# Patient Record
Sex: MALE | Race: WHITE | NOT HISPANIC OR LATINO | Employment: OTHER | URBAN - METROPOLITAN AREA
[De-identification: names, ages, dates, MRNs, and addresses within clinical notes are randomized per-mention and may not be internally consistent; named-entity substitution may affect disease eponyms.]

---

## 2017-01-12 ENCOUNTER — OFFICE VISIT (OUTPATIENT)
Dept: FAMILY MEDICINE | Facility: CLINIC | Age: 82
End: 2017-01-12
Payer: MEDICARE

## 2017-01-12 VITALS
HEIGHT: 65 IN | SYSTOLIC BLOOD PRESSURE: 138 MMHG | WEIGHT: 194.44 LBS | HEART RATE: 76 BPM | BODY MASS INDEX: 32.4 KG/M2 | DIASTOLIC BLOOD PRESSURE: 82 MMHG | RESPIRATION RATE: 12 BRPM

## 2017-01-12 DIAGNOSIS — Z28.39 IMMUNIZATION DEFICIENCY: ICD-10-CM

## 2017-01-12 DIAGNOSIS — N40.0 BENIGN NODULAR PROSTATIC HYPERPLASIA WITHOUT LOWER URINARY TRACT SYMPTOMS: ICD-10-CM

## 2017-01-12 DIAGNOSIS — G47.33 OSA (OBSTRUCTIVE SLEEP APNEA): ICD-10-CM

## 2017-01-12 DIAGNOSIS — I25.10 ASCVD (ARTERIOSCLEROTIC CARDIOVASCULAR DISEASE): ICD-10-CM

## 2017-01-12 DIAGNOSIS — G30.9 DEMENTIA IN ALZHEIMER'S DISEASE: ICD-10-CM

## 2017-01-12 DIAGNOSIS — Z74.09 IMPAIRED MOBILITY AND ACTIVITIES OF DAILY LIVING: ICD-10-CM

## 2017-01-12 DIAGNOSIS — F51.01 PRIMARY INSOMNIA: ICD-10-CM

## 2017-01-12 DIAGNOSIS — E55.9 VITAMIN D INSUFFICIENCY: ICD-10-CM

## 2017-01-12 DIAGNOSIS — Z78.9 IMPAIRED MOBILITY AND ACTIVITIES OF DAILY LIVING: ICD-10-CM

## 2017-01-12 DIAGNOSIS — M15.9 PRIMARY OSTEOARTHRITIS INVOLVING MULTIPLE JOINTS: Primary | ICD-10-CM

## 2017-01-12 DIAGNOSIS — F02.80 DEMENTIA IN ALZHEIMER'S DISEASE: ICD-10-CM

## 2017-01-12 DIAGNOSIS — Z12.5 SCREENING FOR PROSTATE CANCER: ICD-10-CM

## 2017-01-12 LAB — COMPLEXED PSA SERPL-MCNC: 0.75 NG/ML

## 2017-01-12 PROCEDURE — 84153 ASSAY OF PSA TOTAL: CPT

## 2017-01-12 PROCEDURE — G0009 ADMIN PNEUMOCOCCAL VACCINE: HCPCS | Mod: S$GLB,,, | Performed by: FAMILY MEDICINE

## 2017-01-12 PROCEDURE — 1160F RVW MEDS BY RX/DR IN RCRD: CPT | Mod: S$GLB,,, | Performed by: FAMILY MEDICINE

## 2017-01-12 PROCEDURE — 1157F ADVNC CARE PLAN IN RCRD: CPT | Mod: S$GLB,,, | Performed by: FAMILY MEDICINE

## 2017-01-12 PROCEDURE — 99499 UNLISTED E&M SERVICE: CPT | Mod: S$GLB,,, | Performed by: FAMILY MEDICINE

## 2017-01-12 PROCEDURE — 90670 PCV13 VACCINE IM: CPT | Mod: S$GLB,,, | Performed by: FAMILY MEDICINE

## 2017-01-12 PROCEDURE — 82306 VITAMIN D 25 HYDROXY: CPT

## 2017-01-12 PROCEDURE — 99999 PR PBB SHADOW E&M-EST. PATIENT-LVL III: CPT | Mod: PBBFAC,,, | Performed by: FAMILY MEDICINE

## 2017-01-12 PROCEDURE — 99213 OFFICE O/P EST LOW 20 MIN: CPT | Mod: 25,S$GLB,, | Performed by: FAMILY MEDICINE

## 2017-01-12 PROCEDURE — 1159F MED LIST DOCD IN RCRD: CPT | Mod: S$GLB,,, | Performed by: FAMILY MEDICINE

## 2017-01-12 PROCEDURE — 1126F AMNT PAIN NOTED NONE PRSNT: CPT | Mod: S$GLB,,, | Performed by: FAMILY MEDICINE

## 2017-01-12 RX ORDER — NITROGLYCERIN 0.4 MG/1
0.4 TABLET SUBLINGUAL EVERY 5 MIN PRN
Refills: 2 | COMMUNITY
Start: 2016-10-05

## 2017-01-12 RX ORDER — SACUBITRIL AND VALSARTAN 24; 26 MG/1; MG/1
TABLET, FILM COATED ORAL
Refills: 5 | COMMUNITY
Start: 2017-01-02 | End: 2018-08-09 | Stop reason: ALTCHOICE

## 2017-01-12 RX ORDER — CEPHALEXIN 500 MG/1
CAPSULE ORAL
Refills: 0 | COMMUNITY
Start: 2016-11-03 | End: 2017-01-12 | Stop reason: ALTCHOICE

## 2017-01-12 RX ORDER — TAMSULOSIN HYDROCHLORIDE 0.4 MG/1
CAPSULE ORAL
Qty: 90 CAPSULE | Refills: 3 | Status: SHIPPED | OUTPATIENT
Start: 2017-01-12 | End: 2018-02-08 | Stop reason: SDUPTHER

## 2017-01-12 RX ORDER — MEMANTINE HYDROCHLORIDE 5 MG/1
5 TABLET ORAL 2 TIMES DAILY
Qty: 180 TABLET | Refills: 3 | Status: SHIPPED | OUTPATIENT
Start: 2017-01-12 | End: 2018-01-22 | Stop reason: SDUPTHER

## 2017-01-12 RX ORDER — ENOXAPARIN SODIUM 100 MG/ML
INJECTION SUBCUTANEOUS
Refills: 1 | COMMUNITY
Start: 2016-10-24 | End: 2017-01-12 | Stop reason: ALTCHOICE

## 2017-01-12 RX ORDER — IPRATROPIUM BROMIDE AND ALBUTEROL SULFATE 2.5; .5 MG/3ML; MG/3ML
SOLUTION RESPIRATORY (INHALATION)
Refills: 2 | COMMUNITY
Start: 2016-10-19 | End: 2017-07-21

## 2017-01-12 RX ORDER — SILVER 2" X 2"
BANDAGE TOPICAL
Refills: 0 | COMMUNITY
Start: 2016-10-28 | End: 2017-01-12 | Stop reason: ALTCHOICE

## 2017-01-12 NOTE — PROGRESS NOTES
Subjective:       Patient ID: Reese Bell is a 85 y.o. male.    Chief Complaint: Medication Refill    Medication Refill   This is a chronic problem. The problem has been unchanged. Associated symptoms include arthralgias and joint swelling. Pertinent negatives include no abdominal pain, chest pain, congestion, coughing, myalgias, nausea, neck pain, rash, sore throat or vomiting.     Review of Systems   Constitutional: Negative for appetite change.   HENT: Negative for congestion, ear pain, sneezing and sore throat.    Eyes: Negative for redness and visual disturbance.   Respiratory: Positive for chest tightness and shortness of breath. Negative for cough and stridor.    Cardiovascular: Positive for leg swelling. Negative for chest pain.   Gastrointestinal: Negative for abdominal pain, blood in stool, diarrhea, nausea and vomiting.   Genitourinary: Positive for difficulty urinating. Negative for dysuria and hematuria.        Nocturia X1   Musculoskeletal: Positive for arthralgias, gait problem and joint swelling. Negative for back pain, myalgias and neck pain.   Skin: Negative for rash.   Neurological: Negative for dizziness.   Psychiatric/Behavioral: Negative for agitation. The patient is not nervous/anxious.        Objective:      Physical Exam   Constitutional: He is oriented to person, place, and time. He appears well-developed and well-nourished.   HENT:   Head: Normocephalic.   Eyes: Pupils are equal, round, and reactive to light.   Neck: Normal range of motion. Neck supple. No thyromegaly present.   Cardiovascular: Normal rate and regular rhythm.  Exam reveals no friction rub.    No murmur heard.  Pulmonary/Chest: Effort normal. No respiratory distress. He has no wheezes.   Abdominal: There is no tenderness. There is no rebound and no guarding.   Musculoskeletal: He exhibits tenderness and deformity. He exhibits no edema.   Poor mobility from his knee pain   Lymphadenopathy:     He has no cervical  adenopathy.   Neurological: He is alert and oriented to person, place, and time. He has normal reflexes. No cranial nerve deficit.   Skin: Skin is warm and dry.   Psychiatric: He has a normal mood and affect. Judgment and thought content normal.       Assessment:       1. Primary osteoarthritis involving multiple joints    2. ASCVD (arteriosclerotic cardiovascular disease)    3. FRIDA (obstructive sleep apnea)    4. Primary insomnia    5. Benign nodular prostatic hyperplasia without lower urinary tract symptoms        Plan:   Reese RAUSCH was seen today for medication refill.    Diagnoses and all orders for this visit:    Primary osteoarthritis involving multiple joints    ASCVD (arteriosclerotic cardiovascular disease)    FRIDA (obstructive sleep apnea)    Primary insomnia    Benign nodular prostatic hyperplasia without lower urinary tract symptoms    Pt needs a W/C for assistance

## 2017-01-12 NOTE — MR AVS SNAPSHOT
[facility]  [address]  [facility] [address]  Phone: [phone]  Fax: [phone]                  Reese Bell   2017 8:00 AM   Office Visit    Description:  Male : 1931   Provider:  Oracio Johnson MD   Department:  [facility]           Reason for Visit     Medication Refill           Diagnoses this Visit        Comments    Primary osteoarthritis involving multiple joints    -  Primary     ASCVD (arteriosclerotic cardiovascular disease)         FRIDA (obstructive sleep apnea)         Primary insomnia         Benign nodular prostatic hyperplasia without lower urinary tract symptoms         Dementia in Alzheimer's disease         Vitamin D insufficiency         Screening for prostate cancer         Immunization deficiency                To Do List           Goals (5 Years of Data)     None       These Medications        Disp Refills Start End    memantine (NAMENDA) 5 MG Tab 180 tablet 3 2017     Take 1 tablet (5 mg total) by mouth 2 (two) times daily. - Oral    Pharmacy: [facility]/pharmacy #[id number] [place], [place] - [address] Ph #: [phone]       tamsulosin (FLOMAX) 0.4 mg Cp24 90 capsule 3 2017     Can take one at bedtime for bladder & prostate to ease urination    Pharmacy: [facility]/pharmacy #[id number] - [place], [place] - [address] Ph #: [phone]         [facility] On Call     [facility] On Call Nurse Care Line -  Assistance  Registered nurses in the [facility] provide clinical advisement, health education, appointment booking, and other advisory services.  Call for this free service at [phone].             Medications           Message regarding Medications     Verify the changes and/or additions to your medication regime listed below are the same as discussed with your clinician today.  If any of these changes or additions are incorrect, please notify your healthcare provider.        STOP taking these medications      cephALEXin (KEFLEX) 500 MG capsule TAKE 1 CAPSULE ORALLY 3 TIMES A DAY.    enoxaparin (LOVENOX) 80 mg/0.8 mL Syrg SYRINGE (SUBCUTANEOUS) ONCE A DAY.    IODOSORB 0.9 % gel APPLY A SMALL AMOUNT TO EACH NOSTRIL 2 TIMES A DAY STARTING 2 DAYS BEFORE PROCEDURE.    TENS units (TENS 502) Nenita 1 each by Misc.(Non-Drug; Combo Route) route as needed.           Verify that the below list of medications is an accurate representation of the medications you are currently taking.  If none reported, the list may be blank. If incorrect, please contact your healthcare provider. Carry this list with you in case of emergency.           Current Medications     albuterol-ipratropium 2.5mg-0.5mg/3mL (DUO-NEB) 0.5 mg-3 mg(2.5 mg base)/3 mL nebulizer solution TAKE 3 ML (INHALATION) 2 TIMES A DAY AS NEEDED FOR WHEEZING    carvedilol (COREG) 25 MG tablet Take 12.5 mg by mouth 2 (two) times daily with meals.     clopidogrel (PLAVIX) 75 mg tablet Take 75 mg by mouth every evening.     COD LIVER OIL ORAL Take 1 tablet by mouth once daily.    CRESTOR 5 mg tablet 1 tablet. Take 1 tablet on Monday and 1 on Wednesdays    ENTRESTO 24-26 mg per tablet TAKE 1 TABLET ORALLY 2 TIMES A DAY.    furosemide (LASIX) 40 MG tablet Take 40 mg by mouth 2 (two) times daily.     memantine (NAMENDA) 5 MG Tab Take 1 tablet (5 mg total) by mouth 2 (two) times daily.    nitroGLYCERIN (NITROSTAT) 0.4 MG SL tablet TAKE 1 TABLET SUBLINGUALLY EVERY 5 MINS X 3 AS NEEDED FOR CHEST PAIN    RANEXA 1,000 mg Tb12 Take 1,000 mg by mouth 2 (two) times daily.     tamsulosin (FLOMAX) 0.4 mg Cp24 Can take one at bedtime for bladder & prostate to ease urination    walker Misc Quad walker with  wheels and a seat & brakes    warfarin (COUMADIN) 5 MG tablet Take 5 mg by mouth. As directed.           Clinical Reference Information           Vital Signs - Last Recorded  Most recent update: 1/12/2017  8:23 AM by Rosanna Stein MA    BP Pulse Resp Ht Wt BMI    138/82 (BP Location: Left arm,  "Patient Position: Sitting, BP Method: Manual) 76 12 5' 5" (1.651 m) 88.2 kg (194 lb 7.1 oz) 32.36 kg/m2      Blood Pressure          Most Recent Value    BP  138/82      Allergies as of 1/12/2017     No Known Allergies      Immunizations Administered on Date of Encounter - 1/12/2017     Name Date Dose VIS Date Route    Pneumococcal Conjugate - 13 Valent  Incomplete 0.5 mL 11/5/2015 Intramuscular      Orders Placed During Today's Visit      Normal Orders This Visit    Pneumococcal Conjugate Vaccine (13 Valent) (IM)     Future Labs/Procedures Expected by Expires    PSA, Screening  1/12/2017 1/12/2018    Vitamin D  1/12/2017 1/12/2018      [facility] Sign-Up     Activating your [facility] account is as easy as 1-2-3!     1) Visit [email or web address], select Sign Up Now, enter this activation code and your date of birth, then select Next.  [email or web address]  Expires: 2/26/2017  8:34 AM      2) Create a username and password to use when you visit [facility] in the future and select a security question in case you lose your password and select Next.    3) Enter your e-mail address and click Sign Up!    Additional Information  If you have questions, please e-mail [email or web address] or call [phone] to talk to our [facility] staff. Remember, [facility] is NOT to be used for urgent needs. For medical emergencies, dial 911.         "

## 2017-01-13 LAB — 25(OH)D3+25(OH)D2 SERPL-MCNC: 30 NG/ML

## 2017-01-29 ENCOUNTER — TELEMEDICINE (OUTPATIENT)
Dept: TELEMEDICINE | Facility: OTHER | Age: 82
End: 2017-01-29
Payer: MEDICARE

## 2017-01-29 DIAGNOSIS — R29.90 EPISODE OF TRANSIENT NEUROLOGIC SYMPTOMS: ICD-10-CM

## 2017-01-29 PROCEDURE — 99499 UNLISTED E&M SERVICE: CPT | Mod: GT,,, | Performed by: PSYCHIATRY & NEUROLOGY

## 2017-01-29 PROCEDURE — G0425 INPT/ED TELECONSULT30: HCPCS | Mod: GT,,, | Performed by: PSYCHIATRY & NEUROLOGY

## 2017-01-29 NOTE — TELEMEDICINE CONSULT
[facility]/Vascular Neurology  Tele-Consult    Consultation started: 1/29/2017 at 1:48 PM   Consulting Provider:  Dr. Mendoza  The patient location is [facility] Emergency Department  Spoke hospital nurse at bedside with patient assisting consultant.     Subjective:   Subjective   History of Present Illness:  Reese Bell is a 85 y.o. male who presents with sudden onset of weakness after standing up possibby more on the l than the right.  NO clear triggers, has not had in the past. Has not been treated.  Has not improved.    Wake up Stroke?: no  Last known normal:  11 am  Recent bleeding noted: no  Does the patient take any Blood Thinners? yes           H&P was obtained from patient.  Past Medical History: hypertension, diabetes and CAD    Medications:   Current Outpatient Prescriptions:     albuterol-ipratropium 2.5mg-0.5mg/3mL (DUO-NEB) 0.5 mg-3 mg(2.5 mg base)/3 mL nebulizer solution, TAKE 3 ML (INHALATION) 2 TIMES A DAY AS NEEDED FOR WHEEZING, Disp: , Rfl: 2    carvedilol (COREG) 25 MG tablet, Take 12.5 mg by mouth 2 (two) times daily with meals. , Disp: , Rfl:     clopidogrel (PLAVIX) 75 mg tablet, Take 75 mg by mouth every evening. , Disp: , Rfl:     COD LIVER OIL ORAL, Take 1 tablet by mouth once daily., Disp: , Rfl:     CRESTOR 5 mg tablet, 1 tablet. Take 1 tablet on Monday and 1 on Wednesdays, Disp: , Rfl:     ENTRESTO 24-26 mg per tablet, TAKE 1 TABLET ORALLY 2 TIMES A DAY., Disp: , Rfl: 5    furosemide (LASIX) 40 MG tablet, Take 40 mg by mouth 2 (two) times daily. , Disp: , Rfl:     memantine (NAMENDA) 5 MG Tab, Take 1 tablet (5 mg total) by mouth 2 (two) times daily., Disp: 180 tablet, Rfl: 3    nitroGLYCERIN (NITROSTAT) 0.4 MG SL tablet, TAKE 1 TABLET SUBLINGUALLY EVERY 5 MINS X 3 AS NEEDED FOR CHEST PAIN, Disp: , Rfl: 2    RANEXA 1,000 mg Tb12, Take 1,000 mg by mouth 2 (two) times daily. , Disp: , Rfl:     tamsulosin (FLOMAX) 0.4 mg Cp24, Can take one at bedtime for bladder &  prostate to ease urination, Disp: 90 capsule, Rfl: 3    walker Misc, Quad walker with  wheels and a seat & brakes, Disp: 1 each, Rfl: 0    warfarin (COUMADIN) 5 MG tablet, Take 5 mg by mouth. As directed., Disp: , Rfl:     Allergies: Review of patient's allergies indicates:  No Known Allergies    Objective:     Vitals: There were no vitals filed for this visit.     Objective   Physical Exam:  General: well developed, well nourished   HENT: Head:normocephalic and atraumatic   HENT: Ears: right ear normal and left ear normal  Nose: normal nares and no discharge  Eyes:conjunctivae/corneas clear. PERRL.  Neck: normal, no obvious masses and trachea to midline  Lungs: normal respiratory effort  Cardiovascular: Heart: regular rate and rhythm   Cardiovascular: Extremities: no cyanosis, no edema and no clubbing  Abdomen: appears normal and not distended  Skin:  skin color and texture normal, no rash and no bruises  Musculoskeletal: normal range of motion in all extremities  Psych/Behavioral: appropriate affect       Imaging Notes: No hemmorhage. No mass effect. No early infarct signs. Impression performed at: 1:50 pm    NIH Stroke Scale:  Interval: baseline (upon arrival/admit)  Level of Consciousness: 0 - alert  LOC Questions: 0 - answers both correctly  LOC Commands: 0 - performs both correctly  Best Gaze: 0 - normal  Visual: 0 - no visual loss  Facial Palsy: 0 - normal  Motor Left Arm: 0 - no drift  Motor Right Arm: 0 - no drift  Motor Left Le - no drift  Motor Right Le - no drift  Limb Ataxia: 0 - absent  Sensory: 0 - normal  Best Language: 0 - no aphasia  Dysarthria: 0 - normal articulation  Extinction and Inattention: 0 - no neglect  NIH Stroke Scale Total: 0  [place] Coma Scale:  Best Eye Response: 4 - spontaneous  Best Motor Response: 6 - obeys commands  Best Verbal Response: 5 - oriented  [place] Coma Scale Total: 15        Assessment - Diagnosis - Goals:     Plan     Diagnosis/Impression: 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 Diagnostic studies: HgbA1C to assess blood glucose levels, Lipid Profile to assess cholesterol levels, MRA head to assess vasculature, MRA neck/arch to assess vasculature, MRI head without contrast to assess brain parenchyma, Trans-thoracic cardiac echo to assess cardiac function/status  Consults: Rehab Consult; Occupational Therapy, Rehab Consult; Physical Therapy and Rehab Consult; Speech Therapy  Antithrombotics: Warfarin INR adjusted target  Statins: Atorvastatin- 40 mg oral daily    Disposition Recommendation:  admit to inpatient       Possible Interventional Revascularization Candidate? No; No large vessel occlusion    Recommended the emergency room physician to have a brief discussion with the patient and/or family if available regarding the risks and benefits of treatment, and to briefly document the occurrence of that discussion in his clinical encounter note.     The attending portion of this evaluation, treatment, and documentation was performed per Arden Arteaga via audiovisual.      Billing code:  (non-stroke, some mimics)    · This patient has neurological symptom(s)/condition/illness, with minimal potential for morbidity and mortality.  · There is a low probability for acute neurological change leading to clinical and possibly life-threatening deterioration requiring highest level of physician preparedness for urgent intervention.  · Care was coordinated with other physicians involved in the patient's care.  · Radiologic studies and laboratory data were reviewed and interpreted, and plan of care was re-assessed based on the results.  Diagnosis, treatment options and prognosis may have been discussed with the patient and/or family members or caregiver.      Consultation ended: 1/29/2017 at 1:55 pm    Arden Arteaga MD  Vascular and Interventional Neurology  Staff  Director of Comprehensive Stroke Center  [facility]  [phone]

## 2017-01-29 NOTE — TELEMEDICINE CONSULT
Duplicate encounter.    Arden Arteaga MD  Vascular and Interventional Neurology Staff  Director of [facility] Stroke Center  [facility]  [phone]

## 2017-02-23 ENCOUNTER — TELEPHONE (OUTPATIENT)
Dept: FAMILY MEDICINE | Facility: CLINIC | Age: 82
End: 2017-02-23

## 2017-02-23 DIAGNOSIS — G47.33 OSA (OBSTRUCTIVE SLEEP APNEA): Primary | ICD-10-CM

## 2017-02-23 NOTE — TELEPHONE ENCOUNTER
----- Message from Sen Coronado sent at 2017  9:39 AM CST -----  Contact: Pam @ [facility]  Reese Bell  MRN: [id number]  : 1931  PCP: Oracio Johnson  Home Phone      [phone]  Work Phone      Not on file.  Mobile          Not on file.      MESSAGE: requesting order for C-pap machine, mask & supplies (with refills good for a yr) -- along with order, send diagnosis code & last office notes -- fax to [phone]    PCP: Alex

## 2017-02-24 ENCOUNTER — TELEPHONE (OUTPATIENT)
Dept: FAMILY MEDICINE | Facility: CLINIC | Age: 82
End: 2017-02-24

## 2017-02-24 DIAGNOSIS — G47.33 OSA (OBSTRUCTIVE SLEEP APNEA): Primary | ICD-10-CM

## 2017-02-24 NOTE — TELEPHONE ENCOUNTER
Pts wife stated he only needs a rx for the mask sent to [facility].Please advise. She can't remember where his sleep study was done

## 2017-02-24 NOTE — TELEPHONE ENCOUNTER
Not that smooth-ask his wife when & where was his sleep study done and by whom--I need those results CPAP setting for ____Cm of H20 as a first step

## 2017-03-20 ENCOUNTER — LAB VISIT (OUTPATIENT)
Dept: LAB | Facility: HOSPITAL | Age: 82
End: 2017-03-20
Attending: SPECIALIST
Payer: MEDICARE

## 2017-03-20 DIAGNOSIS — D51.0 PERNICIOUS ANEMIA: Primary | ICD-10-CM

## 2017-03-20 LAB
BASOPHILS # BLD AUTO: 0.01 K/UL
BASOPHILS # BLD AUTO: 0.01 K/UL
BASOPHILS NFR BLD: 0.2 %
BASOPHILS NFR BLD: 0.2 %
DIFFERENTIAL METHOD: ABNORMAL
DIFFERENTIAL METHOD: ABNORMAL
EOSINOPHIL # BLD AUTO: 0.3 K/UL
EOSINOPHIL # BLD AUTO: 0.3 K/UL
EOSINOPHIL NFR BLD: 4.8 %
EOSINOPHIL NFR BLD: 4.8 %
ERYTHROCYTE [DISTWIDTH] IN BLOOD BY AUTOMATED COUNT: 13.6 %
ERYTHROCYTE [DISTWIDTH] IN BLOOD BY AUTOMATED COUNT: 13.6 %
HCT VFR BLD AUTO: 43.8 %
HCT VFR BLD AUTO: 43.8 %
HGB BLD-MCNC: 14.8 G/DL
HGB BLD-MCNC: 14.8 G/DL
LYMPHOCYTES # BLD AUTO: 0.7 K/UL
LYMPHOCYTES # BLD AUTO: 0.7 K/UL
LYMPHOCYTES NFR BLD: 11.9 %
LYMPHOCYTES NFR BLD: 11.9 %
MCH RBC QN AUTO: 32.2 PG
MCH RBC QN AUTO: 32.2 PG
MCHC RBC AUTO-ENTMCNC: 33.8 %
MCHC RBC AUTO-ENTMCNC: 33.8 %
MCV RBC AUTO: 95 FL
MCV RBC AUTO: 95 FL
MONOCYTES # BLD AUTO: 0.6 K/UL
MONOCYTES # BLD AUTO: 0.6 K/UL
MONOCYTES NFR BLD: 9.7 %
MONOCYTES NFR BLD: 9.7 %
NEUTROPHILS # BLD AUTO: 4.3 K/UL
NEUTROPHILS # BLD AUTO: 4.3 K/UL
NEUTROPHILS NFR BLD: 73.4 %
NEUTROPHILS NFR BLD: 73.4 %
PATH REV BLD -IMP: NORMAL
PATH REV BLD -IMP: NORMAL
PLATELET # BLD AUTO: 120 K/UL
PLATELET # BLD AUTO: 120 K/UL
PMV BLD AUTO: 8.6 FL
PMV BLD AUTO: 8.6 FL
RBC # BLD AUTO: 4.6 M/UL
RBC # BLD AUTO: 4.6 M/UL
WBC # BLD AUTO: 5.88 K/UL
WBC # BLD AUTO: 5.88 K/UL

## 2017-03-20 PROCEDURE — 85025 COMPLETE CBC W/AUTO DIFF WBC: CPT

## 2017-03-20 PROCEDURE — 36415 COLL VENOUS BLD VENIPUNCTURE: CPT

## 2017-03-20 PROCEDURE — 83921 ORGANIC ACID SINGLE QUANT: CPT

## 2017-03-24 LAB — METHYLMALONATE SERPL-SCNC: 0.19 UMOL/L

## 2017-05-11 ENCOUNTER — TELEPHONE (OUTPATIENT)
Dept: FAMILY MEDICINE | Facility: CLINIC | Age: 82
End: 2017-05-11

## 2017-05-11 DIAGNOSIS — M54.41 CHRONIC MIDLINE LOW BACK PAIN WITH BILATERAL SCIATICA: Primary | ICD-10-CM

## 2017-05-11 DIAGNOSIS — M54.42 CHRONIC MIDLINE LOW BACK PAIN WITH BILATERAL SCIATICA: Primary | ICD-10-CM

## 2017-05-11 DIAGNOSIS — G89.29 CHRONIC MIDLINE LOW BACK PAIN WITH BILATERAL SCIATICA: Primary | ICD-10-CM

## 2017-05-11 PROBLEM — D89.89 KAPPA LIGHT CHAIN DISEASE: Chronic | Status: ACTIVE | Noted: 2017-05-11

## 2017-05-11 PROBLEM — D47.2 MGUS (MONOCLONAL GAMMOPATHY OF UNKNOWN SIGNIFICANCE): Chronic | Status: ACTIVE | Noted: 2017-05-11

## 2017-05-11 PROBLEM — D69.6 THROMBOCYTOPENIA: Chronic | Status: ACTIVE | Noted: 2017-05-11

## 2017-05-11 NOTE — TELEPHONE ENCOUNTER
----- Message from Iman Alan sent at 2017  2:03 PM CDT -----  Contact: JAZMYN / DAUGHTER  Reese Bell  MRN: [id number]  : 1931  PCP: Oracio Johnson  Home Phone      [phone]  Work Phone      Not on file.  Mobile          Not on file.      MESSAGE: PT IS REQUESTING TO HAVE ORDERS FOR HOME HEALTH PUT IN FOR PT. THEY ARE REQUESTING FOR "[employer] [facility]. PLEASE CALL TO DISCUSS.     PHONE: [phone]

## 2017-05-15 ENCOUNTER — TELEPHONE (OUTPATIENT)
Dept: FAMILY MEDICINE | Facility: CLINIC | Age: 82
End: 2017-05-15

## 2017-05-16 ENCOUNTER — TELEPHONE (OUTPATIENT)
Dept: FAMILY MEDICINE | Facility: CLINIC | Age: 82
End: 2017-05-16

## 2017-05-16 NOTE — TELEPHONE ENCOUNTER
----- Message from Anabel Brown sent at 5/15/2017 12:29 PM CDT -----  Contact: [facility]ie/ [facility]  Reese Bell  MRN: [id number]  : 1931  PCP: Oracio Johnson  Home Phone      [phone]  Work Phone      Not on file.  Mobile          Not on file.      MESSAGE:   Referral was sent there but approval has to come to them from [facility] would like to know if paperwork was sent to them yet?    Phone:  [phone]

## 2017-05-19 ENCOUNTER — TELEPHONE (OUTPATIENT)
Dept: FAMILY MEDICINE | Facility: CLINIC | Age: 82
End: 2017-05-19

## 2017-05-19 NOTE — TELEPHONE ENCOUNTER
----- Message from Sen Coronado sent at 2017  2:33 PM CDT -----  Contact: Marifer @ [employer]  Reese Bell  MRN: [id number]  : 1931  PCP: Oracio Johnson  Home Phone      [phone]  Work Phone      Not on file.  Mobile          Not on file.      MESSAGE: received request for home health services -- Needs additional verification     Call Marifer @ [phone]    PCP: Alex

## 2017-05-22 ENCOUNTER — TELEPHONE (OUTPATIENT)
Dept: FAMILY MEDICINE | Facility: CLINIC | Age: 82
End: 2017-05-22

## 2017-05-22 DIAGNOSIS — I50.9 CHRONIC CONGESTIVE HEART FAILURE, UNSPECIFIED CONGESTIVE HEART FAILURE TYPE: Primary | ICD-10-CM

## 2017-05-22 DIAGNOSIS — M17.0 OSTEOARTHRITIS OF BOTH KNEES, UNSPECIFIED OSTEOARTHRITIS TYPE: ICD-10-CM

## 2017-05-22 NOTE — TELEPHONE ENCOUNTER
"Please add another PT referral to just say " evaluate and treat"    [facility] will not accept it having 60 as duration and 3 as frequency       Please fax referral and chart notes  "

## 2017-05-23 NOTE — TELEPHONE ENCOUNTER
Spoke to Marifer at [facility]--need orders for H/H need to state H/H eval and treat, specific services requested and qualifying dx.    Spoke to Mikala--will speak to her sister for more info and call us back--may not need H/H at this time.

## 2017-05-25 ENCOUNTER — TELEPHONE (OUTPATIENT)
Dept: FAMILY MEDICINE | Facility: CLINIC | Age: 82
End: 2017-05-25

## 2017-05-25 NOTE — TELEPHONE ENCOUNTER
----- Message from Iman Alan sent at 2017  1:20 PM CDT -----  Contact: [facility]  Reese Bell  MRN: [id number]  : 1931  PCP: Oracio Johnson  Home Phone      [phone]  Work Phone      Not on file.  Mobile          Not on file.      MESSAGE: NEEDS TO CLARIFY HOME HEALTH ORDERS, PLEASE CALL     PHONE: [phone]

## 2017-06-01 ENCOUNTER — TELEPHONE (OUTPATIENT)
Dept: FAMILY MEDICINE | Facility: CLINIC | Age: 82
End: 2017-06-01

## 2017-06-01 NOTE — TELEPHONE ENCOUNTER
----- Message from Sen Coronado sent at 2017 11:40 AM CDT -----  Contact: Daughter - Mikala Bell  MRN: [id number]  : 1931  PCP: Oracio Johnson  Home Phone      [phone]  Work Phone      Not on file.  Mobile          Not on file.      MESSAGE: would like to speak with nurse Re: Home Health services - also, about getting a rollator    Call [phone]    PCP: Alex

## 2017-07-20 ENCOUNTER — TELEPHONE (OUTPATIENT)
Dept: FAMILY MEDICINE | Facility: CLINIC | Age: 82
End: 2017-07-20

## 2017-07-20 DIAGNOSIS — G47.33 OSA (OBSTRUCTIVE SLEEP APNEA): Primary | ICD-10-CM

## 2017-07-20 NOTE — TELEPHONE ENCOUNTER
----- Message from Sen Coronado sent at 2017  3:31 PM CDT -----  Contact: Kinsey @ [facility]  Reese Bell  MRN: [id number]  : 1931  PCP: Oracio Johnson  Home Phone      [phone]  Work Phone      Not on file.  Mobile          Not on file.      MESSAGE: having problems with C-Pap machine -- requesting order for repair & replace -- also, order for supplies has , so he needs new order for that --- fax to [phone]    PCP: Alex

## 2017-07-20 NOTE — TELEPHONE ENCOUNTER
having problems with C-Pap machine -- requesting order for repair & replace -- also, order for supplies has , so he needs new order for that --- fax to [phone]  Will also need medical necessity form/[facility]

## 2017-07-21 ENCOUNTER — OFFICE VISIT (OUTPATIENT)
Dept: FAMILY MEDICINE | Facility: CLINIC | Age: 82
End: 2017-07-21
Payer: MEDICARE

## 2017-07-21 VITALS
BODY MASS INDEX: 33.09 KG/M2 | RESPIRATION RATE: 20 BRPM | SYSTOLIC BLOOD PRESSURE: 110 MMHG | WEIGHT: 198.63 LBS | HEIGHT: 65 IN | DIASTOLIC BLOOD PRESSURE: 72 MMHG | HEART RATE: 100 BPM

## 2017-07-21 DIAGNOSIS — J01.90 ACUTE SINUSITIS, RECURRENCE NOT SPECIFIED, UNSPECIFIED LOCATION: Primary | ICD-10-CM

## 2017-07-21 PROCEDURE — 99213 OFFICE O/P EST LOW 20 MIN: CPT | Mod: S$GLB,,, | Performed by: FAMILY MEDICINE

## 2017-07-21 PROCEDURE — 1159F MED LIST DOCD IN RCRD: CPT | Mod: S$GLB,,, | Performed by: FAMILY MEDICINE

## 2017-07-21 PROCEDURE — 99999 PR PBB SHADOW E&M-EST. PATIENT-LVL II: CPT | Mod: PBBFAC,,, | Performed by: FAMILY MEDICINE

## 2017-07-21 RX ORDER — BENZONATATE 200 MG/1
200 CAPSULE ORAL 3 TIMES DAILY PRN
Qty: 20 CAPSULE | Refills: 2 | Status: SHIPPED | OUTPATIENT
Start: 2017-07-21 | End: 2017-07-28

## 2017-07-21 RX ORDER — APIXABAN 2.5 MG/1
TABLET, FILM COATED ORAL 2 TIMES DAILY
Status: ON HOLD | COMMUNITY
End: 2019-08-20 | Stop reason: HOSPADM

## 2017-07-21 RX ORDER — AMOXICILLIN 875 MG/1
875 TABLET, FILM COATED ORAL 2 TIMES DAILY
Qty: 14 TABLET | Refills: 0 | Status: SHIPPED | OUTPATIENT
Start: 2017-07-21 | End: 2017-07-31

## 2017-07-21 RX ORDER — CETIRIZINE HYDROCHLORIDE 10 MG/1
10 TABLET ORAL DAILY
Qty: 30 TABLET | Refills: 5 | Status: SHIPPED | OUTPATIENT
Start: 2017-07-21 | End: 2017-09-25

## 2017-07-21 NOTE — PROGRESS NOTES
Chief complaint: Sinus congestion    History of present illness: Pt is 86 y.o. male complaints of sinus congestion, facial pressure, sore throat, ear ache.  Patient states glands are swollen and neck.  Patient has a cough.  This started 5 days ago.  Patient denies chest pain, shortness of breath, fever.  Patient has itchy watery eyes, itchy scratchy throat.  The patient complains of decreased hearing.  Cough is productive of yellow mucus.  Patient has congestive heart failure.  His medications were reviewed.    Review of systems:  Constitutional-no weight loss, weight gain  HEENT-allergy symptoms such as itchy watery eyes, post nasal drip, itchy palate, come and go.  Respiratory-no wheezing, see history of present illness  Neurological-no weakness or numbness    Past medical history, family history, social history-same as note dated today    Medications-all reviewed and verified in nurses notes.    Physical exam: Vital signs-reviewed and verified in nurses notes  Gen.-alert, oriented, no apparent distress.  Coughing.  Head-positive facial tenderness over the frontal and maxillary sinuses  Eyes: Pupils equal round reactive to light and accommodation, extraocular muscles intact, conjunctiva clear  Ears: Tympanic membranes are clear and mobile, no fluid present.  Nose: Injected mucous membranes, erythematous, mucopurulent discharge  Throat:  Injected red streaky mucosa,  tonsils normal  Neck: Shotty, tender anterior lymphadenopathy  Heart: Regular rate and rhythm, no murmurs, rubs or gallops  Lungs:Lungs were clear to auscultation and percussion, and with normal diaphragmatic excursion. No wheezes or rales were noted.     Assessment/Plan:   Acute sinusitis, recurrence not specified, unspecified location  -     amoxicillin (AMOXIL) 875 MG tablet; Take 1 tablet (875 mg total) by mouth 2 (two) times daily.  Dispense: 14 tablet; Refill: 0  -     benzonatate (TESSALON) 200 MG capsule; Take 1 capsule (200 mg total) by mouth  3 (three) times daily as needed for Cough.  Dispense: 20 capsule; Refill: 2  -     cetirizine (ZYRTEC) 10 MG tablet; Take 1 tablet (10 mg total) by mouth once daily. As needed for congestion  Dispense: 30 tablet; Refill: 5      Simply saline nasal lavage q.2 to 3 hours p.r.n.  Avoid dust, allergens, other sinus irritants.  Handwashing technique discussed to prevent spreading germs.

## 2017-07-21 NOTE — TELEPHONE ENCOUNTER
Jillian from [email or web address] contacted, called regarding the eval of the CPAP machine, start date of service is 7/21/17, end date is 8/4/17. Auth [id number]    [employer]aster expedited coverage for CPAP  Start date 7/21/17, end date 7/21/18  Auth: [id number]    Phone: [phone]

## 2017-07-27 ENCOUNTER — TELEPHONE (OUTPATIENT)
Dept: FAMILY MEDICINE | Facility: CLINIC | Age: 82
End: 2017-07-27

## 2017-08-16 ENCOUNTER — TELEPHONE (OUTPATIENT)
Dept: FAMILY MEDICINE | Facility: CLINIC | Age: 82
End: 2017-08-16

## 2017-08-16 DIAGNOSIS — Z74.09 IMPAIRED MOBILITY: Primary | ICD-10-CM

## 2017-08-16 NOTE — TELEPHONE ENCOUNTER
----- Message from Sen Coronado sent at 2017 12:49 PM CDT -----  Contact: Daughter - Mikala Bell  MRN: [id number]  : 1931  PCP: Oracio Johnson  Home Phone      [phone]  Work Phone      Not on file.  Mobile          Not on file.      MESSAGE: requesting order for arnoldo wheelchair -- old one brakes no longer work & patient fell out of it yesterday    Call [phone]    PCP: Alex

## 2017-08-17 PROBLEM — F02.80 DEMENTIA IN ALZHEIMER'S DISEASE: Status: ACTIVE | Noted: 2017-08-17

## 2017-08-17 PROBLEM — G30.9 DEMENTIA IN ALZHEIMER'S DISEASE: Status: ACTIVE | Noted: 2017-08-17

## 2017-08-17 PROBLEM — Z74.09 IMPAIRED MOBILITY AND ACTIVITIES OF DAILY LIVING: Status: ACTIVE | Noted: 2017-08-17

## 2017-08-17 PROBLEM — Z78.9 IMPAIRED MOBILITY AND ACTIVITIES OF DAILY LIVING: Status: ACTIVE | Noted: 2017-08-17

## 2017-08-17 NOTE — TELEPHONE ENCOUNTER
Please add addendum to his last office visit stating he needs a wheelchair and reason for it also. Will need this in order to complete the W/C request in order for his insurance to cover it.     ([facility] medical necessity completed, waiting on signed addendum to send with it)

## 2017-09-18 ENCOUNTER — TELEPHONE (OUTPATIENT)
Dept: FAMILY MEDICINE | Facility: CLINIC | Age: 82
End: 2017-09-18

## 2017-09-18 NOTE — TELEPHONE ENCOUNTER
----- Message from Sen Coronado sent at 2017  4:42 PM CDT -----  Contact: Wife - Arleth Bell  MRN: [id number]  : 1931  PCP: Oracio Johnson  Home Phone      [phone]  Work Phone      Not on file.  Mobile          Not on file.      MESSAGE: back pain - rt side over kidney area -- requesting appt tomorrow (Dr Johnson)    Call [phone]    PCP: Alex

## 2017-09-18 NOTE — TELEPHONE ENCOUNTER
Spoke to pts wife and scheduled pt an appt with Caro on Monday.  Advised wife that if pain gets too bad to call us to see if we have any last minute openings or go to the ER. Verbal understanding

## 2017-09-25 ENCOUNTER — APPOINTMENT (OUTPATIENT)
Dept: RADIOLOGY | Facility: CLINIC | Age: 82
End: 2017-09-25
Attending: FAMILY MEDICINE
Payer: MEDICARE

## 2017-09-25 ENCOUNTER — OFFICE VISIT (OUTPATIENT)
Dept: FAMILY MEDICINE | Facility: CLINIC | Age: 82
End: 2017-09-25
Payer: MEDICARE

## 2017-09-25 VITALS
RESPIRATION RATE: 16 BRPM | WEIGHT: 193.81 LBS | DIASTOLIC BLOOD PRESSURE: 80 MMHG | OXYGEN SATURATION: 97 % | HEART RATE: 74 BPM | BODY MASS INDEX: 32.29 KG/M2 | HEIGHT: 65 IN | SYSTOLIC BLOOD PRESSURE: 110 MMHG

## 2017-09-25 DIAGNOSIS — R21 RASH: ICD-10-CM

## 2017-09-25 DIAGNOSIS — I50.9 CHRONIC CONGESTIVE HEART FAILURE, UNSPECIFIED CONGESTIVE HEART FAILURE TYPE: ICD-10-CM

## 2017-09-25 DIAGNOSIS — G47.33 OSA (OBSTRUCTIVE SLEEP APNEA): ICD-10-CM

## 2017-09-25 DIAGNOSIS — M54.41 CHRONIC RIGHT-SIDED LOW BACK PAIN WITH RIGHT-SIDED SCIATICA: ICD-10-CM

## 2017-09-25 DIAGNOSIS — I25.10 ASCVD (ARTERIOSCLEROTIC CARDIOVASCULAR DISEASE): ICD-10-CM

## 2017-09-25 DIAGNOSIS — M54.50 ACUTE RIGHT-SIDED LOW BACK PAIN WITHOUT SCIATICA: ICD-10-CM

## 2017-09-25 DIAGNOSIS — M54.50 ACUTE RIGHT-SIDED LOW BACK PAIN WITHOUT SCIATICA: Primary | ICD-10-CM

## 2017-09-25 DIAGNOSIS — G89.29 CHRONIC RIGHT-SIDED LOW BACK PAIN WITH RIGHT-SIDED SCIATICA: ICD-10-CM

## 2017-09-25 DIAGNOSIS — M25.551 RIGHT HIP PAIN: ICD-10-CM

## 2017-09-25 PROBLEM — M54.40 CHRONIC RIGHT-SIDED LOW BACK PAIN WITH SCIATICA: Status: ACTIVE | Noted: 2017-09-25

## 2017-09-25 PROCEDURE — 3008F BODY MASS INDEX DOCD: CPT | Mod: S$GLB,,, | Performed by: FAMILY MEDICINE

## 2017-09-25 PROCEDURE — 99214 OFFICE O/P EST MOD 30 MIN: CPT | Mod: S$GLB,,, | Performed by: FAMILY MEDICINE

## 2017-09-25 PROCEDURE — 1125F AMNT PAIN NOTED PAIN PRSNT: CPT | Mod: S$GLB,,, | Performed by: FAMILY MEDICINE

## 2017-09-25 PROCEDURE — 99999 PR PBB SHADOW E&M-EST. PATIENT-LVL III: CPT | Mod: PBBFAC,,, | Performed by: FAMILY MEDICINE

## 2017-09-25 PROCEDURE — 72100 X-RAY EXAM L-S SPINE 2/3 VWS: CPT | Mod: TC,PO

## 2017-09-25 PROCEDURE — 1159F MED LIST DOCD IN RCRD: CPT | Mod: S$GLB,,, | Performed by: FAMILY MEDICINE

## 2017-09-25 PROCEDURE — 72100 X-RAY EXAM L-S SPINE 2/3 VWS: CPT | Mod: 26,,, | Performed by: RADIOLOGY

## 2017-09-25 RX ORDER — CLOTRIMAZOLE AND BETAMETHASONE DIPROPIONATE 10; .64 MG/G; MG/G
CREAM TOPICAL 2 TIMES DAILY
Qty: 45 G | Refills: 5 | Status: SHIPPED | OUTPATIENT
Start: 2017-09-25 | End: 2019-08-12 | Stop reason: CLARIF

## 2017-09-25 RX ORDER — HYDROCODONE BITARTRATE AND ACETAMINOPHEN 5; 325 MG/1; MG/1
1 TABLET ORAL EVERY 6 HOURS PRN
Qty: 60 TABLET | Refills: 0 | Status: SHIPPED | OUTPATIENT
Start: 2017-09-25 | End: 2017-10-05

## 2017-09-25 RX ORDER — FUROSEMIDE 80 MG/1
TABLET ORAL
Refills: 5 | Status: ON HOLD | COMMUNITY
Start: 2017-08-16 | End: 2019-03-31 | Stop reason: HOSPADM

## 2017-09-25 RX ORDER — BUPRENORPHINE 10 UG/H
10 PATCH TRANSDERMAL
Qty: 4 PATCH | Refills: 3 | Status: SHIPPED | OUTPATIENT
Start: 2017-09-25 | End: 2017-10-05

## 2017-09-25 NOTE — PROGRESS NOTES
Subjective:       Patient ID: Reese Bell is a 86 y.o. male.    Chief Complaint: Rt hip pain and L knee pain    Pt is a 86 y.o. male who presents for check up for Acute right-sided low back pain without sciatica  (primary encounter diagnosis). Doing well on current meds. Denies any side effects. Prevention is  up to date.      Review of Systems   Constitutional: Negative for appetite change.   HENT: Negative for congestion, ear pain, sneezing and sore throat.    Eyes: Negative for redness and visual disturbance.   Respiratory: Negative for cough, chest tightness and stridor.    Cardiovascular: Negative for chest pain.   Gastrointestinal: Negative for abdominal pain, blood in stool, diarrhea, nausea and vomiting.   Genitourinary: Negative for difficulty urinating, dysuria and hematuria.   Musculoskeletal: Positive for arthralgias. Negative for back pain, joint swelling, myalgias and neck pain.        R hip with 10/10 pain upon standing   Skin: Negative for rash.   Neurological: Negative for dizziness.   Psychiatric/Behavioral: Negative for agitation. The patient is not nervous/anxious.        Objective:      Physical Exam   Constitutional: He is oriented to person, place, and time. He appears well-developed and well-nourished.   HENT:   Head: Normocephalic.   Eyes: Pupils are equal, round, and reactive to light.   Neck: Normal range of motion. Neck supple. No thyromegaly present.   Cardiovascular: Normal rate and regular rhythm.  Exam reveals no friction rub.    No murmur heard.  Pulmonary/Chest: Effort normal. No respiratory distress. He has no wheezes.   Abdominal: There is no tenderness. There is no rebound and no guarding.   Musculoskeletal: Normal range of motion. He exhibits no edema or tenderness.   Lymphadenopathy:     He has no cervical adenopathy.   Neurological: He is alert and oriented to person, place, and time. He has normal reflexes. No cranial nerve deficit.   Skin: Skin is warm and dry.    Psychiatric: He has a normal mood and affect. Judgment and thought content normal.       Assessment:       1. Acute right-sided low back pain without sciatica    2. Right hip pain    3. Chronic right-sided low back pain with right-sided sciatica        Plan:   Reese RAUSCH was seen today for rt hip pain and l knee pain.    Diagnoses and all orders for this visit:    Acute right-sided low back pain without sciatica  -     X-Ray Lumbar Spine AP And Lateral; Future  -     X-Ray Hip 2 View Right; Future    Right hip pain    Chronic right-sided low back pain with right-sided sciatica

## 2017-11-24 DIAGNOSIS — J31.0 RHINITIS, UNSPECIFIED CHRONICITY, UNSPECIFIED TYPE: Primary | ICD-10-CM

## 2017-11-24 RX ORDER — FLUTICASONE PROPIONATE 50 MCG
1 SPRAY, SUSPENSION (ML) NASAL 2 TIMES DAILY
Qty: 1 BOTTLE | Refills: 11 | Status: SHIPPED | OUTPATIENT
Start: 2017-11-24 | End: 2017-11-24 | Stop reason: SDUPTHER

## 2017-11-24 RX ORDER — FLUTICASONE PROPIONATE 50 MCG
1 SPRAY, SUSPENSION (ML) NASAL 2 TIMES DAILY
Qty: 1 BOTTLE | Refills: 11 | Status: SHIPPED | OUTPATIENT
Start: 2017-11-24 | End: 2019-08-06

## 2017-11-24 NOTE — TELEPHONE ENCOUNTER
----- Message from [place] sent at 2017 10:14 AM CST -----  Contact: self  Reese Bell  MRN: [id number]  : 1931  PCP: Oracio Johnson  Home Phone      [phone]  Work Phone      Not on file.  Mobile          Not on file.      MESSAGE:  pt has sinus congested, no fever, coughing, would like omkar to call something in    Pharmacy: [facility] pharmacy    Phone: [phone]

## 2017-11-24 NOTE — TELEPHONE ENCOUNTER
----- Message from Danielle Reddy MA sent at 2017  1:33 PM CST -----  Contact: Wife-  Reese Bell  MRN: [id number]  : 1931  PCP: Oracio Johnson  Home Phone      [phone]  Work Phone      Not on file.  Mobile          Not on file.      MESSAGE: Patient's Fluticasone was sent to [facility]  He needs it sent to [facility] ([place])  Cancel the was to [facility]

## 2018-01-22 DIAGNOSIS — G30.9 DEMENTIA IN ALZHEIMER'S DISEASE: ICD-10-CM

## 2018-01-22 DIAGNOSIS — F02.80 DEMENTIA IN ALZHEIMER'S DISEASE: ICD-10-CM

## 2018-01-22 RX ORDER — MEMANTINE HYDROCHLORIDE 5 MG/1
5 TABLET ORAL 2 TIMES DAILY
Qty: 180 TABLET | Refills: 3 | Status: SHIPPED | OUTPATIENT
Start: 2018-01-22 | End: 2019-01-16 | Stop reason: SDUPTHER

## 2018-02-08 DIAGNOSIS — N40.0 BENIGN NODULAR PROSTATIC HYPERPLASIA WITHOUT LOWER URINARY TRACT SYMPTOMS: ICD-10-CM

## 2018-02-08 RX ORDER — TAMSULOSIN HYDROCHLORIDE 0.4 MG/1
CAPSULE ORAL
Qty: 90 CAPSULE | Refills: 3 | Status: SHIPPED | OUTPATIENT
Start: 2018-02-08 | End: 2019-01-31 | Stop reason: SDUPTHER

## 2018-02-23 ENCOUNTER — TELEPHONE (OUTPATIENT)
Dept: FAMILY MEDICINE | Facility: CLINIC | Age: 83
End: 2018-02-23

## 2018-02-23 NOTE — TELEPHONE ENCOUNTER
----- Message from Sen Coronado sent at 2018  3:29 PM CST -----  Contact: Kacy @ [facility]  Reese Bell  MRN: [id number]  : 1931  PCP: Oracio Johnson  Home Phone      [phone]  Work Phone      Not on file.  Mobile          Not on file.      MESSAGE: requesting orders & clinicals for [facility] -- fax # [phone]    Call Kacy @ [phone]    PCP: Alex

## 2018-02-26 ENCOUNTER — TELEPHONE (OUTPATIENT)
Dept: FAMILY MEDICINE | Facility: CLINIC | Age: 83
End: 2018-02-26

## 2018-02-26 NOTE — TELEPHONE ENCOUNTER
Attempted to contact pts wife back , she was not home pt stated he will get her to call office back when shhe returns home, verbalized understanding

## 2018-03-06 ENCOUNTER — TELEPHONE (OUTPATIENT)
Dept: FAMILY MEDICINE | Facility: CLINIC | Age: 83
End: 2018-03-06

## 2018-03-06 NOTE — TELEPHONE ENCOUNTER
----- Message from Sen Coronado sent at 3/6/2018  2:25 PM CST -----  Contact: Rosalba @ [employer]  Reese Bell  MRN: [id number]  : 1931  PCP: Oracio Johnson  Home Phone      [phone]  Work Phone      Not on file.  Mobile          Not on file.      MESSAGE: would like to speak with nurse Re: patient's request for a power scooter    Call Rosalba @ [phone]    PCP: Alex

## 2018-04-17 ENCOUNTER — TELEPHONE (OUTPATIENT)
Dept: FAMILY MEDICINE | Facility: CLINIC | Age: 83
End: 2018-04-17

## 2018-04-17 NOTE — TELEPHONE ENCOUNTER
Made pt an apt on Friday with Dr. Johnson. Advised if he feels like he needs to be seen today to go to the ER or Urgent Care pt verbalized understanding

## 2018-04-17 NOTE — TELEPHONE ENCOUNTER
----- Message from Sen Coronado sent at 2018  3:26 PM CDT -----  Contact: Wife - Arleth Bell  MRN: [id number]  : 1931  PCP: Oracio Johnson  Home Phone      [phone]  Work Phone      Not on file.  Mobile          Not on file.      MESSAGE: having some shoulder pain -- would like appt this week with Dr Johnson    Call [phone]    PCP: Alex

## 2018-05-14 ENCOUNTER — TELEPHONE (OUTPATIENT)
Dept: FAMILY MEDICINE | Facility: CLINIC | Age: 83
End: 2018-05-14

## 2018-05-14 RX ORDER — FUROSEMIDE 40 MG/1
TABLET ORAL
COMMUNITY
Start: 2018-03-03 | End: 2018-05-17

## 2018-05-14 NOTE — TELEPHONE ENCOUNTER
----- Message from Sen Coronado sent at 2018 11:59 AM CDT -----  Contact: Daughter - Mikala Bell  MRN: [id number]  : 1931  PCP: Oracio Johnson  Home Phone      [phone]  Work Phone      Not on file.  Mobile          Not on file.      MESSAGE: [place] [facility] [place] -- SOB & Weakness - saw Dr Vera -- has fluid retention -- was given extra fluid pill -- daughter states he is not urinating -- requesting appt today    Call Mikala @ [phone]    PCP: Alex

## 2018-05-14 NOTE — TELEPHONE ENCOUNTER
Spoke to daughter and she stated that pt drank a cup of coffee today along with some water and only urinated 3 times today. Informed her that pt needs to intake more water than that so he can urinate more, verbalized understanding.  Also scheduled pt an appt with omkar on thursday at 9:30am for check up and also told daughter if pt does not urinate more with in taking more water to bring him to hospital, verbalized understanding

## 2018-05-15 ENCOUNTER — TELEPHONE (OUTPATIENT)
Dept: UROLOGY | Facility: CLINIC | Age: 83
End: 2018-05-15

## 2018-05-15 NOTE — TELEPHONE ENCOUNTER
[t wife reports improvement in urination since yesterday. I informed her that we could see mr issa today or as they wanted but it would have to be in [place]. I explained that we are only at [facility] on wed and this week we are only there a 1/2 day. Pt states she will talk to her daughter and call me if she wants him to come in. Pt has an appt with pcp Thursday.

## 2018-05-17 ENCOUNTER — OFFICE VISIT (OUTPATIENT)
Dept: FAMILY MEDICINE | Facility: CLINIC | Age: 83
End: 2018-05-17
Payer: MEDICARE

## 2018-05-17 ENCOUNTER — APPOINTMENT (OUTPATIENT)
Dept: RADIOLOGY | Facility: CLINIC | Age: 83
End: 2018-05-17
Attending: FAMILY MEDICINE
Payer: MEDICARE

## 2018-05-17 VITALS
RESPIRATION RATE: 18 BRPM | HEIGHT: 65 IN | HEART RATE: 80 BPM | SYSTOLIC BLOOD PRESSURE: 124 MMHG | DIASTOLIC BLOOD PRESSURE: 72 MMHG | WEIGHT: 204.13 LBS | BODY MASS INDEX: 34.01 KG/M2

## 2018-05-17 DIAGNOSIS — M15.9 PRIMARY OSTEOARTHRITIS INVOLVING MULTIPLE JOINTS: ICD-10-CM

## 2018-05-17 DIAGNOSIS — I50.9 CHRONIC CONGESTIVE HEART FAILURE, UNSPECIFIED HEART FAILURE TYPE: ICD-10-CM

## 2018-05-17 DIAGNOSIS — Z78.9 IMPAIRED MOBILITY AND ACTIVITIES OF DAILY LIVING: ICD-10-CM

## 2018-05-17 DIAGNOSIS — I25.10 ASCVD (ARTERIOSCLEROTIC CARDIOVASCULAR DISEASE): ICD-10-CM

## 2018-05-17 DIAGNOSIS — M25.511 CHRONIC RIGHT SHOULDER PAIN: ICD-10-CM

## 2018-05-17 DIAGNOSIS — M25.511 CHRONIC RIGHT SHOULDER PAIN: Primary | ICD-10-CM

## 2018-05-17 DIAGNOSIS — G89.29 CHRONIC RIGHT SHOULDER PAIN: ICD-10-CM

## 2018-05-17 DIAGNOSIS — Z74.09 IMPAIRED MOBILITY AND ACTIVITIES OF DAILY LIVING: ICD-10-CM

## 2018-05-17 DIAGNOSIS — M75.41 ROTATOR CUFF IMPINGEMENT SYNDROME OF RIGHT SHOULDER: ICD-10-CM

## 2018-05-17 DIAGNOSIS — G89.29 CHRONIC RIGHT SHOULDER PAIN: Primary | ICD-10-CM

## 2018-05-17 DIAGNOSIS — F51.01 PRIMARY INSOMNIA: ICD-10-CM

## 2018-05-17 PROCEDURE — 20610 DRAIN/INJ JOINT/BURSA W/O US: CPT | Mod: RT,S$GLB,, | Performed by: FAMILY MEDICINE

## 2018-05-17 PROCEDURE — 99499 UNLISTED E&M SERVICE: CPT | Mod: S$GLB,,, | Performed by: FAMILY MEDICINE

## 2018-05-17 PROCEDURE — 99213 OFFICE O/P EST LOW 20 MIN: CPT | Mod: 25,S$GLB,, | Performed by: FAMILY MEDICINE

## 2018-05-17 PROCEDURE — 73030 X-RAY EXAM OF SHOULDER: CPT | Mod: TC,PO,RT

## 2018-05-17 PROCEDURE — 73030 X-RAY EXAM OF SHOULDER: CPT | Mod: 26,RT,, | Performed by: RADIOLOGY

## 2018-05-17 PROCEDURE — 99999 PR PBB SHADOW E&M-EST. PATIENT-LVL III: CPT | Mod: PBBFAC,,, | Performed by: FAMILY MEDICINE

## 2018-05-17 RX ORDER — AMITRIPTYLINE HYDROCHLORIDE 25 MG/1
25 TABLET, FILM COATED ORAL NIGHTLY PRN
Qty: 30 TABLET | Refills: 2 | Status: SHIPPED | OUTPATIENT
Start: 2018-05-17 | End: 2018-08-09

## 2018-05-17 RX ORDER — KETOROLAC TROMETHAMINE 30 MG/ML
15 INJECTION, SOLUTION INTRAMUSCULAR; INTRAVENOUS
Status: COMPLETED | OUTPATIENT
Start: 2018-05-17 | End: 2018-05-17

## 2018-05-17 RX ORDER — BUPIVACAINE HYDROCHLORIDE 5 MG/ML
1 INJECTION, SOLUTION EPIDURAL; INTRACAUDAL
Status: COMPLETED | OUTPATIENT
Start: 2018-05-17 | End: 2018-05-17

## 2018-05-17 RX ORDER — METHYLPREDNISOLONE ACETATE 40 MG/ML
40 INJECTION, SUSPENSION INTRA-ARTICULAR; INTRALESIONAL; INTRAMUSCULAR; SOFT TISSUE
Status: COMPLETED | OUTPATIENT
Start: 2018-05-17 | End: 2018-05-17

## 2018-05-17 RX ADMIN — METHYLPREDNISOLONE ACETATE 40 MG: 40 INJECTION, SUSPENSION INTRA-ARTICULAR; INTRALESIONAL; INTRAMUSCULAR; SOFT TISSUE at 11:05

## 2018-05-17 RX ADMIN — BUPIVACAINE HYDROCHLORIDE 5 MG: 5 INJECTION, SOLUTION EPIDURAL; INTRACAUDAL at 11:05

## 2018-05-17 RX ADMIN — KETOROLAC TROMETHAMINE 15 MG: 30 INJECTION, SOLUTION INTRAMUSCULAR; INTRAVENOUS at 11:05

## 2018-05-17 NOTE — PROGRESS NOTES
Patient, Reese Bell (MRN #[id number]), presented with a recent Platelet count less than 150 K/uL consistent with the definition of thrombocytopenia (ICD10 - D69.6).    Platelets   Date Value Ref Range Status   05/12/2018 117 (L) 150 - 350 K/uL Final     The patient's thrombocytopenia was monitored, evaluated, addressed and/or treated. This addendum to the medical record is made on 05/17/2018.

## 2018-05-17 NOTE — PROGRESS NOTES
Subjective:       Patient ID: Reese Bell is a 86 y.o. male.    Chief Complaint: Edema (ongoing) and Shoulder Pain (rt shoulder joint pain)    Pt is a 86 y.o. male who presents for check up for abdominal swelling and R shoulder pain. Doing well on current meds. Denies any side effects. Prevention is up to date.Pt is a 86 y.o. male who presents for check up for No diagnosis found.. Doing well on current meds. Denies any side effects. Prevention is up to date.      Edema   Associated symptoms include arthralgias. Pertinent negatives include no abdominal pain, chest pain, congestion, coughing, joint swelling, myalgias, nausea, neck pain, rash, sore throat or vomiting.   Shoulder Pain        Review of Systems   Constitutional: Negative for appetite change.   HENT: Negative for congestion, ear pain, sneezing and sore throat.    Eyes: Negative for redness and visual disturbance.   Respiratory: Negative for cough, chest tightness and stridor.    Cardiovascular: Negative for chest pain.   Gastrointestinal: Negative for abdominal pain, blood in stool, diarrhea, nausea and vomiting.   Genitourinary: Negative for difficulty urinating, dysuria and hematuria.   Musculoskeletal: Positive for arthralgias. Negative for back pain, joint swelling, myalgias and neck pain.        R shoulder aches on & off   Skin: Negative for rash.   Neurological: Negative for dizziness.   Psychiatric/Behavioral: Negative for agitation. The patient is not nervous/anxious.        Objective:      Physical Exam   Constitutional: He is oriented to person, place, and time. He appears well-developed and well-nourished.   Elderly 85 y/o  W M  w R shoulder   HENT:   Head: Normocephalic.   Eyes: Pupils are equal, round, and reactive to light.   Neck: Normal range of motion. Neck supple. No thyromegaly present.   Cardiovascular: Normal rate and regular rhythm.  Exam reveals no friction rub.    No murmur heard.  Pulmonary/Chest: Effort normal. No  respiratory distress. He has no wheezes.   Abdominal: There is no tenderness. There is no rebound and no guarding.   Musculoskeletal: He exhibits no edema or tenderness.   R shoulder with pain on Int & Ext ROM   Lymphadenopathy:     He has no cervical adenopathy.   Neurological: He is alert and oriented to person, place, and time. He has normal reflexes. No cranial nerve deficit.   Skin: Skin is warm and dry.   Psychiatric: He has a normal mood and affect. Judgment and thought content normal.       Assessment:       1. Chronic right shoulder pain    2. Chronic congestive heart failure, unspecified heart failure type    3. Primary osteoarthritis involving multiple joints    4. ASCVD (arteriosclerotic cardiovascular disease)    5. Rotator cuff impingement syndrome of right shoulder    6. Primary insomnia    7. Impaired mobility and activities of daily living        Plan:   Reese RAUSCH was seen today for edema and shoulder pain.    Diagnoses and all orders for this visit:    Chronic right shoulder pain  -     X-ray Shoulder 2 or More Views Right; Future  -     amitriptyline (ELAVIL) 25 MG tablet; Take 1 tablet (25 mg total) by mouth nightly as needed for Insomnia or Pain.    Chronic congestive heart failure, unspecified heart failure type    Primary osteoarthritis involving multiple joints    ASCVD (arteriosclerotic cardiovascular disease)    Rotator cuff impingement syndrome of right shoulder  -     methylPREDNISolone acetate injection 40 mg; Inject 1 mL (40 mg total) into the articular space one time.  -     bupivacaine (PF) injection 5 mg; Inject 1 mL (5 mg total) into the articular space one time.  -     ketorolac injection 15 mg; Inject 0.5 mLs (15 mg total) into the articular space one time.    Primary insomnia  -     amitriptyline (ELAVIL) 25 MG tablet; Take 1 tablet (25 mg total) by mouth nightly as needed for Insomnia or Pain.    Impaired mobility and activities of daily living    Pt's R shoulder  was prepped  with Betadine and 1cc Medrol, 1cc Marcaine, & 15 mg Toradol was injected interarticularly into the R shoulder area

## 2018-06-12 ENCOUNTER — TELEPHONE (OUTPATIENT)
Dept: FAMILY MEDICINE | Facility: CLINIC | Age: 83
End: 2018-06-12

## 2018-06-12 DIAGNOSIS — F41.1 GAD (GENERALIZED ANXIETY DISORDER): Primary | ICD-10-CM

## 2018-06-12 NOTE — TELEPHONE ENCOUNTER
PA for Amitriptyline submitted to insurance company via [employer] web site. Key: [id number]  Awaiting insurance company response/ decision.

## 2018-06-14 ENCOUNTER — TELEPHONE (OUTPATIENT)
Dept: FAMILY MEDICINE | Facility: CLINIC | Age: 83
End: 2018-06-14

## 2018-06-14 RX ORDER — CITALOPRAM 10 MG/1
10 TABLET ORAL DAILY
Qty: 30 TABLET | Refills: 1 | Status: SHIPPED | OUTPATIENT
Start: 2018-06-14 | End: 2018-08-09

## 2018-06-14 NOTE — TELEPHONE ENCOUNTER
----- Message from Leni Hardy sent at 2018  2:28 PM CDT -----  Contact: Etienne from [facility]  Reese Bell  MRN: [id number]  : 1931  PCP: Oracio Johnson  Home Phone      [phone]  Work Phone      Not on file.  Mobile          Not on file.      MESSAGE:   Pharmacy is calling to clarify an RX.  RX name:  citalopram (CELEXA) 10 MG tablet  What do they need to clarify:  Wants to clarify if the patient will continue to take clopidogrel (PLAVIX) 75 mg tablet and amitriptyline (ELAVIL) 25 MG tablet   Pharmacy name and location:  [facility]   Comments:       Phone:  [phone]

## 2018-06-14 NOTE — TELEPHONE ENCOUNTER
Received determination of denial for Amitriptyline 25 mg state that patient must try and fail  two preferred ( citalopram,escitalopram,fluoxetine,or sertraline)SNROs (duloxetine, venlafaxine ER,trazodone,mirtazapine.Please review and advise.Thank you

## 2018-06-14 NOTE — TELEPHONE ENCOUNTER
[facility] calling, Celexa interacts with Elavil and Plavix, please clarify, do you want them to fill this prescription?

## 2018-06-15 ENCOUNTER — TELEPHONE (OUTPATIENT)
Dept: FAMILY MEDICINE | Facility: CLINIC | Age: 83
End: 2018-06-15

## 2018-06-15 NOTE — TELEPHONE ENCOUNTER
[facility]  Script Clarification state can Citalopram be taking with Elavil and Plavix.Please review and advise.Thank you

## 2018-06-15 NOTE — TELEPHONE ENCOUNTER
Spoke with  state that it is ok to fill Celexa.Spoke with  [facility] pharmacy per  ok to fill.

## 2018-06-15 NOTE — TELEPHONE ENCOUNTER
Notified Ashia at [facility] pharmacy not to fill Rx, verbalize understanding.

## 2018-08-09 ENCOUNTER — OFFICE VISIT (OUTPATIENT)
Dept: FAMILY MEDICINE | Facility: CLINIC | Age: 83
End: 2018-08-09
Payer: MEDICARE

## 2018-08-09 VITALS
HEART RATE: 76 BPM | HEIGHT: 65 IN | SYSTOLIC BLOOD PRESSURE: 130 MMHG | BODY MASS INDEX: 34.71 KG/M2 | DIASTOLIC BLOOD PRESSURE: 72 MMHG | WEIGHT: 208.31 LBS | RESPIRATION RATE: 20 BRPM

## 2018-08-09 DIAGNOSIS — I50.9 CHRONIC CONGESTIVE HEART FAILURE, UNSPECIFIED HEART FAILURE TYPE: ICD-10-CM

## 2018-08-09 DIAGNOSIS — I25.10 ASCVD (ARTERIOSCLEROTIC CARDIOVASCULAR DISEASE): ICD-10-CM

## 2018-08-09 DIAGNOSIS — G30.9 DEMENTIA IN ALZHEIMER'S DISEASE: ICD-10-CM

## 2018-08-09 DIAGNOSIS — F02.80 DEMENTIA IN ALZHEIMER'S DISEASE: ICD-10-CM

## 2018-08-09 DIAGNOSIS — Z74.09 IMPAIRED MOBILITY AND ACTIVITIES OF DAILY LIVING: ICD-10-CM

## 2018-08-09 DIAGNOSIS — N39.0 URINARY TRACT INFECTION WITHOUT HEMATURIA, SITE UNSPECIFIED: Primary | ICD-10-CM

## 2018-08-09 DIAGNOSIS — E55.9 VITAMIN D INSUFFICIENCY: ICD-10-CM

## 2018-08-09 DIAGNOSIS — N18.30 STAGE 3 CHRONIC KIDNEY DISEASE: ICD-10-CM

## 2018-08-09 DIAGNOSIS — Z78.9 IMPAIRED MOBILITY AND ACTIVITIES OF DAILY LIVING: ICD-10-CM

## 2018-08-09 LAB
25(OH)D3+25(OH)D2 SERPL-MCNC: 34 NG/ML
ALBUMIN SERPL BCP-MCNC: 4 G/DL
ALP SERPL-CCNC: 104 U/L
ALT SERPL W/O P-5'-P-CCNC: 12 U/L
ANION GAP SERPL CALC-SCNC: 9 MMOL/L
AST SERPL-CCNC: 15 U/L
BACTERIA SPEC CULT: NORMAL
BILIRUB SERPL-MCNC: 0.8 MG/DL
BILIRUB SERPL-MCNC: NORMAL MG/DL
BLOOD URINE, POC: NORMAL
BUN SERPL-MCNC: 28 MG/DL
CALCIUM SERPL-MCNC: 9.3 MG/DL
CASTS: NORMAL
CHLORIDE SERPL-SCNC: 102 MMOL/L
CO2 SERPL-SCNC: 24 MMOL/L
COLOR, POC UA: NORMAL
CREAT SERPL-MCNC: 1.8 MG/DL
CRYSTALS: NORMAL
EST. GFR  (AFRICAN AMERICAN): 38 ML/MIN/1.73 M^2
EST. GFR  (NON AFRICAN AMERICAN): 33 ML/MIN/1.73 M^2
GLUCOSE SERPL-MCNC: 105 MG/DL
GLUCOSE UR QL STRIP: NORMAL
KETONES UR QL STRIP: NORMAL
LEUKOCYTE ESTERASE URINE, POC: NORMAL
NITRITE, POC UA: NORMAL
PH, POC UA: 5
POTASSIUM SERPL-SCNC: 4.6 MMOL/L
PROT SERPL-MCNC: 7.3 G/DL
PROTEIN, POC: NORMAL
RBC CELLS COUNTED: NORMAL
SODIUM SERPL-SCNC: 135 MMOL/L
SPECIFIC GRAVITY, POC UA: 1.01
UROBILINOGEN, POC UA: NORMAL
WHITE BLOOD CELLS: NORMAL

## 2018-08-09 PROCEDURE — 82306 VITAMIN D 25 HYDROXY: CPT

## 2018-08-09 PROCEDURE — 99499 UNLISTED E&M SERVICE: CPT | Mod: S$GLB,,, | Performed by: FAMILY MEDICINE

## 2018-08-09 PROCEDURE — 80053 COMPREHEN METABOLIC PANEL: CPT

## 2018-08-09 PROCEDURE — 99213 OFFICE O/P EST LOW 20 MIN: CPT | Mod: S$GLB,,, | Performed by: FAMILY MEDICINE

## 2018-08-09 PROCEDURE — 99999 PR PBB SHADOW E&M-EST. PATIENT-LVL III: CPT | Mod: PBBFAC,,, | Performed by: FAMILY MEDICINE

## 2018-08-09 PROCEDURE — 81000 URINALYSIS NONAUTO W/SCOPE: CPT | Mod: S$GLB,,, | Performed by: FAMILY MEDICINE

## 2018-08-09 NOTE — PROGRESS NOTES
The following lab results were abnormal. The following recommendations were made:Kidney function is a little from last week Cr is 1.8 for 2.1

## 2018-08-09 NOTE — PROGRESS NOTES
Subjective:       Patient ID: Reese Bell is a 87 y.o. male.    Chief Complaint: Hip Pain (rt side x 2 days); Fluid Retention; and Shortness of Breath    Pt is a 87 y.o. male who presents for check up for poor renal condition and fluid retention Doing well on current meds. Denies any side effects. Prevention is up to date.      Review of Systems   Constitutional: Negative for appetite change.   HENT: Negative for congestion, ear pain, sneezing and sore throat.    Eyes: Negative for redness and visual disturbance.   Respiratory: Negative for cough, chest tightness and stridor.         R sided rib cage pain and sleeping a lot   Cardiovascular: Negative for chest pain.   Gastrointestinal: Negative for abdominal pain, blood in stool, diarrhea, nausea and vomiting.   Genitourinary: Negative for difficulty urinating, dysuria and hematuria.        Urinary incontinence   Musculoskeletal: Negative for arthralgias, back pain, joint swelling, myalgias and neck pain.   Skin: Negative for rash.   Neurological: Negative for dizziness.   Psychiatric/Behavioral: Negative for agitation. The patient is not nervous/anxious.        Objective:      Physical Exam   Constitutional: He is oriented to person, place, and time. He appears well-developed. He appears distressed.   HENT:   Head: Normocephalic.   Eyes: Pupils are equal, round, and reactive to light.   Neck: Normal range of motion. Neck supple. No thyromegaly present.   Cardiovascular: Normal rate and regular rhythm.  Exam reveals no friction rub.    No murmur heard.  Pulmonary/Chest: Effort normal. No respiratory distress. He has no wheezes.   Abdominal: There is no tenderness. There is no rebound and no guarding.   Musculoskeletal: Normal range of motion. He exhibits no edema or tenderness.   Lymphadenopathy:     He has no cervical adenopathy.   Neurological: He is alert and oriented to person, place, and time. He has normal reflexes. No cranial nerve deficit.   Skin:  Skin is warm and dry.   R flank is clear   Psychiatric: He has a normal mood and affect. Judgment and thought content normal.       Assessment:       1. Urinary tract infection without hematuria, site unspecified    2. ASCVD (arteriosclerotic cardiovascular disease)    3. Chronic congestive heart failure, unspecified heart failure type    4. Impaired mobility and activities of daily living    5. Dementia in Alzheimer's disease    6. Stage 3 chronic kidney disease        Plan:   Reese RAUSCH was seen today for hip pain, fluid retention and shortness of breath.    Diagnoses and all orders for this visit:    Urinary tract infection without hematuria, site unspecified  -     POCT urinalysis, dipstick or tablet reag  -     POCT URINE SEDIMENT EXAM    ASCVD (arteriosclerotic cardiovascular disease)    Chronic congestive heart failure, unspecified heart failure type    Impaired mobility and activities of daily living    Dementia in Alzheimer's disease    Stage 3 chronic kidney disease    Other orders  -     sacubitril-valsartan (ENTRESTO) 49-51 mg per tablet; Take 1 tablet by mouth 2 (two) times daily.

## 2018-08-10 ENCOUNTER — TELEPHONE (OUTPATIENT)
Dept: FAMILY MEDICINE | Facility: CLINIC | Age: 83
End: 2018-08-10

## 2018-08-10 NOTE — TELEPHONE ENCOUNTER
----- Message from Oracio Johnson MD sent at 8/9/2018  5:58 PM CDT -----  The following lab results were abnormal. The following recommendations were made:Kidney function is a little from last week Cr is 1.8 for 2.1

## 2018-08-10 NOTE — TELEPHONE ENCOUNTER
Daughter is wanting clarification for the lasix.  States that Dr. De Leon decreased it to 40 mg daily but she wants to know how he is supposed to take it now.  Please advise.      Daughter also wants to know if you want to repeat blood work before the 17th?

## 2018-08-10 NOTE — TELEPHONE ENCOUNTER
Patient's daugher Mikala needs to speak to a nurse concerning patient lasix dosage and also she states patient coming in for lab on Monday-please order lab test.

## 2018-12-13 DIAGNOSIS — M25.519 SHOULDER PAIN, UNSPECIFIED CHRONICITY, UNSPECIFIED LATERALITY: Primary | ICD-10-CM

## 2018-12-13 RX ORDER — HYDROCODONE BITARTRATE AND ACETAMINOPHEN 5; 325 MG/1; MG/1
1 TABLET ORAL EVERY 6 HOURS PRN
Qty: 60 TABLET | Refills: 0 | Status: SHIPPED | OUTPATIENT
Start: 2018-12-13 | End: 2018-12-23

## 2018-12-13 NOTE — TELEPHONE ENCOUNTER
Spoke with patient's daughter that medication was sent to pharmacy.  Was not sent.  Please resend.

## 2018-12-13 NOTE — TELEPHONE ENCOUNTER
Contacted pt mother states he was given a rx for pain last year for his shoulder. Still having pain, would like something called in for pain    [facility] [place]

## 2018-12-13 NOTE — TELEPHONE ENCOUNTER
----- Message from Jennifer Duran MA sent at 2018 10:33 AM CST -----  Contact: Daughter Mikala Bell  : 1931  MRN: [id number]  Phone: [phone]    Patient daughter Mikala is calling to to discuss pain medication refill for right shoulder pain. Informed daughter that will need to make an appt regarding pain. Wants to speak to nurse.

## 2018-12-18 ENCOUNTER — TELEPHONE (OUTPATIENT)
Dept: FAMILY MEDICINE | Facility: CLINIC | Age: 83
End: 2018-12-18

## 2018-12-18 RX ORDER — CYCLOBENZAPRINE HCL 5 MG
TABLET ORAL
Qty: 30 TABLET | Refills: 2 | Status: SHIPPED | OUTPATIENT
Start: 2018-12-18 | End: 2019-08-12 | Stop reason: CLARIF

## 2018-12-18 NOTE — TELEPHONE ENCOUNTER
States that he was given pain pills for shoulder pain, but his pain is more muscular. Would like to try flexeril?    Ph[facility]acy: [facility] [place]

## 2019-01-16 DIAGNOSIS — F02.80 DEMENTIA IN ALZHEIMER'S DISEASE: ICD-10-CM

## 2019-01-16 DIAGNOSIS — G30.9 DEMENTIA IN ALZHEIMER'S DISEASE: ICD-10-CM

## 2019-01-17 RX ORDER — MEMANTINE HYDROCHLORIDE 5 MG/1
5 TABLET ORAL 2 TIMES DAILY
Qty: 180 TABLET | Refills: 3 | Status: SHIPPED | OUTPATIENT
Start: 2019-01-17 | End: 2019-10-24 | Stop reason: SDUPTHER

## 2019-01-31 DIAGNOSIS — N40.0 BENIGN NODULAR PROSTATIC HYPERPLASIA WITHOUT LOWER URINARY TRACT SYMPTOMS: ICD-10-CM

## 2019-01-31 RX ORDER — TAMSULOSIN HYDROCHLORIDE 0.4 MG/1
CAPSULE ORAL
Qty: 90 CAPSULE | Refills: 3 | Status: SHIPPED | OUTPATIENT
Start: 2019-01-31 | End: 2020-01-28

## 2019-03-27 ENCOUNTER — HOSPITAL ENCOUNTER (EMERGENCY)
Facility: HOSPITAL | Age: 84
Discharge: HOME | End: 2019-03-27
Attending: SURGERY
Payer: MEDICARE

## 2019-03-27 VITALS
HEART RATE: 84 BPM | OXYGEN SATURATION: 96 % | SYSTOLIC BLOOD PRESSURE: 166 MMHG | WEIGHT: 265 LBS | TEMPERATURE: 97 F | DIASTOLIC BLOOD PRESSURE: 86 MMHG | RESPIRATION RATE: 18 BRPM | BODY MASS INDEX: 44.1 KG/M2

## 2019-03-27 DIAGNOSIS — S00.03XA CONTUSION OF SCALP, INITIAL ENCOUNTER: Primary | ICD-10-CM

## 2019-03-27 PROCEDURE — 99284 EMERGENCY DEPT VISIT MOD MDM: CPT | Mod: 25

## 2019-03-27 PROCEDURE — 12001 RPR S/N/AX/GEN/TRNK 2.5CM/<: CPT

## 2019-03-27 NOTE — ED TRIAGE NOTES
Pt reports leg gave out and fell hitting head, denies LOC. Pt on anticoagulants. Small laceration to back of head, bleed controlled

## 2019-03-27 NOTE — ED PROVIDER NOTES
[facility]                                                 Chief Complaint  87 y.o. male with Fall    History of Present Illness  Reese Bell presents to the emergency room with head contusion today  Patient fell and hit his head, abrasion on the posterior scalp with no bleeding noted  Patient had no loss of consciousness, alert and appropriate on ER interview today  His only complaint is headache, CT of the head is negative on ER evaluation now    The history is provided by the patient   device was not used during this ER visit  Medical history: Vascular disease, DJD, HLD, HTN, FRIDA, thrombocytopenia, MGUS  Surgeries: Appendectomy, pacemaker, dental surgery, hernia repair, tonsillectomy  No Known Allergies     Review of Systems and Physical Exam      Review of Systems  -- Constitution - no fever, denies fatigue, no weakness, no chills  -- Eyes - no tearing or redness, no visual disturbance  -- Ear, Nose - no tinnitus or earache, no nasal congestion or discharge  -- Mouth,Throat - no sore throat, no toothache, normal voice, normal swallowing  -- Respiratory - denies cough and congestion, no shortness of breath, no JESSICA  -- Cardiovascular - denies chest pain, no palpitations, denies claudication  -- Gastrointestinal - denies abdominal pain, nausea, vomiting, or diarrhea  -- Genitourinary - no dysuria, no hematuria, no flank pain, no bladder pain  -- Musculoskeletal - denies back pain, negative for trauma or injury  -- Neurological - headache, denies weakness or seizure; no LOC  -- Skin - denies pallor, rash, or changes in skin. no hives or welts noted    Vital Signs  His oral temperature is 96.8 °F (36 °C).   His blood pressure is 175/115 and his pulse is 91.   His respiration is 18 and oxygen saturation is 99%.     Physical Exam  -- Nursing note and vitals reviewed  -- Constitutional: Appears well-developed and well-nourished  -- Head: Atraumatic. Normocephalic. No  obvious abnormality  -- Eyes: Pupils are equal and reactive to light. Normal conjunctiva and lids  -- Nose: Nose normal in appearance, nares grossly normal. No discharge  -- Throat: Mucous membranes moist, pharynx normal, normal tonsils. No lesions   -- Ears: External ears and TM normal bilaterally. Normal hearing and no drainage  -- Neck: Normal range of motion. Neck supple. No masses, trachea midline  -- Cardiac: Normal rate, regular rhythm and normal heart sounds  -- Pulmonary: Normal respiratory effort, breath sounds clear to auscultation  -- Abdominal: Soft, no tenderness. Normal bowel sounds. Normal liver edge  -- Musculoskeletal: Normal range of motion, no effusions. Joints stable   -- Neurological: No focal deficits. Showed good interaction with staff  -- Skin: Small abrasion on the posterior scalp    Emergency Room Course      Treatment and Evaluation  -- The CT of the head performed in the ER today was negative for acute pathology    Laceration Repair  -- Performed by: CHRISTIAN BELL  -- Consent Done: Emergent Situation  -- Body area: Back of the head  -- Laceration length: 2 centimeter  -- Tendon involvement: none  -- Nerve involvement: none  -- Vascular damage: no  -- Amount of cleaning: standard  -- Skin closure:  Dermabond    Diagnosis  -- The encounter diagnosis was Contusion of scalp, initial encounter.    Disposition and Plan  -- Disposition: home  -- Condition: stable  -- Follow-up: Patient to follow up with Oracio Johnson MD in 1-2 days.  -- I advised the patient that we have found no life threatening condition today  -- At this time, I believe the patient is clinically stable for discharge.   -- The patient acknowledges that close follow up with a MD is required   -- Patient agrees to comply with all instruction and direction given in the ER    This note is dictated on M*Modal word recognition program.  There are word recognition mistakes that are occasionally missed on review.           Lito Shaikh MD  03/27/19 1821

## 2019-03-28 ENCOUNTER — HOSPITAL ENCOUNTER (EMERGENCY)
Facility: HOSPITAL | Age: 84
Discharge: ANOTHER HOSPITAL | End: 2019-03-28
Attending: SURGERY
Payer: MEDICARE

## 2019-03-28 VITALS
SYSTOLIC BLOOD PRESSURE: 162 MMHG | OXYGEN SATURATION: 98 % | WEIGHT: 208 LBS | HEART RATE: 95 BPM | TEMPERATURE: 97 F | BODY MASS INDEX: 34.66 KG/M2 | DIASTOLIC BLOOD PRESSURE: 87 MMHG | RESPIRATION RATE: 16 BRPM | HEIGHT: 65 IN

## 2019-03-28 DIAGNOSIS — R00.2 PALPITATIONS: ICD-10-CM

## 2019-03-28 DIAGNOSIS — I21.3 ST ELEVATION MYOCARDIAL INFARCTION (STEMI), UNSPECIFIED ARTERY: Primary | ICD-10-CM

## 2019-03-28 PROBLEM — I10 HYPERTENSION: Status: ACTIVE | Noted: 2019-03-28

## 2019-03-28 PROBLEM — I24.0 RCA OCCLUSION: Status: ACTIVE | Noted: 2019-03-28

## 2019-03-28 PROBLEM — E78.5 HYPERLIPIDEMIA: Status: ACTIVE | Noted: 2019-03-28

## 2019-03-28 PROBLEM — Z95.810 CARDIAC RESYNCHRONIZATION THERAPY DEFIBRILLATOR (CRT-D) IN PLACE: Status: ACTIVE | Noted: 2019-03-28

## 2019-03-28 PROBLEM — I48.21 PERMANENT ATRIAL FIBRILLATION: Status: ACTIVE | Noted: 2019-03-28

## 2019-03-28 PROBLEM — I25.5 ISCHEMIC CARDIOMYOPATHY: Status: ACTIVE | Noted: 2019-03-28

## 2019-03-28 PROBLEM — I50.22 CHF (CONGESTIVE HEART FAILURE), NYHA CLASS II, CHRONIC, SYSTOLIC: Status: ACTIVE | Noted: 2019-03-28

## 2019-03-28 PROBLEM — I21.19 ACUTE ST ELEVATION MYOCARDIAL INFARCTION (STEMI) OF INFERIOR WALL: Status: ACTIVE | Noted: 2019-03-28

## 2019-03-28 LAB
ALBUMIN SERPL BCP-MCNC: 4 G/DL (ref 3.5–5.2)
ALP SERPL-CCNC: 111 U/L (ref 55–135)
ALT SERPL W/O P-5'-P-CCNC: 9 U/L (ref 10–44)
ANION GAP SERPL CALC-SCNC: 9 MMOL/L (ref 8–16)
APTT BLDCRRT: 34.8 SEC (ref 21–32)
AST SERPL-CCNC: 12 U/L (ref 10–40)
BASOPHILS # BLD AUTO: 0.01 K/UL (ref 0–0.2)
BASOPHILS NFR BLD: 0.2 % (ref 0–1.9)
BILIRUB SERPL-MCNC: 0.7 MG/DL (ref 0.1–1)
BNP SERPL-MCNC: 122 PG/ML (ref 0–99)
BUN SERPL-MCNC: 27 MG/DL (ref 8–23)
CALCIUM SERPL-MCNC: 9.5 MG/DL (ref 8.7–10.5)
CHLORIDE SERPL-SCNC: 101 MMOL/L (ref 95–110)
CK MB SERPL-MCNC: 1.3 NG/ML (ref 0.1–6.5)
CK MB SERPL-RTO: 2.5 % (ref 0–5)
CK SERPL-CCNC: 53 U/L (ref 20–200)
CK SERPL-CCNC: 53 U/L (ref 20–200)
CO2 SERPL-SCNC: 24 MMOL/L (ref 23–29)
CREAT SERPL-MCNC: 1.6 MG/DL (ref 0.5–1.4)
D DIMER PPP IA.FEU-MCNC: 1.67 MG/L FEU
DIFFERENTIAL METHOD: ABNORMAL
EOSINOPHIL # BLD AUTO: 0.3 K/UL (ref 0–0.5)
EOSINOPHIL NFR BLD: 4.5 % (ref 0–8)
ERYTHROCYTE [DISTWIDTH] IN BLOOD BY AUTOMATED COUNT: 13.3 % (ref 11.5–14.5)
EST. GFR  (AFRICAN AMERICAN): 44 ML/MIN/1.73 M^2
EST. GFR  (NON AFRICAN AMERICAN): 38 ML/MIN/1.73 M^2
GLUCOSE SERPL-MCNC: 118 MG/DL (ref 70–110)
HCT VFR BLD AUTO: 45.9 % (ref 40–54)
HGB BLD-MCNC: 15.5 G/DL (ref 14–18)
INR PPP: 1 (ref 0.8–1.2)
LYMPHOCYTES # BLD AUTO: 0.7 K/UL (ref 1–4.8)
LYMPHOCYTES NFR BLD: 11.9 % (ref 18–48)
MCH RBC QN AUTO: 33.5 PG (ref 27–31)
MCHC RBC AUTO-ENTMCNC: 33.8 G/DL (ref 32–36)
MCV RBC AUTO: 99 FL (ref 82–98)
MONOCYTES # BLD AUTO: 0.7 K/UL (ref 0.3–1)
MONOCYTES NFR BLD: 12.3 % (ref 4–15)
NEUTROPHILS # BLD AUTO: 4.2 K/UL (ref 1.8–7.7)
NEUTROPHILS NFR BLD: 71.1 % (ref 38–73)
PLATELET # BLD AUTO: 112 K/UL (ref 150–350)
PMV BLD AUTO: 8.5 FL (ref 9.2–12.9)
POTASSIUM SERPL-SCNC: 4.5 MMOL/L (ref 3.5–5.1)
PROT SERPL-MCNC: 6.9 G/DL (ref 6–8.4)
PROTHROMBIN TIME: 10.9 SEC (ref 9–12.5)
RBC # BLD AUTO: 4.62 M/UL (ref 4.6–6.2)
SODIUM SERPL-SCNC: 134 MMOL/L (ref 136–145)
TROPONIN I SERPL DL<=0.01 NG/ML-MCNC: 0.01 NG/ML (ref 0–0.03)
WBC # BLD AUTO: 5.95 K/UL (ref 3.9–12.7)

## 2019-03-28 PROCEDURE — 85610 PROTHROMBIN TIME: CPT

## 2019-03-28 PROCEDURE — 83880 ASSAY OF NATRIURETIC PEPTIDE: CPT

## 2019-03-28 PROCEDURE — 25000003 PHARM REV CODE 250: Performed by: SURGERY

## 2019-03-28 PROCEDURE — 93010 EKG 12-LEAD: ICD-10-PCS | Mod: ,,, | Performed by: INTERNAL MEDICINE

## 2019-03-28 PROCEDURE — 85025 COMPLETE CBC W/AUTO DIFF WBC: CPT

## 2019-03-28 PROCEDURE — 36415 COLL VENOUS BLD VENIPUNCTURE: CPT

## 2019-03-28 PROCEDURE — 93010 ELECTROCARDIOGRAM REPORT: CPT | Mod: ,,, | Performed by: INTERNAL MEDICINE

## 2019-03-28 PROCEDURE — 84484 ASSAY OF TROPONIN QUANT: CPT

## 2019-03-28 PROCEDURE — 63600175 PHARM REV CODE 636 W HCPCS: Performed by: SURGERY

## 2019-03-28 PROCEDURE — 96375 TX/PRO/DX INJ NEW DRUG ADDON: CPT

## 2019-03-28 PROCEDURE — 93005 ELECTROCARDIOGRAM TRACING: CPT

## 2019-03-28 PROCEDURE — 85730 THROMBOPLASTIN TIME PARTIAL: CPT

## 2019-03-28 PROCEDURE — 80053 COMPREHEN METABOLIC PANEL: CPT

## 2019-03-28 PROCEDURE — 99285 EMERGENCY DEPT VISIT HI MDM: CPT | Mod: 25

## 2019-03-28 PROCEDURE — 82553 CREATINE MB FRACTION: CPT

## 2019-03-28 PROCEDURE — 96374 THER/PROPH/DIAG INJ IV PUSH: CPT

## 2019-03-28 PROCEDURE — 82550 ASSAY OF CK (CPK): CPT

## 2019-03-28 PROCEDURE — 85379 FIBRIN DEGRADATION QUANT: CPT

## 2019-03-28 RX ORDER — PANTOPRAZOLE SODIUM 40 MG/1
40 TABLET, DELAYED RELEASE ORAL
Status: COMPLETED | OUTPATIENT
Start: 2019-03-28 | End: 2019-03-28

## 2019-03-28 RX ORDER — ASPIRIN 325 MG
325 TABLET ORAL
Status: COMPLETED | OUTPATIENT
Start: 2019-03-28 | End: 2019-03-28

## 2019-03-28 RX ORDER — MORPHINE SULFATE 2 MG/ML
2 INJECTION, SOLUTION INTRAMUSCULAR; INTRAVENOUS
Status: COMPLETED | OUTPATIENT
Start: 2019-03-28 | End: 2019-03-28

## 2019-03-28 RX ORDER — HEPARIN SODIUM,PORCINE/D5W 25000/250
12 INTRAVENOUS SOLUTION INTRAVENOUS CONTINUOUS
Status: DISCONTINUED | OUTPATIENT
Start: 2019-03-28 | End: 2019-03-28 | Stop reason: HOSPADM

## 2019-03-28 RX ORDER — HEPARIN SODIUM 10000 [USP'U]/100ML
INJECTION, SOLUTION INTRAVENOUS
Status: DISCONTINUED
Start: 2019-03-28 | End: 2019-03-28 | Stop reason: HOSPADM

## 2019-03-28 RX ORDER — ONDANSETRON 2 MG/ML
4 INJECTION INTRAMUSCULAR; INTRAVENOUS
Status: COMPLETED | OUTPATIENT
Start: 2019-03-28 | End: 2019-03-28

## 2019-03-28 RX ORDER — ATORVASTATIN CALCIUM 40 MG/1
40 TABLET, FILM COATED ORAL
Status: COMPLETED | OUTPATIENT
Start: 2019-03-28 | End: 2019-03-28

## 2019-03-28 RX ADMIN — NITROGLYCERIN 0.5 INCH: 20 OINTMENT TOPICAL at 08:03

## 2019-03-28 RX ADMIN — PANTOPRAZOLE SODIUM 40 MG: 40 TABLET, DELAYED RELEASE ORAL at 08:03

## 2019-03-28 RX ADMIN — MORPHINE SULFATE 2 MG: 2 INJECTION, SOLUTION INTRAMUSCULAR; INTRAVENOUS at 08:03

## 2019-03-28 RX ADMIN — ASPIRIN 325 MG ORAL TABLET 325 MG: 325 PILL ORAL at 08:03

## 2019-03-28 RX ADMIN — ONDANSETRON 4 MG: 2 INJECTION INTRAMUSCULAR; INTRAVENOUS at 08:03

## 2019-03-28 RX ADMIN — ATORVASTATIN CALCIUM 40 MG: 40 TABLET, FILM COATED ORAL at 08:03

## 2019-03-28 NOTE — ED NOTES
Pt accepted to [facility] by Dr. Hayes. ED to ED transfer. STAT transfer already established.

## 2019-03-28 NOTE — ED TRIAGE NOTES
"Pt c/o midsternal chest pain that started ten minutes ago. Pt reports "it is radiating down both arms"  "

## 2019-03-28 NOTE — ED PROVIDER NOTES
[facility]                                                 Chief Complaint  87 y.o. male with Chest Pain    History of Present Illness  Reese Bell presents to the emergency room with chest pain this morning  The patient had chest pain 15 minutes prior to arrival, left-sided chest pain per HX  Patient on EKG has suspicious ST changes in the inferior leads, vascular disease HX  This EKG was discussed at length with Dr. Cummings, our cardiologist who is actually off  He has concerns that this is an inferior STEMI based on his evaluation of the EKG  Heparin immediately started, transfer to [facility], immediate evaluation    The history is provided by the patient   device was not used during this ER visit  Medical history: Vascular disease, DJD, HLD, HTN, FRIDA, thrombocytopenia, MGUS  Surgeries: Appendectomy, pacemaker, dental surgery, hernia repair, tonsillectomy  No Known Allergies     Review of Systems and Physical Exam      Review of Systems  -- Constitution - no fever, denies fatigue, no weakness, no chills  -- Eyes - no tearing or redness, no visual disturbance  -- Ear, Nose - no tinnitus or earache, no nasal congestion or discharge  -- Mouth,Throat - no sore throat, no toothache, normal voice, normal swallowing  -- Respiratory - denies cough and congestion, no shortness of breath, no JESSICA  -- Cardiovascular - chest pain, palpitations, denies claudication  -- Gastrointestinal - denies abdominal pain, nausea, vomiting, or diarrhea  -- Genitourinary - no dysuria, no hematuria, no flank pain, no bladder pain  -- Musculoskeletal - denies back pain, negative for trauma or injury  -- Neurological - no headache, denies weakness or seizure; no LOC  -- Skin - denies pallor, rash, or changes in skin. no hives or welts noted    Vital Signs  His blood pressure is 143/99 and his pulse is 95.   His respiration is 16 and oxygen saturation is 98%.     Physical Exam  -- Nursing  note and vitals reviewed  -- Constitutional: Appears well-developed and well-nourished  -- Head: Atraumatic. Normocephalic. No obvious abnormality  -- Eyes: Pupils are equal and reactive to light. Normal conjunctiva and lids  -- Cardiac: Normal rate, regular rhythm and normal heart sounds  -- Pulmonary: Normal respiratory effort, breath sounds clear to auscultation  -- Abdominal: Soft, no tenderness. Normal bowel sounds. Normal liver edge  -- Musculoskeletal: Normal range of motion, no effusions. Joints stable   -- Neurological: No focal deficits. Showed good interaction with staff  -- Skin: Warm and dry. No evidence of rash or cellulitis    Emergency Room Course      Lab Results   (L)   K 4.5      CO2 24   BUN 27 (H)   CREATININE 1.6 (H)    (H)   ALKPHOS 111   AST 12   ALT 9 (L)   BILITOT 0.7   ALBUMIN 4.0   PROT 6.9   WBC 5.95   HGB 15.5   HCT 45.9    (L)   CPK 53   CPK 53   CPKMB 1.3   TROPONINI 0.011   INR 1.0    (H)   DDIMER 1.67 (H)     EKG  Ventricular-paced rhythm  Biventricular pacemaker detected    Radiology  -- Chest x-ray showed no infiltrate and showed no acute pathology    Medications Given  ondansetron injection 4 mg (has no administration in time range)   morphine injection 2 mg (has no administration in time range)   heparin 25,000 units in dextrose 5% (100 units/ml) IV bolus from bag INITIAL BOLUS (max bolus 4000 units) (has no administration in time range)   heparin 25,000 units in dextrose 5% 250 mL (100 units/mL) infusion LOW INTENSITY nomogram - [facility] (has no administration in time range)   heparin 25,000 units in dextrose 5% (100 units/ml) IV bolus from bag    heparin 25,000 units in dextrose 5% (100 units/ml) IV bolus from bag    aspirin tablet 325 mg (325 mg Oral Given 3/28/19 0816)   nitroGLYCERIN 2% TD oint ointment 0.5 inch (0.5 inches Topical (Top) Given 3/28/19 0816)   pantoprazole EC tablet 40 mg (40 mg Oral Given 3/28/19 0816)   atorvastatin tablet 40 mg  (40 mg Oral Given 3/28/19 0816)     Diagnosis  -- ST elevation myocardial infarction (STEMI), unspecified artery    Disposition and Plan  -- Disposition: transfer  -- Condition: stable    This note is dictated on M*Modal word recognition program.  There are word recognition mistakes that are occasionally missed on review.          Lito Shaikh MD  03/28/19 0966

## 2019-03-29 PROBLEM — I49.01 VENTRICULAR FIBRILLATION: Status: ACTIVE | Noted: 2019-03-29

## 2019-04-29 ENCOUNTER — LAB VISIT (OUTPATIENT)
Dept: LAB | Facility: HOSPITAL | Age: 84
End: 2019-04-29
Attending: INTERNAL MEDICINE
Payer: MEDICARE

## 2019-04-29 DIAGNOSIS — I50.9 CHF (CONGESTIVE HEART FAILURE): ICD-10-CM

## 2019-04-29 DIAGNOSIS — I21.3 STEMI (ST ELEVATION MYOCARDIAL INFARCTION): Primary | ICD-10-CM

## 2019-04-29 LAB
ANION GAP SERPL CALC-SCNC: 9 MMOL/L (ref 8–16)
BNP SERPL-MCNC: 188 PG/ML (ref 0–99)
BUN SERPL-MCNC: 26 MG/DL (ref 8–23)
CALCIUM SERPL-MCNC: 8.8 MG/DL (ref 8.7–10.5)
CHLORIDE SERPL-SCNC: 105 MMOL/L (ref 95–110)
CO2 SERPL-SCNC: 22 MMOL/L (ref 23–29)
CREAT SERPL-MCNC: 1.4 MG/DL (ref 0.5–1.4)
EST. GFR  (AFRICAN AMERICAN): 52 ML/MIN/1.73 M^2
EST. GFR  (NON AFRICAN AMERICAN): 45 ML/MIN/1.73 M^2
GLUCOSE SERPL-MCNC: 115 MG/DL (ref 70–110)
POTASSIUM SERPL-SCNC: 4.7 MMOL/L (ref 3.5–5.1)
SODIUM SERPL-SCNC: 136 MMOL/L (ref 136–145)

## 2019-04-29 PROCEDURE — 83880 ASSAY OF NATRIURETIC PEPTIDE: CPT

## 2019-04-29 PROCEDURE — 80048 BASIC METABOLIC PNL TOTAL CA: CPT

## 2019-08-06 ENCOUNTER — OFFICE VISIT (OUTPATIENT)
Dept: FAMILY MEDICINE | Facility: CLINIC | Age: 84
End: 2019-08-06
Payer: MEDICARE

## 2019-08-06 VITALS
DIASTOLIC BLOOD PRESSURE: 60 MMHG | RESPIRATION RATE: 20 BRPM | BODY MASS INDEX: 34.85 KG/M2 | SYSTOLIC BLOOD PRESSURE: 142 MMHG | HEIGHT: 65 IN | WEIGHT: 209.19 LBS | HEART RATE: 60 BPM

## 2019-08-06 DIAGNOSIS — L03.90 CELLULITIS, UNSPECIFIED CELLULITIS SITE: ICD-10-CM

## 2019-08-06 PROCEDURE — 99213 OFFICE O/P EST LOW 20 MIN: CPT | Mod: 25,S$GLB,, | Performed by: FAMILY MEDICINE

## 2019-08-06 PROCEDURE — 1101F PR PT FALLS ASSESS DOC 0-1 FALLS W/OUT INJ PAST YR: ICD-10-PCS | Mod: CPTII,S$GLB,, | Performed by: FAMILY MEDICINE

## 2019-08-06 PROCEDURE — 96372 THER/PROPH/DIAG INJ SC/IM: CPT | Mod: S$GLB,,, | Performed by: FAMILY MEDICINE

## 2019-08-06 PROCEDURE — 99999 PR PBB SHADOW E&M-EST. PATIENT-LVL III: ICD-10-PCS | Mod: PBBFAC,,, | Performed by: FAMILY MEDICINE

## 2019-08-06 PROCEDURE — 1101F PT FALLS ASSESS-DOCD LE1/YR: CPT | Mod: CPTII,S$GLB,, | Performed by: FAMILY MEDICINE

## 2019-08-06 PROCEDURE — 99499 RISK ADDL DX/OHS AUDIT: ICD-10-PCS | Mod: S$GLB,,, | Performed by: FAMILY MEDICINE

## 2019-08-06 PROCEDURE — 96372 PR INJECTION,THERAP/PROPH/DIAG2ST, IM OR SUBCUT: ICD-10-PCS | Mod: S$GLB,,, | Performed by: FAMILY MEDICINE

## 2019-08-06 PROCEDURE — 99213 PR OFFICE/OUTPT VISIT, EST, LEVL III, 20-29 MIN: ICD-10-PCS | Mod: 25,S$GLB,, | Performed by: FAMILY MEDICINE

## 2019-08-06 PROCEDURE — 99999 PR PBB SHADOW E&M-EST. PATIENT-LVL III: CPT | Mod: PBBFAC,,, | Performed by: FAMILY MEDICINE

## 2019-08-06 PROCEDURE — 99499 UNLISTED E&M SERVICE: CPT | Mod: S$GLB,,, | Performed by: FAMILY MEDICINE

## 2019-08-06 RX ORDER — HYDROCODONE BITARTRATE AND ACETAMINOPHEN 5; 325 MG/1; MG/1
1 TABLET ORAL EVERY 12 HOURS PRN
Qty: 60 TABLET | Refills: 0 | Status: ON HOLD | OUTPATIENT
Start: 2019-08-06 | End: 2019-08-20 | Stop reason: HOSPADM

## 2019-08-06 RX ORDER — SULFAMETHOXAZOLE AND TRIMETHOPRIM 800; 160 MG/1; MG/1
1 TABLET ORAL 2 TIMES DAILY
Qty: 20 TABLET | Refills: 0 | Status: SHIPPED | OUTPATIENT
Start: 2019-08-06 | End: 2019-08-12 | Stop reason: ALTCHOICE

## 2019-08-06 RX ORDER — CYCLOBENZAPRINE HCL 10 MG
10 TABLET ORAL 3 TIMES DAILY PRN
Qty: 30 TABLET | Refills: 5 | Status: ON HOLD | OUTPATIENT
Start: 2019-08-06 | End: 2019-08-20 | Stop reason: HOSPADM

## 2019-08-06 RX ORDER — EZETIMIBE 10 MG/1
10 TABLET ORAL NIGHTLY
COMMUNITY
Start: 2019-08-03

## 2019-08-06 RX ORDER — LINCOMYCIN HYDROCHLORIDE 300 MG/ML
600 INJECTION, SOLUTION INTRAMUSCULAR; INTRAVENOUS; SUBCONJUNCTIVAL
Status: COMPLETED | OUTPATIENT
Start: 2019-08-06 | End: 2019-08-06

## 2019-08-06 RX ORDER — MUPIROCIN 20 MG/G
OINTMENT TOPICAL 2 TIMES DAILY
Qty: 22 G | Refills: 2 | Status: ON HOLD | OUTPATIENT
Start: 2019-08-06 | End: 2019-08-20 | Stop reason: HOSPADM

## 2019-08-06 RX ORDER — MIRTAZAPINE 15 MG/1
15 TABLET, FILM COATED ORAL NIGHTLY
Qty: 30 TABLET | Refills: 11 | Status: SHIPPED | OUTPATIENT
Start: 2019-08-06 | End: 2020-08-13

## 2019-08-06 RX ADMIN — LINCOMYCIN HYDROCHLORIDE 600 MG: 300 INJECTION, SOLUTION INTRAMUSCULAR; INTRAVENOUS; SUBCONJUNCTIVAL at 11:08

## 2019-08-06 NOTE — PROGRESS NOTES
Patient, Reese Bell (MRN #[id number]), presented with a recent Estimated Glumerular Filtration Rate (EGFR) between 30 and 45 consistent with the definition of chronic kidney disease stage 3 - moderate (ICD10 - N18.3).    eGFR if non    Date Value Ref Range Status   05/12/2019 33 (A) >60 mL/min/1.73 m^2 Final     Comment:     Calculation used to obtain the estimated glomerular filtration  rate (eGFR) is the CKD-EPI equation.          The patient's chronic kidney disease stage 3 was monitored, evaluated, addressed and/or treated. This addendum to the medical record is made on 08/06/2019.

## 2019-08-06 NOTE — PROGRESS NOTES
Subjective:       Patient ID: Reese Bell is a 88 y.o. male.    Chief Complaint: infected toe    Pt is a 88 y.o. male who presents for check up for L great toe with puruluent and subungual; infection. Doing well on current meds. Denies any side effects. Prevention is up to date.    Review of Systems   Constitutional: Negative for appetite change.   HENT: Negative for congestion, ear pain, sneezing and sore throat.    Eyes: Negative for redness and visual disturbance.   Respiratory: Negative for cough, chest tightness and stridor.    Cardiovascular: Negative for chest pain.   Gastrointestinal: Negative for abdominal pain, blood in stool, diarrhea, nausea and vomiting.   Genitourinary: Negative for difficulty urinating, dysuria and hematuria.   Musculoskeletal: Positive for joint swelling. Negative for arthralgias, back pain, myalgias and neck pain.        R shoulder with impingement; L great toe with redness and  some subungual d'c    Skin: Negative for rash.   Neurological: Negative for dizziness.   Psychiatric/Behavioral: Negative for agitation. The patient is not nervous/anxious.        Objective:      Physical Exam   Constitutional: He is oriented to person, place, and time. He appears well-developed and well-nourished.   HENT:   Head: Normocephalic.   Eyes: Pupils are equal, round, and reactive to light.   Neck: Normal range of motion. Neck supple. No thyromegaly present.   Cardiovascular: Normal rate and regular rhythm. Exam reveals no friction rub.   No murmur heard.  Pulmonary/Chest: Effort normal. No respiratory distress. He has no wheezes.   Abdominal: There is no tenderness. There is no rebound and no guarding.   Musculoskeletal: Normal range of motion. He exhibits tenderness. He exhibits no edema.   R shoulder with Tenderness   Lymphadenopathy:     He has no cervical adenopathy.   Neurological: He is alert and oriented to person, place, and time. He has normal reflexes. No cranial nerve deficit.    Skin: Skin is warm and dry.   L great toe and nail with purulent d'c   Psychiatric: He has a normal mood and affect. Judgment and thought content normal.       Assessment:       No diagnosis found.    Plan:   There are no diagnoses linked to this encounter.

## 2019-08-06 NOTE — PROGRESS NOTES
Patient, Reese Bell (MRN #[id number]), presented with a recent Platelet count less than 150 K/uL consistent with the definition of thrombocytopenia (ICD10 - D69.6).    Platelets   Date Value Ref Range Status   05/12/2019 136 (L) 150 - 350 K/uL Final     The patient's thrombocytopenia was monitored, evaluated, addressed and/or treated. This addendum to the medical record is made on 08/06/2019.

## 2019-08-12 ENCOUNTER — HOSPITAL ENCOUNTER (EMERGENCY)
Facility: HOSPITAL | Age: 84
Discharge: ANOTHER HOSPITAL | End: 2019-08-12
Attending: SURGERY
Payer: MEDICARE

## 2019-08-12 ENCOUNTER — HOSPITAL ENCOUNTER (INPATIENT)
Facility: HOSPITAL | Age: 84
LOS: 9 days | Discharge: NURSING, REHAB OR LONG-TERM CARE | DRG: 083 | End: 2019-08-21
Attending: EMERGENCY MEDICINE | Admitting: PSYCHIATRY & NEUROLOGY
Payer: MEDICARE

## 2019-08-12 VITALS
DIASTOLIC BLOOD PRESSURE: 59 MMHG | HEIGHT: 65 IN | BODY MASS INDEX: 33.49 KG/M2 | SYSTOLIC BLOOD PRESSURE: 127 MMHG | WEIGHT: 201 LBS | OXYGEN SATURATION: 96 % | RESPIRATION RATE: 15 BRPM | TEMPERATURE: 97 F | HEART RATE: 69 BPM

## 2019-08-12 DIAGNOSIS — I25.10 CORONARY ARTERY DISEASE INVOLVING NATIVE CORONARY ARTERY OF NATIVE HEART WITHOUT ANGINA PECTORIS: ICD-10-CM

## 2019-08-12 DIAGNOSIS — I25.10 ASCVD (ARTERIOSCLEROTIC CARDIOVASCULAR DISEASE): ICD-10-CM

## 2019-08-12 DIAGNOSIS — I21.3 ST ELEVATION MYOCARDIAL INFARCTION (STEMI), UNSPECIFIED ARTERY: ICD-10-CM

## 2019-08-12 DIAGNOSIS — I25.10 CAD (CORONARY ARTERY DISEASE): ICD-10-CM

## 2019-08-12 DIAGNOSIS — G30.9 DEMENTIA IN ALZHEIMER'S DISEASE: ICD-10-CM

## 2019-08-12 DIAGNOSIS — I62.00 SUBDURAL HEMORRHAGE: Primary | ICD-10-CM

## 2019-08-12 DIAGNOSIS — Z74.09 IMPAIRED MOBILITY AND ACTIVITIES OF DAILY LIVING: ICD-10-CM

## 2019-08-12 DIAGNOSIS — R00.1 BRADYCARDIA: ICD-10-CM

## 2019-08-12 DIAGNOSIS — F02.80 DEMENTIA IN ALZHEIMER'S DISEASE: ICD-10-CM

## 2019-08-12 DIAGNOSIS — R06.02 SOB (SHORTNESS OF BREATH): ICD-10-CM

## 2019-08-12 DIAGNOSIS — I48.0 PAROXYSMAL ATRIAL FIBRILLATION: ICD-10-CM

## 2019-08-12 DIAGNOSIS — Z78.9 IMPAIRED MOBILITY AND ACTIVITIES OF DAILY LIVING: ICD-10-CM

## 2019-08-12 DIAGNOSIS — I25.5 ISCHEMIC CARDIOMYOPATHY: ICD-10-CM

## 2019-08-12 DIAGNOSIS — S06.5XAA SUBDURAL HEMATOMA: ICD-10-CM

## 2019-08-12 DIAGNOSIS — I49.01 VENTRICULAR FIBRILLATION: ICD-10-CM

## 2019-08-12 DIAGNOSIS — S06.5XAA SUBDURAL HEMATOMA: Primary | ICD-10-CM

## 2019-08-12 DIAGNOSIS — G93.5 BRAIN COMPRESSION: ICD-10-CM

## 2019-08-12 LAB
ABO + RH BLD: NORMAL
ALBUMIN SERPL BCP-MCNC: 4 G/DL (ref 3.5–5.2)
ALP SERPL-CCNC: 121 U/L (ref 55–135)
ALT SERPL W/O P-5'-P-CCNC: 22 U/L (ref 10–44)
ANION GAP SERPL CALC-SCNC: 10 MMOL/L (ref 8–16)
APTT BLDCRRT: 39.9 SEC (ref 21–32)
AST SERPL-CCNC: 19 U/L (ref 10–40)
BASOPHILS # BLD AUTO: 0.01 K/UL (ref 0–0.2)
BASOPHILS NFR BLD: 0.1 % (ref 0–1.9)
BILIRUB SERPL-MCNC: 0.5 MG/DL (ref 0.1–1)
BILIRUB UR QL STRIP: NEGATIVE
BLD GP AB SCN CELLS X3 SERPL QL: NORMAL
BNP SERPL-MCNC: 70 PG/ML (ref 0–99)
BUN SERPL-MCNC: 27 MG/DL (ref 8–23)
CALCIUM SERPL-MCNC: 9.1 MG/DL (ref 8.7–10.5)
CHLORIDE SERPL-SCNC: 100 MMOL/L (ref 95–110)
CHOLEST SERPL-MCNC: 147 MG/DL (ref 120–199)
CHOLEST/HDLC SERPL: 3.6 {RATIO} (ref 2–5)
CK MB SERPL-MCNC: 2.5 NG/ML (ref 0.1–6.5)
CK MB SERPL-RTO: 4.1 % (ref 0–5)
CK SERPL-CCNC: 61 U/L (ref 20–200)
CK SERPL-CCNC: 61 U/L (ref 20–200)
CLARITY UR REFRACT.AUTO: CLEAR
CO2 SERPL-SCNC: 23 MMOL/L (ref 23–29)
COLOR UR AUTO: YELLOW
CREAT SERPL-MCNC: 1.5 MG/DL (ref 0.5–1.4)
D DIMER PPP IA.FEU-MCNC: 3.55 MG/L FEU
DIFFERENTIAL METHOD: ABNORMAL
EOSINOPHIL # BLD AUTO: 0.3 K/UL (ref 0–0.5)
EOSINOPHIL NFR BLD: 3.2 % (ref 0–8)
ERYTHROCYTE [DISTWIDTH] IN BLOOD BY AUTOMATED COUNT: 13.1 % (ref 11.5–14.5)
EST. GFR  (AFRICAN AMERICAN): 47 ML/MIN/1.73 M^2
EST. GFR  (NON AFRICAN AMERICAN): 41 ML/MIN/1.73 M^2
ESTIMATED AVG GLUCOSE: 103 MG/DL (ref 68–131)
GLUCOSE SERPL-MCNC: 123 MG/DL (ref 70–110)
GLUCOSE UR QL STRIP: NEGATIVE
HBA1C MFR BLD HPLC: 5.2 % (ref 4–5.6)
HCT VFR BLD AUTO: 40.1 % (ref 40–54)
HDLC SERPL-MCNC: 41 MG/DL (ref 40–75)
HDLC SERPL: 27.9 % (ref 20–50)
HGB BLD-MCNC: 13.5 G/DL (ref 14–18)
HGB UR QL STRIP: NEGATIVE
IMM GRANULOCYTES # BLD AUTO: 0.07 K/UL (ref 0–0.04)
IMM GRANULOCYTES NFR BLD AUTO: 0.8 % (ref 0–0.5)
INR PPP: 1 (ref 0.8–1.2)
KETONES UR QL STRIP: NEGATIVE
LDLC SERPL CALC-MCNC: 78.2 MG/DL (ref 63–159)
LEUKOCYTE ESTERASE UR QL STRIP: NEGATIVE
LYMPHOCYTES # BLD AUTO: 0.6 K/UL (ref 1–4.8)
LYMPHOCYTES NFR BLD: 7.1 % (ref 18–48)
MAGNESIUM SERPL-MCNC: 2 MG/DL (ref 1.6–2.6)
MCH RBC QN AUTO: 32.1 PG (ref 27–31)
MCHC RBC AUTO-ENTMCNC: 33.7 G/DL (ref 32–36)
MCV RBC AUTO: 95 FL (ref 82–98)
MONOCYTES # BLD AUTO: 0.8 K/UL (ref 0.3–1)
MONOCYTES NFR BLD: 8.8 % (ref 4–15)
NEUTROPHILS # BLD AUTO: 7 K/UL (ref 1.8–7.7)
NEUTROPHILS NFR BLD: 80 % (ref 38–73)
NITRITE UR QL STRIP: NEGATIVE
NONHDLC SERPL-MCNC: 106 MG/DL
NRBC BLD-RTO: 0 /100 WBC
PH UR STRIP: 6 [PH] (ref 5–8)
PHOSPHATE SERPL-MCNC: 2.7 MG/DL (ref 2.7–4.5)
PLATELET # BLD AUTO: 117 K/UL (ref 150–350)
PMV BLD AUTO: 8.5 FL (ref 9.2–12.9)
POTASSIUM SERPL-SCNC: 4.9 MMOL/L (ref 3.5–5.1)
PROT SERPL-MCNC: 7.2 G/DL (ref 6–8.4)
PROT UR QL STRIP: NEGATIVE
PROTHROMBIN TIME: 10.9 SEC (ref 9–12.5)
RBC # BLD AUTO: 4.21 M/UL (ref 4.6–6.2)
SODIUM SERPL-SCNC: 133 MMOL/L (ref 136–145)
SP GR UR STRIP: 1.01 (ref 1–1.03)
TRIGL SERPL-MCNC: 139 MG/DL (ref 30–150)
TROPONIN I SERPL DL<=0.01 NG/ML-MCNC: 0.02 NG/ML (ref 0–0.03)
TSH SERPL DL<=0.005 MIU/L-ACNC: 0.8 UIU/ML (ref 0.4–4)
TSH SERPL DL<=0.005 MIU/L-ACNC: 0.98 UIU/ML (ref 0.4–4)
URN SPEC COLLECT METH UR: NORMAL
WBC # BLD AUTO: 8.71 K/UL (ref 3.9–12.7)

## 2019-08-12 PROCEDURE — 99291 CRITICAL CARE FIRST HOUR: CPT | Mod: 25

## 2019-08-12 PROCEDURE — 99291 PR CRITICAL CARE, E/M 30-74 MINUTES: ICD-10-PCS | Mod: ,,, | Performed by: EMERGENCY MEDICINE

## 2019-08-12 PROCEDURE — 27000221 HC OXYGEN, UP TO 24 HOURS

## 2019-08-12 PROCEDURE — 80061 LIPID PANEL: CPT

## 2019-08-12 PROCEDURE — 84484 ASSAY OF TROPONIN QUANT: CPT

## 2019-08-12 PROCEDURE — 84443 ASSAY THYROID STIM HORMONE: CPT | Mod: 91

## 2019-08-12 PROCEDURE — 99291 PR CRITICAL CARE, E/M 30-74 MINUTES: ICD-10-PCS | Mod: GC,,, | Performed by: PSYCHIATRY & NEUROLOGY

## 2019-08-12 PROCEDURE — 25000003 PHARM REV CODE 250: Performed by: EMERGENCY MEDICINE

## 2019-08-12 PROCEDURE — 94640 AIRWAY INHALATION TREATMENT: CPT

## 2019-08-12 PROCEDURE — 99223 1ST HOSP IP/OBS HIGH 75: CPT | Mod: ,,, | Performed by: NURSE PRACTITIONER

## 2019-08-12 PROCEDURE — 82550 ASSAY OF CK (CPK): CPT

## 2019-08-12 PROCEDURE — 25000003 PHARM REV CODE 250: Performed by: NURSE PRACTITIONER

## 2019-08-12 PROCEDURE — 36415 COLL VENOUS BLD VENIPUNCTURE: CPT

## 2019-08-12 PROCEDURE — 20000000 HC ICU ROOM

## 2019-08-12 PROCEDURE — 93010 ELECTROCARDIOGRAM REPORT: CPT | Mod: ,,, | Performed by: INTERNAL MEDICINE

## 2019-08-12 PROCEDURE — 80053 COMPREHEN METABOLIC PANEL: CPT

## 2019-08-12 PROCEDURE — 83880 ASSAY OF NATRIURETIC PEPTIDE: CPT

## 2019-08-12 PROCEDURE — 83036 HEMOGLOBIN GLYCOSYLATED A1C: CPT

## 2019-08-12 PROCEDURE — 99291 CRITICAL CARE FIRST HOUR: CPT | Mod: ,,, | Performed by: EMERGENCY MEDICINE

## 2019-08-12 PROCEDURE — 85610 PROTHROMBIN TIME: CPT

## 2019-08-12 PROCEDURE — 85025 COMPLETE CBC W/AUTO DIFF WBC: CPT

## 2019-08-12 PROCEDURE — 94761 N-INVAS EAR/PLS OXIMETRY MLT: CPT

## 2019-08-12 PROCEDURE — 93010 EKG 12-LEAD: ICD-10-PCS | Mod: ,,, | Performed by: INTERNAL MEDICINE

## 2019-08-12 PROCEDURE — 99223 1ST HOSP IP/OBS HIGH 75: CPT | Mod: GC,,, | Performed by: PSYCHIATRY & NEUROLOGY

## 2019-08-12 PROCEDURE — 84100 ASSAY OF PHOSPHORUS: CPT

## 2019-08-12 PROCEDURE — 99900035 HC TECH TIME PER 15 MIN (STAT)

## 2019-08-12 PROCEDURE — 82553 CREATINE MB FRACTION: CPT

## 2019-08-12 PROCEDURE — 93005 ELECTROCARDIOGRAM TRACING: CPT

## 2019-08-12 PROCEDURE — 27000190 HC CPAP FULL FACE MASK W/VALVE

## 2019-08-12 PROCEDURE — 83735 ASSAY OF MAGNESIUM: CPT

## 2019-08-12 PROCEDURE — 25000242 PHARM REV CODE 250 ALT 637 W/ HCPCS: Performed by: SURGERY

## 2019-08-12 PROCEDURE — 85730 THROMBOPLASTIN TIME PARTIAL: CPT

## 2019-08-12 PROCEDURE — 99223 PR INITIAL HOSPITAL CARE,LEVL III: ICD-10-PCS | Mod: ,,, | Performed by: NURSE PRACTITIONER

## 2019-08-12 PROCEDURE — 84443 ASSAY THYROID STIM HORMONE: CPT

## 2019-08-12 PROCEDURE — 81003 URINALYSIS AUTO W/O SCOPE: CPT

## 2019-08-12 PROCEDURE — 94660 CPAP INITIATION&MGMT: CPT

## 2019-08-12 PROCEDURE — 99223 PR INITIAL HOSPITAL CARE,LEVL III: ICD-10-PCS | Mod: GC,,, | Performed by: PSYCHIATRY & NEUROLOGY

## 2019-08-12 PROCEDURE — 86901 BLOOD TYPING SEROLOGIC RH(D): CPT

## 2019-08-12 PROCEDURE — 85379 FIBRIN DEGRADATION QUANT: CPT

## 2019-08-12 PROCEDURE — 99291 CRITICAL CARE FIRST HOUR: CPT | Mod: GC,,, | Performed by: PSYCHIATRY & NEUROLOGY

## 2019-08-12 RX ORDER — EZETIMIBE 10 MG/1
10 TABLET ORAL DAILY
Status: DISCONTINUED | OUTPATIENT
Start: 2019-08-12 | End: 2019-08-21 | Stop reason: HOSPADM

## 2019-08-12 RX ORDER — LANOLIN ALCOHOL/MO/W.PET/CERES
800 CREAM (GRAM) TOPICAL
Status: DISCONTINUED | OUTPATIENT
Start: 2019-08-12 | End: 2019-08-18

## 2019-08-12 RX ORDER — CARVEDILOL 12.5 MG/1
12.5 TABLET ORAL 2 TIMES DAILY
Status: DISCONTINUED | OUTPATIENT
Start: 2019-08-12 | End: 2019-08-21 | Stop reason: HOSPADM

## 2019-08-12 RX ORDER — ACETAMINOPHEN 325 MG/1
650 TABLET ORAL EVERY 6 HOURS PRN
Status: DISCONTINUED | OUTPATIENT
Start: 2019-08-12 | End: 2019-08-21 | Stop reason: HOSPADM

## 2019-08-12 RX ORDER — SODIUM,POTASSIUM PHOSPHATES 280-250MG
2 POWDER IN PACKET (EA) ORAL
Status: DISCONTINUED | OUTPATIENT
Start: 2019-08-12 | End: 2019-08-18

## 2019-08-12 RX ORDER — TAMSULOSIN HYDROCHLORIDE 0.4 MG/1
0.4 CAPSULE ORAL DAILY
Status: DISCONTINUED | OUTPATIENT
Start: 2019-08-12 | End: 2019-08-21 | Stop reason: HOSPADM

## 2019-08-12 RX ORDER — SODIUM CHLORIDE 0.9 % (FLUSH) 0.9 %
10 SYRINGE (ML) INJECTION
Status: DISCONTINUED | OUTPATIENT
Start: 2019-08-12 | End: 2019-08-21 | Stop reason: HOSPADM

## 2019-08-12 RX ORDER — MUPIROCIN 20 MG/G
OINTMENT TOPICAL 2 TIMES DAILY
Status: DISCONTINUED | OUTPATIENT
Start: 2019-08-12 | End: 2019-08-21 | Stop reason: HOSPADM

## 2019-08-12 RX ORDER — MEMANTINE HYDROCHLORIDE 5 MG/1
5 TABLET ORAL 2 TIMES DAILY
Status: DISCONTINUED | OUTPATIENT
Start: 2019-08-12 | End: 2019-08-21 | Stop reason: HOSPADM

## 2019-08-12 RX ORDER — RANOLAZINE 500 MG/1
1000 TABLET, EXTENDED RELEASE ORAL 2 TIMES DAILY
Status: DISCONTINUED | OUTPATIENT
Start: 2019-08-12 | End: 2019-08-21 | Stop reason: HOSPADM

## 2019-08-12 RX ORDER — CARVEDILOL 12.5 MG/1
12.5 TABLET ORAL 2 TIMES DAILY WITH MEALS
Status: DISCONTINUED | OUTPATIENT
Start: 2019-08-12 | End: 2019-08-12

## 2019-08-12 RX ORDER — POTASSIUM CHLORIDE 20 MEQ/15ML
60 SOLUTION ORAL
Status: DISCONTINUED | OUTPATIENT
Start: 2019-08-12 | End: 2019-08-18

## 2019-08-12 RX ORDER — NITROGLYCERIN 0.4 MG/1
0.4 TABLET SUBLINGUAL EVERY 5 MIN PRN
Status: DISCONTINUED | OUTPATIENT
Start: 2019-08-12 | End: 2019-08-21 | Stop reason: HOSPADM

## 2019-08-12 RX ORDER — POLYETHYLENE GLYCOL 3350 17 G/17G
17 POWDER, FOR SOLUTION ORAL DAILY
Status: DISCONTINUED | OUTPATIENT
Start: 2019-08-12 | End: 2019-08-21 | Stop reason: HOSPADM

## 2019-08-12 RX ORDER — LABETALOL HCL 20 MG/4 ML
10 SYRINGE (ML) INTRAVENOUS EVERY 4 HOURS PRN
Status: DISCONTINUED | OUTPATIENT
Start: 2019-08-12 | End: 2019-08-21 | Stop reason: HOSPADM

## 2019-08-12 RX ORDER — AMOXICILLIN 250 MG
1 CAPSULE ORAL 2 TIMES DAILY
Status: DISCONTINUED | OUTPATIENT
Start: 2019-08-12 | End: 2019-08-21 | Stop reason: HOSPADM

## 2019-08-12 RX ORDER — ASPIRIN 325 MG
325 TABLET ORAL
Status: DISCONTINUED | OUTPATIENT
Start: 2019-08-12 | End: 2019-08-12 | Stop reason: HOSPADM

## 2019-08-12 RX ORDER — POTASSIUM CHLORIDE 20 MEQ/15ML
40 SOLUTION ORAL
Status: DISCONTINUED | OUTPATIENT
Start: 2019-08-12 | End: 2019-08-18

## 2019-08-12 RX ORDER — ROSUVASTATIN CALCIUM 5 MG/1
5 TABLET, COATED ORAL DAILY
Status: DISCONTINUED | OUTPATIENT
Start: 2019-08-12 | End: 2019-08-12

## 2019-08-12 RX ORDER — RANOLAZINE 1000 MG/1
1000 TABLET, EXTENDED RELEASE ORAL 2 TIMES DAILY
Status: DISCONTINUED | OUTPATIENT
Start: 2019-08-12 | End: 2019-08-12

## 2019-08-12 RX ORDER — EZETIMIBE 10 MG/1
10 TABLET ORAL NIGHTLY
Status: DISCONTINUED | OUTPATIENT
Start: 2019-08-12 | End: 2019-08-12

## 2019-08-12 RX ORDER — ONDANSETRON 2 MG/ML
4 INJECTION INTRAMUSCULAR; INTRAVENOUS EVERY 8 HOURS PRN
Status: DISCONTINUED | OUTPATIENT
Start: 2019-08-12 | End: 2019-08-21 | Stop reason: HOSPADM

## 2019-08-12 RX ORDER — ROSUVASTATIN CALCIUM 10 MG/1
10 TABLET, COATED ORAL
Status: DISCONTINUED | OUTPATIENT
Start: 2019-08-14 | End: 2019-08-21 | Stop reason: HOSPADM

## 2019-08-12 RX ORDER — LEVALBUTEROL 1.25 MG/.5ML
1.25 SOLUTION, CONCENTRATE RESPIRATORY (INHALATION)
Status: COMPLETED | OUTPATIENT
Start: 2019-08-12 | End: 2019-08-12

## 2019-08-12 RX ORDER — FUROSEMIDE 20 MG/1
20 TABLET ORAL DAILY
Status: DISCONTINUED | OUTPATIENT
Start: 2019-08-12 | End: 2019-08-18

## 2019-08-12 RX ORDER — HYDRALAZINE HYDROCHLORIDE 20 MG/ML
10 INJECTION INTRAMUSCULAR; INTRAVENOUS EVERY 4 HOURS PRN
Status: DISCONTINUED | OUTPATIENT
Start: 2019-08-12 | End: 2019-08-15

## 2019-08-12 RX ADMIN — CARVEDILOL 12.5 MG: 12.5 TABLET, FILM COATED ORAL at 12:08

## 2019-08-12 RX ADMIN — FUROSEMIDE 20 MG: 20 TABLET ORAL at 02:08

## 2019-08-12 RX ADMIN — ACETAMINOPHEN 650 MG: 325 TABLET ORAL at 10:08

## 2019-08-12 RX ADMIN — EZETIMIBE 10 MG: 10 TABLET ORAL at 12:08

## 2019-08-12 RX ADMIN — SACUBITRIL AND VALSARTAN 1 TABLET: 24; 26 TABLET, FILM COATED ORAL at 10:08

## 2019-08-12 RX ADMIN — RANOLAZINE 1000 MG: 1000 TABLET, FILM COATED, EXTENDED RELEASE ORAL at 10:08

## 2019-08-12 RX ADMIN — LEVALBUTEROL 1.25 MG: 1.25 SOLUTION, CONCENTRATE RESPIRATORY (INHALATION) at 03:08

## 2019-08-12 RX ADMIN — POLYETHYLENE GLYCOL 3350 17 G: 17 POWDER, FOR SOLUTION ORAL at 02:08

## 2019-08-12 RX ADMIN — MEMANTINE HYDROCHLORIDE 5 MG: 5 TABLET ORAL at 10:08

## 2019-08-12 RX ADMIN — TAMSULOSIN HYDROCHLORIDE 0.4 MG: 0.4 CAPSULE ORAL at 02:08

## 2019-08-12 RX ADMIN — MUPIROCIN: 20 OINTMENT TOPICAL at 10:08

## 2019-08-12 RX ADMIN — CARVEDILOL 12.5 MG: 12.5 TABLET, FILM COATED ORAL at 10:08

## 2019-08-12 NOTE — HPI
Mr. Bell is a R-handed 89 y/o M with PMHx of STEMI s/p stent placement (ASA/Plavix 3/2019), ICD/pacemaker placement, and Atrial fibrillation anticoagulated on Eliquis who presented to an outside ED with complaints of headache s/p mechanical fall. The patient was in his usual state of health, debilitated, reliant on wheelchair for most/all of mobility and living with family members, when he awoke in the middle of the night of presentation at 1 AM, and fell from a standing position, hitting his face and head. The patient was having difficulty sleeping at night as a result of frequent muscle cramps and was prescribed Flexeril within the past week to assist, he has been having increased dizziness since starting the medication. The patient was placed back into his bed after this fall, he awoke again around 3 AM, was found to be bleeding from his nose and had multiple blood clots his CPAP machine. He was endorsing a headache and family was concerned because his mental status seemed changed from prior. The patient was brought to an outside hospital where he was then transported to [facility] for further neurologic evaluation with CT head showing a right tentorial subdural hematoma.     The patient has been compliant with anticoagulation and antiplatelet medications. His last dose was the day prior to admission. His family states that at baseline he isn't oriented to time. He does have new worsening dysarthria since this accident. The family is also concerned about change in mental status as the patient frequently brings up events that occurred several years ago as if they had just occurred.

## 2019-08-12 NOTE — H&P
"[facility]  Neurocritical Care  History & Physical Note    Admit Date: 2019  Service Date: 2019  Length of Stay: 0    Subjective:     Chief Complaint: <principal problem not specified>    History of Present Illness:   Transfer from [facility]:  Pt is an88 yo m, h/o CAD MI x4 (STEMI in March, on ASA/plavix), HTN, v fib with ICD and pacemaker, a fib on eliquis, Multiple TIAs, last one year ago severe debility (uses walker and wheelchair at baseline), s/p fall last night and early this am.while trying to walk to bathroom.  Has had 2-3 falls in past 2 days bc "legs give out" which happens often for pt but more frequently over past week. Also, He was recently prescribed flexiril for shoulder pain. Family noticed  an increase in falls over past 3-4 days.   Of note,Spouse  within past 2 weeks.  Seen at [place] overnight, extensive work-up including CT head which showed small subdural.  [place] [facility] [place]. Pt neurologically stable, disorientation to time and place. No immediate neurosurgical intervention necessary at present.   Decision made to not give platelets. No reversal of anticoagulation. Repeat CTH after 6h revealed R tentorial SDH 1.2 cm thickness, stable, no increase or new bleed. CT C-spine showed no acute fractures; 1cm lucent lesion at C7; cervical spondylosis. Pt will be admitted to [facility] over night for close neurological monitoring       Hospital Course:  Admit to [facility]. Repeat CTH and C-spine. SBP goal <160. No platelets. Hold anticoagulation.    Past Medical History:   Diagnosis Date    ASCVD (arteriosclerotic cardiovascular disease)     DJD (degenerative joint disease)     Hyperlipidemia     Hypertension     MGUS (monoclonal gammopathy of unknown significance) 2017    FRIDA (obstructive sleep apnea)     Thrombocytopenia 2017    Ventricular fibrillation 3/29/2019     Past Surgical History:   Procedure Laterality Date    " APPENDECTOMY      CARDIAC PACEMAKER PLACEMENT  10/2016    CATHETERIZATION, HEART, LEFT Left 3/28/2019    Performed by [facility] at [facility] CATH    DENTAL SURGERY  06/02/2016    HERNIA REPAIR      right inguinal    Right side catherization Right 2019    TONSILLECTOMY         Current Facility-Administered Medications on File Prior to Encounter   Medication Dose Route Frequency Provider Last Rate Last Dose    [COMPLETED] levalbuterol nebulizer solution 1.25 mg  1.25 mg Nebulization ED 1 Time Lito Shaikh MD   1.25 mg at 08/12/19 0340    [DISCONTINUED] aspirin tablet 325 mg  325 mg Oral ED 1 Time Lito Shaikh MD         Current Outpatient Medications on File Prior to Encounter   Medication Sig Dispense Refill    aspirin (ECOTRIN) 81 MG EC tablet Take 1 tablet (81 mg total) by mouth once daily.  0    carvedilol (COREG) 25 MG tablet Take 12.5 mg by mouth 2 (two) times daily with meals.       clopidogrel (PLAVIX) 75 mg tablet Take 75 mg by mouth every evening.       CRESTOR 5 mg tablet 10 mg. Take 1 tablet on Monday and 1 on Wednesdays      cyclobenzaprine (FLEXERIL) 10 MG tablet Take 1 tablet (10 mg total) by mouth 3 (three) times daily as needed for Muscle spasms. 30 tablet 5    ezetimibe (ZETIA) 10 mg tablet       furosemide (LASIX) 20 MG tablet Take 1 tablet (20 mg total) by mouth once daily. 30 tablet 11    HYDROcodone-acetaminophen (NORCO) 5-325 mg per tablet Take 1 tablet by mouth every 12 (twelve) hours as needed for Pain. 60 tablet 0    memantine (NAMENDA) 5 MG Tab TAKE 1 TABLET (5 MG TOTAL) BY MOUTH 2 (TWO) TIMES DAILY. 180 tablet 3    mirtazapine (REMERON) 15 MG tablet Take 1 tablet (15 mg total) by mouth every evening. 30 tablet 11    mupirocin (BACTROBAN) 2 % ointment Apply topically 2 (two) times daily. for 10 days 22 g 2    sacubitril-valsartan (ENTRESTO) 24-26 mg per tablet Take 1 tablet by mouth 2 (two) times daily.      tamsulosin (FLOMAX) 0.4 mg Cap CAN TAKE ONE AT BEDTIME  FOR BLADDER & PROSTATE TO EASE URINATION 90 capsule 3    walker Misc Quad walker with  wheels and a seat & brakes 1 each 0    apixaban (ELIQUIS) 2.5 mg Tab Take by mouth 2 (two) times daily.      nitroGLYCERIN (NITROSTAT) 0.4 MG SL tablet TAKE 1 TABLET SUBLINGUALLY EVERY 5 MINS X 3 AS NEEDED FOR CHEST PAIN  2    RANEXA 1,000 mg Tb12 Take 1,000 mg by mouth 2 (two) times daily.       [DISCONTINUED] clotrimazole-betamethasone 1-0.05% (LOTRISONE) cream Apply topically 2 (two) times daily. 45 g 5    [DISCONTINUED] cyclobenzaprine (FLEXERIL) 5 MG tablet On e po q hs for muscle tightness 30 tablet 2    [DISCONTINUED] sulfamethoxazole-trimethoprim 800-160mg (BACTRIM DS) 800-160 mg Tab Take 1 tablet by mouth 2 (two) times daily. 20 tablet 0     Allergies: Patient has no known allergies.    Family History   Problem Relation Age of Onset    Stroke Mother     Coronary artery disease Father        Social History     Tobacco Use    Smoking status: Former Smoker    Smokeless tobacco: Never Used   Substance Use Topics    Alcohol use: No    Drug use: No      Review of Systems:   Constitutional: + disoriented to time and place. + dementia Denies fevers, weight loss, chills, or weakness.  Eyes: Denies changes in vision.  ENT: + nose bleed Denies dysphagia, nasal discharge, ear pain or discharge.  Cardiovascular:+ AICD Denies chest pain, palpitations, orthopnea, or claudication.  Respiratory:+ sleep apnea Denies shortness of breath, cough, hemoptysis, or wheezing.  GI: Denies nausea/vomitting, hematochezia, melena, abd pain, or changes in appetite.  : + BPH Denies dysuria, incontinence, or hematuria.  Musculoskeletal:+ bilat LE weakness, neuropathy, + R shoulder pain  Denies joint pain or myalgias.  Skin/breast: Denies rashes, lumps, lesions, or discharge.  Neurologic: Denies headache, dizziness, vertigo, or paresthesias.  Psychiatric: Denies changes in mood or hallucinations.  Endocrine: Denies polyuria, polydipsia,  heat/cold intolerance.  Hematologic/Lymph: Denies lymphadenopathy, easy bruising or easy bleeding.  Allergic/Immunologic: Denies rash, rhinitis.    Skin: + L  Periorbital bruising, upper extremity bruising    Vitals:   Temp: 98.2 °F (36.8 °C)  Pulse: 74  BP: 123/62  MAP (mmHg): 87  Resp: 12  SpO2: 95 %  O2 Device (Oxygen Therapy): nasal cannula    Temp  Min: 97.2 °F (36.2 °C)  Max: 99.3 °F (37.4 °C)  Pulse  Min: 67  Max: 84  BP  Min: 123/62  Max: 178/77  MAP (mmHg)  Min: 85  Max: 127  Resp  Min: 12  Max: 26  SpO2  Min: 89 %  Max: 99 %    No intake/output data recorded.         Examination:   Constitutional: Well-nourished and -developed. No apparent distress.   Eyes: Conjunctiva clear, anicteric. Lids no lesions.  Head/Ears/Nose/Mouth/Throat/Neck: [place] Moist mucous membranes. External ears, nose atraumatic.   Cardiovascular:  V paced rhythm, Regular rhythm.heart tones distatnt. No murmurs. No leg edema.  Respiratory: O2 per NC. Comfortable respirations. bilat Breath sounds diminished.  Gastrointestinal: No hernia. Soft, nondistended, nontender. + bowel sounds.    Neurologic:   -[id number]  -Alert. Oriented to person, disoriented to place, and time. Speech fluent. Follows commands. Facial symmetry  -Cranial nerves II-XII grossly intact, PERRL 3+ + cough, gag, shoulder shrug  -Strength 5/5 and symmetric in arms, legs. Tone normal. Moves spon. And against gravity  -Sensation bilat intact to touch in arms, legs.        Today I independently reviewed pertinent medications, lines/drains/airways, imaging, laboratory results, microbiology results, notably:   Assessment/Plan:     Neuro  Subdural hematoma  Admit to [facility] s/p fall x2, R SDH  8/12  Repeat CTH after 6h revealed R tentorial SDH 1.2 cm thickness, stable, no increase or new bleed.   NSGY consulted, no immediate neurosurgical  Intervention necessary at present  Neuro checks & VS q1h  SBPgoal <160  Hold anticoagulation for next 48h  Restart crestor 5mg every Monday and  Wednesday  PT/OT/SLP      Dementia in Alzheimer's disease  Restart memantine 5mg bid    Cardiac/Vascular  Ventricular fibrillation  Has AICD    Ischemic cardiomyopathy  see chronic CHF, ASHD  Has AICD  Echo pending      Hypertension  SBP goal <160  Continue coreg 12.5mg bid  Lasix 20mg daily  sacubitril- valsartan 24-26 bid  Prn hydralazine, labetalol      Hyperlipidemia  Continue crestor 5mg every Monday and Wednesday    ezetimbe 10mg daily    STEMI (ST elevation myocardial infarction)  Hx of MI x4  Last MI 4/2019  Hx of multiple stents.  4 placed 4/2019  ASA and plavix on hold for 48h 2/2 SDH    Chronic CHF  Continue sacubitril-valsartan 24-26mg bid   coreg 12.5mg bid  Furosemide 20mg daily  Echo pending      ASCVD (arteriosclerotic cardiovascular disease)  Continue ezetembe 10mg daily  crestor 5mg every Monday, & Wednesday  Ranolazine SR 1000mg bid  NTG SL prn chest pain  ECG pending  Echo pending  Hold ASA, plavix for 48h, 2/2 SDH    Hx of MI x4 with multiple stents. Most recent MI 4/2019    Renal/  Stage 3 chronic kidney disease  Monitor daily CMP, BUN/Cr 27/1.5 on admit   Monitor I& O  Monitor urine output    Benign prostatic hyperplasia  Restart tamulosin 0.4mg daily    Other  Impaired mobility and activities of daily living  PT/OT to treat and evaluate    Sleep apnea  Continue CPAP  QHS          The patient is being Prophylaxed for:  Venous Thromboembolism with: Mechanical  Stress Ulcer with: None  Ventilator Pneumonia with: not applicable    Activity Orders          Diet NPO: NPO starting at 08/12 1224        Full Code     I have spent 35 min with this patient, with over 50% of this time spent coordinating care and speaking with the family    Marifer Hassan NP  Neurocritical Care  [facility]

## 2019-08-12 NOTE — ASSESSMENT & PLAN NOTE
Continue sacubitril-valsartan 24-26mg bid   coreg 12.5mg bid  Furosemide 20mg daily  Echo pending

## 2019-08-12 NOTE — PROGRESS NOTES
Patient arrived to [facility] from [facility] ED  Type of stroke/diagnosis: SDH    Current symptoms: slurred speech / HA    Skin assessment done: YES  Wounds noted: R groin moisture associated dermatitis, L ribs/hip bruised, sacral redness, L eye/chin bruising    NCC notified: Marifer Hassan NP

## 2019-08-12 NOTE — ED TRIAGE NOTES
Reese Bell, an 88 y.o. male presents to the ED as a transfer from [place] for neuro services.  According to his son, he fell twice last night while trying to ambulate to the bathroom.  Patient fell from standing onto his back.  Patient is normally wheelchair bound and only shuffles a bit.  Patient's nose then began to bleed and family took him to the ED where his CT showed a right subdural hematoma.  Patient takes Eliquis for atrial fibrillation.        Review of patient's allergies indicates:  No Known Allergies  Chief Complaint   Patient presents with    [facility] Transfer- Neuro Consult     Patient had several recent falls most recently at 0100. CT showed right SDH. Patient on blood thinners. Per family patient has had confusion for the past week. Patient disoriented to time. Patient had STEMI in March. Patient being transferred for Neuro Eval     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    FRIDA (obstructive sleep apnea)     Thrombocytopenia 5/11/2017    Ventricular fibrillation 3/29/2019

## 2019-08-12 NOTE — ED NOTES
Patients family is very concerned about patients home medications for his heart. I spoke with neurosurgery who states to hold his AM meds for now and they will review the chart and place orders. Family informed.

## 2019-08-12 NOTE — ED PROVIDER NOTES
[facility]                                                 Chief Complaint  88 y.o. male with Shortness of Breath (anxious appearing) and Fall (x2, last at 1AM)    History of Present Illness  Reese Bell presents to the emergency room after fall tonight  Patient had a fall hitting the right side of his head this early morning PTA  Patient began having anxiety and panic with shortness of breath after  Patient is alert and appropriate, normotensive, GCS 15 on ER arrival  Patient has been dealing with the loss of his wife with significant grief    The history is provided by the patient   device was not used during this ER visit  Medical history: Vascular disease, DJD, HLD, HTN, FRIDA, thrombocytopenia, MGUS  Surgeries: Appendectomy, pacemaker, dental surgery, hernia repair, tonsillectomy  No Known Allergies     I have reviewed all of this patient's past medical, surgical, family, and social   histories as well as active allergies and medications documented in the  electronic medical record    Review of Systems and Physical Exam      Review of Systems  -- Constitution - no fever, denies fatigue, no weakness, no chills  -- Eyes - no tearing or redness, no visual disturbance  -- Ear, Nose - no tinnitus or earache, no nasal congestion or discharge  -- Mouth,Throat - no sore throat, no toothache, normal voice, normal swallowing  -- Respiratory - shortness of breath, no JESSICA, no cough or congestion  -- Cardiovascular - denies chest pain, no palpitations, denies claudication  -- Gastrointestinal - denies abdominal pain, nausea, vomiting, or diarrhea  -- Genitourinary - no dysuria, denies flank pain, no hematuria, no STD risk  -- Musculoskeletal - denies back pain, negative for trauma or injury  -- Neurological - headache, denies weakness or seizure; no LOC  -- Skin - denies pallor, rash, or changes in skin. no hives or welts noted  -- Psychiatric - Denies SI or HI, no psychosis or  fractured thought noted     Vital Signs  His tympanic temperature is 97.2 °F (36.2 °C).   His blood pressure is 135/60 and his pulse is 69.   His respiration is 15 and oxygen saturation is 94%    Physical Exam  -- Nursing note and vitals reviewed  -- Constitutional: Appears well-developed and well-nourished  -- Head:  Mild facial contusion  -- Eyes: Pupils are equal and reactive to light. Normal conjunctiva and lids  -- Neck: Normal range of motion. Neck supple. No masses, trachea midline  -- Cardiac: Normal rate, regular rhythm and normal heart sounds  -- Pulmonary: Normal respiratory effort, breath sounds clear to auscultation  -- Abdominal: Soft, no tenderness. Normal bowel sounds. Normal liver edge  -- Musculoskeletal: Normal range of motion, no effusions. Joints stable   -- Neurological: No focal deficits. Showed good interaction with staff  -- Vascular: Posterior tibial, dorsalis pedis and radial pulses 2+ bilaterally      Emergency Room Course      Lab Results   (L)   K 4.9      CO2 23   BUN 27 (H)   CREATININE 1.5 (H)    (H)   ALKPHOS 121   AST 19   ALT 22   BILITOT 0.5   ALBUMIN 4.0   PROT 7.2   WBC 8.71   HGB 13.5 (L)   HCT 40.1    (L)   CPK 61   CPK 61   CPKMB 2.5   TROPONINI 0.024   INR 1.0   BNP 70     EKG   -- The EKG findings today were without concerning findings from baseline     Radiology  -- CT head shows a right tentorial 8 millimeter subdural hematoma  -- Chest x-ray showed no infiltrate and showed no acute pathology     Medications Given  aspirin tablet 325 mg (325 mg Oral Not Given 8/12/19 0345)   levalbuterol nebulizer solution 1.25 mg (1.25 mg Nebulization Given 8/12/19 0340)     MDM  Number of Diagnoses or Management Options  SOB (shortness of breath): new, needed workup  Subdural hematoma: new, needed workup     Amount and/or Complexity of Data Reviewed  Clinical lab tests: reviewed and ordered  Tests in the radiology section of CPT®: ordered and reviewed  Tests  in the medicine section of CPT®: reviewed and ordered  Obtain history from someone other than the patient: yes  Discuss the patient with other providers: yes    Risk of Complications, Morbidity, and/or Mortality  Presenting problems: high  Diagnostic procedures: high  Management options: high       ED Physician Management  -- Diagnosis management comments: 88 y.o. male with subdural hematoma  -- patient be transferred to [facility] to the neuro critical care unit  -- GCS 15, patient will be transferred for further evaluation by neuro surgery  -- patient is on Eliquis with high risk for further bleed progression this morning    Critical Care ED Physician Time (minutes):  -- Performed by: Lito Shaikh M.D.  -- Date/Time: 4:41 AM 8/12/2019   -- Direct Patient Care (Face Time): 10  -- Additional History from Records or Taking Additional History: 5  -- Ordering, Reviewing, and Interpreting Diagnostic Studies: 10  -- Total Time in Documentation: 10  -- Consultation with Other Physicians: 10  -- Consultation with Family Related to Condition: 5  -- Total Critical Care Time: 50     Diagnosis  -- The primary encounter diagnosis was Subdural hematoma.   -- A diagnosis of SOB (shortness of breath) was also pertinent to this visit.    Disposition and Plan  -- Disposition: transfer  -- Condition: stable    This note is dictated on M*Modal word recognition program.  There are word recognition mistakes that are occasionally missed on review.         Lito Shaikh MD  08/12/19 0445

## 2019-08-12 NOTE — ED TRIAGE NOTES
88 y.o. male presents to ER ED 02/ED 02A   Chief Complaint   Patient presents with    Shortness of Breath     anxious appearing    Fall     x2, last at 1AM   Patient presenting with family stating patient fell two times at home when walking to bathroom. Last fall at 1AM. Family reports patient hit his left side of face on carpet at 1AM. Patient appears very anxious. Reports having shortness of breath. Family reports patient has recent death of his wife.

## 2019-08-12 NOTE — ED PROVIDER NOTES
"Encounter Date: 2019       History     Chief Complaint   Patient presents with    [facility] [place] Transfer- Neuro Consult     Patient had several recent falls most recently at 0100. CT showed right SDH. Patient on blood thinners. Per family patient has had confusion for the past week. Patient disoriented to time. Patient had STEMI in March. Patient being transferred for Neuro Eval     87 yo m, h/o CAD (STEMI in March, on ASA/plavix), HTN, v fib with ICD and pacemaker, a fib on eliquis, severe debility (uses walker and wheelchair at baseline), s/p fall last night while trying to walk to bathroom.  Has had 2-3 falls in past 2 days bc "legs give out" which happens often for pt but more severe over past week.  Spouse  in past 2 weeks.  Seen at [place] overnight, extensive work-up including CT head which showed small subdural.  Pt transferred for NSG eval.      The history is provided by the patient and a relative.     Review of patient's allergies indicates:  No Known Allergies  Past Medical History:   Diagnosis Date    ASCVD (arteriosclerotic cardiovascular disease)     DJD (degenerative joint disease)     Hyperlipidemia     Hypertension     MGUS (monoclonal gammopathy of unknown significance) 2017    FRIDA (obstructive sleep apnea)     Thrombocytopenia 2017    Ventricular fibrillation 3/29/2019     Past Surgical History:   Procedure Laterality Date    APPENDECTOMY      CARDIAC PACEMAKER PLACEMENT  10/2016    CATHETERIZATION, HEART, LEFT Left 3/28/2019    [facility] CATH    DENTAL SURGERY  2016    HERNIA REPAIR      right inguinal    Right side catherization Right 2019    TONSILLECTOMY       Family History   Problem Relation Age of Onset    Stroke Mother     Coronary artery disease Father      Social History     Tobacco Use    Smoking status: Former Smoker    Smokeless tobacco: Never Used   Substance Use Topics    Alcohol use: No    Drug use: No     Review " of Systems   Constitutional: Negative for chills, diaphoresis, fatigue and fever.   Respiratory: Positive for shortness of breath. Negative for cough.    Cardiovascular: Negative for chest pain, palpitations and leg swelling.   Gastrointestinal: Negative for abdominal pain.   Genitourinary: Negative for difficulty urinating.   Musculoskeletal: Negative for back pain.   Neurological: Negative for dizziness, tremors, seizures, speech difficulty, weakness, numbness and headaches.   All other systems reviewed and are negative.      Physical Exam     Initial Vitals [08/12/19 0604]   BP Pulse Resp Temp SpO2   (!) 170/100 70 16 99.3 °F (37.4 °C) 96 %      MAP       --         Physical Exam    Nursing note and vitals reviewed.  Constitutional: He appears well-developed and well-nourished. He is not diaphoretic. No distress.   HENT:   Mouth/Throat: Oropharynx is clear and moist.   Ecchymoses under left eye   Eyes: Conjunctivae and EOM are normal. Pupils are equal, round, and reactive to light.   Neck: Neck supple.   Cardiovascular: Normal rate and regular rhythm.   No murmur heard.  Pulmonary/Chest: Breath sounds normal. No respiratory distress.   Abdominal: He exhibits no distension. There is no tenderness. There is no rebound and no guarding.   Musculoskeletal: Normal range of motion. He exhibits no edema.   Neurological: He is alert and oriented to person, place, and time. He has normal strength. GCS score is 15. GCS eye subscore is 4. GCS verbal subscore is 5. GCS motor subscore is 6.   Skin: Skin is warm and dry. No pallor.   Psychiatric: He has a normal mood and affect. Thought content normal.         ED Course   Procedures  Labs Reviewed - No data to display       Imaging Results           CT Cervical Spine Without Contrast (Final result)  Result time 08/12/19 11:36:06    Final result by Chip Becerril Jr., MD (08/12/19 11:36:06)                 Impression:      1. No acute fracture of the cervical spine.  2. Grade 1  anterolisthesis C2 on C3 and C3 and C4.  3. Nonspecific 1 cm lucent lesion at the anterior aspect of the C7 vertebral body with ill-defined margins.  Further evaluation with MRI or bone scan as clinically indicated.  4. Cervical spondylosis as above.  5.  This report was flagged in Epic as abnormal.    Electronically signed by resident: Johan Mendiola  Date:    08/12/2019  Time:    10:04    Electronically signed by: Chip Becerril MD  Date:    08/12/2019  Time:    11:36             Narrative:    EXAMINATION:  CT CERVICAL SPINE WITHOUT CONTRAST    CLINICAL HISTORY:  C-spine trauma, NEXUS/CCR positive, low risk    TECHNIQUE:  Low dose axial images, sagittal and coronal reformations were performed though the cervical spine.  Contrast was not administered.    COMPARISON:  None.    FINDINGS:  Please see CT head same date for complete intracranial assessment.  Mild fluid within the left mastoid air cells.    Skull base and craniocervical junction are within normal limits.    Grade 1 anterolisthesis C2 on C3 and C3 on C4.    1 cm lucent lesion at the anterior aspect of the C7 vertebral body with ill-defined margins.    Severe disc space height loss C6-C7.  Moderate and mild disc space height loss C4-C5 and C5-C6 respectively.    Advanced degenerative changes of the cervical spine with multilevel posterior disc osteophyte complexes, uncovertebral spurring, and facet arthropathy resulting in moderate spinal canal stenosis C4-C5, C5-C6, and C6-C7.  Multilevel moderate to severe neural foraminal narrowing C2-C3 through C6-C7.    Paraspinal muscles and soft tissues are unremarkable.  Mild atherosclerosis of the bilateral carotid bifurcations.  Partially visualized pacer wires.                                CT Head Without Contrast (Final result)  Result time 08/12/19 11:24:09    Final result by Otilio Zafar MD (08/12/19 11:24:09)                 Impression:      Right tentorial subdural hematoma measuring up to 1.2 cm in  thickness, not appreciably changed from recent exam.    Moderate chronic microvascular ischemic changes.    No new intracranial hemorrhage identified.    Probable minimally displaced fracture of the inferior orbital wall with hemorrhage throughout the left maxillary sinus.  Dedicated facial bone imaging if warranted    This report was flagged in Epic as abnormal.    Electronically signed by resident: Norma Mendoza  Date:    08/12/2019  Time:    10:15    Electronically signed by: Otilio Zafar MD  Date:    08/12/2019  Time:    11:24             Narrative:    EXAMINATION:  CT HEAD WITHOUT CONTRAST    CLINICAL HISTORY:  SDH;    TECHNIQUE:  Low dose axial images were obtained through the head.  Coronal and sagittal reformations were also performed. Contrast was not administered.    COMPARISON:  CT head 03/27/2019, outside head CT 08/12/2019 at 07:55    FINDINGS:  The ventricles and sulci are stable in size without evidence for hydrocephalus. Mild generalized cerebral volume loss.    Acute subdural hematoma along the right tentorial leaflet.  This again measuring up to 1.2 cm in thickness 1 on coronal images.  Modest mass effect on the subjacent brain parenchyma with some up lifting of the inferomedial aspect of right temporal lobe.  No midline shift.  No new extra-axial blood or fluid collections elsewhere.    Moderate chronic microvascular ischemic changes.  Remote left thalamic lacunar type infarct.  No evidence of major vascular distribution infarct, parenchymal mass or hemorrhage.    There is high density material within the left maxillary sinus with subtle nondisplaced fracture along the inferior orbital wall.    The cranium is intact.  The mastoid air cells appear within normal limits.                                 Medical Decision Making:   History:   Old Medical Records: I decided to obtain old medical records.  Old Records Summarized: records from another hospital.  Initial Assessment:   89 yo m,  anticoagulation and antiplatelet agents    S/p ? Mechanical fall  Small subdural with GCS 15    Remains neurologically intact, no complaints    No reversal agents given at OSH    Of note, pt with labs at OSH which were largely unremarkable but ddimer elevated at 3.  No CTA done  Clinical Tests:   Lab Tests: Reviewed and Ordered  Radiological Study: Reviewed and Ordered  Medical Tests: Reviewed and Ordered  ED Management:  Neurosurgery consulted  Anticipate admission  Will hold antiplatelet and anticoagulant agents  ddimer elevated at OSH but pt already appropriately anticoagulated with normal VS here, not hypoxic so will hold off on CTA for now and defer to inpatient team    9:08 AM  Neurosurgery has evaluated pt - recommend admit to neuro ICU and CT cspine  Discussed case with neuro ICU              Attending Attestation:         Attending Critical Care:   Critical Care Times:   ==============================================================  · Total Critical Care Time - exclusive of procedural time: 35 minutes.  ==============================================================  Critical care was necessary to treat or prevent imminent or life-threatening deterioration of the following conditions: CNS failure.   Critical care was time spent personally by me on the following activities: obtaining history from patient or relative, examination of patient, review of x-rays / CT sent with the patient, ordering lab, x-rays, and/or EKG, review of old charts, development of treatment plan with patient or relative, evaluation of patient's response to treatment, ordering and performing treatments and interventions, discussion with consultants and re-evaluation of patient's conition.   Critical Care Condition: critical                  Clinical Impression:       ICD-10-CM ICD-9-CM   1. Subdural hemorrhage I62.00 432.1   2. CAD (coronary artery disease) I25.10 414.00   3. Brain compression G93.5 348.4   4. ASCVD (arteriosclerotic  cardiovascular disease) I25.10 429.2     440.9   5. Ischemic cardiomyopathy I25.5 414.8   6. Subdural hematoma S06.5X9A 432.1   7. Ventricular fibrillation I49.01 427.41                                Laura Holley MD  08/13/19 0629

## 2019-08-12 NOTE — ASSESSMENT & PLAN NOTE
Hx of MI x4  Last MI 4/2019  Hx of multiple stents.  4 placed 4/2019  ASA and plavix on hold for 48h 2/2 SDH

## 2019-08-12 NOTE — PLAN OF CARE
Problem: Adult Inpatient Plan of Care  Goal: Plan of Care Review  POC reviewed with pt and family at 1700. Pt and family verbalized understanding. Questions and concerns addressed.  Will continue to monitor. See flowsheets for full assessment and VS info.

## 2019-08-12 NOTE — ASSESSMENT & PLAN NOTE
87 y/o M with R tentorial subdural hematoma with risk factors including age and anticoagulation. Patient with relatively stable imaging studies over 5 hours apart with repeat CT head w/o. Endorsing dysarthria and mild encephalopathy on presentation.     -Recommend admission to NCCU with q1 hour neuro evaluations  -Recommend discontinuation of oral anticoagulation in setting of active bleeding  -Recommend repeat Head CT w/o contrast at 24 hours and with any acute change in neurologic status  -Recommend evaluation with PT/OT/SLP for therapeutic benefit.

## 2019-08-12 NOTE — CARE UPDATE
Repeat CTH reviewed, demonstrated stable R tentorial SDH.    Neurosurgery will sign off. Please do not hestitate to contact us with any further questions or concerns.    Discussed with Dr. Rebolledo    -Gorge Burnett MD  Neurosurgery

## 2019-08-12 NOTE — ASSESSMENT & PLAN NOTE
Continue ezetembe 10mg daily  crestor 5mg every Monday, & Wednesday  Ranolazine SR 1000mg bid  NTG SL prn chest pain  ECG pending  Echo pending  Hold ASA, plavix for 48h, 2/2 SDH    Hx of MI x4 with multiple stents. Most recent MI 4/2019

## 2019-08-12 NOTE — HPI
88 M with small SDH. He has CAD, STEMI in April s/p 4 stents, A Fib, on ASA/Eliquis/Plavix, had three falls last night and hit his head. CTH at outside hospital showed small SDH over the right tentorium. He denies any neck pain. Neurosurgery consulted.

## 2019-08-12 NOTE — CONSULTS
[facility]  Neurosurgery  Consult Note    Inpatient consult to Neurosurgery  Consult performed by: Gorge Burnett MD  Consult ordered by: Laura Holley MD        Subjective:     Chief Complaint/Reason for Admission: SDH    History of Present Illness: 88 M with small SDH. He has CAD, STEMI in April s/p 4 stents, A Fib, on ASA/Eliquis/Plavix, had three falls last night and hit his head. CTH at outside hospital showed small SDH over the right tentorium. He denies any neck pain. Neurosurgery consulted.      (Not in a hospital admission)    Review of patient's allergies indicates:  No Known Allergies    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    FRIDA (obstructive sleep apnea)     Thrombocytopenia 5/11/2017    Ventricular fibrillation 3/29/2019     Past Surgical History:   Procedure Laterality Date    APPENDECTOMY      CARDIAC PACEMAKER PLACEMENT  10/2016    CATHETERIZATION, HEART, LEFT Left 3/28/2019    Performed by [facility] at [facility] CATH    DENTAL SURGERY  06/02/2016    HERNIA REPAIR      right inguinal    Right side catherization Right 2019    TONSILLECTOMY       Family History     Problem Relation (Age of Onset)    Coronary artery disease Father    Stroke Mother        Tobacco Use    Smoking status: Former Smoker    Smokeless tobacco: Never Used   Substance and Sexual Activity    Alcohol use: No    Drug use: No    Sexual activity: Not on file     Review of Systems  Objective:     Weight: 91.2 kg (201 lb)  Body mass index is 33.45 kg/m².  Vital Signs (Most Recent):  Temp: 97.6 °F (36.4 °C) (08/12/19 0730)  Pulse: 72 (08/12/19 0730)  Resp: 16 (08/12/19 0730)  BP: (!) 164/71 (08/12/19 0730)  SpO2: 98 % (08/12/19 0730) Vital Signs (24h Range):  Temp:  [97.2 °F (36.2 °C)-99.3 °F  (37.4 °C)] 97.6 °F (36.4 °C)  Pulse:  [67-81] 72  Resp:  [12-26] 16  SpO2:  [89 %-99 %] 98 %  BP: (127-178)/() 164/71                          Neurosurgery Physical Exam  GCS [id number] AOx2  PERRL, EOMI, Face Symmetric, Tongue Midline  FC  SILT  No pronator drift    No tenderness to palpation over C spine.    Significant Labs:  Recent Labs   Lab 08/12/19  0340   *   *   K 4.9      CO2 23   BUN 27*   CREATININE 1.5*   CALCIUM 9.1   MG 2.0     Recent Labs   Lab 08/12/19  0340   WBC 8.71   HGB 13.5*   HCT 40.1   *     Recent Labs   Lab 08/12/19  0340   INR 1.0   APTT 39.9*     Microbiology Results (last 7 days)     ** No results found for the last 168 hours. **        All pertinent labs from the last 24 hours have been reviewed.    Significant Diagnostics:  I have reviewed all pertinent imaging results/findings within the past 24 hours.    Assessment/Plan:     Subdural hematoma  88 M with small R SDH. He has CAD, STEMI in April s/p 4 stents, A Fib, on ASA/Eliquis/Plavix, had three falls last night and hit his head. CTH at outside hospital showed small SDH over the right tentorium. Neurosurgery consulted.    --Neurologically stable at baseline  --No immediate neurosurgical intervention indicated  --Admit to [facility] for q1 hour neurochecks  --All labs and diagnostics reviewed  --Recommend repeat CTH 6 hours after the first  --Decision regarding reversal of his antiplatelet/AC per NCC  --SBP <140 (cardene ggt; hydralazine & labetalol PRN; transition to home meds when appropriate)  --Na >135  --Keppra 500 BID  --HOB >30  --Recommend Type and Screen  --NPO for now  --Continue to monitor clinically, notify NSGY immediately with any changes in neuro status    Dispo: ICU        Thank you for your consult. I will follow-up with patient. Please contact us if you have any additional questions.    Gorge Burnett MD  Neurosurgery  [facility]

## 2019-08-12 NOTE — HPI
"  Transfer from [facility]:  Pt is an88 yo m, h/o CAD MI x4 (STEMI in March, on ASA/plavix), HTN, v fib with ICD and pacemaker, a fib on eliquis, Multiple TIAs, last one year ago severe debility (uses walker and wheelchair at baseline), s/p fall last night and early this am.while trying to walk to bathroom.  Has had 2-3 falls in past 2 days bc "legs give out" which happens often for pt but more frequently over past week. Also, He was recently prescribed flexiril for shoulder pain. Family noticed  an increase in falls over past 3-4 days.  Of note,Spouse  within past 2 weeks.  Seen at [place] overnight, extensive work-up including CT head which showed small subdural.  Pt transferred to [facility] for NSGY eval. Pt neurologically stable, disorientation to time and place. No immediate neurosurgical intervention necessary at present.  Decision made to not give platelets. No reversal of anticoagulation. Repeat CTH after 6h revealed R tentorial SDH 1.2 cm thickness, stable, no increase or new bleed. CT C-spine showed no acute fractures; 1cm lucent lesion at C7; cervical spondylosis. Pt will be admitted to [facility] over night for close neurological monitoring     "

## 2019-08-12 NOTE — ED NOTES
Patient has not received Flexeril since Saturday at 6 PM. Patient has not needed Norco for pain for past month.

## 2019-08-12 NOTE — CONSULTS
[facility]  Vascular Neurology  Comprehensive Stroke Center  Consult Note    Inpatient consult to Vascular (Stroke) Neurology  Consult performed by: Сергей Ball MD  Consult ordered by: Marifer Hassan NP  Reason for consult: Subdural hematoma        Assessment/Plan:     Patient is a 88 y.o. year old male with:    Subdural hematoma  87 y/o M with R tentorial subdural hematoma with risk factors including age and anticoagulation. Patient with relatively stable imaging studies over 5 hours apart with repeat CT head w/o. Endorsing dysarthria and mild encephalopathy on presentation.     -Recommend admission to NCCU with q1 hour neuro evaluations  -Recommend discontinuation of oral anticoagulation in setting of active bleeding  -Recommend repeat Head CT w/o contrast at 24 hours and with any acute change in neurologic status  -Recommend evaluation with PT/OT/SLP for therapeutic benefit.     Hypertension  -Resume home antihypertensive agents including Carvedilol, Valsartan-Sacubutril    Hyperlipidemia  -Resume home rosuvastatin 10 mg daily        STROKE DOCUMENTATION          NIH Scale:  Interval: baseline  1a. Level of Consciousness: 0-->Alert, keenly responsive  1b. LOC Questions: 0-->Answers both questions correctly  1c. LOC Commands: 0-->Performs both tasks correctly  2. Best Gaze: 0-->Normal  3. Visual: 0-->No visual loss  4. Facial Palsy: 0-->Normal symmetrical movements  5a. Motor Arm, Left: 0-->No drift, limb holds 90 (or 45) degrees for full 10 secs  5b. Motor Arm, Right: 0-->No drift, limb holds 90 (or 45) degrees for full 10 secs  6a. Motor Leg, Left: 0-->No drift, leg holds 30 degree position for full 5 secs  6b. Motor Leg, Right: 0-->No drift, leg holds 30 degree position for full 5 secs  7. Limb Ataxia: 0-->Absent  8. Sensory: 0-->Normal, no sensory loss  9. Best Language: 0-->No aphasia, normal  10. Dysarthria: 1-->Mild-to-moderate dysarthria, patient slurs at least some words and, at  worst, can be understood with some difficulty  11. Extinction and Inattention (formerly Neglect): 0-->No abnormality  Total (NIH Stroke Scale): 1    Modified Desire    Jennifer Coma Scale:    ABCD2 Score:    [id number] Score:   HAS -BLED Score:   ICH Score:   Hunt & Soto Classification:       Thrombolysis Candidate? No, Subdural hematoma    Delays to Thrombolysis?  No    Interventional Revascularization Candidate?   Is the patient eligible for mechanical endovascular reperfusion (ALEXX)?  subdural hemorrhage      Hemorrhagic change of an Ischemic Stroke: Does this patient have an ischemic stroke with hemorrhagic changes? No     Subjective:     History of Present Illness:  Mr. Bell is a R-handed 89 y/o M with PMHx of STEMI s/p stent placement (ASA/Plavix 3/2019), ICD/pacemaker placement, and Atrial fibrillation anticoagulated on Eliquis who presented to an outside ED with complaints of headache s/p mechanical fall. The patient was in his usual state of health, debilitated, reliant on wheelchair for most/all of mobility and living with family members, when he awoke in the middle of the night of presentation at 1 AM, and fell from a standing position, hitting his face and head. The patient was having difficulty sleeping at night as a result of frequent muscle cramps and was prescribed Flexeril within the past week to assist, he has been having increased dizziness since starting the medication. The patient was placed back into his bed after this fall, he awoke again around 3 AM, was found to be bleeding from his nose and had multiple blood clots his CPAP machine. He was endorsing a headache and family was concerned because his mental status seemed changed from prior. The patient was brought to an outside hospital where he was then transported to [facility] for further neurologic evaluation with CT head showing a right tentorial subdural hematoma.     The patient has been compliant with anticoagulation and  antiplatelet medications. His last dose was the day prior to admission. His family states that at baseline he isn't oriented to time. He does have new worsening dysarthria since this accident. The family is also concerned about change in mental status as the patient frequently brings up events that occurred several years ago as if they had just occurred.         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    FRIDA (obstructive sleep apnea)     Thrombocytopenia 5/11/2017    Ventricular fibrillation 3/29/2019     Past Surgical History:   Procedure Laterality Date    APPENDECTOMY      CARDIAC PACEMAKER PLACEMENT  10/2016    CATHETERIZATION, HEART, LEFT Left 3/28/2019    [facility] CATH    DENTAL SURGERY  06/02/2016    HERNIA REPAIR      right inguinal    Right side catherization Right 2019    TONSILLECTOMY       Family History   Problem Relation Age of Onset    Stroke Mother     Coronary artery disease Father      Social History     Tobacco Use    Smoking status: Former Smoker    Smokeless tobacco: Never Used   Substance Use Topics    Alcohol use: No    Drug use: No     Review of patient's allergies indicates:  No Known Allergies    Medications: I have reviewed the current medication administration record.    Medications Prior to Admission   Medication Sig Dispense Refill Last Dose    aspirin (ECOTRIN) 81 MG EC tablet Take 1 tablet (81 mg total) by mouth once daily.  0 8/11/2019    carvedilol (COREG) 25 MG tablet Take 12.5 mg by mouth 2 (two) times daily with meals.    8/11/2019    clopidogrel (PLAVIX) 75 mg tablet Take 75 mg by mouth every evening.    8/11/2019    CRESTOR 5 mg tablet 10 mg. Take 1 tablet on Monday and 1 on Wednesdays   Past Week    cyclobenzaprine (FLEXERIL) 10 MG tablet  Take 1 tablet (10 mg total) by mouth 3 (three) times daily as needed for Muscle spasms. 30 tablet 5 Past Week    ezetimibe (ZETIA) 10 mg tablet    8/11/2019    furosemide (LASIX) 20 MG tablet Take 1 tablet (20 mg total) by mouth once daily. 30 tablet 11 8/11/2019    HYDROcodone-acetaminophen (NORCO) 5-325 mg per tablet Take 1 tablet by mouth every 12 (twelve) hours as needed for Pain. 60 tablet 0 8/11/2019    memantine (NAMENDA) 5 MG Tab TAKE 1 TABLET (5 MG TOTAL) BY MOUTH 2 (TWO) TIMES DAILY. 180 tablet 3 8/11/2019    mirtazapine (REMERON) 15 MG tablet Take 1 tablet (15 mg total) by mouth every evening. 30 tablet 11     mupirocin (BACTROBAN) 2 % ointment Apply topically 2 (two) times daily. for 10 days 22 g 2 8/11/2019    sacubitril-valsartan (ENTRESTO) 24-26 mg per tablet Take 1 tablet by mouth 2 (two) times daily.   8/11/2019    tamsulosin (FLOMAX) 0.4 mg Cap CAN TAKE ONE AT BEDTIME FOR BLADDER & PROSTATE TO EASE URINATION 90 capsule 3 8/11/2019    walker Misc Quad walker with  wheels and a seat & brakes 1 each 0 Taking    apixaban (ELIQUIS) 2.5 mg Tab Take by mouth 2 (two) times daily.   Unknown at Unknown time    nitroGLYCERIN (NITROSTAT) 0.4 MG SL tablet TAKE 1 TABLET SUBLINGUALLY EVERY 5 MINS X 3 AS NEEDED FOR CHEST PAIN  2 Taking    RANEXA 1,000 mg Tb12 Take 1,000 mg by mouth 2 (two) times daily.    More than a month at Unknown time       Review of Systems   Constitutional: Positive for fatigue. Negative for chills and fever.   HENT: Negative for congestion, trouble swallowing and voice change.    Eyes: Negative for photophobia and pain.   Respiratory: Negative for chest tightness and shortness of breath.    Cardiovascular: Negative for chest pain and leg swelling.   Gastrointestinal: Negative for abdominal pain, nausea and vomiting.   Musculoskeletal: Positive for back pain. Negative for myalgias and neck pain.   Skin: Color change: bruising.   Neurological: Positive for speech difficulty.  Negative for dizziness, weakness, light-headedness, numbness and headaches.     Objective:     Vital Signs (Most Recent):  Temp: 98.2 °F (36.8 °C) (08/12/19 1030)  Pulse: 74 (08/12/19 1401)  Resp: 12 (08/12/19 1347)  BP: 123/62 (08/12/19 1401)  SpO2: 95 % (08/12/19 1410)    Vital Signs Range (Last 24H):  Temp:  [97.2 °F (36.2 °C)-99.3 °F (37.4 °C)]   Pulse:  [67-84]   Resp:  [12-26]   BP: (123-178)/()   SpO2:  [89 %-99 %]     Physical Exam   Constitutional: He appears well-developed and well-nourished.   HENT:   Head: Normocephalic.   Mouth/Throat: Oropharynx is clear and moist.   Periorbital and mandibular ecchymosis present   Eyes: Pupils are equal, round, and reactive to light. Conjunctivae and EOM are normal.   Neck: Normal range of motion. Neck supple.   Cardiovascular: Normal rate, regular rhythm and intact distal pulses.   Pulmonary/Chest: Effort normal and breath sounds normal.   Abdominal: Soft. Bowel sounds are normal. There is no tenderness.   Musculoskeletal: Normal range of motion. He exhibits no edema or deformity.   Skin: Skin is warm and dry. Capillary refill takes less than 2 seconds.         Neurological Exam:   LOC: alert  Attention Span: Good   Language: No aphasia  Articulation: mild dysarthria, currently without dentures  Orientation: person, place, not time, including month or year, or situation  Visual Fields: Full  EOM (CN III, IV, VI): Full/intact  Pupils (CN II, III): PERRL  Facial Sensation (CN V): Normal  Facial Movement (CN VII): Symmetric facial expression    Reflexes: 1+ throughout symmetric  Motor: Arm left  Normal 5/5  Leg left  Normal 5/5  Arm right  Normal 5/5  Leg right Normal 5/5  Cebellar: No evidence of appendicular or axial ataxia  Sensation: Intact to light touch, temperature and vibration  Tone: Normal tone throughout      Laboratory:  CMP:   Recent Labs   Lab 08/12/19  0340   CALCIUM 9.1   ALBUMIN 4.0   PROT 7.2   *   K 4.9   CO2 23      BUN 27*    CREATININE 1.5*   ALKPHOS 121   ALT 22   AST 19   BILITOT 0.5     CBC:   Recent Labs   Lab 08/12/19  0340   WBC 8.71   RBC 4.21*   HGB 13.5*   HCT 40.1   *   MCV 95   MCH 32.1*   MCHC 33.7     Lipid Panel:   Recent Labs   Lab 08/12/19  1420   CHOL 147   LDLCALC 78.2   HDL 41   TRIG 139     Coagulation:   Recent Labs   Lab 08/12/19  0340   INR 1.0   APTT 39.9*     Hgb A1C:   Recent Labs   Lab 08/12/19  1420   HGBA1C 5.2     TSH:   Recent Labs   Lab 08/12/19  0340   TSH 0.982       Diagnostic Results:      Brain imaging:  CT head w/o: 8/12/19  Right tentorial subdural hematoma measuring 1.2 cm in thickness, not largely changed from initial CT head from earlier in the day.   Moderate chronic microvascular ischemic changes  Probable minimally displaced fracture of the inferior orbital wall with hemorrhage throughout the left maxillary sinus.    Vessel Imaging:  None    Cardiac Evaluation:   None      Сергей Ball MD  Comprehensive Stroke Center  Department of Vascular Neurology   [facility]

## 2019-08-12 NOTE — SUBJECTIVE & OBJECTIVE
(Not in a hospital admission)    Review of patient's allergies indicates:  No Known Allergies    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    FRIDA (obstructive sleep apnea)     Thrombocytopenia 5/11/2017    Ventricular fibrillation 3/29/2019     Past Surgical History:   Procedure Laterality Date    APPENDECTOMY      CARDIAC PACEMAKER PLACEMENT  10/2016    CATHETERIZATION, HEART, LEFT Left 3/28/2019    Performed [facility] at [facility] CATH    DENTAL SURGERY  06/02/2016    HERNIA REPAIR      right inguinal    Right side catherization Right 2019    TONSILLECTOMY       Family History     Problem Relation (Age of Onset)    Coronary artery disease Father    Stroke Mother        Tobacco Use    Smoking status: Former Smoker    Smokeless tobacco: Never Used   Substance and Sexual Activity    Alcohol use: No    Drug use: No    Sexual activity: Not on file     Review of Systems  Objective:     Weight: 91.2 kg (201 lb)  Body mass index is 33.45 kg/m².  Vital Signs (Most Recent):  Temp: 97.6 °F (36.4 °C) (08/12/19 0730)  Pulse: 72 (08/12/19 0730)  Resp: 16 (08/12/19 0730)  BP: (!) 164/71 (08/12/19 0730)  SpO2: 98 % (08/12/19 0730) Vital Signs (24h Range):  Temp:  [97.2 °F (36.2 °C)-99.3 °F (37.4 °C)] 97.6 °F (36.4 °C)  Pulse:  [67-81] 72  Resp:  [12-26] 16  SpO2:  [89 %-99 %] 98 %  BP: (127-178)/() 164/71                          Neurosurgery Physical Exam  GCS [id number] AOx2  PERRL, EOMI, Face Symmetric, Tongue Midline  FC  SILT  No pronator drift    No tenderness to palpation over C spine.    Significant Labs:  Recent Labs   Lab 08/12/19  0340   *   *   K 4.9      CO2 23   BUN 27*   CREATININE 1.5*   CALCIUM 9.1   MG 2.0     Recent Labs   Lab 08/12/19  0340   WBC 8.71   HGB 13.5*   HCT 40.1   PLT  117*     Recent Labs   Lab 08/12/19  0340   INR 1.0   APTT 39.9*     Microbiology Results (last 7 days)     ** No results found for the last 168 hours. **        All pertinent labs from the last 24 hours have been reviewed.    Significant Diagnostics:  I have reviewed all pertinent imaging results/findings within the past 24 hours.

## 2019-08-12 NOTE — ASSESSMENT & PLAN NOTE
SBP goal <160  Continue coreg 12.5mg bid  Lasix 20mg daily  sacubitril- valsartan 24-26 bid  Prn hydralazine, labetalol

## 2019-08-12 NOTE — HOSPITAL COURSE
8/12 Admit to NCC. Repeat CTH and C-spine. SBP goal <160. No platelets. Hold anticoagulation.  08/13/2019 CTH repeated overnight for agitation which demonstrated mildly enlarged bleed; this morning Mr Bell is neurologically back to baseline; CTH repeated at 11 AM shows stable bleed.   08/14/2019: ON given bolus, for hypotension; off precedex; more interactive; used home cpap at night. PM CTH from yesterday with stable bleed. observe vitals until PM can be TTF after;   08/15/2019: AICD interrogation yesterday with EP; no abnormalities found. Had another episode this morning when HR on monitor went down to 20s which is likely artifact; patient remained asymptomatic; repeat CTH with stable bleed. Neurologically stable, working with PT. Cards to see pt today.

## 2019-08-12 NOTE — SUBJECTIVE & OBJECTIVE
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    FRIDA (obstructive sleep apnea)     Thrombocytopenia 5/11/2017    Ventricular fibrillation 3/29/2019     Past Surgical History:   Procedure Laterality Date    APPENDECTOMY      CARDIAC PACEMAKER PLACEMENT  10/2016    CATHETERIZATION, HEART, LEFT Left 3/28/2019    [facility] CATH    DENTAL SURGERY  06/02/2016    HERNIA REPAIR      right inguinal    Right side catherization Right 2019    TONSILLECTOMY       Family History   Problem Relation Age of Onset    Stroke Mother     Coronary artery disease Father      Social History     Tobacco Use    Smoking status: Former Smoker    Smokeless tobacco: Never Used   Substance Use Topics    Alcohol use: No    Drug use: No     Review of patient's allergies indicates:  No Known Allergies    Medications: I have reviewed the current medication administration record.    Medications Prior to Admission   Medication Sig Dispense Refill Last Dose    aspirin (ECOTRIN) 81 MG EC tablet Take 1 tablet (81 mg total) by mouth once daily.  0 8/11/2019    carvedilol (COREG) 25 MG tablet Take 12.5 mg by mouth 2 (two) times daily with meals.    8/11/2019    clopidogrel (PLAVIX) 75 mg tablet Take 75 mg by mouth every evening.    8/11/2019    CRESTOR 5 mg tablet 10 mg. Take 1 tablet on Monday and 1 on Wednesdays   Past Week    cyclobenzaprine (FLEXERIL) 10 MG tablet Take 1 tablet (10 mg total) by mouth 3 (three) times daily as needed for Muscle spasms. 30 tablet 5 Past Week    ezetimibe (ZETIA) 10 mg tablet    8/11/2019    furosemide (LASIX) 20 MG tablet Take 1 tablet (20 mg total) by mouth once daily. 30 tablet 11 8/11/2019    HYDROcodone-acetaminophen (NORCO) 5-325 mg per tablet Take 1 tablet by mouth every 12  (twelve) hours as needed for Pain. 60 tablet 0 8/11/2019    memantine (NAMENDA) 5 MG Tab TAKE 1 TABLET (5 MG TOTAL) BY MOUTH 2 (TWO) TIMES DAILY. 180 tablet 3 8/11/2019    mirtazapine (REMERON) 15 MG tablet Take 1 tablet (15 mg total) by mouth every evening. 30 tablet 11     mupirocin (BACTROBAN) 2 % ointment Apply topically 2 (two) times daily. for 10 days 22 g 2 8/11/2019    sacubitril-valsartan (ENTRESTO) 24-26 mg per tablet Take 1 tablet by mouth 2 (two) times daily.   8/11/2019    tamsulosin (FLOMAX) 0.4 mg Cap CAN TAKE ONE AT BEDTIME FOR BLADDER & PROSTATE TO EASE URINATION 90 capsule 3 8/11/2019    walker Misc Quad walker with  wheels and a seat & brakes 1 each 0 Taking    apixaban (ELIQUIS) 2.5 mg Tab Take by mouth 2 (two) times daily.   Unknown at Unknown time    nitroGLYCERIN (NITROSTAT) 0.4 MG SL tablet TAKE 1 TABLET SUBLINGUALLY EVERY 5 MINS X 3 AS NEEDED FOR CHEST PAIN  2 Taking    RANEXA 1,000 mg Tb12 Take 1,000 mg by mouth 2 (two) times daily.    More than a month at Unknown time       Review of Systems   Constitutional: Positive for fatigue. Negative for chills and fever.   HENT: Negative for congestion, trouble swallowing and voice change.    Eyes: Negative for photophobia and pain.   Respiratory: Negative for chest tightness and shortness of breath.    Cardiovascular: Negative for chest pain and leg swelling.   Gastrointestinal: Negative for abdominal pain, nausea and vomiting.   Musculoskeletal: Positive for back pain. Negative for myalgias and neck pain.   Skin: Color change: bruising.   Neurological: Positive for speech difficulty. Negative for dizziness, weakness, light-headedness, numbness and headaches.     Objective:     Vital Signs (Most Recent):  Temp: 98.2 °F (36.8 °C) (08/12/19 1030)  Pulse: 74 (08/12/19 1401)  Resp: 12 (08/12/19 1347)  BP: 123/62 (08/12/19 1401)  SpO2: 95 % (08/12/19 1410)    Vital Signs Range (Last 24H):  Temp:  [97.2 °F (36.2 °C)-99.3 °F (37.4 °C)]   Pulse:   [67-84]   Resp:  [12-26]   BP: (123-178)/()   SpO2:  [89 %-99 %]     Physical Exam   Constitutional: He appears well-developed and well-nourished.   HENT:   Head: Normocephalic.   Mouth/Throat: Oropharynx is clear and moist.   Periorbital and mandibular ecchymosis present   Eyes: Pupils are equal, round, and reactive to light. Conjunctivae and EOM are normal.   Neck: Normal range of motion. Neck supple.   Cardiovascular: Normal rate, regular rhythm and intact distal pulses.   Pulmonary/Chest: Effort normal and breath sounds normal.   Abdominal: Soft. Bowel sounds are normal. There is no tenderness.   Musculoskeletal: Normal range of motion. He exhibits no edema or deformity.   Skin: Skin is warm and dry. Capillary refill takes less than 2 seconds.         Neurological Exam:   LOC: alert  Attention Span: Good   Language: No aphasia  Articulation: mild dysarthria, currently without dentures  Orientation: person, place, not time, including month or year, or situation  Visual Fields: Full  EOM (CN III, IV, VI): Full/intact  Pupils (CN II, III): PERRL  Facial Sensation (CN V): Normal  Facial Movement (CN VII): Symmetric facial expression    Reflexes: 1+ throughout symmetric  Motor: Arm left  Normal 5/5  Leg left  Normal 5/5  Arm right  Normal 5/5  Leg right Normal 5/5  Cebellar: No evidence of appendicular or axial ataxia  Sensation: Intact to light touch, temperature and vibration  Tone: Normal tone throughout      Laboratory:  CMP:   Recent Labs   Lab 08/12/19  0340   CALCIUM 9.1   ALBUMIN 4.0   PROT 7.2   *   K 4.9   CO2 23      BUN 27*   CREATININE 1.5*   ALKPHOS 121   ALT 22   AST 19   BILITOT 0.5     CBC:   Recent Labs   Lab 08/12/19  0340   WBC 8.71   RBC 4.21*   HGB 13.5*   HCT 40.1   *   MCV 95   MCH 32.1*   MCHC 33.7     Lipid Panel:   Recent Labs   Lab 08/12/19  1420   CHOL 147   LDLCALC 78.2   HDL 41   TRIG 139     Coagulation:   Recent Labs   Lab 08/12/19  0340   INR 1.0   APTT 39.9*      Hgb A1C:   Recent Labs   Lab 08/12/19  1420   HGBA1C 5.2     TSH:   Recent Labs   Lab 08/12/19  0340   TSH 0.982       Diagnostic Results:      Brain imaging:  CT head w/o: 8/12/19  Right tentorial subdural hematoma measuring 1.2 cm in thickness, not largely changed from initial CT head from earlier in the day.   Moderate chronic microvascular ischemic changes  Probable minimally displaced fracture of the inferior orbital wall with hemorrhage throughout the left maxillary sinus.    Vessel Imaging:  None    Cardiac Evaluation:   None

## 2019-08-12 NOTE — ED NOTES
Pt oxygen sats dropped to 89% on room air. Pt reports slight improvement of shortness of breath. RR 20. 2 L nasal cannula applied.

## 2019-08-12 NOTE — ED NOTES
Patient had one urine occurrence. Linens and gown changed. Geni care performed. No s/s of distress noted. Family at bedside. PT remains on continuous cardiac, pulse ox, and BP monitoring. Side rails up x2 and call light within reach.

## 2019-08-12 NOTE — ASSESSMENT & PLAN NOTE
Admit to NCC s/p fall x2, R SDH  8/12  Repeat CTH after 6h revealed R tentorial SDH 1.2 cm thickness, stable, no increase or new bleed.   NSGY consulted, no immediate neurosurgical  Intervention necessary at present  Neuro checks & VS q1h  SBPgoal <160  Hold anticoagulation for next 48h  Restart crestor 5mg every Monday and Wednesday  PT/OT/SLP

## 2019-08-12 NOTE — ED NOTES
Patient is in the stretcher with side rails up x2. No s/s of distress noted. 02 in place via NC at 2L. Pt remains on continuous cardiac, pulse ox, and BP monitoring. Family at bedside. Pt repositioned in bed. Respirations even and unlabored. Call light within reach.

## 2019-08-12 NOTE — ED NOTES
Patient transferred via [facility] to [facility] ED. Report given to Therese ALMARAZ. Family took pt belongings. Family aware of transfer and in route to [facility].

## 2019-08-12 NOTE — ASSESSMENT & PLAN NOTE
88 M with small R SDH. He has CAD, STEMI in April s/p 4 stents, A Fib, on ASA/Eliquis/Plavix, had three falls last night and hit his head. CTH at outside hospital showed small SDH over the right tentorium. Neurosurgery consulted.    --Neurologically stable at baseline  --No immediate neurosurgical intervention indicated  --Admit to [facility] for q1 hour neurochecks  --All labs and diagnostics reviewed  --Recommend repeat CTH 6 hours after the first  --Decision regarding reversal of his antiplatelet/AC per NCC  --SBP <140 (cardene ggt; hydralazine & labetalol PRN; transition to home meds when appropriate)  --Na >135  --Keppra 500 BID  --HOB >30  --Recommend Type and Screen  --NPO for now  --Continue to monitor clinically, notify NSGY immediately with any changes in neuro status    Dispo: ICU

## 2019-08-13 LAB
ALBUMIN SERPL BCP-MCNC: 3.4 G/DL (ref 3.5–5.2)
ALLENS TEST: ABNORMAL
ALP SERPL-CCNC: 112 U/L (ref 55–135)
ALT SERPL W/O P-5'-P-CCNC: 17 U/L (ref 10–44)
ANION GAP SERPL CALC-SCNC: 9 MMOL/L (ref 8–16)
APTT BLDCRRT: 32.2 SEC (ref 21–32)
ASCENDING AORTA: 3.85 CM
AST SERPL-CCNC: 17 U/L (ref 10–40)
AV INDEX (PROSTH): 0.85
AV MEAN GRADIENT: 3 MMHG
AV PEAK GRADIENT: 5 MMHG
AV VALVE AREA: 2.45 CM2
AV VELOCITY RATIO: 1.06
BASOPHILS # BLD AUTO: 0.03 K/UL (ref 0–0.2)
BASOPHILS NFR BLD: 0.5 % (ref 0–1.9)
BILIRUB SERPL-MCNC: 0.6 MG/DL (ref 0.1–1)
BSA FOR ECHO PROCEDURE: 2.1 M2
BUN SERPL-MCNC: 25 MG/DL (ref 8–23)
CALCIUM SERPL-MCNC: 8.4 MG/DL (ref 8.7–10.5)
CHLORIDE SERPL-SCNC: 103 MMOL/L (ref 95–110)
CK MB SERPL-MCNC: 2.3 NG/ML (ref 0.1–6.5)
CK MB SERPL-RTO: 3.4 % (ref 0–5)
CK SERPL-CCNC: 68 U/L (ref 20–200)
CO2 SERPL-SCNC: 21 MMOL/L (ref 23–29)
CREAT SERPL-MCNC: 1.6 MG/DL (ref 0.5–1.4)
CV ECHO LV RWT: 0.38 CM
DELSYS: ABNORMAL
DIFFERENTIAL METHOD: ABNORMAL
DOP CALC AO PEAK VEL: 1.11 M/S
DOP CALC AO VTI: 16.11 CM
DOP CALC LVOT AREA: 2.9 CM2
DOP CALC LVOT DIAMETER: 1.91 CM
DOP CALC LVOT PEAK VEL: 1.18 M/S
DOP CALC LVOT STROKE VOLUME: 39.43 CM3
DOP CALCLVOT PEAK VEL VTI: 13.77 CM
ECHO LV POSTERIOR WALL: 1 CM (ref 0.6–1.1)
EOSINOPHIL # BLD AUTO: 0.4 K/UL (ref 0–0.5)
EOSINOPHIL NFR BLD: 6.5 % (ref 0–8)
ERYTHROCYTE [DISTWIDTH] IN BLOOD BY AUTOMATED COUNT: 12.8 % (ref 11.5–14.5)
EST. GFR  (AFRICAN AMERICAN): 43.8 ML/MIN/1.73 M^2
EST. GFR  (NON AFRICAN AMERICAN): 37.9 ML/MIN/1.73 M^2
FIO2: 40
FRACTIONAL SHORTENING: 20 % (ref 28–44)
GLUCOSE SERPL-MCNC: 106 MG/DL (ref 70–110)
HCO3 UR-SCNC: 24.8 MMOL/L (ref 24–28)
HCT VFR BLD AUTO: 40.3 % (ref 40–54)
HGB BLD-MCNC: 12.8 G/DL (ref 14–18)
IMM GRANULOCYTES # BLD AUTO: 0.06 K/UL (ref 0–0.04)
IMM GRANULOCYTES NFR BLD AUTO: 0.9 % (ref 0–0.5)
INR PPP: 1 (ref 0.8–1.2)
INTERVENTRICULAR SEPTUM: 1 CM (ref 0.6–1.1)
LA MAJOR: 5.49 CM
LA WIDTH: 3.95 CM
LEFT ATRIUM SIZE: 4.9 CM
LEFT INTERNAL DIMENSION IN SYSTOLE: 4.19 CM (ref 2.1–4)
LEFT VENTRICLE DIASTOLIC VOLUME INDEX: 65.07 ML/M2
LEFT VENTRICLE DIASTOLIC VOLUME: 132.61 ML
LEFT VENTRICLE MASS INDEX: 97 G/M2
LEFT VENTRICLE SYSTOLIC VOLUME INDEX: 38.4 ML/M2
LEFT VENTRICLE SYSTOLIC VOLUME: 78.32 ML
LEFT VENTRICULAR INTERNAL DIMENSION IN DIASTOLE: 5.25 CM (ref 3.5–6)
LEFT VENTRICULAR MASS: 197.26 G
LYMPHOCYTES # BLD AUTO: 0.6 K/UL (ref 1–4.8)
LYMPHOCYTES NFR BLD: 8.7 % (ref 18–48)
MAGNESIUM SERPL-MCNC: 1.9 MG/DL (ref 1.6–2.6)
MCH RBC QN AUTO: 32.4 PG (ref 27–31)
MCHC RBC AUTO-ENTMCNC: 31.8 G/DL (ref 32–36)
MCV RBC AUTO: 102 FL (ref 82–98)
MODE: ABNORMAL
MONOCYTES # BLD AUTO: 0.7 K/UL (ref 0.3–1)
MONOCYTES NFR BLD: 10.5 % (ref 4–15)
NEUTROPHILS # BLD AUTO: 4.8 K/UL (ref 1.8–7.7)
NEUTROPHILS NFR BLD: 72.9 % (ref 38–73)
NRBC BLD-RTO: 0 /100 WBC
PCO2 BLDA: 44 MMHG (ref 35–45)
PEEP: 8
PH SMN: 7.36 [PH] (ref 7.35–7.45)
PHOSPHATE SERPL-MCNC: 2.9 MG/DL (ref 2.7–4.5)
PISA TR MAX VEL: 2.43 M/S
PLATELET # BLD AUTO: 111 K/UL (ref 150–350)
PMV BLD AUTO: 8.7 FL (ref 9.2–12.9)
PO2 BLDA: 56 MMHG (ref 40–60)
POC BE: -1 MMOL/L
POC SATURATED O2: 88 % (ref 95–100)
POC TCO2: 26 MMOL/L (ref 24–29)
POTASSIUM SERPL-SCNC: 4.7 MMOL/L (ref 3.5–5.1)
PROT SERPL-MCNC: 6 G/DL (ref 6–8.4)
PROTHROMBIN TIME: 10.4 SEC (ref 9–12.5)
RA MAJOR: 6.23 CM
RA WIDTH: 4.16 CM
RBC # BLD AUTO: 3.95 M/UL (ref 4.6–6.2)
RIGHT VENTRICULAR END-DIASTOLIC DIMENSION: 3.51 CM
SAMPLE: ABNORMAL
SINUS: 3.45 CM
SITE: ABNORMAL
SODIUM SERPL-SCNC: 133 MMOL/L (ref 136–145)
STJ: 3.82 CM
TR MAX PG: 24 MMHG
TROPONIN I SERPL DL<=0.01 NG/ML-MCNC: 0.04 NG/ML (ref 0–0.03)
WBC # BLD AUTO: 6.58 K/UL (ref 3.9–12.7)

## 2019-08-13 PROCEDURE — 94640 AIRWAY INHALATION TREATMENT: CPT

## 2019-08-13 PROCEDURE — 80053 COMPREHEN METABOLIC PANEL: CPT

## 2019-08-13 PROCEDURE — 25000003 PHARM REV CODE 250: Performed by: EMERGENCY MEDICINE

## 2019-08-13 PROCEDURE — 84100 ASSAY OF PHOSPHORUS: CPT

## 2019-08-13 PROCEDURE — 63600175 PHARM REV CODE 636 W HCPCS

## 2019-08-13 PROCEDURE — 99292 PR CRITICAL CARE, ADDL 30 MIN: ICD-10-PCS | Mod: ,,, | Performed by: NURSE PRACTITIONER

## 2019-08-13 PROCEDURE — 97161 PT EVAL LOW COMPLEX 20 MIN: CPT

## 2019-08-13 PROCEDURE — 99222 1ST HOSP IP/OBS MODERATE 55: CPT | Mod: ,,, | Performed by: NURSE PRACTITIONER

## 2019-08-13 PROCEDURE — 63600175 PHARM REV CODE 636 W HCPCS: Performed by: NURSE PRACTITIONER

## 2019-08-13 PROCEDURE — 27000221 HC OXYGEN, UP TO 24 HOURS

## 2019-08-13 PROCEDURE — 97116 GAIT TRAINING THERAPY: CPT

## 2019-08-13 PROCEDURE — 94660 CPAP INITIATION&MGMT: CPT

## 2019-08-13 PROCEDURE — 94761 N-INVAS EAR/PLS OXIMETRY MLT: CPT

## 2019-08-13 PROCEDURE — 97530 THERAPEUTIC ACTIVITIES: CPT

## 2019-08-13 PROCEDURE — 92610 EVALUATE SWALLOWING FUNCTION: CPT

## 2019-08-13 PROCEDURE — 99222 PR INITIAL HOSPITAL CARE,LEVL II: ICD-10-PCS | Mod: ,,, | Performed by: NURSE PRACTITIONER

## 2019-08-13 PROCEDURE — 25000003 PHARM REV CODE 250

## 2019-08-13 PROCEDURE — 82550 ASSAY OF CK (CPK): CPT

## 2019-08-13 PROCEDURE — 99292 CRITICAL CARE ADDL 30 MIN: CPT | Mod: ,,, | Performed by: NURSE PRACTITIONER

## 2019-08-13 PROCEDURE — 85610 PROTHROMBIN TIME: CPT

## 2019-08-13 PROCEDURE — 25000242 PHARM REV CODE 250 ALT 637 W/ HCPCS: Performed by: PSYCHIATRY & NEUROLOGY

## 2019-08-13 PROCEDURE — 25000003 PHARM REV CODE 250: Performed by: NURSE PRACTITIONER

## 2019-08-13 PROCEDURE — 97165 OT EVAL LOW COMPLEX 30 MIN: CPT

## 2019-08-13 PROCEDURE — 20000000 HC ICU ROOM

## 2019-08-13 PROCEDURE — 83735 ASSAY OF MAGNESIUM: CPT

## 2019-08-13 PROCEDURE — 84484 ASSAY OF TROPONIN QUANT: CPT

## 2019-08-13 PROCEDURE — 82553 CREATINE MB FRACTION: CPT

## 2019-08-13 PROCEDURE — 85025 COMPLETE CBC W/AUTO DIFF WBC: CPT

## 2019-08-13 PROCEDURE — 99291 PR CRITICAL CARE, E/M 30-74 MINUTES: ICD-10-PCS | Mod: 25,GC,, | Performed by: PSYCHIATRY & NEUROLOGY

## 2019-08-13 PROCEDURE — 63600175 PHARM REV CODE 636 W HCPCS: Performed by: INTERNAL MEDICINE

## 2019-08-13 PROCEDURE — 92523 SPEECH SOUND LANG COMPREHEN: CPT

## 2019-08-13 PROCEDURE — 82803 BLOOD GASES ANY COMBINATION: CPT

## 2019-08-13 PROCEDURE — 99291 CRITICAL CARE FIRST HOUR: CPT | Mod: 25,GC,, | Performed by: PSYCHIATRY & NEUROLOGY

## 2019-08-13 PROCEDURE — 85730 THROMBOPLASTIN TIME PARTIAL: CPT

## 2019-08-13 PROCEDURE — 99900035 HC TECH TIME PER 15 MIN (STAT)

## 2019-08-13 RX ORDER — IPRATROPIUM BROMIDE AND ALBUTEROL SULFATE 2.5; .5 MG/3ML; MG/3ML
3 SOLUTION RESPIRATORY (INHALATION) EVERY 6 HOURS PRN
Status: DISCONTINUED | OUTPATIENT
Start: 2019-08-13 | End: 2019-08-21 | Stop reason: HOSPADM

## 2019-08-13 RX ORDER — DEXMEDETOMIDINE HYDROCHLORIDE 4 UG/ML
INJECTION, SOLUTION INTRAVENOUS
Status: COMPLETED
Start: 2019-08-13 | End: 2019-08-13

## 2019-08-13 RX ORDER — MIDAZOLAM HYDROCHLORIDE 1 MG/ML
1 INJECTION INTRAMUSCULAR; INTRAVENOUS ONCE
Status: COMPLETED | OUTPATIENT
Start: 2019-08-13 | End: 2019-08-13

## 2019-08-13 RX ORDER — DEXMEDETOMIDINE HYDROCHLORIDE 4 UG/ML
0.2 INJECTION, SOLUTION INTRAVENOUS CONTINUOUS
Status: DISCONTINUED | OUTPATIENT
Start: 2019-08-13 | End: 2019-08-14

## 2019-08-13 RX ORDER — LEVETIRACETAM 5 MG/ML
500 INJECTION INTRAVASCULAR EVERY 12 HOURS
Status: DISCONTINUED | OUTPATIENT
Start: 2019-08-13 | End: 2019-08-13

## 2019-08-13 RX ORDER — MIDAZOLAM HYDROCHLORIDE 5 MG/ML
0.5 INJECTION INTRAMUSCULAR; INTRAVENOUS ONCE
Status: DISCONTINUED | OUTPATIENT
Start: 2019-08-13 | End: 2019-08-15

## 2019-08-13 RX ORDER — LACOSAMIDE 50 MG/1
50 TABLET ORAL EVERY 12 HOURS
Status: DISCONTINUED | OUTPATIENT
Start: 2019-08-13 | End: 2019-08-20

## 2019-08-13 RX ORDER — MIDAZOLAM HYDROCHLORIDE 1 MG/ML
1 INJECTION INTRAMUSCULAR; INTRAVENOUS ONCE AS NEEDED
Status: DISCONTINUED | OUTPATIENT
Start: 2019-08-13 | End: 2019-08-18

## 2019-08-13 RX ORDER — MIDAZOLAM HYDROCHLORIDE 1 MG/ML
INJECTION INTRAMUSCULAR; INTRAVENOUS
Status: COMPLETED
Start: 2019-08-13 | End: 2019-08-13

## 2019-08-13 RX ORDER — MIDAZOLAM HYDROCHLORIDE 5 MG/ML
1 INJECTION INTRAMUSCULAR; INTRAVENOUS ONCE
Status: DISCONTINUED | OUTPATIENT
Start: 2019-08-13 | End: 2019-08-13

## 2019-08-13 RX ADMIN — SACUBITRIL AND VALSARTAN 1 TABLET: 24; 26 TABLET, FILM COATED ORAL at 09:08

## 2019-08-13 RX ADMIN — MIDAZOLAM HYDROCHLORIDE 2 MG: 1 INJECTION INTRAMUSCULAR; INTRAVENOUS at 02:08

## 2019-08-13 RX ADMIN — CARVEDILOL 12.5 MG: 12.5 TABLET, FILM COATED ORAL at 09:08

## 2019-08-13 RX ADMIN — IPRATROPIUM BROMIDE AND ALBUTEROL SULFATE 3 ML: .5; 3 SOLUTION RESPIRATORY (INHALATION) at 05:08

## 2019-08-13 RX ADMIN — LEVETIRACETAM 500 MG: 5 INJECTION INTRAVENOUS at 04:08

## 2019-08-13 RX ADMIN — RANOLAZINE 1000 MG: 1000 TABLET, FILM COATED, EXTENDED RELEASE ORAL at 09:08

## 2019-08-13 RX ADMIN — ACETAMINOPHEN 650 MG: 325 TABLET ORAL at 07:08

## 2019-08-13 RX ADMIN — MUPIROCIN: 20 OINTMENT TOPICAL at 09:08

## 2019-08-13 RX ADMIN — LACOSAMIDE 50 MG: 50 TABLET, FILM COATED ORAL at 09:08

## 2019-08-13 RX ADMIN — FUROSEMIDE 20 MG: 20 TABLET ORAL at 10:08

## 2019-08-13 RX ADMIN — MIDAZOLAM HYDROCHLORIDE 2 MG: 1 INJECTION, SOLUTION INTRAMUSCULAR; INTRAVENOUS at 02:08

## 2019-08-13 RX ADMIN — DEXMEDETOMIDINE HYDROCHLORIDE 0.2 MCG/KG/HR: 4 INJECTION, SOLUTION INTRAVENOUS at 03:08

## 2019-08-13 RX ADMIN — HUMAN ALBUMIN MICROSPHERES AND PERFLUTREN 0.66 MG: 10; .22 INJECTION, SOLUTION INTRAVENOUS at 10:08

## 2019-08-13 RX ADMIN — TAMSULOSIN HYDROCHLORIDE 0.4 MG: 0.4 CAPSULE ORAL at 09:08

## 2019-08-13 RX ADMIN — MIDAZOLAM HYDROCHLORIDE 0.5 MG: 1 INJECTION, SOLUTION INTRAMUSCULAR; INTRAVENOUS at 04:08

## 2019-08-13 RX ADMIN — SENNOSIDES,DOCUSATE SODIUM 1 TABLET: 8.6; 5 TABLET, FILM COATED ORAL at 09:08

## 2019-08-13 RX ADMIN — MEMANTINE HYDROCHLORIDE 5 MG: 5 TABLET ORAL at 09:08

## 2019-08-13 RX ADMIN — POLYETHYLENE GLYCOL 3350 17 G: 17 POWDER, FOR SOLUTION ORAL at 09:08

## 2019-08-13 RX ADMIN — EZETIMIBE 10 MG: 10 TABLET ORAL at 09:08

## 2019-08-13 RX ADMIN — MUPIROCIN: 20 OINTMENT TOPICAL at 10:08

## 2019-08-13 NOTE — ASSESSMENT & PLAN NOTE
- CT Head revealed R SDH.   - Repeat CTH stable R tentorial SDH.   - NSGY consulted and no emergent sx intervention at this time.   - confusion 8/13 repeat CTH shows worsening SDH but no midline shift or worsening edema.

## 2019-08-13 NOTE — ASSESSMENT & PLAN NOTE
Admit to NCC s/p fall x2, R SDH  8/12  Repeat CTH after 6h revealed R tentorial SDH 1.2 cm thickness, stable, no increase or new bleed.   NSGY consulted, no immediate neurosurgical  Intervention necessary at present  Neuro checks & VS q1h  SBPgoal <160  Hold anticoagulation and antiplatelets; repeat CTH at 11am  Should not be restarted on triple therapy because of high risk of bleeding  PT/OT/SLP

## 2019-08-13 NOTE — PROGRESS NOTES
"[facility]  Neurocritical Care  Progress Note    Admit Date: 2019  Service Date: 2019  Length of Stay: 1    Subjective:     Chief Complaint: <principal problem not specified>    History of Present Illness:   Transfer from [facility]:  Pt is an88 yo m, h/o CAD MI x4 (STEMI in March, on ASA/plavix), HTN, v fib with ICD and pacemaker, a fib on eliquis, Multiple TIAs, last one year ago severe debility (uses walker and wheelchair at baseline), s/p fall last night and early this am.while trying to walk to bathroom.  Has had 2-3 falls in past 2 days bc "legs give out" which happens often for pt but more frequently over past week. Also, He was recently prescribed flexiril for shoulder pain. Family noticed  an increase in falls over past 3-4 days.   Of note,Spouse  within past 2 weeks.  Seen at [facility] overnight, extensive work-up including CT head which showed small subdural.  Pt transferred to [facility] for NSGY eval. Pt neurologically stable, disorientation to time and place. No immediate neurosurgical intervention necessary at present.   Decision made to not give platelets. No reversal of anticoagulation. Repeat CTH after 6h revealed R tentorial SDH 1.2 cm thickness, stable, no increase or new bleed. CT C-spine showed no acute fractures; 1cm lucent lesion at C7; cervical spondylosis. Pt will be admitted to [facility] over night for close neurological monitoring       Hospital Course:  Admit to [facility]. Repeat CTH and C-spine. SBP goal <160. No platelets. Hold anticoagulation.  2019 CTH repeated overnight for agitation which demonstrated mildly enlarged bleed; this morning Mr Bell is neurologically back to baseline; CTH repeated at 11 AM shows stable bleed.         Past Medical History:   Diagnosis Date    ASCVD (arteriosclerotic cardiovascular disease)     DJD (degenerative joint disease)     Hyperlipidemia     Hypertension     MGUS (monoclonal gammopathy of unknown " significance) 5/11/2017    FRIDA (obstructive sleep apnea)     Thrombocytopenia 5/11/2017    Ventricular fibrillation 3/29/2019     Past Surgical History:   Procedure Laterality Date    APPENDECTOMY      CARDIAC PACEMAKER PLACEMENT  10/2016    CATHETERIZATION, HEART, LEFT Left 3/28/2019    [facility] CATH    DENTAL SURGERY  06/02/2016    HERNIA REPAIR      right inguinal    Right side catherization Right 2019    TONSILLECTOMY       Family History   Problem Relation Age of Onset    Stroke Mother     Coronary artery disease Father      Social History     Tobacco Use    Smoking status: Former Smoker    Smokeless tobacco: Never Used   Substance Use Topics    Alcohol use: No    Drug use: No     Review of patient's allergies indicates:  No Known Allergies    Medications: I have reviewed the current medication administration record.    Medications Prior to Admission   Medication Sig Dispense Refill Last Dose    aspirin (ECOTRIN) 81 MG EC tablet Take 1 tablet (81 mg total) by mouth once daily.  0 8/11/2019    carvedilol (COREG) 25 MG tablet Take 12.5 mg by mouth 2 (two) times daily with meals.    8/11/2019    clopidogrel (PLAVIX) 75 mg tablet Take 75 mg by mouth every evening.    8/11/2019    CRESTOR 5 mg tablet 10 mg. Take 1 tablet on Monday and 1 on Wednesdays   Past Week    cyclobenzaprine (FLEXERIL) 10 MG tablet Take 1 tablet (10 mg total) by mouth 3 (three) times daily as needed for Muscle spasms. 30 tablet 5 Past Week    ezetimibe (ZETIA) 10 mg tablet    8/11/2019    furosemide (LASIX) 20 MG tablet Take 1 tablet (20 mg total) by mouth once daily. 30 tablet 11 8/11/2019    HYDROcodone-acetaminophen (NORCO) 5-325 mg per tablet Take 1 tablet by mouth every 12 (twelve) hours as needed for Pain. 60 tablet 0 8/11/2019    memantine (NAMENDA) 5 MG Tab TAKE 1 TABLET (5 MG TOTAL) BY MOUTH 2 (TWO) TIMES DAILY. 180 tablet 3 8/11/2019    mirtazapine (REMERON) 15 MG tablet Take 1  tablet (15 mg total) by mouth every evening. 30 tablet 11     mupirocin (BACTROBAN) 2 % ointment Apply topically 2 (two) times daily. for 10 days 22 g 2 8/11/2019    sacubitril-valsartan (ENTRESTO) 24-26 mg per tablet Take 1 tablet by mouth 2 (two) times daily.   8/11/2019    tamsulosin (FLOMAX) 0.4 mg Cap CAN TAKE ONE AT BEDTIME FOR BLADDER & PROSTATE TO EASE URINATION 90 capsule 3 8/11/2019    walker Misc Quad walker with  wheels and a seat & brakes 1 each 0 Taking    apixaban (ELIQUIS) 2.5 mg Tab Take by mouth 2 (two) times daily.   Unknown at Unknown time    nitroGLYCERIN (NITROSTAT) 0.4 MG SL tablet TAKE 1 TABLET SUBLINGUALLY EVERY 5 MINS X 3 AS NEEDED FOR CHEST PAIN  2 Taking    RANEXA 1,000 mg Tb12 Take 1,000 mg by mouth 2 (two) times daily.    More than a month at Unknown time       Review of Systems   Constitutional: Positive for fatigue. Negative for chills and fever.   HENT: Negative for congestion, trouble swallowing and voice change.    Eyes: Negative for photophobia and pain.   Respiratory: Negative for chest tightness and shortness of breath.    Cardiovascular: Negative for chest pain and leg swelling.   Gastrointestinal: Negative for abdominal pain, nausea and vomiting.   Musculoskeletal: Positive for back pain. Negative for myalgias and neck pain.   Skin: Color change: bruising.   Neurological: Positive for speech difficulty. Negative for dizziness, weakness, light-headedness, numbness and headaches.     Objective:     Vital Signs (Most Recent):  Temp: 97.8 °F (36.6 °C) (08/13/19 0701)  Pulse: 71 (08/13/19 1000)  Resp: (!) 27 (08/13/19 1000)  BP: 137/78 (08/13/19 1000)  SpO2: 98 % (08/13/19 1000)    Vital Signs Range (Last 24H):  Temp:  [97.6 °F (36.4 °C)-98.5 °F (36.9 °C)]   Pulse:  [64-83]   Resp:  [12-44]   BP: (101-169)/(55-92)   SpO2:  [92 %-100 %]     Examination:   Constitutional: Well-nourished and -developed. No apparent distress.   Eyes: Conjunctiva clear, anicteric. Lids no  lesions.  Head/Ears/Nose/Mouth/Throat/Neck: [place] Moist mucous membranes. External ears, nose atraumatic.   Cardiovascular:  V paced rhythm, Regular rhythm.heart tones distatnt. No murmurs. No leg edema.  Respiratory: O2 per NC. Comfortable respirations. bilat Breath sounds diminished.  Gastrointestinal: No hernia. Soft, nondistended, nontender. + bowel sounds.     Neurologic:   -[id number]  -Alert. Oriented to person, place and time. Not oriented to situation. Speech fluent. Follows commands. Facial symmetry  -Cranial nerves II-XII grossly intact, PERRL 3+, did not check brainstem reflexes  -Strength 5/5 and symmetric in arms, legs.   -Tone normal. Moves spon. A-and against gravity  -Sensation bilaterally intact  and equal to touch in arms, legs.           Laboratory:  CMP:   Recent Labs   Lab 08/13/19  0104   CALCIUM 8.4*   ALBUMIN 3.4*   PROT 6.0   *   K 4.7   CO2 21*      BUN 25*   CREATININE 1.6*   ALKPHOS 112   ALT 17   AST 17   BILITOT 0.6     CBC:   Recent Labs   Lab 08/13/19  0157   WBC 6.58   RBC 3.95*   HGB 12.8*   HCT 40.3   *   *   MCH 32.4*   MCHC 31.8*     Lipid Panel:   Recent Labs   Lab 08/12/19  1420   CHOL 147   LDLCALC 78.2   HDL 41   TRIG 139     Coagulation:   Recent Labs   Lab 08/13/19  0104   INR 1.0   APTT 32.2*     Hgb A1C:   Recent Labs   Lab 08/12/19  1420   HGBA1C 5.2     TSH:   Recent Labs   Lab 08/12/19  1420   TSH 0.802       Diagnostic Results:      Brain imaging:  CT head w/o: 8/12/19  Right tentorial subdural hematoma measuring 1.2 cm in thickness, not largely changed from initial CT head from earlier in the day.   Moderate chronic microvascular ischemic changes  Probable minimally displaced fracture of the inferior orbital wall with hemorrhage throughout the left maxillary sinus.    Vessel Imaging:  None    Cardiac Evaluation:   None    Assessment/Plan:     Neuro  Subdural hematoma  Admit to [facility] s/p fall x2, R SDH  8/12  Repeat CTH after 6h revealed R tentorial  SDH 1.2 cm thickness, stable, no increase or new bleed.   NSGY consulted, no immediate neurosurgical  Intervention necessary at present  Neuro checks & VS q1h  SBPgoal <160  Hold anticoagulation and antiplatelets; repeat CTH at 11am  Should not be restarted on triple therapy because of high risk of bleeding  PT/OT/SLP      Cardiac/Vascular  Ventricular fibrillation  Has AICD  Will not do interrogation as fall was mechanical and he did not have any prodromal symptoms    Ischemic cardiomyopathy  see chronic CHF, ASHD  Has AICD  Echo as above      Hypertension  SBP goal <160  Continue coreg 12.5mg bid  Lasix 20mg daily  sacubitril- valsartan 24-26 bid  Prn hydralazine, labetalol      Hyperlipidemia  Continue crestor 5mg every Monday and Wednesday    ezetimbe 10mg daily    STEMI (ST elevation myocardial infarction)  Hx of MI x4  Last MI 4/2019  Hx of multiple stents.  4 placed 4/2019  ASA and plavix on hold for 48h 2/2 SDH  Will restart ASA once stable from bleeding perspective; should not be restarted on triple therapy given significant risk of bleeding    Chronic CHF  Continue sacubitril-valsartan 24-26mg bid   coreg 12.5mg bid  Furosemide 20mg daily  Echo repeated,  Normal ef    ASCVD (arteriosclerotic cardiovascular disease)  Continue ezetembe 10mg daily  Crestor 5mg every Monday, & Wednesday  Ranolazine SR 1000mg bid  NTG SL prn chest pain  ECG pending  Echo w normal EF  Hold ASA, plavix for 48h, 2/2 SDH  Was on triple therapy - DAPT and Eliquis; holding all for now; will restart ASA at some point; would not continue triple therapy given high risk of bleeding      Hx of MI x4 with multiple stents. Most recent MI 4/2019    Renal/  Benign prostatic hyperplasia  Restart tamulosin 0.4mg daily    Other  Impaired mobility and activities of daily living  PT/OT to treat and evaluate    Sleep apnea  Continue CPAP  QHS          The patient is being Prophylaxed for:  Venous Thromboembolism with: Mechanical  Stress Ulcer  with: Not Applicable   Ventilator Pneumonia with: not applicable    Activity Orders          Diet Adult Regular (IDDSI Level 7): Regular starting at 08/12 1640        Full Code    Tyrone Bustamante MD  Neurocritical Care  [facility]

## 2019-08-13 NOTE — HPI
Reese Bell is a 88-year-old male with PMHx of CAD (STEMI in March, on ASA/plavix), HTN, V- fib with ICD and pacemaker, and A- fib (on eliquis). Patient presented to [place] for fall. CT Head revealed R SDH. Transferred to [facility] on 8/12/19. Repeat CTH stable R tentorial SDH. NSGY consulted and no emergent sx intervention at this time. Hospital course complicated by confusion 8/13 repeat CTH shows worsening SDH but no midline shift or worsening edema.     Functional History: Patient lives in [place]. Prior to admission, mod(I). Uses w/c and RW at home. Confusion recently. 24/7 care from 8 siblings.

## 2019-08-13 NOTE — ASSESSMENT & PLAN NOTE
Has AICD  Will not do interrogation as fall was mechanical and he did not have any prodromal symptoms

## 2019-08-13 NOTE — PLAN OF CARE
Problem: SLP Goal  Goal: SLP Goal  Speech Language Pathology Goals  Goals expected to be met by 8/20  1. Pt will tolerate regular diet with thin liquids with strict aspiration precautions.   2. Pt will orient x4 given min cues.   3. Pt will complete simple problem solving with 90% accy indptly.   4. Pt will complete further assessment of simple recall and reasoning.       Outcome: Ongoing (interventions implemented as appropriate)  Rec regular diet with thin liquids with strict aspiration precautions. CORI Islas, CCC/SLP  8/13/2019

## 2019-08-13 NOTE — CONSULTS
Attempted to see patient for wound care consult. Transfer from [facility]:Pt is an88 yo m, h/o CAD MI x4 (STEMI in March, on ASA/plavix), HTN, v fib with ICD and pacemaker, a fib on eliquis, Multiple TIAs, last one year ago severe debility (uses walker and wheelchair at baseline), s/p fall last night and early this am.while trying to walk to bathroom. Seen at [facility] overnight, extensive work-up including CT head which showed small subdural.  Pt transferred to [facility] for NSGY eval.     Discussed with nurse who reports patient has some MASD to the groin but barrier cream is in use. No need for wound care nurse at this time.     Recommend to continue with barrier cream and if begins to appear yeasty (red, itchy, satellite lesions) to discuss with MD and use miconazole antifungal barrier cream.     Wound care will sign off. Please reconsult if needed.   Nursing to continue care.   Amanda Peraza BS, BSN, RN, COCN, [facility]  [phone]

## 2019-08-13 NOTE — ASSESSMENT & PLAN NOTE
Hx of MI x4  Last MI 4/2019  Hx of multiple stents.  4 placed 4/2019  ASA and plavix on hold for 48h 2/2 SDH  Will restart ASA once stable from bleeding perspective; should not be restarted on triple therapy given significant risk of bleeding

## 2019-08-13 NOTE — PLAN OF CARE
Problem: Adult Inpatient Plan of Care  Goal: Plan of Care Review  Outcome: Ongoing (interventions implemented as appropriate)  POC reviewed with pt at 0500. Pt and daughter verbalized understanding. Questions and concerns addressed. Pt became agitated and combative over night. 0.5 mg versed admin x 1. STAT CTH completed due to increased agitation and change in neuro exam. Results reviewed with pt and family by DANE Oneal with NCC. Pt on CPAP over night. AM labs collected. No replacements admin. Will continue to monitor. See flowsheets for full assessment and VS info

## 2019-08-13 NOTE — SUBJECTIVE & OBJECTIVE
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    FRIDA (obstructive sleep apnea)     Thrombocytopenia 5/11/2017    Ventricular fibrillation 3/29/2019     Past Surgical History:   Procedure Laterality Date    APPENDECTOMY      CARDIAC PACEMAKER PLACEMENT  10/2016    CATHETERIZATION, HEART, LEFT Left 3/28/2019    [facility] CATH    DENTAL SURGERY  06/02/2016    HERNIA REPAIR      right inguinal    Right side catherization Right 2019    TONSILLECTOMY       Family History   Problem Relation Age of Onset    Stroke Mother     Coronary artery disease Father      Social History     Tobacco Use    Smoking status: Former Smoker    Smokeless tobacco: Never Used   Substance Use Topics    Alcohol use: No    Drug use: No     Review of patient's allergies indicates:  No Known Allergies    Medications: I have reviewed the current medication administration record.    Medications Prior to Admission   Medication Sig Dispense Refill Last Dose    aspirin (ECOTRIN) 81 MG EC tablet Take 1 tablet (81 mg total) by mouth once daily.  0 8/11/2019    carvedilol (COREG) 25 MG tablet Take 12.5 mg by mouth 2 (two) times daily with meals.    8/11/2019    clopidogrel (PLAVIX) 75 mg tablet Take 75 mg by mouth every evening.    8/11/2019    CRESTOR 5 mg tablet 10 mg. Take 1 tablet on Monday and 1 on Wednesdays   Past Week    cyclobenzaprine (FLEXERIL) 10 MG tablet Take 1 tablet (10 mg total) by mouth 3 (three) times daily as needed for Muscle spasms. 30 tablet 5 Past Week    ezetimibe (ZETIA) 10 mg tablet    8/11/2019    furosemide (LASIX) 20 MG tablet Take 1 tablet (20 mg total) by mouth once daily. 30 tablet 11 8/11/2019    HYDROcodone-acetaminophen (NORCO) 5-325 mg per tablet Take 1 tablet by mouth every 12  (twelve) hours as needed for Pain. 60 tablet 0 8/11/2019    memantine (NAMENDA) 5 MG Tab TAKE 1 TABLET (5 MG TOTAL) BY MOUTH 2 (TWO) TIMES DAILY. 180 tablet 3 8/11/2019    mirtazapine (REMERON) 15 MG tablet Take 1 tablet (15 mg total) by mouth every evening. 30 tablet 11     mupirocin (BACTROBAN) 2 % ointment Apply topically 2 (two) times daily. for 10 days 22 g 2 8/11/2019    sacubitril-valsartan (ENTRESTO) 24-26 mg per tablet Take 1 tablet by mouth 2 (two) times daily.   8/11/2019    tamsulosin (FLOMAX) 0.4 mg Cap CAN TAKE ONE AT BEDTIME FOR BLADDER & PROSTATE TO EASE URINATION 90 capsule 3 8/11/2019    walker Misc Quad walker with  wheels and a seat & brakes 1 each 0 Taking    apixaban (ELIQUIS) 2.5 mg Tab Take by mouth 2 (two) times daily.   Unknown at Unknown time    nitroGLYCERIN (NITROSTAT) 0.4 MG SL tablet TAKE 1 TABLET SUBLINGUALLY EVERY 5 MINS X 3 AS NEEDED FOR CHEST PAIN  2 Taking    RANEXA 1,000 mg Tb12 Take 1,000 mg by mouth 2 (two) times daily.    More than a month at Unknown time       Review of Systems   Constitutional: Positive for fatigue. Negative for chills and fever.   HENT: Negative for congestion, trouble swallowing and voice change.    Eyes: Negative for photophobia and pain.   Respiratory: Negative for chest tightness and shortness of breath.    Cardiovascular: Negative for chest pain and leg swelling.   Gastrointestinal: Negative for abdominal pain, nausea and vomiting.   Musculoskeletal: Positive for back pain. Negative for myalgias and neck pain.   Skin: Color change: bruising.   Neurological: Positive for speech difficulty. Negative for dizziness, weakness, light-headedness, numbness and headaches.     Objective:     Vital Signs (Most Recent):  Temp: 97.8 °F (36.6 °C) (08/13/19 0701)  Pulse: 71 (08/13/19 1000)  Resp: (!) 27 (08/13/19 1000)  BP: 137/78 (08/13/19 1000)  SpO2: 98 % (08/13/19 1000)    Vital Signs Range (Last 24H):  Temp:  [97.6 °F (36.4 °C)-98.5 °F (36.9 °C)]    Pulse:  [64-83]   Resp:  [12-44]   BP: (101-169)/(55-92)   SpO2:  [92 %-100 %]     Examination:   Constitutional: Well-nourished and -developed. No apparent distress.   Eyes: Conjunctiva clear, anicteric. Lids no lesions.  Head/Ears/Nose/Mouth/Throat/Neck: [place] Moist mucous membranes. External ears, nose atraumatic.   Cardiovascular:  V paced rhythm, Regular rhythm.heart tones distatnt. No murmurs. No leg edema.  Respiratory: O2 per NC. Comfortable respirations. bilat Breath sounds diminished.  Gastrointestinal: No hernia. Soft, nondistended, nontender. + bowel sounds.     Neurologic:   -E4V4M6  -Alert. Oriented to person, place and time. Not oriented to situation. Speech fluent. Follows commands. Facial symmetry  -Cranial nerves II-XII grossly intact, PERRL 3+, did not check brainstem reflexes  -Strength 5/5 and symmetric in arms, legs.   -Tone normal. Moves spon. A-and against gravity  -Sensation bilaterally intact and equal to touch in arms, legs.           Laboratory:  CMP:   Recent Labs   Lab 08/13/19  0104   CALCIUM 8.4*   ALBUMIN 3.4*   PROT 6.0   *   K 4.7   CO2 21*      BUN 25*   CREATININE 1.6*   ALKPHOS 112   ALT 17   AST 17   BILITOT 0.6     CBC:   Recent Labs   Lab 08/13/19  0157   WBC 6.58   RBC 3.95*   HGB 12.8*   HCT 40.3   *   *   MCH 32.4*   MCHC 31.8*     Lipid Panel:   Recent Labs   Lab 08/12/19  1420   CHOL 147   LDLCALC 78.2   HDL 41   TRIG 139     Coagulation:   Recent Labs   Lab 08/13/19  0104   INR 1.0   APTT 32.2*     Hgb A1C:   Recent Labs   Lab 08/12/19  1420   HGBA1C 5.2     TSH:   Recent Labs   Lab 08/12/19  1420   TSH 0.802       Diagnostic Results:      Brain imaging:  CT head w/o: 8/12/19  Right tentorial subdural hematoma measuring 1.2 cm in thickness, not largely changed from initial CT head from earlier in the day.   Moderate chronic microvascular ischemic changes  Probable minimally displaced fracture of the inferior orbital wall with hemorrhage  throughout the left maxillary sinus.    Vessel Imaging:  None    Cardiac Evaluation:   None

## 2019-08-13 NOTE — PT/OT/SLP EVAL
"Speech Language Pathology Evaluation  Cognitive/Bedside Swallow    Patient Name:  Reese Bell   MRN:  [id number]  Admitting Diagnosis: <principal problem not specified>    Recommendations:                  General Recommendations:  Dysphagia therapy, Speech/language therapy and Cognitive-linguistic therapy  Diet recommendations:  Regular, Thin   Aspiration Precautions: Strict aspiration precautions   General Precautions: Standard, aspiration, fall, seizure  Communication strategies:  provide increased time to answer    History:     Past Medical History:   Diagnosis Date    ASCVD (arteriosclerotic cardiovascular disease)     DJD (degenerative joint disease)     Hyperlipidemia     Hypertension     MGUS (monoclonal gammopathy of unknown significance) 2017    FRIDA (obstructive sleep apnea)     Thrombocytopenia 2017    Ventricular fibrillation 3/29/2019       Past Surgical History:   Procedure Laterality Date    APPENDECTOMY      CARDIAC PACEMAKER PLACEMENT  10/2016    CATHETERIZATION, HEART, LEFT Left 3/28/2019    Per[facility] at [facility] CATH    DENTAL SURGERY  2016    HERNIA REPAIR      right inguinal    Right side catherization Right 2019    TONSILLECTOMY         Social History: Patient lives with family, wife is recently . Wheelchair bound at baseline though daughter reports considerably increased confusion with decreased speech intelligibility since fall.      Subjective     "There. I can talk. We're done."  Daughter reports agitation overnight.     Pain/Comfort:  · Pain Rating 1: 0/10  · Pain Rating Post-Intervention 1: 0/10    Objective:     Cognitive Status:    Orientation Person, month and type of place  Problem [employer] 66%    Limited assessment 2/2 decreased pt participation     Receptive Language:   Comprehension:   repetitions required 2/2 pt is Circle  WFL    Pragmatics:    limited interaction in conversation     Expressive Language:  Verbal:  "   fluent      Motor Speech:  inconsistent imprecision, daughter reports not at baseline, periods of clear speech noted    Voice:   WFL    Visual-Spatial:  tba    Reading:   tba     Written Expression:   tba    Oral Musculature Evaluation  · Oral Musculature: WFL  · Mucosal Quality: good  · Mandibular Strength and Mobility: WFL  · Oral Labial Strength and Mobility: WFL  · Lingual Strength and Mobility: WFL  · Buccal Strength and Mobility: WFL  · Volitional Cough: WFL  · Volitional Swallow: WFL  · Voice Prior to PO Intake: clear, mild imprecision intermittently    Bedside Swallow Eval:   Consistencies Assessed:  · Thin liquids    · Puree    · Solids       Oral Phase:   · WFL   · Mildly increased mastication with solids    Pharyngeal Phase:   · no overt clinical signs/symptoms of aspiration    Compensatory Strategies  · None    Treatment: Education provided re: role of SLP, general swallow precs, and ongoing assessment.  Daughter in agreement.     Assessment:     Reese Bell is a 88 y.o. male with an SLP diagnosis of Dysarthria and Cognitive-Linguistic Impairment. Assessment to be ongoing as agitation decreases and participation improves.     Goals:   Multidisciplinary Problems     SLP Goals        Problem: SLP Goal    Goal Priority Disciplines Outcome   SLP Goal     SLP Ongoing (interventions implemented as appropriate)   Description:  Speech Language Pathology Goals  Goals expected to be met by 8/20  1. Pt will tolerate regular diet with thin liquids with strict aspiration precautions.   2. Pt will orient x4 given min cues.   3. Pt will complete simple problem solving with 90% accy indptly.   4. Pt will complete further assessment of simple recall and reasoning.                         Plan:     · Patient to be seen:  3 x/week   · Plan of Care expires:  09/12/19  · Plan of Care reviewed with:  patient   · SLP Follow-Up:  Yes       Discharge recommendations:  Discharge Facility/Level of Care Needs: (possible  home health, 24 hour supervision)   Barriers to Discharge:  None    Time Tracking:     SLP Treatment Date:   08/13/19  Speech Start Time:  0727  Speech Stop Time:  0743     Speech Total Time (min):  16 min    Billable Minutes: Eval 8  and Eval Swallow and Oral Function 8    CORI Islas, CCC-SLP  08/13/2019

## 2019-08-13 NOTE — PLAN OF CARE
PCP- DR. ALAN WALTERS     PT HAS A RIDE HOME AND 24HR ASSISTANCE WITH ADULT DAUGHTERS. TRANSFERRED FROM [facility].     PHARMACY- [facility] [address], [place] [address]    Coverage Name [employer] Auth Phone     Employer Group   Group Number [id number]   Subscriber Name JUAN F UP Subscriber Number [id number]   Subscriber Date of Birth 7/16/1931 08/13/19 1850   Discharge Assessment   Assessment Type Discharge Planning Assessment   Confirmed/corrected address and phone number on facesheet? Yes   Assessment information obtained from? Patient   Expected Length of Stay (days) 3   Communicated expected length of stay with patient/caregiver yes   Prior to hospitilization cognitive status: Alert/Oriented   Prior to hospitalization functional status: Assistive Equipment   Current cognitive status: Alert/Oriented   Current Functional Status: Assistive Equipment   Lives With child(keesha), adult   Able to Return to Prior Arrangements yes   Is patient able to care for self after discharge? Unable to determine at this time (comments)   Patient's perception of discharge disposition home or selfcare   Readmission Within the Last 30 Days no previous admission in last 30 days   Patient currently being followed by outpatient case management? No   Patient currently receives any other outside agency services? No   Equipment Currently Used at Home rollator;CPAP;wheelchair   Do you have any problems affording any of your prescribed medications? No   Is the patient taking medications as prescribed? yes   Does the patient have transportation home? Yes   Transportation Anticipated family or friend will provide;health plan transportation   Does the patient receive services at the [facility]? No   Discharge Plan A Rehab   Discharge Plan B Home with family;Home Health   Patient/Family in Agreement with Plan yes

## 2019-08-13 NOTE — PLAN OF CARE
Problem: Adult Inpatient Plan of Care  Goal: Plan of Care Review  Outcome: Ongoing (interventions implemented as appropriate)  Nutrition assessment completed. Please see RD note for details.    Recommendation/Intervention:   Continue Regular diet order. Pt consuming 100% of meals.     RD to monitor.    Goals: Pt to continue tolerating >85% EEN and EPN during admission  Nutrition Goal Status: new  Communication of RD Recs: reviewed with RN

## 2019-08-13 NOTE — PT/OT/SLP EVAL
"Physical Therapy Evaluation    Patient Name:  Reese Bell   MRN:  [id number]    Recommendations:     Discharge Recommendations:  home health PT(family provides 24 hr. supervision )   Discharge Equipment Recommendations: walker, rolling   Barriers to discharge: None and 24 supervison from family, good family support     Assessment:     Reese Bell is a 88 y.o. male admitted with a medical diagnosis of <principal problem not specified>.  He presents with the following impairments/functional limitations:  weakness, impaired functional mobilty, decreased safety awareness, impaired cardiopulmonary response to activity, impaired endurance, gait instability, impaired balance, impaired self care skills, decreased lower extremity function Patient tolerated assessment well. He requires Minimum assistance with ambulation. He was pleasant throughout and enjoyed telling stories about his past. He required frequent re-direction in order to attend to the task at hand. He will continue to benefit from skilled PT services during his LOS. At this time, upon D/C he is a good candidate for  PT in order to improve functional mobility tolerance.     Rehab Prognosis: Good; patient would benefit from acute skilled PT services to address these deficits and reach maximum level of function.    Recent Surgery: * No surgery found *      Plan:     During this hospitalization, patient to be seen 4 x/week to address the identified rehab impairments via gait training, therapeutic activities, therapeutic exercises, neuromuscular re-education and progress toward the following goals:    · Plan of Care Expires:  09/12/19    Subjective     Chief Complaint: none at this time   Patient/Family Comments/goals: "I have 8 kids, they al come stay with me. I do a lot for myself."  Pain/Comfort:  · Pain Rating 1: 0/10  · Pain Rating Post-Intervention 1: 0/10    Patients cultural, spiritual, Catholic conflicts given the current situation: " no    Living Environment:  Patient lives in 1 story home with 0 BE in [place], [place]. He uses a rollator and w/c. He is retired and enjoys fishing. He has 8 children who take turns staying with him in his home; he has 24 hour care and supervision   Prior to admission, patients level of function was Eduin .  Equipment used at home: rollator, wheelchair.  DME owned (not currently used): none.  Upon discharge, patient will have assistance from family (24/7).    Objective:     Communicated with RN prior to session.  Patient found supine with telemetry, pulse ox (continuous), blood pressure cuff, oxygen, peripheral IV  upon PT entry to room.    General Precautions: Standard, aspiration, fall, seizure   Orthopedic Precautions:N/A   Braces: N/A     Exams:  · Cognitive Exam:  Patient is oriented to Person, Place and Situation  · Postural Exam:  Patient presented with the following abnormalities:    · -       Rounded shoulders  · -       Forward head  · Sensation:    · -       Intact  light/touch BLE  · Patient reports L foot peripheral neuropathy   · RLE ROM: WFL  · RLE Strength: WFL  · LLE ROM: WFL  · LLE Strength: WFL    Functional Mobility:  · Bed Mobility:     · Rolling Right: stand by assistance  · Scooting: stand by assistance  · Supine to Sit: stand by assistance  · Transfers:     · Sit to Stand:  contact guard assistance and 2 trials with rolling walker  · Gait: ~8ft with Radha to hospital chair; patient with 2 small LOB 2* to distraction while walking   · Balance: sitting EOB -SBA; static standing - CGA; dynamic standing - Radha       Therapeutic Activities and Exercises:   Patient and daughter educated on role and goal of PT   White board updated   Questions and concerns answered within PT scope   Patient and daughter in agreement with DC rec     AM-PAC 6 CLICK MOBILITY  Total Score:17     Patient left up in chair with all lines intact, call button in reach, RN notified and daughter present.    GOALS:    Multidisciplinary Problems     Physical Therapy Goals        Problem: Physical Therapy Goal    Goal Priority Disciplines Outcome Goal Variances Interventions   Physical Therapy Goal     PT, PT/OT Ongoing (interventions implemented as appropriate)     Description:  Goals to be met by: 19    Patient will increase functional independence with mobility by performin. Supine to sit with Modified [place]  2. Sit to supine with Modified [place]  3. Sit to stand transfer with Supervision  4. Gait  x 100 feet with Supervision using Rolling Walker.   5. Lower extremity exercise program x10 reps per handout, with supervision                      History:     Past Medical History:   Diagnosis Date    ASCVD (arteriosclerotic cardiovascular disease)     DJD (degenerative joint disease)     Hyperlipidemia     Hypertension     MGUS (monoclonal gammopathy of unknown significance) 2017    FRIDA (obstructive sleep apnea)     Thrombocytopenia 2017    Ventricular fibrillation 3/29/2019       Past Surgical History:   Procedure Laterality Date    APPENDECTOMY      CARDIAC PACEMAKER PLACEMENT  10/2016    CATHETERIZATION, HEART, LEFT Left 3/28/2019    [facility]    DENTAL SURGERY  2016    HERNIA REPAIR      right inguinal    Right side catherization Right 2019    TONSILLECTOMY         Time Tracking:     PT Received On: 19  PT Start Time: 852     PT Stop Time: 923  PT Total Time (min): 31 min (co-eval with OT)     Billable Minutes: Evaluation 20min and Gait Training 11min      Manda Diaz, PT  2019

## 2019-08-13 NOTE — PLAN OF CARE
Problem: Occupational Therapy Goal  Goal: Occupational Therapy Goal  Goals to be met by: 7 days (8/20/19)     Patient will increase functional independence with ADLs by performing:    UE Dressing with Supervision.  LE Dressing with Contact Guard Assistance.  Grooming while standing at sink with Contact Guard Assistance.  Toileting from toilet with Contact Guard Assistance for hygiene and clothing management.   Supine to sit with Supervision.  Toilet transfer to toilet with Stand-by Assistance.  Complete functional mobility household distance with Stand-by Assistance using AD as needed.    Outcome: Ongoing (interventions implemented as appropriate)  Eval and POC set 8/13/19

## 2019-08-13 NOTE — CONSULTS
Inpatient consult to Physical Medicine Rehab  Consult performed by: Ashia Cole NP  Consult ordered by: Marifer Hassan NP  Reason for consult: Assess rehab needs      Reviewed patient history and current admission.  Rehab team following.  Full consult to follow.    SONY Booth, FNP-C  Physical Medicine & Rehabilitation   08/13/2019  Spectralink: [id number]

## 2019-08-13 NOTE — PLAN OF CARE
Problem: Physical Therapy Goal  Goal: Physical Therapy Goal  Goals to be met by: 19    Patient will increase functional independence with mobility by performin. Supine to sit with Modified [place]  2. Sit to supine with Modified [place]  3. Sit to stand transfer with Supervision  4. Gait  x 100 feet with Supervision using Rolling Walker.   5. Lower extremity exercise program x10 reps per handout, with supervision    Outcome: Ongoing (interventions implemented as appropriate)  Initial evaluation completed. Patient tolerated assessment well. Established POC and goals. Patient would continue to benefit from skilled PT services in order to improve functional mobility independence.       Manda Diaz, PT, DPT  2019

## 2019-08-13 NOTE — CONSULTS
[facility]  Physical Medicine & Rehab  Consult Note    Patient Name: Reese Bell  MRN: [id number]  Admission Date: 8/12/2019  Hospital Length of Stay: 1 days  Attending Physician: Ronaldo Dotson MD     Inpatient consult to Physical Medicine & Rehabilitation  Consult performed by: Ashia Cole NP  Consult requested by:  Ronaldo Dotson MD    Collaborating Physician: Nemo Elliott MD  Reason for Consult:  Assess rehabilitation needs     Consults  Subjective:     Principal Problem: Subdural hematoma    HPI: Reese Bell is a 88-year-old male with PMHx of CAD (STEMI in March, on ASA/plavix), HTN, v fib with ICD and pacemaker, and a fib on eliquis. Patient presented to [place] for fall. CT Head revealed R SDH. Transferred to [facility] on 8/12/19. Repeat CTH stable R tentorial SDH. NSGY consulted and no emergent sx intervention at this time. Hospital course complicated by confusion 8/13 repeat CTH shows worsening SDH but no midline shift or worsening edema.     Functional History: Patient lives in [place]. Prior to admission, mod(I). Uses w/c and RW at home. Confusion recently. 24/7 care from 8 siblings.     Hospital Course:   PT and OT evals pending     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    FRIDA (obstructive sleep apnea)     Thrombocytopenia 5/11/2017    Ventricular fibrillation 3/29/2019     Past Surgical History:   Procedure Laterality Date    APPENDECTOMY      CARDIAC PACEMAKER PLACEMENT  10/2016    CATHETERIZATION, HEART, LEFT Left 3/28/2019    [facility] CATH    DENTAL SURGERY  06/02/2016    HERNIA REPAIR      right inguinal    Right side catherization Right 2019    TONSILLECTOMY       Review of patient's allergies indicates:  No Known  Allergies    Scheduled Medications:    carvedilol  12.5 mg Oral BID    ezetimibe  10 mg Oral Daily    furosemide  20 mg Oral Daily    lacosamide  50 mg Oral Q12H    memantine  5 mg Oral BID    midazolam  0.5 mg Intravenous Once    mupirocin   Topical (Top) BID    perflutren protein-a microsphr  3 mL Intravenous 1 time in Clinic/HOD    polyethylene glycol  17 g Oral Daily    ranolazine  1,000 mg Oral BID    [START ON 8/14/2019] rosuvastatin  10 mg Oral Every Mon, Wed, Fri    sacubitril-valsartan  1 tablet Oral BID    senna-docusate 8.6-50 mg  1 tablet Oral BID    tamsulosin  0.4 mg Oral Daily       PRN Medications: acetaminophen, hydrALAZINE, labetalol, magnesium oxide, magnesium oxide, nitroGLYCERIN, ondansetron, potassium chloride 10%, potassium chloride 10%, potassium chloride 10%, potassium, sodium phosphates, potassium, sodium phosphates, potassium, sodium phosphates, sodium chloride 0.9%    Family History     Problem Relation (Age of Onset)    Coronary artery disease Father    Stroke Mother        Tobacco Use    Smoking status: Former Smoker    Smokeless tobacco: Never Used   Substance and Sexual Activity    Alcohol use: No    Drug use: No    Sexual activity: Not on file     Review of Systems   Reason unable to perform ROS: unable to get full ROS 2/2 AMS.   Constitutional: Positive for activity change.   Neurological: Positive for speech difficulty and weakness.   Psychiatric/Behavioral: Positive for confusion. Negative for agitation and behavioral problems.     Objective:     Vital Signs (Most Recent):  Temp: 97.8 °F (36.6 °C) (08/13/19 0701)  Pulse: 71 (08/13/19 1000)  Resp: (!) 27 (08/13/19 1000)  BP: 137/78 (08/13/19 1000)  SpO2: 98 % (08/13/19 1000)    Vital Signs (24h Range):  Temp:  [97.6 °F (36.4 °C)-98.5 °F (36.9 °C)] 97.8 °F (36.6 °C)  Pulse:  [64-83] 71  Resp:  [12-44] 27  SpO2:  [92 %-100 %] 98 %  BP: (101-174)/(55-96) 137/78     Body mass index is 34.89 kg/m².    Physical Exam    Constitutional: He appears well-developed and well-nourished.   HENT:   Head: Normocephalic and atraumatic.   Eyes: Pupils are equal, round, and reactive to light. EOM are normal. Right eye exhibits no discharge. Left eye exhibits no discharge.   Neck: Neck supple.   Pulmonary/Chest: Effort normal. No respiratory distress.   Musculoskeletal: He exhibits no tenderness or deformity.   Neurological: He is alert.   - Disoriented  - Following some commands  - Daughter at    Skin: Skin is warm and dry.   Psychiatric: He has a normal mood and affect. His behavior is normal.   Vital signs and nursing note reviewed     Diagnostic Results:   Labs: Reviewed  ECG: Reviewed  CT: Reviewed    Assessment/Plan:     Subdural hemorrhage  - see subdural hematoma    Subdural hematoma  - CT Head revealed R SDH.   - Repeat CTH stable R tentorial SDH.   - NSGY consulted and no emergent sx intervention at this time.   - confusion 8/13 repeat CTH shows worsening SDH but no midline shift or worsening edema.     Ventricular fibrillation  - ICD and pacemaker    Impaired mobility and activities of daily living  - Related to prolonged/acute hospital course.     Recommendations  -  Encourage mobility, OOB in chair at least 3 hours per day, and early ambulation as appropriate  -  PT/OT evaluate and treat  -  Pain management  -  Monitor for and prevent skin breakdown and pressure ulcers  · Early mobility, repositioning/weight shifting every 20-30 minutes when sitting, turn patient every 2 hours, proper mattress/overlay and chair cushioning, pressure relief/heel protector boots  -  DVT prophylaxis    -  Reviewed discharge options (IP rehab, SNF, HH therapy, and OP therapy)      PT and OT evals pending. Will follow progress and discuss with rehab team for post acute care/rehab recommendation.    Thank you for your consult.     Ashia Cole NP  Department of Physical Medicine & Rehab  [facility]

## 2019-08-13 NOTE — ASSESSMENT & PLAN NOTE
Continue sacubitril-valsartan 24-26mg bid   coreg 12.5mg bid  Furosemide 20mg daily  Echo repeated,  Normal ef

## 2019-08-13 NOTE — PROGRESS NOTES
Procedure explained. optison given ivp via left a/c sl for imaging. Denies transfusion rxn.  Tolerated well. Sl flushed before and after with 10 cc ns.

## 2019-08-13 NOTE — PT/OT/SLP EVAL
Occupational Therapy   Evaluation    Name: Reese Bell  MRN: [id number]  Admitting Diagnosis: R tentorial SDH    Recommendations:     Discharge Recommendations: home with home health (with continued 24 hr family assistance/supervision)  Discharge Equipment Recommendations:  walker, rolling  Barriers to discharge:  None    Assessment:     Reese Bell is a 88 y.o. male with a medical diagnosis of R tentorial SDH.  He presents with performance deficits including weakness, impaired endurance, impaired functional mobilty, impaired self care skills, impaired balance, gait instability, decreased safety awareness, impaired cardiopulmonary response to activity. Pt would continue to benefit from OT to increase functional independence and safety. Recommend HH with 24 hr supervision upon D/C.     Rehab Prognosis: Good; patient would benefit from acute skilled OT services to address these deficits and reach maximum level of function.       Plan:     Patient to be seen 3 x/week to address the above listed problems via self-care/home management, therapeutic activities, therapeutic exercises  · Plan of Care Expires: 09/13/19  · Plan of Care Reviewed with: patient, daughter    Subjective     Chief Complaint: None stated  Patient/Family Comments/goals: Return home    Occupational Profile:  Living Environment: Pt lives alone but has 24/7 care between his 8 children and several grandchildren/great-grandchildren; he lives in 1-story house with ramps at thresholds and walk-in shower with seat. (Pt's wife passed away ~2 weeks ago)  Previous level of function: (I) with dressing, supervision for bathing; Mod I for household mobility using rollator and W/C for longer distances; several recent falls at home  Equipment Used at Home:  rollator, wheelchair(built-in shower seat)  Assistance upon Discharge: Family provides 24 hr supervision/assistance    Pain/Comfort:  · Pain Rating 1: 0/10  · Pain Rating Post-Intervention 1:  0/10    Patients cultural, spiritual, Episcopalian conflicts given the current situation: no    Objective:     Communicated with: RN prior to session. Patient found supine with telemetry, pulse ox (continuous), blood pressure cuff, peripheral IV, oxygen upon OT entry to room, daughter present.    General Precautions: Standard, fall, aspiration, seizure   Orthopedic Precautions:N/A   Braces: N/A     Occupational Performance:    Bed Mobility:    · Patient completed Supine to Sit with contact guard assistance with HOB elevated    Functional Mobility/Transfers:  · Patient completed Sit <> Stand Transfer with contact guard assistance with rolling walker from EOB 2 trials  · Functional Mobility: Few steps from EOB to bedside chair with Min A using RW-- unsteady, c/o LE weakness    Activities of Daily Living:  · Upper Body Dressing: minimum assistance to don gown around back  · Lower Body Dressing: moderate assistance to don socks while seated    Cognitive/Visual Perceptual:  Cognitive/Psychosocial Skills:     -       Oriented to: Person and Place   -       Follows Commands/attention: Easily distracted and Follows one-step commands, easily re-directable  -       Communication: dysarthria  -       Safety awareness/insight to disability: impaired   Visual/Perceptual:      -Intact      Physical Exam:  Balance:    -       good sitting, fair standing balance  Skin integrity: Bruising of arms and L side of face (eye and chin)  Sensation:    -       Intact B UE  Dominant hand:    -       Right  Upper Extremity Range of Motion:     -       Right Upper Extremity: Deficits: shld AROM due to arthritis  -       Left Upper Extremity: WFL  Upper Extremity Strength:    -       Right Upper Extremity: WFL  -       Left Upper Extremity: WFL   Strength:    -       Right Upper Extremity: WFL  -       Left Upper Extremity: WFL  Fine Motor Coordination: intact    AMPAC 6 Click ADL:  AMPAC Total Score: 18    Treatment & Education:  OT eval;  educated pt and daughter on OT role and POC and to call for assistance before getting up; able to sit EOB with SBA and required cues for breathing technique throughout, pt on 3L O2; discussed D/C recs with pt and daughter  Education:    Patient left up in chair with all lines intact, call button in reach, RN notified and daughter present    GOALS:   Multidisciplinary Problems     Occupational Therapy Goals        Problem: Occupational Therapy Goal    Goal Priority Disciplines Outcome Interventions   Occupational Therapy Goal     OT, PT/OT Ongoing (interventions implemented as appropriate)    Description:  Goals to be met by: 7 days (8/20/19)     Patient will increase functional independence with ADLs by performing:    UE Dressing with Supervision.  LE Dressing with Contact Guard Assistance.  Grooming while standing at sink with Contact Guard Assistance.  Toileting from toilet with Contact Guard Assistance for hygiene and clothing management.   Supine to sit with Supervision.  Toilet transfer to toilet with Stand-by Assistance.  Complete functional mobility household distance with Stand-by Assistance using AD as needed.                      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    FRIDA (obstructive sleep apnea)     Thrombocytopenia 5/11/2017    Ventricular fibrillation 3/29/2019       Past Surgical History:   Procedure Laterality Date    APPENDECTOMY      CARDIAC PACEMAKER PLACEMENT  10/2016    CATHETERIZATION, HEART, LEFT Left 3/28/2019    Performed by [facility] at [facility] CATH    DENTAL SURGERY  06/02/2016    HERNIA REPAIR      right inguinal    Right side catherization Right 2019    TONSILLECTOMY         Time Tracking:     OT Date of Treatment: 08/13/19  OT Start Time: 0852  OT Stop Time: 0923  OT  Total Time (min): 31 min    Billable Minutes:Evaluation 21  Therapeutic Activity 10    FAITH Lau  8/13/2019

## 2019-08-13 NOTE — ASSESSMENT & PLAN NOTE
Admit to [facility] s/p fall x2, R SDH  8/12  Repeat CTH after 6h revealed R tentorial SDH 1.2 cm thickness, stable, no increase or new bleed.   NSGY consulted, no immediate neurosurgical  Intervention necessary at present  Neuro checks & VS q1h  SBPgoal <160  Hold anticoagulation and antiplatelets; repeat CTH at 11am  Should not be restarted on triple therapy because of high risk of bleeding  Vimpat for Sz PPx  PT/OT/SLP

## 2019-08-13 NOTE — CONSULTS
"  [facility]  Adult Nutrition  Consult Note    SUMMARY     Recommendations    Recommendation/Intervention:   Continue Regular diet order. Pt consuming 100% of meals.     RD to monitor.    Goals: Pt to continue tolerating >85% EEN and EPN during admission  Nutrition Goal Status: new  Communication of RD Recs: reviewed with RN    Reason for Assessment    Reason For Assessment: consult  Diagnosis: other (see comments)(SDH)  Relevant Medical History: CAD, HTN, v fib, a fib  Interdisciplinary Rounds: attended  General Information Comments: Pt's diet advanced. Per dtgr, pt tolerating 100% of meals since diet advancement. Pt's weight stable PTA approx 200lb, adequate po intake and appetite. Pt obese and nourished with no indications of malnutrition at this time.  Nutrition Discharge Planning: adequate po intake for optimal nutrition    Nutrition Risk Screen    Nutrition Risk Screen: no indicators present    Nutrition/Diet History    Spiritual, Cultural Beliefs, Baptist Practices, Values that Affect Care: yes  Factors Affecting Nutritional Intake: None identified at this time    Anthropometrics    Temp: 97.8 °F (36.6 °C)  Height Method: Stated  Height: 5' 5" (165.1 cm)  Height (inches): 65 in  Weight Method: Bed Scale  Weight: 95.1 kg (209 lb 10.5 oz)  Weight (lb): 209.66 lb  Ideal Body Weight (IBW), Male: 136 lb  % Ideal Body Weight, Male (lb): 154.16 lb  BMI (Calculated): 35  BMI Grade: 35 - 39.9 - obesity - grade II       Lab/Procedures/Meds    Pertinent Labs Reviewed: reviewed  Pertinent Medications Reviewed: reviewed  Pertinent Medications Comments: versed    Estimated/Assessed Needs    Weight Used For Calorie Calculations: 95.1 kg (209 lb 10.5 oz)  Energy Calorie Requirements (kcal): 1933  Energy Need Method: [place]-St Jeor(PAL 1.25)  Protein Requirements: 95-114g(1.0-1.2g/kg)  Weight Used For Protein Calculations: 95.1 kg (209 lb 10.5 oz)  Fluid Requirements (mL): 1mL/kcal or per MD     RDA " Method (mL): 1933         Nutrition Prescription Ordered    Current Diet Order: Regular    Evaluation of Received Nutrient/Fluid Intake       % Intake of Estimated Energy Needs: 75 - 100 %  % Meal Intake: 75 - 100 %    Nutrition Risk    Level of Risk/Frequency of Follow-up: low(f/u 1x/week)     Assessment and Plan    No nutrition diagnosis at this time.       Monitor and Evaluation    Food and Nutrient Intake: energy intake, food and beverage intake  Food and Nutrient Adminstration: diet order  Anthropometric Measurements: weight, weight change, body mass index  Biochemical Data, Medical Tests and Procedures: electrolyte and renal panel, gastrointestinal profile, glucose/endocrine profile, inflammatory profile, lipid profile  Nutrition-Focused Physical Findings: overall appearance     Nutrition Follow-Up    RD Follow-up?: Yes

## 2019-08-13 NOTE — NURSING
Pt becoming increasing agitated, climbing OOB, trying to hit staff. [facility] Order to give versed 1mg x1. Family at bedside WCTM.

## 2019-08-13 NOTE — SIGNIFICANT EVENT
Patient became agitated and more confused, no longer oriented to self. ABG WNL. CTH ordered STAT which shows worsening SDH, but no midline shift or worsening edema.    Will continue to hold giving platelets given the patient's extensive cardiac history and multiple cardiac stents.    Discussed case with Dr. Hernandez and updated family at the bedside.    CC time spent: 30 minutes

## 2019-08-13 NOTE — ASSESSMENT & PLAN NOTE
Continue ezetembe 10mg daily  Crestor 5mg every Monday, & Wednesday  Ranolazine SR 1000mg bid  NTG SL prn chest pain  ECG pending  Echo w normal EF  Hold ASA, plavix for 48h, 2/2 SDH  Was on triple therapy - DAPT and Eliquis; holding all for now; will restart ASA at some point; would not continue triple therapy given high risk of bleeding      Hx of MI x4 with multiple stents. Most recent MI 4/2019

## 2019-08-13 NOTE — PLAN OF CARE
08/13/19 1518   Post-Acute Status   Post-Acute Authorization Home Health/Hospice   Home Health/Hospice Status Referrals Sent  (to N)     SW reviewed chart for dc planning needs. Pt therapy recs are for  and N sets this up. Sent to N via [facility].     Delfina Samano LMSW  Neurocritical Care   [facility]  [id number]

## 2019-08-13 NOTE — SUBJECTIVE & OBJECTIVE
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    FRIDA (obstructive sleep apnea)     Thrombocytopenia 5/11/2017    Ventricular fibrillation 3/29/2019     Past Surgical History:   Procedure Laterality Date    APPENDECTOMY      CARDIAC PACEMAKER PLACEMENT  10/2016    CATHETERIZATION, HEART, LEFT Left 3/28/2019    [facility] at [facility] CATH    DENTAL SURGERY  06/02/2016    HERNIA REPAIR      right inguinal    Right side catherization Right 2019    TONSILLECTOMY       Review of patient's allergies indicates:  No Known Allergies    Scheduled Medications:    carvedilol  12.5 mg Oral BID    ezetimibe  10 mg Oral Daily    furosemide  20 mg Oral Daily    lacosamide  50 mg Oral Q12H    memantine  5 mg Oral BID    midazolam  0.5 mg Intravenous Once    mupirocin   Topical (Top) BID    perflutren protein-a microsphr  3 mL Intravenous 1 time in Clinic/HOD    polyethylene glycol  17 g Oral Daily    ranolazine  1,000 mg Oral BID    [START ON 8/14/2019] rosuvastatin  10 mg Oral Every Mon, Wed, Fri    sacubitril-valsartan  1 tablet Oral BID    senna-docusate 8.6-50 mg  1 tablet Oral BID    tamsulosin  0.4 mg Oral Daily       PRN Medications: acetaminophen, hydrALAZINE, labetalol, magnesium oxide, magnesium oxide, nitroGLYCERIN, ondansetron, potassium chloride 10%, potassium chloride 10%, potassium chloride 10%, potassium, sodium phosphates, potassium, sodium phosphates, potassium, sodium phosphates, sodium chloride 0.9%    Family History     Problem Relation (Age of Onset)    Coronary artery disease Father    Stroke Mother        Tobacco Use    Smoking status: Former Smoker    Smokeless tobacco: Never Used   Substance and Sexual Activity    Alcohol use: No    Drug use: No    Sexual activity: Not on file      Review of Systems   Reason unable to perform ROS: unable to get full ROS 2/2 AMS.   Constitutional: Positive for activity change.   Neurological: Positive for speech difficulty and weakness.   Psychiatric/Behavioral: Positive for confusion. Negative for agitation and behavioral problems.     Objective:     Vital Signs (Most Recent):  Temp: 97.8 °F (36.6 °C) (08/13/19 0701)  Pulse: 71 (08/13/19 1000)  Resp: (!) 27 (08/13/19 1000)  BP: 137/78 (08/13/19 1000)  SpO2: 98 % (08/13/19 1000)    Vital Signs (24h Range):  Temp:  [97.6 °F (36.4 °C)-98.5 °F (36.9 °C)] 97.8 °F (36.6 °C)  Pulse:  [64-83] 71  Resp:  [12-44] 27  SpO2:  [92 %-100 %] 98 %  BP: (101-174)/(55-96) 137/78     Body mass index is 34.89 kg/m².    Physical Exam   Constitutional: He appears well-developed and well-nourished.   HENT:   Head: Normocephalic and atraumatic.   Eyes: Pupils are equal, round, and reactive to light. EOM are normal. Right eye exhibits no discharge. Left eye exhibits no discharge.   Neck: Neck supple.   Pulmonary/Chest: Effort normal. No respiratory distress.   Musculoskeletal: He exhibits no tenderness or deformity.   Neurological: He is alert.   - Disoriented  - Following some commands  - Daughter at    Skin: Skin is warm and dry.   Psychiatric: He has a normal mood and affect. His behavior is normal.     NEUROLOGICAL EXAMINATION:     CRANIAL NERVES     CN III, IV, VI   Pupils are equal, round, and reactive to light.  Extraocular motions are normal.       Diagnostic Results:   Labs: Reviewed  ECG: Reviewed  CT: Reviewed

## 2019-08-14 ENCOUNTER — DOCUMENTATION ONLY (OUTPATIENT)
Dept: ELECTROPHYSIOLOGY | Facility: CLINIC | Age: 84
End: 2019-08-14

## 2019-08-14 LAB
ALBUMIN SERPL BCP-MCNC: 3.5 G/DL (ref 3.5–5.2)
ALP SERPL-CCNC: 121 U/L (ref 55–135)
ALT SERPL W/O P-5'-P-CCNC: 17 U/L (ref 10–44)
ANION GAP SERPL CALC-SCNC: 11 MMOL/L (ref 8–16)
AST SERPL-CCNC: 18 U/L (ref 10–40)
BASOPHILS # BLD AUTO: 0.03 K/UL (ref 0–0.2)
BASOPHILS NFR BLD: 0.4 % (ref 0–1.9)
BILIRUB SERPL-MCNC: 0.6 MG/DL (ref 0.1–1)
BUN SERPL-MCNC: 30 MG/DL (ref 8–23)
CALCIUM SERPL-MCNC: 9 MG/DL (ref 8.7–10.5)
CHLORIDE SERPL-SCNC: 104 MMOL/L (ref 95–110)
CO2 SERPL-SCNC: 20 MMOL/L (ref 23–29)
CREAT SERPL-MCNC: 1.8 MG/DL (ref 0.5–1.4)
DIFFERENTIAL METHOD: ABNORMAL
EOSINOPHIL # BLD AUTO: 0.5 K/UL (ref 0–0.5)
EOSINOPHIL NFR BLD: 6.6 % (ref 0–8)
ERYTHROCYTE [DISTWIDTH] IN BLOOD BY AUTOMATED COUNT: 13.1 % (ref 11.5–14.5)
EST. GFR  (AFRICAN AMERICAN): 38 ML/MIN/1.73 M^2
EST. GFR  (NON AFRICAN AMERICAN): 32.9 ML/MIN/1.73 M^2
GLUCOSE SERPL-MCNC: 100 MG/DL (ref 70–110)
HCT VFR BLD AUTO: 41.2 % (ref 40–54)
HGB BLD-MCNC: 12.9 G/DL (ref 14–18)
IMM GRANULOCYTES # BLD AUTO: 0.09 K/UL (ref 0–0.04)
IMM GRANULOCYTES NFR BLD AUTO: 1.3 % (ref 0–0.5)
LYMPHOCYTES # BLD AUTO: 0.6 K/UL (ref 1–4.8)
LYMPHOCYTES NFR BLD: 9 % (ref 18–48)
MAGNESIUM SERPL-MCNC: 2.1 MG/DL (ref 1.6–2.6)
MCH RBC QN AUTO: 32.7 PG (ref 27–31)
MCHC RBC AUTO-ENTMCNC: 31.3 G/DL (ref 32–36)
MCV RBC AUTO: 105 FL (ref 82–98)
MONOCYTES # BLD AUTO: 0.7 K/UL (ref 0.3–1)
MONOCYTES NFR BLD: 10.3 % (ref 4–15)
NEUTROPHILS # BLD AUTO: 5.1 K/UL (ref 1.8–7.7)
NEUTROPHILS NFR BLD: 72.4 % (ref 38–73)
NRBC BLD-RTO: 0 /100 WBC
PHOSPHATE SERPL-MCNC: 3.6 MG/DL (ref 2.7–4.5)
PLATELET # BLD AUTO: 94 K/UL (ref 150–350)
PMV BLD AUTO: 8.9 FL (ref 9.2–12.9)
POTASSIUM SERPL-SCNC: 4.8 MMOL/L (ref 3.5–5.1)
PROT SERPL-MCNC: 6.5 G/DL (ref 6–8.4)
RBC # BLD AUTO: 3.94 M/UL (ref 4.6–6.2)
SODIUM SERPL-SCNC: 135 MMOL/L (ref 136–145)
WBC # BLD AUTO: 7.09 K/UL (ref 3.9–12.7)

## 2019-08-14 PROCEDURE — 99900035 HC TECH TIME PER 15 MIN (STAT)

## 2019-08-14 PROCEDURE — 63600175 PHARM REV CODE 636 W HCPCS: Performed by: NURSE PRACTITIONER

## 2019-08-14 PROCEDURE — 94761 N-INVAS EAR/PLS OXIMETRY MLT: CPT

## 2019-08-14 PROCEDURE — 25000242 PHARM REV CODE 250 ALT 637 W/ HCPCS: Performed by: PSYCHIATRY & NEUROLOGY

## 2019-08-14 PROCEDURE — 97110 THERAPEUTIC EXERCISES: CPT

## 2019-08-14 PROCEDURE — 25000003 PHARM REV CODE 250: Performed by: EMERGENCY MEDICINE

## 2019-08-14 PROCEDURE — 51701 INSERT BLADDER CATHETER: CPT

## 2019-08-14 PROCEDURE — 51798 US URINE CAPACITY MEASURE: CPT

## 2019-08-14 PROCEDURE — 99232 PR SUBSEQUENT HOSPITAL CARE,LEVL II: ICD-10-PCS | Mod: ,,, | Performed by: NURSE PRACTITIONER

## 2019-08-14 PROCEDURE — 25000003 PHARM REV CODE 250: Performed by: NURSE PRACTITIONER

## 2019-08-14 PROCEDURE — 94640 AIRWAY INHALATION TREATMENT: CPT

## 2019-08-14 PROCEDURE — 99233 PR SUBSEQUENT HOSPITAL CARE,LEVL III: ICD-10-PCS | Mod: GC,,, | Performed by: PSYCHIATRY & NEUROLOGY

## 2019-08-14 PROCEDURE — 84100 ASSAY OF PHOSPHORUS: CPT

## 2019-08-14 PROCEDURE — 99232 SBSQ HOSP IP/OBS MODERATE 35: CPT | Mod: ,,, | Performed by: NURSE PRACTITIONER

## 2019-08-14 PROCEDURE — 27000221 HC OXYGEN, UP TO 24 HOURS

## 2019-08-14 PROCEDURE — 20000000 HC ICU ROOM

## 2019-08-14 PROCEDURE — 83735 ASSAY OF MAGNESIUM: CPT

## 2019-08-14 PROCEDURE — 99233 SBSQ HOSP IP/OBS HIGH 50: CPT | Mod: GC,,, | Performed by: PSYCHIATRY & NEUROLOGY

## 2019-08-14 PROCEDURE — 94660 CPAP INITIATION&MGMT: CPT

## 2019-08-14 PROCEDURE — 25000003 PHARM REV CODE 250: Performed by: STUDENT IN AN ORGANIZED HEALTH CARE EDUCATION/TRAINING PROGRAM

## 2019-08-14 PROCEDURE — 80053 COMPREHEN METABOLIC PANEL: CPT

## 2019-08-14 PROCEDURE — 85025 COMPLETE CBC W/AUTO DIFF WBC: CPT

## 2019-08-14 RX ORDER — MIRTAZAPINE 15 MG/1
15 TABLET, FILM COATED ORAL NIGHTLY
Status: DISCONTINUED | OUTPATIENT
Start: 2019-08-14 | End: 2019-08-21 | Stop reason: HOSPADM

## 2019-08-14 RX ADMIN — MUPIROCIN: 20 OINTMENT TOPICAL at 08:08

## 2019-08-14 RX ADMIN — MUPIROCIN: 20 OINTMENT TOPICAL at 09:08

## 2019-08-14 RX ADMIN — MEMANTINE HYDROCHLORIDE 5 MG: 5 TABLET ORAL at 09:08

## 2019-08-14 RX ADMIN — CARVEDILOL 12.5 MG: 12.5 TABLET, FILM COATED ORAL at 08:08

## 2019-08-14 RX ADMIN — FUROSEMIDE 20 MG: 20 TABLET ORAL at 08:08

## 2019-08-14 RX ADMIN — MIRTAZAPINE 15 MG: 15 TABLET, FILM COATED ORAL at 09:08

## 2019-08-14 RX ADMIN — CARVEDILOL 12.5 MG: 12.5 TABLET, FILM COATED ORAL at 09:08

## 2019-08-14 RX ADMIN — SENNOSIDES,DOCUSATE SODIUM 1 TABLET: 8.6; 5 TABLET, FILM COATED ORAL at 09:08

## 2019-08-14 RX ADMIN — LACOSAMIDE 50 MG: 50 TABLET, FILM COATED ORAL at 09:08

## 2019-08-14 RX ADMIN — SODIUM CHLORIDE 500 ML: 0.9 INJECTION, SOLUTION INTRAVENOUS at 12:08

## 2019-08-14 RX ADMIN — IPRATROPIUM BROMIDE AND ALBUTEROL SULFATE 3 ML: .5; 3 SOLUTION RESPIRATORY (INHALATION) at 08:08

## 2019-08-14 RX ADMIN — SACUBITRIL AND VALSARTAN 1 TABLET: 24; 26 TABLET, FILM COATED ORAL at 08:08

## 2019-08-14 RX ADMIN — SENNOSIDES,DOCUSATE SODIUM 1 TABLET: 8.6; 5 TABLET, FILM COATED ORAL at 08:08

## 2019-08-14 RX ADMIN — LACOSAMIDE 50 MG: 50 TABLET, FILM COATED ORAL at 08:08

## 2019-08-14 RX ADMIN — EZETIMIBE 10 MG: 10 TABLET ORAL at 08:08

## 2019-08-14 RX ADMIN — RANOLAZINE 1000 MG: 1000 TABLET, FILM COATED, EXTENDED RELEASE ORAL at 09:08

## 2019-08-14 RX ADMIN — TAMSULOSIN HYDROCHLORIDE 0.4 MG: 0.4 CAPSULE ORAL at 08:08

## 2019-08-14 RX ADMIN — SACUBITRIL AND VALSARTAN 1 TABLET: 24; 26 TABLET, FILM COATED ORAL at 09:08

## 2019-08-14 RX ADMIN — MEMANTINE HYDROCHLORIDE 5 MG: 5 TABLET ORAL at 08:08

## 2019-08-14 RX ADMIN — ROSUVASTATIN CALCIUM 10 MG: 10 TABLET, FILM COATED ORAL at 08:08

## 2019-08-14 RX ADMIN — HYDRALAZINE HYDROCHLORIDE 10 MG: 20 INJECTION INTRAMUSCULAR; INTRAVENOUS at 05:08

## 2019-08-14 RX ADMIN — POLYETHYLENE GLYCOL 3350 17 G: 17 POWDER, FOR SOLUTION ORAL at 08:08

## 2019-08-14 NOTE — SUBJECTIVE & OBJECTIVE
Interval History 8/14/2019:  Patient is seen for follow-up rehab evaluation and recommendations: Evaluated by therapy.    HPI, Past Medical, Family, and Social History remains the same as documented in the initial encounter.    Scheduled Medications:    carvedilol  12.5 mg Oral BID    ezetimibe  10 mg Oral Daily    furosemide  20 mg Oral Daily    lacosamide  50 mg Oral Q12H    memantine  5 mg Oral BID    midazolam  0.5 mg Intravenous Once    mirtazapine  15 mg Oral QHS    mupirocin   Topical (Top) BID    polyethylene glycol  17 g Oral Daily    ranolazine  1,000 mg Oral BID    rosuvastatin  10 mg Oral Every Mon, Wed, Fri    sacubitril-valsartan  1 tablet Oral BID    senna-docusate 8.6-50 mg  1 tablet Oral BID    tamsulosin  0.4 mg Oral Daily       Diagnostic Results: Labs: Reviewed    PRN Medications: acetaminophen, albuterol-ipratropium, hydrALAZINE, labetalol, magnesium oxide, magnesium oxide, midazolam, nitroGLYCERIN, ondansetron, potassium chloride 10%, potassium chloride 10%, potassium chloride 10%, potassium, sodium phosphates, potassium, sodium phosphates, potassium, sodium phosphates, sodium chloride 0.9%    Review of Systems   Constitutional: Positive for activity change and fatigue.   HENT: Negative for trouble swallowing and voice change.    Eyes: Negative for photophobia and visual disturbance.   Respiratory: Negative for cough and shortness of breath.    Cardiovascular: Negative for chest pain and palpitations.   Gastrointestinal: Negative for nausea and vomiting.   Genitourinary: Negative for difficulty urinating and flank pain.   Musculoskeletal: Positive for gait problem. Negative for arthralgias.   Skin: Negative for color change and rash.   Neurological: Positive for speech difficulty and weakness. Negative for headaches.   Hematological: Bruises/bleeds easily.   Psychiatric/Behavioral: Positive for confusion. Negative for agitation.     Objective:     Vital Signs (Most Recent):  Temp:  97.9 °F (36.6 °C) (08/14/19 0705)  Pulse: 71 (08/14/19 1000)  Resp: 15 (08/14/19 1000)  BP: (!) 118/58 (08/14/19 1000)  SpO2: 100 % (08/14/19 1000)    Vital Signs (24h Range):  Temp:  [96.9 °F (36.1 °C)-98.2 °F (36.8 °C)] 97.9 °F (36.6 °C)  Pulse:  [60-78] 71  Resp:  [13-47] 15  SpO2:  [94 %-100 %] 100 %  BP: ()/() 118/58     Physical Exam   Constitutional: He appears well-developed and well-nourished.   HENT:   Head: Normocephalic and atraumatic.   Eyes: Right eye exhibits no discharge. Left eye exhibits no discharge.   Neck: Neck supple.   Cardiovascular: Normal rate and intact distal pulses.   Pulmonary/Chest: Effort normal. No respiratory distress.   Abdominal: Soft. He exhibits no distension.   Musculoskeletal: He exhibits no edema or deformity.   Neurological: He exhibits normal muscle tone.   somnlent but awakens to voice and tactile stimuli   Oriented to self   +dysarthria   Follows commands   No focal weakness    Skin: Skin is warm and dry. Bruising (facial ) noted.   Psychiatric: He has a normal mood and affect. His behavior is normal.

## 2019-08-14 NOTE — CARE UPDATE
Episode of bradycardia picked up by monitor however tracing with normal HR which was 100 % paced;  Have consulted EP for interrogation who plans to do it today; appreciate assistance;    Also discussed with Gen. cardiolology the patient given extensive history of coronary disease, high CHADSVASC2 score and risks of anticoagulation. They will be seeing patient tomorrow; also, patient will need establishment of care with them. Considering Watchman KAVEH device.    Tyrone Bustamante MD

## 2019-08-14 NOTE — PLAN OF CARE
"Hospital Medicine ICU Acceptance Note    Date of Admit: 2019  Date of Transfer / Stepdown: 2019  Liz, C/J, L, Onc (IV chemo w/in 1 month), Gyn/Onc, or other special case?: no   ICU team stepping patient down: MICU  ICU team member giving verbal handoff: [id number]  Accepting  team: D    Brief History of Present Illness:      Pt is an88 yo m, h/o CAD MI x4 (STEMI in March, on ASA/plavix), HTN, v fib with ICD and pacemaker, a fib on eliquis, Multiple TIAs, last one year ago severe debility (uses walker and wheelchair at baseline), s/p fall last night and early this am.while trying to walk to bathroom.  Has had 2-3 falls in past 2 days bc "legs give out" which happens often for pt but more frequently over past week. Also, He was recently prescribed flexiril for shoulder pain. Family noticed  an increase in falls over past 3-4 days.   Of note,Spouse  within past 2 weeks.  Seen at [facility] overnight, extensive work-up including CT head which showed small subdural.  Pt transferred to [facility] for NSGY eval. Pt neurologically stable, disorientation to time and place. No immediate neurosurgical intervention necessary at present.   Decision made to not give platelets. No reversal of anticoagulation. Repeat CTH after 6h revealed R tentorial SDH 1.2 cm thickness, stable, no increase or new bleed. CT C-spine showed no acute fractures; 1cm lucent lesion at C7; cervical spondylosis. Pt will be admitted to [facility] over night for close neurological monitoring    Hospital/ICU Course:      Admit to [facility]. Repeat CTH and C-spine. SBP goal <160. No platelets. Hold anticoagulation.  2019 CTH repeated overnight for agitation which demonstrated mildly enlarged bleed; this morning Mr Bell is neurologically back to baseline; CTH repeated at 11 AM shows stable bleed.   2019: ON given bolus, for hypotension; off precedex; more interactive; used home cpap at night. PM CTH from yesterday with stable bleed. observe " vitals until PM can be TTF after;     Consultants and Procedures:     Consultants:  Neurocritical care  PT, OT  PMR  Vascular Neurology  Wound care    Procedures:    CT head    Transfer Information:     Diet:  Cardiac diet    Physical Activity:  PT/OT ordered     To Do / Pending Studies / Follow ups:  -CT head tomorrow morning  -restart heparin for DVT ppx in AM if head CT is ok  -restart asa 1 week after first stable CT  -restart plavix in outpatient setting  -likely not stable to be on eliquis  -will need to see cardiology outpatient  -maintain delirium precautions   -DC home with home health once medically stable     Patient has been accepted by Hospital Medicine Team D, who will assume care of the patient upon arrival to the floor from the ICU. Please contact ICU team with any concerns prior to arrival. Please contact Hospital Medicine at [phone] or [phone] (please do NOT leave a voicemail) when patient arrives to the floor.    Maria M Earl MD  8/14/2019 2:14 PM  Department of Hospital medicine

## 2019-08-14 NOTE — PROGRESS NOTES
Cardiac device interrogation and/or reprogramming completed by industry representative, Priyanka Hutton with [employer]; please refer to report located in the Cards Procedure tab.  Verbal report given to Michael Burris EP fellow, and neuro MD.

## 2019-08-14 NOTE — ASSESSMENT & PLAN NOTE
Continue sacubitril-valsartan 24-26mg bid   coreg 12.5mg bid  Furosemide 20mg daily (hold today's dose because of episode of hypotension)  Echo repeated,  Normal ef

## 2019-08-14 NOTE — PLAN OF CARE
Problem: Adult Inpatient Plan of Care  Goal: Plan of Care Review  Outcome: Ongoing (interventions implemented as appropriate)  POC reviewed with pt and family at 1500. Pt and family verbalized understanding. Questions and concerns addressed. No acute events today. Vital signs stable throughout shift. Pt progressing toward goals. Plan for head CT tomorrow morning at 0600. Will continue to monitor. See flowsheets for full assessment and VS info.

## 2019-08-14 NOTE — ASSESSMENT & PLAN NOTE
SBP goal <160  Continue coreg 12.5mg bid  Lasix 20mg daily (holding today due to episode of hypotension)  sacubitril- valsartan 24-26 bid  Prn hydralazine, labetalol

## 2019-08-14 NOTE — HOSPITAL COURSE
08/13/2019: Bed mobility SBA.  Sit to stand CGA & RW.  Ambulated 8 ft Radha w/ small LOB.  UBD Radha and LBD ModA. SLP diagnosis of Dysarthria and Cognitive-Linguistic Impairment. Passed for regular diet and thin liquids.   08/14/2019: David w/ PT.

## 2019-08-14 NOTE — PLAN OF CARE
Problem: Adult Inpatient Plan of Care  Goal: Plan of Care Review  Outcome: Ongoing (interventions implemented as appropriate)   See vital signs and assessments in flowsheets. See below for updates on today's progress.     Pulmonary: 2L NC.     Cardiovascular: NSR. Pacemaker/AICD. SBP WNL.     Neurological: Oriented to Person, immediate family and intermittently to place. Pupils 2-3 equal and reactant. Increasing agitated and combative over the day. Versed given x1. Bilateral wrist restraint intact. Precedex gtt initiated. Head CT x2 during day. See notes/results.     Gastrointestinal: Reg diet. Appropriate appetite. One bm.    Genitourinary: Voids per condom cath and urinal. Good U/O    Integumentary/Other: Bruising to left eye, chin and lateral waist. Moisture dermitis .       Infusions: Percedex.     Patient progressing towards goals as tolerated, plan of care communicated and reviewed with Reese Bell and family. All concerns addressed. POC awaiting results from 6pm CT to move forward with appropriate care. WCTM.

## 2019-08-14 NOTE — PLAN OF CARE
Problem: Adult Inpatient Plan of Care  Goal: Plan of Care Review  Outcome: Ongoing (interventions implemented as appropriate)  Pt oriented to person, continues to be confused. precedex stopped during shift due to hypotension. 500 cc bolus administered once. Bilateral wrist restraints intact, no injuries noted, pt is agitated and combative occasionally when awake, would occasionally try to hit and kick, but otherwise, pleasant. CPAP at night, no s/s of resp distress. Paced rhythm on monitor. VS and assessment per flow sheet, patient progressing towards goals as tolerated, plan of care reviewed with Reese Bell and family, all concerns addressed, will continue to monitor.

## 2019-08-14 NOTE — PROGRESS NOTES
[facility]  Physical Medicine & Rehab  Progress Note    Patient Name: Reese Bell  MRN: [id number]  Admission Date: 8/12/2019  Length of Stay: 2 days  Attending Physician: Ronaldo Dotson MD    Subjective:     Principal Problem:     Hospital Course:    08/13/2019: Bed mobility SBA.  Sit to stand CGA & RW.  Ambulated 8 ft Radha w/ small LOB.  UBD Radha and LBD ModA. SLP diagnosis of Dysarthria and Cognitive-Linguistic Impairment. Passed for regular diet and thin liquids.     Interval History 8/14/2019:  Patient is seen for follow-up rehab evaluation and recommendations: Evaluated by therapy.    HPI, Past Medical, Family, and Social History remains the same as documented in the initial encounter.    Scheduled Medications:    carvedilol  12.5 mg Oral BID    ezetimibe  10 mg Oral Daily    furosemide  20 mg Oral Daily    lacosamide  50 mg Oral Q12H    memantine  5 mg Oral BID    midazolam  0.5 mg Intravenous Once    mirtazapine  15 mg Oral QHS    mupirocin   Topical (Top) BID    polyethylene glycol  17 g Oral Daily    ranolazine  1,000 mg Oral BID    rosuvastatin  10 mg Oral Every Mon, Wed, Fri    sacubitril-valsartan  1 tablet Oral BID    senna-docusate 8.6-50 mg  1 tablet Oral BID    tamsulosin  0.4 mg Oral Daily       Diagnostic Results: Labs: Reviewed    PRN Medications: acetaminophen, albuterol-ipratropium, hydrALAZINE, labetalol, magnesium oxide, magnesium oxide, midazolam, nitroGLYCERIN, ondansetron, potassium chloride 10%, potassium chloride 10%, potassium chloride 10%, potassium, sodium phosphates, potassium, sodium phosphates, potassium, sodium phosphates, sodium chloride 0.9%    Review of Systems   Constitutional: Positive for activity change and fatigue.   HENT: Negative for trouble swallowing and voice change.    Respiratory: Negative for cough and shortness of breath.    Cardiovascular: Negative for chest pain and palpitations.   Musculoskeletal: Positive for gait  "problem. Negative for arthralgias.   Neurological: Positive for speech difficulty and weakness. Negative for headaches.   Hematological: Bruises/bleeds easily.   Psychiatric/Behavioral: Positive for confusion. Negative for agitation.     Objective:     Vital Signs (Most Recent):  Temp: 97.9 °F (36.6 °C) (08/14/19 0705)  Pulse: 71 (08/14/19 1000)  Resp: 15 (08/14/19 1000)  BP: (!) 118/58 (08/14/19 1000)  SpO2: 100 % (08/14/19 1000)    Vital Signs (24h Range):  Temp:  [96.9 °F (36.1 °C)-98.2 °F (36.8 °C)] 97.9 °F (36.6 °C)  Pulse:  [60-78] 71  Resp:  [13-47] 15  SpO2:  [94 %-100 %] 100 %  BP: ()/() 118/58     Physical Exam   Constitutional: He appears well-developed and well-nourished.   HENT:   Head: Normocephalic and atraumatic.   Eyes: Right eye exhibits no discharge. Left eye exhibits no discharge.   Neck: Neck supple.   Cardiovascular: Normal rate and intact distal pulses.   Pulmonary/Chest: Effort normal. No respiratory distress.   Abdominal: Soft. He exhibits no distension.   Musculoskeletal: He exhibits no edema or deformity.   Neurological: He exhibits normal muscle tone.   somnlent but awakens to voice and tactile stimuli   Oriented to self   +dysarthria   Follows commands   No focal weakness    Skin: Skin is warm and dry. Bruising (facial ) noted.   Psychiatric: He has a normal mood and affect. His behavior is normal.     Assessment/Plan:      Subdural hemorrhage  - see subdural hematoma    Subdural hematoma  - CT Head revealed R SDH.   - Repeat CTH stable R tentorial SDH.   - NSGY consulted and no emergent sx intervention at this time.   - confusion 8/13 repeat CTH shows worsening SDH but no midline shift or worsening edema.     Ventricular fibrillation  - ICD and pacemaker, no interrogation per NCC     STEMI (ST elevation myocardial infarction)  -Last MI 4/2019 w/ stents placed   -per NCC, "Will restart ASA once stable from bleeding perspective; should not be restarted on triple therapy given " "significant risk of bleeding."    Dementia in Alzheimer's disease  -Short term memory loss per daughter     Impaired mobility and activities of daily living  - Related to prolonged/acute hospital course.     Recommendations  -  Encourage mobility, OOB in chair at least 3 hours per day, and early ambulation as appropriate  -  PT/OT evaluate and treat  -  Pain management  -  Monitor for and prevent skin breakdown and pressure ulcers  · Early mobility, repositioning/weight shifting every 20-30 minutes when sitting, turn patient every 2 hours, proper mattress/overlay and chair cushioning, pressure relief/heel protector boots  -  DVT prophylaxis    -  Reviewed discharge options (IP rehab, SNF, HH therapy, and OP therapy)    Participating with therapy. Work up in progress. Will follow progress and discuss with rehab team for post acute care/rehab recommendation.      Yary Freitas NP  Department of Physical Medicine & Rehab   [facility]  "

## 2019-08-14 NOTE — PROGRESS NOTES
"[facility]  Neurocritical Care  Progress Note    Admit Date: 2019  Service Date: 2019  Length of Stay: 2    Subjective:     Chief Complaint: <principal problem not specified>    History of Present Illness:   Transfer from [facility]:  Pt is an88 yo m, h/o CAD MI x4 (STEMI in March, on ASA/plavix), HTN, v fib with ICD and pacemaker, a fib on eliquis, Multiple TIAs, last one year ago severe debility (uses walker and wheelchair at baseline), s/p fall last night and early this am.while trying to walk to bathroom.  Has had 2-3 falls in past 2 days bc "legs give out" which happens often for pt but more frequently over past week. Also, He was recently prescribed flexiril for shoulder pain. Family noticed  an increase in falls over past 3-4 days.   Of note,Spouse  within past 2 weeks.  Seen at [place] overnight, extensive work-up including CT head which showed small subdural.  [place] [facility] [place]. Pt neurologically stable, disorientation to time and place. No immediate neurosurgical intervention necessary at present.   Decision made to not give platelets. No reversal of anticoagulation. Repeat CTH after 6h revealed R tentorial SDH 1.2 cm thickness, stable, no increase or new bleed. CT C-spine showed no acute fractures; 1cm lucent lesion at C7; cervical spondylosis. Pt will be admitted to [facility] over night for close neurological monitoring       Hospital Course:  Admit to [facility]. Repeat CTH and C-spine. SBP goal <160. No platelets. Hold anticoagulation.  2019 CTH repeated overnight for agitation which demonstrated mildly enlarged bleed; this morning Mr Bell is neurologically back to baseline; CTH repeated at 11 AM shows stable bleed.   2019: ON given bolus, for hypotension; off precedex; more interactive; used home cpap at night. PM CTH from yesterday with stable bleed. observe vitals until PM can be TTF after;         Past Medical History:   Diagnosis " Date    ASCVD (arteriosclerotic cardiovascular disease)     DJD (degenerative joint disease)     Hyperlipidemia     Hypertension     MGUS (monoclonal gammopathy of unknown significance) 5/11/2017    FRIDA (obstructive sleep apnea)     Thrombocytopenia 5/11/2017    Ventricular fibrillation 3/29/2019     Past Surgical History:   Procedure Laterality Date    APPENDECTOMY      CARDIAC PACEMAKER PLACEMENT  10/2016    CATHETERIZATION, HEART, LEFT Left 3/28/2019    Performed by [facility] at [facility] CATH    DENTAL SURGERY  06/02/2016    HERNIA REPAIR      right inguinal    Right side catherization Right 2019    TONSILLECTOMY       Family History   Problem Relation Age of Onset    Stroke Mother     Coronary artery disease Father      Social History     Tobacco Use    Smoking status: Former Smoker    Smokeless tobacco: Never Used   Substance Use Topics    Alcohol use: No    Drug use: No     Review of patient's allergies indicates:  No Known Allergies    Medications: I have reviewed the current medication administration record.    Medications Prior to Admission   Medication Sig Dispense Refill Last Dose    aspirin (ECOTRIN) 81 MG EC tablet Take 1 tablet (81 mg total) by mouth once daily.  0 8/11/2019    carvedilol (COREG) 25 MG tablet Take 12.5 mg by mouth 2 (two) times daily with meals.    8/11/2019    clopidogrel (PLAVIX) 75 mg tablet Take 75 mg by mouth every evening.    8/11/2019    CRESTOR 5 mg tablet 10 mg. Take 1 tablet on Monday and 1 on Wednesdays   Past Week    cyclobenzaprine (FLEXERIL) 10 MG tablet Take 1 tablet (10 mg total) by mouth 3 (three) times daily as needed for Muscle spasms. 30 tablet 5 Past Week    ezetimibe (ZETIA) 10 mg tablet    8/11/2019    furosemide (LASIX) 20 MG tablet Take 1 tablet (20 mg total) by mouth once daily. 30 tablet 11 8/11/2019    HYDROcodone-acetaminophen (NORCO) 5-325 mg per tablet Take 1 tablet by mouth every 12 (twelve) hours as needed for Pain. 60  tablet 0 8/11/2019    memantine (NAMENDA) 5 MG Tab TAKE 1 TABLET (5 MG TOTAL) BY MOUTH 2 (TWO) TIMES DAILY. 180 tablet 3 8/11/2019    mirtazapine (REMERON) 15 MG tablet Take 1 tablet (15 mg total) by mouth every evening. 30 tablet 11     mupirocin (BACTROBAN) 2 % ointment Apply topically 2 (two) times daily. for 10 days 22 g 2 8/11/2019    sacubitril-valsartan (ENTRESTO) 24-26 mg per tablet Take 1 tablet by mouth 2 (two) times daily.   8/11/2019    tamsulosin (FLOMAX) 0.4 mg Cap CAN TAKE ONE AT BEDTIME FOR BLADDER & PROSTATE TO EASE URINATION 90 capsule 3 8/11/2019    walker Misc Quad walker with  wheels and a seat & brakes 1 each 0 Taking    apixaban (ELIQUIS) 2.5 mg Tab Take by mouth 2 (two) times daily.   Unknown at Unknown time    nitroGLYCERIN (NITROSTAT) 0.4 MG SL tablet TAKE 1 TABLET SUBLINGUALLY EVERY 5 MINS X 3 AS NEEDED FOR CHEST PAIN  2 Taking    RANEXA 1,000 mg Tb12 Take 1,000 mg by mouth 2 (two) times daily.    More than a month at Unknown time       Review of Systems   Constitutional: Positive for fatigue. Negative for chills and fever.   HENT: Negative for congestion, trouble swallowing and voice change.    Eyes: Negative for photophobia and pain.   Respiratory: Negative for chest tightness and shortness of breath.    Cardiovascular: Negative for chest pain and leg swelling.   Gastrointestinal: Negative for abdominal pain, nausea and vomiting.   Musculoskeletal: Positive for back pain. Negative for myalgias and neck pain.   Skin: Color change: bruising.   Neurological: Positive for speech difficulty. Negative for dizziness, weakness, light-headedness, numbness and headaches.     Objective:     Vital Signs (Most Recent):  Temp: 97.9 °F (36.6 °C) (08/14/19 0705)  Pulse: 76 (08/14/19 0900)  Resp: 16 (08/14/19 0900)  BP: 139/60 (08/14/19 0833)  SpO2: 100 % (08/14/19 0900)    Vital Signs Range (Last 24H):  Temp:  [96.9 °F (36.1 °C)-98.2 °F (36.8 °C)]   Pulse:  [60-78]   Resp:  [13-47]   BP:  ()/(47-91)   SpO2:  [94 %-100 %]     Examination:   Constitutional: Well-nourished and -developed. No apparent distress.   Eyes: Conjunctiva clear, anicteric. Lids no lesions.  Head/Ears/Nose/Mouth/Throat/Neck: [place] Moist mucous membranes. External ears, nose atraumatic.   Cardiovascular:  V paced rhythm, Regular rhythm.heart tones distatnt. No murmurs. No leg edema.  Respiratory: O2 per NC. Comfortable respirations. bilat Breath sounds diminished.  Gastrointestinal: No hernia. Soft, nondistended, nontender. + bowel sounds.     Neurologic:   -[id number]  -Alert. Oriented to person, place and time and situation. Speech fluent. Follows commands. Facial symmetry  -Cranial nerves II-XII grossly intact, PERRL 3+, did not check brainstem reflexes  -Strength 5/5 and symmetric in arms, legs.   -Tone normal. Moves spon. A-and against gravity  -Sensation bilaterally intact and equal to touch in arms, legs.           Laboratory:  CMP:   Recent Labs   Lab 08/14/19  0310   CALCIUM 9.0   ALBUMIN 3.5   PROT 6.5   *   K 4.8   CO2 20*      BUN 30*   CREATININE 1.8*   ALKPHOS 121   ALT 17   AST 18   BILITOT 0.6     CBC:   Recent Labs   Lab 08/14/19  0310   WBC 7.09   RBC 3.94*   HGB 12.9*   HCT 41.2   PLT 94*   *   MCH 32.7*   MCHC 31.3*     Lipid Panel:   Recent Labs   Lab 08/12/19  1420   CHOL 147   LDLCALC 78.2   HDL 41   TRIG 139     Coagulation:   Recent Labs   Lab 08/13/19  0104   INR 1.0   APTT 32.2*     Hgb A1C:   Recent Labs   Lab 08/12/19  1420   HGBA1C 5.2     TSH:   Recent Labs   Lab 08/12/19  1420   TSH 0.802       Diagnostic Results:      Brain imaging:  CT head w/o: 8/12/19  Right tentorial subdural hematoma measuring 1.2 cm in thickness, not largely changed from initial CT head from earlier in the day.   Moderate chronic microvascular ischemic changes  Probable minimally displaced fracture of the inferior orbital wall with hemorrhage throughout the left maxillary sinus.    Vessel  Imaging:  None    Cardiac Evaluation:   None    Assessment/Plan:     Neuro  Subdural hematoma  Admit to [facility] s/p fall x2, R SDH  8/12  Repeat CTH after 6h revealed R tentorial SDH 1.2 cm thickness, stable, no increase or new bleed.   NSGY consulted, no immediate neurosurgical  Intervention necessary at present  Neuro checks & VS q1h  SBPgoal <160  Hold anticoagulation and antiplatelets; repeat CTH at 11am  Should not be restarted on triple therapy because of high risk of bleeding  Vimpat for Sz PPx X 7 days  PT/OT/SLP  Ok to start chemical dvt PPx if AM CTH stable      Cardiac/Vascular  Ventricular fibrillation  Has AICD  Will not do interrogation as fall was mechanical and he did not have any prodromal symptoms    Ischemic cardiomyopathy  see chronic CHF, ASHD  Has AICD  Echo as above  Lasix      Hypertension  SBP goal <160  Continue coreg 12.5mg bid  Lasix 20mg daily (holding today due to episode of hypotension)  sacubitril- valsartan 24-26 bid  Prn hydralazine, labetalol      Hyperlipidemia  Continue crestor 5mg every Monday and Wednesday    ezetimbe 10mg daily    STEMI (ST elevation myocardial infarction)  Hx of MI x4  Last MI 4/2019  Hx of multiple stents.  4 placed 4/2019  ASA and plavix on hold for 48h 2/2 SDH  Will restart ASA once stable from bleeding perspective; should not be restarted on triple therapy given significant risk of bleeding    Chronic CHF  Continue sacubitril-valsartan 24-26mg bid   coreg 12.5mg bid  Furosemide 20mg daily (hold today's dose because of episode of hypotension)  Echo repeated,  Normal ef    ASCVD (arteriosclerotic cardiovascular disease)  Continue ezetembe 10mg daily  Crestor 5mg every Monday, & Wednesday  Ranolazine SR 1000mg bid  NTG SL prn chest pain  ECG pending  Echo w normal EF  Hold ASA, plavix for 48h, 2/2 SDH  Was on triple therapy - DAPT and Eliquis; holding all for now; will restart ASA at some point; would not continue triple therapy given high risk of  bleeding      Hx of MI x4 with multiple stents. Most recent MI 4/2019    Renal/  Stage 3 chronic kidney disease  Monitor daily CMP, BUN/Cr 27/1.5 on admit   Monitor I& O  Monitor urine output    Benign prostatic hyperplasia  home tamulosin 0.4mg daily    Other  Impaired mobility and activities of daily living  PT/OT to treat and evaluate    Sleep apnea  Continue CPAP  QHS          The patient is being Prophylaxed for:  Venous Thromboembolism with: Mechanical  Stress Ulcer with: Not Applicable   Ventilator Pneumonia with: not applicable    Activity Orders          Diet Adult Regular (IDDSI Level 7): Regular starting at 08/12 1640        Full Code    Tyrone Bustamante MD  Neurocritical Care  [facility]

## 2019-08-14 NOTE — SUBJECTIVE & OBJECTIVE
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    FRIDA (obstructive sleep apnea)     Thrombocytopenia 5/11/2017    Ventricular fibrillation 3/29/2019     Past Surgical History:   Procedure Laterality Date    APPENDECTOMY      CARDIAC PACEMAKER PLACEMENT  10/2016    CATHETERIZATION, HEART, LEFT Left 3/28/2019    [facility] CATH    DENTAL SURGERY  06/02/2016    HERNIA REPAIR      right inguinal    Right side catherization Right 2019    TONSILLECTOMY       Family History   Problem Relation Age of Onset    Stroke Mother     Coronary artery disease Father      Social History     Tobacco Use    Smoking status: Former Smoker    Smokeless tobacco: Never Used   Substance Use Topics    Alcohol use: No    Drug use: No     Review of patient's allergies indicates:  No Known Allergies    Medications: I have reviewed the current medication administration record.    Medications Prior to Admission   Medication Sig Dispense Refill Last Dose    aspirin (ECOTRIN) 81 MG EC tablet Take 1 tablet (81 mg total) by mouth once daily.  0 8/11/2019    carvedilol (COREG) 25 MG tablet Take 12.5 mg by mouth 2 (two) times daily with meals.    8/11/2019    clopidogrel (PLAVIX) 75 mg tablet Take 75 mg by mouth every evening.    8/11/2019    CRESTOR 5 mg tablet 10 mg. Take 1 tablet on Monday and 1 on Wednesdays   Past Week    cyclobenzaprine (FLEXERIL) 10 MG tablet Take 1 tablet (10 mg total) by mouth 3 (three) times daily as needed for Muscle spasms. 30 tablet 5 Past Week    ezetimibe (ZETIA) 10 mg tablet    8/11/2019    furosemide (LASIX) 20 MG tablet Take 1 tablet (20 mg total) by mouth once daily. 30 tablet 11 8/11/2019    HYDROcodone-acetaminophen (NORCO) 5-325 mg per tablet Take 1 tablet by mouth every 12  (twelve) hours as needed for Pain. 60 tablet 0 8/11/2019    memantine (NAMENDA) 5 MG Tab TAKE 1 TABLET (5 MG TOTAL) BY MOUTH 2 (TWO) TIMES DAILY. 180 tablet 3 8/11/2019    mirtazapine (REMERON) 15 MG tablet Take 1 tablet (15 mg total) by mouth every evening. 30 tablet 11     mupirocin (BACTROBAN) 2 % ointment Apply topically 2 (two) times daily. for 10 days 22 g 2 8/11/2019    sacubitril-valsartan (ENTRESTO) 24-26 mg per tablet Take 1 tablet by mouth 2 (two) times daily.   8/11/2019    tamsulosin (FLOMAX) 0.4 mg Cap CAN TAKE ONE AT BEDTIME FOR BLADDER & PROSTATE TO EASE URINATION 90 capsule 3 8/11/2019    walker Misc Quad walker with  wheels and a seat & brakes 1 each 0 Taking    apixaban (ELIQUIS) 2.5 mg Tab Take by mouth 2 (two) times daily.   Unknown at Unknown time    nitroGLYCERIN (NITROSTAT) 0.4 MG SL tablet TAKE 1 TABLET SUBLINGUALLY EVERY 5 MINS X 3 AS NEEDED FOR CHEST PAIN  2 Taking    RANEXA 1,000 mg Tb12 Take 1,000 mg by mouth 2 (two) times daily.    More than a month at Unknown time       Review of Systems   Constitutional: Positive for fatigue. Negative for chills and fever.   HENT: Negative for congestion, trouble swallowing and voice change.    Eyes: Negative for photophobia and pain.   Respiratory: Negative for chest tightness and shortness of breath.    Cardiovascular: Negative for chest pain and leg swelling.   Gastrointestinal: Negative for abdominal pain, nausea and vomiting.   Musculoskeletal: Positive for back pain. Negative for myalgias and neck pain.   Skin: Color change: bruising.   Neurological: Positive for speech difficulty. Negative for dizziness, weakness, light-headedness, numbness and headaches.     Objective:     Vital Signs (Most Recent):  Temp: 97.9 °F (36.6 °C) (08/14/19 0705)  Pulse: 76 (08/14/19 0900)  Resp: 16 (08/14/19 0900)  BP: 139/60 (08/14/19 0833)  SpO2: 100 % (08/14/19 0900)    Vital Signs Range (Last 24H):  Temp:  [96.9 °F (36.1 °C)-98.2 °F (36.8 °C)]   Pulse:   [60-78]   Resp:  [13-47]   BP: ()/(47-91)   SpO2:  [94 %-100 %]     Examination:   Constitutional: Well-nourished and -developed. No apparent distress.   Eyes: Conjunctiva clear, anicteric. Lids no lesions.  Head/Ears/Nose/Mouth/Throat/Neck: [place] Moist mucous membranes. External ears, nose atraumatic.   Cardiovascular:  V paced rhythm, Regular rhythm.heart tones distatnt. No murmurs. No leg edema.  Respiratory: O2 per NC. Comfortable respirations. bilat Breath sounds diminished.  Gastrointestinal: No hernia. Soft, nondistended, nontender. + bowel sounds.     Neurologic:   -E4V4M6  -Alert. Oriented to person, place and time and situation. Speech fluent. Follows commands. Facial symmetry  -Cranial nerves II-XII grossly intact, PERRL 3+, did not check brainstem reflexes  -Strength 5/5 and symmetric in arms, legs.   -Tone normal. Moves spon. A-and against gravity  -Sensation bilaterally intact and equal to touch in arms, legs.           Laboratory:  CMP:   Recent Labs   Lab 08/14/19  0310   CALCIUM 9.0   ALBUMIN 3.5   PROT 6.5   *   K 4.8   CO2 20*      BUN 30*   CREATININE 1.8*   ALKPHOS 121   ALT 17   AST 18   BILITOT 0.6     CBC:   Recent Labs   Lab 08/14/19  0310   WBC 7.09   RBC 3.94*   HGB 12.9*   HCT 41.2   PLT 94*   *   MCH 32.7*   MCHC 31.3*     Lipid Panel:   Recent Labs   Lab 08/12/19  1420   CHOL 147   LDLCALC 78.2   HDL 41   TRIG 139     Coagulation:   Recent Labs   Lab 08/13/19  0104   INR 1.0   APTT 32.2*     Hgb A1C:   Recent Labs   Lab 08/12/19  1420   HGBA1C 5.2     TSH:   Recent Labs   Lab 08/12/19  1420   TSH 0.802       Diagnostic Results:      Brain imaging:  CT head w/o: 8/12/19  Right tentorial subdural hematoma measuring 1.2 cm in thickness, not largely changed from initial CT head from earlier in the day.   Moderate chronic microvascular ischemic changes  Probable minimally displaced fracture of the inferior orbital wall with hemorrhage throughout the left maxillary  sinus.    Vessel Imaging:  None    Cardiac Evaluation:   None

## 2019-08-14 NOTE — PLAN OF CARE
Problem: Physical Therapy Goal  Goal: Physical Therapy Goal  Goals to be met by: 19    Patient will increase functional independence with mobility by performin. Supine to sit with Modified [place]  2. Sit to supine with Modified [place]  3. Sit to stand transfer with Supervision  4. Gait  x 100 feet with Supervision using Rolling Walker.   5. Lower extremity exercise program x10 reps per handout, with supervision     Outcome: Ongoing (interventions implemented as appropriate)  goals remain appropriate

## 2019-08-14 NOTE — ASSESSMENT & PLAN NOTE
"-Last MI 4/2019 w/ stents placed   -per [facility], "Will restart ASA once stable from bleeding perspective; should not be restarted on triple therapy given significant risk of bleeding."  "

## 2019-08-14 NOTE — ASSESSMENT & PLAN NOTE
Admit to [facility] s/p fall x2, R SDH  8/12  Repeat CTH after 6h revealed R tentorial SDH 1.2 cm thickness, stable, no increase or new bleed.   NSGY consulted, no immediate neurosurgical  Intervention necessary at present  Neuro checks & VS q1h  SBPgoal <160  Hold anticoagulation and antiplatelets; repeat CTH at 11am  Should not be restarted on triple therapy because of high risk of bleeding  Vimpat for Sz PPx X 7 days  PT/OT/SLP  Ok to start chemical dvt PPx if AM CTH stable

## 2019-08-14 NOTE — PROGRESS NOTES
Informed NP Amy with [facility], pt hypotensive BP 83/50 (61), pt hypotensive while asleep, but when stimulating patient, BP normotensive, but pt is extremely agitated and combative. New orders received for 500 cc NS bolus over two hours. RN verbalizes understanding. WCTM

## 2019-08-15 PROBLEM — I25.10 CORONARY ARTERY DISEASE INVOLVING NATIVE CORONARY ARTERY OF NATIVE HEART WITHOUT ANGINA PECTORIS: Status: ACTIVE | Noted: 2019-08-15

## 2019-08-15 PROBLEM — I48.0 PAROXYSMAL ATRIAL FIBRILLATION: Status: ACTIVE | Noted: 2019-08-15

## 2019-08-15 LAB
ALBUMIN SERPL BCP-MCNC: 3.3 G/DL (ref 3.5–5.2)
ALP SERPL-CCNC: 122 U/L (ref 55–135)
ALT SERPL W/O P-5'-P-CCNC: 13 U/L (ref 10–44)
ANION GAP SERPL CALC-SCNC: 9 MMOL/L (ref 8–16)
AST SERPL-CCNC: 17 U/L (ref 10–40)
BASOPHILS # BLD AUTO: 0.03 K/UL (ref 0–0.2)
BASOPHILS NFR BLD: 0.4 % (ref 0–1.9)
BILIRUB SERPL-MCNC: 0.4 MG/DL (ref 0.1–1)
BUN SERPL-MCNC: 38 MG/DL (ref 8–23)
CALCIUM SERPL-MCNC: 8.2 MG/DL (ref 8.7–10.5)
CHLORIDE SERPL-SCNC: 107 MMOL/L (ref 95–110)
CO2 SERPL-SCNC: 20 MMOL/L (ref 23–29)
CREAT SERPL-MCNC: 1.9 MG/DL (ref 0.5–1.4)
DIFFERENTIAL METHOD: ABNORMAL
EOSINOPHIL # BLD AUTO: 0.5 K/UL (ref 0–0.5)
EOSINOPHIL NFR BLD: 5.5 % (ref 0–8)
ERYTHROCYTE [DISTWIDTH] IN BLOOD BY AUTOMATED COUNT: 13.1 % (ref 11.5–14.5)
EST. GFR  (AFRICAN AMERICAN): 35.6 ML/MIN/1.73 M^2
EST. GFR  (NON AFRICAN AMERICAN): 30.8 ML/MIN/1.73 M^2
GLUCOSE SERPL-MCNC: 120 MG/DL (ref 70–110)
HCT VFR BLD AUTO: 40.9 % (ref 40–54)
HGB BLD-MCNC: 12.8 G/DL (ref 14–18)
IMM GRANULOCYTES # BLD AUTO: 0.1 K/UL (ref 0–0.04)
IMM GRANULOCYTES NFR BLD AUTO: 1.2 % (ref 0–0.5)
LYMPHOCYTES # BLD AUTO: 0.6 K/UL (ref 1–4.8)
LYMPHOCYTES NFR BLD: 6.5 % (ref 18–48)
MCH RBC QN AUTO: 32.8 PG (ref 27–31)
MCHC RBC AUTO-ENTMCNC: 31.3 G/DL (ref 32–36)
MCV RBC AUTO: 105 FL (ref 82–98)
MONOCYTES # BLD AUTO: 0.7 K/UL (ref 0.3–1)
MONOCYTES NFR BLD: 7.8 % (ref 4–15)
NEUTROPHILS # BLD AUTO: 6.7 K/UL (ref 1.8–7.7)
NEUTROPHILS NFR BLD: 78.6 % (ref 38–73)
NRBC BLD-RTO: 0 /100 WBC
PLATELET # BLD AUTO: 106 K/UL (ref 150–350)
PMV BLD AUTO: 8.8 FL (ref 9.2–12.9)
POCT GLUCOSE: 130 MG/DL (ref 70–110)
POTASSIUM SERPL-SCNC: 4.8 MMOL/L (ref 3.5–5.1)
PROT SERPL-MCNC: 6.3 G/DL (ref 6–8.4)
RBC # BLD AUTO: 3.9 M/UL (ref 4.6–6.2)
SODIUM SERPL-SCNC: 136 MMOL/L (ref 136–145)
WBC # BLD AUTO: 8.5 K/UL (ref 3.9–12.7)

## 2019-08-15 PROCEDURE — 25000003 PHARM REV CODE 250: Performed by: NURSE PRACTITIONER

## 2019-08-15 PROCEDURE — 97116 GAIT TRAINING THERAPY: CPT

## 2019-08-15 PROCEDURE — 51701 INSERT BLADDER CATHETER: CPT

## 2019-08-15 PROCEDURE — 99900035 HC TECH TIME PER 15 MIN (STAT)

## 2019-08-15 PROCEDURE — 99232 SBSQ HOSP IP/OBS MODERATE 35: CPT | Mod: ,,, | Performed by: NURSE PRACTITIONER

## 2019-08-15 PROCEDURE — 63600175 PHARM REV CODE 636 W HCPCS: Performed by: NURSE PRACTITIONER

## 2019-08-15 PROCEDURE — 94660 CPAP INITIATION&MGMT: CPT

## 2019-08-15 PROCEDURE — 99222 1ST HOSP IP/OBS MODERATE 55: CPT | Mod: GC,,, | Performed by: INTERNAL MEDICINE

## 2019-08-15 PROCEDURE — 94761 N-INVAS EAR/PLS OXIMETRY MLT: CPT

## 2019-08-15 PROCEDURE — 25000003 PHARM REV CODE 250: Performed by: EMERGENCY MEDICINE

## 2019-08-15 PROCEDURE — 99222 PR INITIAL HOSPITAL CARE,LEVL II: ICD-10-PCS | Mod: GC,,, | Performed by: INTERNAL MEDICINE

## 2019-08-15 PROCEDURE — 99233 SBSQ HOSP IP/OBS HIGH 50: CPT | Mod: GC,,, | Performed by: PSYCHIATRY & NEUROLOGY

## 2019-08-15 PROCEDURE — 97530 THERAPEUTIC ACTIVITIES: CPT

## 2019-08-15 PROCEDURE — 85025 COMPLETE CBC W/AUTO DIFF WBC: CPT

## 2019-08-15 PROCEDURE — 80053 COMPREHEN METABOLIC PANEL: CPT

## 2019-08-15 PROCEDURE — 97535 SELF CARE MNGMENT TRAINING: CPT

## 2019-08-15 PROCEDURE — 27000221 HC OXYGEN, UP TO 24 HOURS

## 2019-08-15 PROCEDURE — 51798 US URINE CAPACITY MEASURE: CPT

## 2019-08-15 PROCEDURE — 63600175 PHARM REV CODE 636 W HCPCS: Performed by: STUDENT IN AN ORGANIZED HEALTH CARE EDUCATION/TRAINING PROGRAM

## 2019-08-15 PROCEDURE — 11000001 HC ACUTE MED/SURG PRIVATE ROOM

## 2019-08-15 PROCEDURE — 92507 TX SP LANG VOICE COMM INDIV: CPT

## 2019-08-15 PROCEDURE — 99232 PR SUBSEQUENT HOSPITAL CARE,LEVL II: ICD-10-PCS | Mod: ,,, | Performed by: NURSE PRACTITIONER

## 2019-08-15 PROCEDURE — 99233 PR SUBSEQUENT HOSPITAL CARE,LEVL III: ICD-10-PCS | Mod: GC,,, | Performed by: PSYCHIATRY & NEUROLOGY

## 2019-08-15 RX ORDER — HEPARIN SODIUM 5000 [USP'U]/ML
5000 INJECTION, SOLUTION INTRAVENOUS; SUBCUTANEOUS EVERY 8 HOURS
Status: DISCONTINUED | OUTPATIENT
Start: 2019-08-15 | End: 2019-08-21 | Stop reason: HOSPADM

## 2019-08-15 RX ADMIN — SENNOSIDES,DOCUSATE SODIUM 1 TABLET: 8.6; 5 TABLET, FILM COATED ORAL at 08:08

## 2019-08-15 RX ADMIN — FUROSEMIDE 20 MG: 20 TABLET ORAL at 08:08

## 2019-08-15 RX ADMIN — MEMANTINE HYDROCHLORIDE 5 MG: 5 TABLET ORAL at 08:08

## 2019-08-15 RX ADMIN — LACOSAMIDE 50 MG: 50 TABLET, FILM COATED ORAL at 08:08

## 2019-08-15 RX ADMIN — MUPIROCIN: 20 OINTMENT TOPICAL at 08:08

## 2019-08-15 RX ADMIN — HEPARIN SODIUM 5000 UNITS: 5000 INJECTION INTRAVENOUS; SUBCUTANEOUS at 03:08

## 2019-08-15 RX ADMIN — EZETIMIBE 10 MG: 10 TABLET ORAL at 08:08

## 2019-08-15 RX ADMIN — SACUBITRIL AND VALSARTAN 1 TABLET: 24; 26 TABLET, FILM COATED ORAL at 08:08

## 2019-08-15 RX ADMIN — POLYETHYLENE GLYCOL 3350 17 G: 17 POWDER, FOR SOLUTION ORAL at 08:08

## 2019-08-15 RX ADMIN — TAMSULOSIN HYDROCHLORIDE 0.4 MG: 0.4 CAPSULE ORAL at 08:08

## 2019-08-15 RX ADMIN — CARVEDILOL 12.5 MG: 12.5 TABLET, FILM COATED ORAL at 08:08

## 2019-08-15 RX ADMIN — RANOLAZINE 1000 MG: 1000 TABLET, FILM COATED, EXTENDED RELEASE ORAL at 08:08

## 2019-08-15 RX ADMIN — HYDRALAZINE HYDROCHLORIDE 10 MG: 20 INJECTION INTRAMUSCULAR; INTRAVENOUS at 05:08

## 2019-08-15 NOTE — SUBJECTIVE & OBJECTIVE
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    FRIDA (obstructive sleep apnea)     Thrombocytopenia 5/11/2017    Ventricular fibrillation 3/29/2019       Past Surgical History:   Procedure Laterality Date    APPENDECTOMY      CARDIAC PACEMAKER PLACEMENT  10/2016    CATHETERIZATION, HEART, LEFT Left 3/28/2019    [facility] CATH    DENTAL SURGERY  06/02/2016    HERNIA REPAIR      right inguinal    Right side catherization Right 2019    TONSILLECTOMY         Review of patient's allergies indicates:  No Known Allergies    No current facility-administered medications on file prior to encounter.      Current Outpatient Medications on File Prior to Encounter   Medication Sig    aspirin (ECOTRIN) 81 MG EC tablet Take 1 tablet (81 mg total) by mouth once daily.    carvedilol (COREG) 25 MG tablet Take 12.5 mg by mouth 2 (two) times daily with meals.     clopidogrel (PLAVIX) 75 mg tablet Take 75 mg by mouth every evening.     CRESTOR 5 mg tablet 10 mg. Take 1 tablet on Monday and 1 on Wednesdays    cyclobenzaprine (FLEXERIL) 10 MG tablet Take 1 tablet (10 mg total) by mouth 3 (three) times daily as needed for Muscle spasms.    ezetimibe (ZETIA) 10 mg tablet     furosemide (LASIX) 20 MG tablet Take 1 tablet (20 mg total) by mouth once daily.    HYDROcodone-acetaminophen (NORCO) 5-325 mg per tablet Take 1 tablet by mouth every 12 (twelve) hours as needed for Pain.    memantine (NAMENDA) 5 MG Tab TAKE 1 TABLET (5 MG TOTAL) BY MOUTH 2 (TWO) TIMES DAILY.    mirtazapine (REMERON) 15 MG tablet Take 1 tablet (15 mg total) by mouth every evening.    mupirocin (BACTROBAN) 2 % ointment Apply topically 2 (two) times daily. for 10 days    sacubitril-valsartan (ENTRESTO) 24-26 mg per tablet Take 1 tablet by  mouth 2 (two) times daily.    tamsulosin (FLOMAX) 0.4 mg Cap CAN TAKE ONE AT BEDTIME FOR BLADDER & PROSTATE TO EASE URINATION    walker Misc Quad walker with  wheels and a seat & brakes    apixaban (ELIQUIS) 2.5 mg Tab Take by mouth 2 (two) times daily.    nitroGLYCERIN (NITROSTAT) 0.4 MG SL tablet TAKE 1 TABLET SUBLINGUALLY EVERY 5 MINS X 3 AS NEEDED FOR CHEST PAIN    RANEXA 1,000 mg Tb12 Take 1,000 mg by mouth 2 (two) times daily.      Family History     Problem Relation (Age of Onset)    Coronary artery disease Father    Stroke Mother        Tobacco Use    Smoking status: Former Smoker    Smokeless tobacco: Never Used   Substance and Sexual Activity    Alcohol use: No    Drug use: No    Sexual activity: Not on file     Review of Systems   Constitution: Negative for chills and fever.   Cardiovascular: Negative for chest pain, dyspnea on exertion, palpitations and syncope.   Respiratory: Negative for cough and sleep disturbances due to breathing.    Musculoskeletal: Negative for back pain.   Gastrointestinal: Negative for abdominal pain, nausea and vomiting.   Neurological: Negative for dizziness and focal weakness.   Psychiatric/Behavioral: Negative for altered mental status.     Objective:     Vital Signs (Most Recent):  Temp: 98 °F (36.7 °C) (08/15/19 1202)  Pulse: 79 (08/15/19 1202)  Resp: 19 (08/15/19 1202)  BP: (!) 107/59 (08/15/19 1202)  SpO2: 100 % (08/15/19 1202) Vital Signs (24h Range):  Temp:  [97.7 °F (36.5 °C)-98.4 °F (36.9 °C)] 98 °F (36.7 °C)  Pulse:  [66-89] 79  Resp:  [14-24] 19  SpO2:  [92 %-100 %] 100 %  BP: ()/(53-99) 107/59     Weight: 94.3 kg (207 lb 14.3 oz)  Body mass index is 33.55 kg/m².    SpO2: 100 %  O2 Device (Oxygen Therapy): nasal cannula      Intake/Output Summary (Last 24 hours) at 8/15/2019 1233  Last data filed at 8/15/2019 1101  Gross per 24 hour   Intake 550 ml   Output 700 ml   Net -150 ml       Lines/Drains/Airways     Peripheral Intravenous Line                  Peripheral IV - Single Lumen 08/12/19 0343 20 G Left Antecubital 3 days         Peripheral IV - Single Lumen 08/13/19 1821 20 G Posterior;Right Hand 1 day                Physical Exam   Constitutional: He is oriented to person, place, and time. He appears well-developed and well-nourished. No distress.   HENT:   Head: Normocephalic and atraumatic.   Left periorbital ecchymosis    Eyes: Conjunctivae and EOM are normal.   Neck: Normal range of motion. No tracheal deviation present.   Cardiovascular: Normal rate, regular rhythm and normal heart sounds.   No murmur heard.  Pulmonary/Chest: Effort normal and breath sounds normal. No respiratory distress. He has no wheezes.   Bibasilar crackles   Abdominal: Soft. Bowel sounds are normal. He exhibits no distension. There is no tenderness.   Musculoskeletal: Normal range of motion. He exhibits no edema.   Neurological: He is alert and oriented to person, place, and time.   Skin: He is not diaphoretic.       Significant Labs: All pertinent lab results from the last 24 hours have been reviewed.    Significant Imaging: All pertinent images from the last 24h have been reviewed.

## 2019-08-15 NOTE — PLAN OF CARE
Problem: Physical Therapy Goal  Goal: Physical Therapy Goal  Goals to be met by: 19    Patient will increase functional independence with mobility by performin. Supine to sit with Modified [place]  2. Sit to supine with Modified [place]  3. Sit to stand transfer with Supervision  4. Gait  x 100 feet with Supervision using Rolling Walker.   5. Lower extremity exercise program x10 reps per handout, with supervision     Outcome: Ongoing (interventions implemented as appropriate)  Patient tolerated treatment well. Established POC and goals reviewed and remain appropriate. Plan is to continue to improve patient's functional mobility capabilities.        Manda Diaz, PT, DPT  8/15/2019

## 2019-08-15 NOTE — PT/OT/SLP PROGRESS
"Physical Therapy Treatment    Patient Name:  Reese Bell   MRN:  [id number]    Recommendations:     Discharge Recommendations:  other (see comments)(SS SNF)   Discharge Equipment Recommendations: walker, rolling   Barriers to discharge: patient has 24/7 supervision with family - family recommending SS SNF at this time     Assessment:     Reese Bell is a 88 y.o. male admitted with a medical diagnosis of <principal problem not specified>.  He presents with the following impairments/functional limitations:  weakness, impaired self care skills, impaired balance, decreased safety awareness, impaired functional mobilty, impaired endurance, gait instability, impaired cognition, decreased lower extremity function Patient tolerated session well today. He was more removed and needed frequent re-orientation by his son that his wife passed out 2 weeks ago. Therefore, was easily distracted upon completion of session. Functionally, he was able to complete more functional mobility and looked more stable doing so. Patient would continue to benefit from skilled PT services while in the hospital. At this time, upon D/C patient is a good candidate for SS SNF in order to progress towards his PLOF. Discussion in detail with son regarding home health vs. SS SNF, son feels that SS SNF may be more beneficial at this time. PT in agreement.     Rehab Prognosis: Good; patient would benefit from acute skilled PT services to address these deficits and reach maximum level of function.    Recent Surgery: * No surgery found *      Plan:     During this hospitalization, patient to be seen 4 x/week to address the identified rehab impairments via gait training, therapeutic activities, therapeutic exercises, neuromuscular re-education and progress toward the following goals:    · Plan of Care Expires:  09/12/19    Subjective     Chief Complaint: gait instability   Patient/Family Comments/goals: "I am just tired."  Pain/Comfort:  · Pain " Rating 1: 0/10  · Pain Rating Post-Intervention 1: 0/10      Objective:     Communicated with RN prior to session.  Patient found supine with telemetry, pulse ox (continuous), blood pressure cuff, oxygen, peripheral IV upon PT entry to room.     General Precautions: Standard, aspiration, fall, seizure   Orthopedic Precautions:N/A   Braces: N/A     Functional Mobility:  · Bed Mobility:     · Rolling Right: contact guard assistance  · Scooting: contact guard assistance  · Supine to Sit: contact guard assistance  · Transfers:     · Sit to Stand: 2x; contact guard assistance with rolling walker  · Verbal cues to safely complete transfer with push-off with BUE with chair vs. RW  · Gait: 2 trials: 12ft and 20ft with CGA and RW; chair follow, improved steadiness, slow speed and decreased pre-swing bilaterally resulting in foot flat initial contact  · Balance: sitting EOB - SBA; static and dynamic standing - CGA      AM-PAC 6 CLICK MOBILITY  Turning over in bed (including adjusting bedclothes, sheets and blankets)?: 3  Sitting down on and standing up from a chair with arms (e.g., wheelchair, bedside commode, etc.): 3  Moving from lying on back to sitting on the side of the bed?: 3  Moving to and from a bed to a chair (including a wheelchair)?: 3  Need to walk in hospital room?: 3  Climbing 3-5 steps with a railing?: 2  Basic Mobility Total Score: 17       Therapeutic Activities and Exercises:  -Patient educated on the continued role and goal of PT  -Questions and concerns answered within the the PT scope of practice.   -White board updated in patient room to reflect level of assistance needed with nursing.     Patient left up in chair with all lines intact, call button in reach, RN notified and son present..    GOALS:   Multidisciplinary Problems     Physical Therapy Goals        Problem: Physical Therapy Goal    Goal Priority Disciplines Outcome Goal Variances Interventions   Physical Therapy Goal     PT, PT/OT Ongoing  (interventions implemented as appropriate)     Description:  Goals to be met by: 19    Patient will increase functional independence with mobility by performin. Supine to sit with Modified [place]  2. Sit to supine with Modified [place]  3. Sit to stand transfer with Supervision  4. Gait  x 100 feet with Supervision using Rolling Walker.   5. Lower extremity exercise program x10 reps per handout, with supervision                      Time Tracking:     PT Received On: 08/15/19  PT Start Time: 1044     PT Stop Time: 1107  PT Total Time (min): 23 min     Billable Minutes: Gait Training 13 and Therapeutic Activity 10    Treatment Type: Treatment  PT/PTA: PT     PTA Visit Number: 0     Manda Diaz, PT  08/15/2019

## 2019-08-15 NOTE — SUBJECTIVE & OBJECTIVE
Interval History 8/15/2019:  Patient is seen for follow-up rehab evaluation and recommendations: Participating w/ therapy.    HPI, Past Medical, Family, and Social History remains the same as documented in the initial encounter.    Scheduled Medications:    carvedilol  12.5 mg Oral BID    ezetimibe  10 mg Oral Daily    furosemide  20 mg Oral Daily    heparin (porcine)  5,000 Units Subcutaneous Q8H    lacosamide  50 mg Oral Q12H    memantine  5 mg Oral BID    mirtazapine  15 mg Oral QHS    mupirocin   Topical (Top) BID    polyethylene glycol  17 g Oral Daily    ranolazine  1,000 mg Oral BID    rosuvastatin  10 mg Oral Every Mon, Wed, Fri    sacubitril-valsartan  1 tablet Oral BID    senna-docusate 8.6-50 mg  1 tablet Oral BID    tamsulosin  0.4 mg Oral Daily       Diagnostic Results:   Labs: Reviewed  CT: Reviewed    PRN Medications: acetaminophen, albuterol-ipratropium, hydrALAZINE, labetalol, magnesium oxide, magnesium oxide, midazolam, nitroGLYCERIN, ondansetron, potassium chloride 10%, potassium chloride 10%, potassium chloride 10%, potassium, sodium phosphates, potassium, sodium phosphates, potassium, sodium phosphates, sodium chloride 0.9%    Review of Systems   Constitutional: Positive for activity change and fatigue.   HENT: Negative for trouble swallowing and voice change.    Respiratory: Negative for cough and shortness of breath.    Cardiovascular: Negative for chest pain and palpitations.   Gastrointestinal: Negative for nausea and vomiting.   Musculoskeletal: Positive for gait problem. Negative for arthralgias.   Skin:        bruising   Neurological: Positive for speech difficulty and weakness.   Psychiatric/Behavioral: Positive for confusion. Negative for agitation.     Objective:     Vital Signs (Most Recent):  Temp: 98 °F (36.7 °C) (08/15/19 1101)  Pulse: 89 (08/15/19 1101)  Resp: (!) 22 (08/15/19 1101)  BP: (!) 93/53 (08/15/19 1101)  SpO2: (!) 92 % (08/15/19 1101)    Vital Signs (24h  Range):  Temp:  [97.7 °F (36.5 °C)-98.4 °F (36.9 °C)] 98 °F (36.7 °C)  Pulse:  [66-89] 89  Resp:  [14-24] 22  SpO2:  [92 %-100 %] 92 %  BP: ()/(53-99) 93/53     Physical Exam   Constitutional: He appears well-developed and well-nourished.   HENT:   Head: Normocephalic and atraumatic.   Eyes: Right eye exhibits no discharge. Left eye exhibits no discharge.   Neck: Neck supple.   Cardiovascular: Normal rate and intact distal pulses.   Pulmonary/Chest: Effort normal. No respiratory distress.   Abdominal: Soft. He exhibits no distension.   Musculoskeletal: He exhibits no edema or deformity.   Neurological: He exhibits normal muscle tone.   somnlent but awakens to voice and tactile stimuli   Oriented to self   +dysarthria   Follows commands   No focal weakness    Skin: Skin is warm and dry. Bruising (facial ) noted.   Psychiatric: He has a normal mood and affect. His behavior is normal.

## 2019-08-15 NOTE — PHYSICIAN QUERY
PT Name: Reese Bell  MR #: [id number]    Physician Query Form - Atrial Fibrillation Specificity     CDS/: Michael Francis               Contact information: [phone]     This form is a permanent document in the medical record.     Query Date: August 15, 2019    By submitting this query, we are merely seeking further clarification of documentation. Please utilize your independent clinical judgment when addressing the question(s) below.    The medical record contains the following:   Indicators     Supporting Clinical Findings Location in Medical Record   x Atrial Fibrillation a fib on eliquis Progress Note Neuro [facility] (Dianna) 8/14   x EKG results Atrial-sensed ventricular-paced rhythm  Biventricular pacemaker detected  Abnormal ECG EKG 8/12    Medication      Treatment     x Other  h/o CAD MI x4 (STEMI in March, on ASA/plavix), HTN, v fib with ICD and pacemaker, a fib on eliquis, Multiple TIAs, last one year ago severe debility (uses walker and wheelchair at baseline), s/p fall last night and early this am. Progress Note Neuro [facility] (Dianna) 8/14       Provider, please further specify the Atrial Fibrillation diagnosis.    [ x ] Paroxysmal   [  ] Other (please specify):   [  ] Clinically Undetermined       Please document in your progress notes daily for the duration of treatment until resolved, and include in your discharge summary.

## 2019-08-15 NOTE — ASSESSMENT & PLAN NOTE
- Patient history of CAD with multiple stents. Most recently, STEMI in 03/2019 with RCA stent.   - Was on aspirin and plavix. Both on hold now. '  - He is almost 6 months out from his STEMI which is the minimum requirement for DAPT. Per [facility], they are ok restarting aspirin a week the last stable CT head.  - At that time, we would recommend starting only aspirin and holding Plavix given that he will be very close to completing the minimum 6 months. Given his high risk of falling and recent SDH, the risk outweigh the benefit.

## 2019-08-15 NOTE — PLAN OF CARE
Problem: Adult Inpatient Plan of Care  Goal: Plan of Care Review  Outcome: Ongoing (interventions implemented as appropriate)  No acute events throughout shift, pt more alert tonight, but maintains confusion. Oriented to person, once place. Reoriented several times overnight by family member. Attempted to get OOB twice, no falls, fall precaution maintained. Will take pt to [facility] @ 0600. Bladder scan performed for possible urinary retention. Creat trending up. VS and assessment per flow sheet, patient progressing towards goals as tolerated, plan of care reviewed with Reese Bell and family, all concerns addressed, will continue to monitor.

## 2019-08-15 NOTE — PROVIDER TRANSFER
"Handoff     Primary Team: [facility] NEUROCRITICAL CARE Room Number: 9090/9090 A     Patient Name: Reese Bell MRN: [id number]     Date of Birth: [id number] Allergies: Patient has no known allergies.     Age: 88 y.o. Admit Date: 2019     Sex: male  BMI: Body mass index is 33.55 kg/m².     Code Status: Full Code        Illness Level (current clinical status): Watcher - No    Reason for Admission: SDH  Brief HPI (pertinent PMH and diagnosis or differential diagnosis): History of Present Illness:   Transfer from [facility]:  Pt is an88 yo m, h/o CAD MI x4 (STEMI in March, on ASA/plavix), HTN, v fib with ICD and pacemaker, a fib on eliquis, Multiple TIAs, last one year ago severe debility (uses walker and wheelchair at baseline), s/p fall last night and early this am.while trying to walk to bathroom.  Has had 2-3 falls in past 2 days bc "legs give out" which happens often for pt but more frequently over past week. Also, He was recently prescribed flexiril for shoulder pain. Family noticed  an increase in falls over past 3-4 days.   Of note,Spouse  within past 2 weeks.  Seen at [place] overnight, extensive work-up including CT head which showed small subdural.  Pt transferred to [facility] for NSGY eval. Pt neurologically stable, disorientation to time and place. No immediate neurosurgical intervention necessary at present.   Decision made to not give platelets. No reversal of anticoagulation. Repeat CTH after 6h revealed R tentorial SDH 1.2 cm thickness, stable, no increase or new bleed. CT C-spine showed no acute fractures; 1cm lucent lesion at C7; cervical spondylosis. Pt will be admitted to [facility] over night for close neurological monitoring        Hospital Course:  Admit to [facility]. Repeat CTH and C-spine. SBP goal <160. No platelets. Hold anticoagulation.  2019 CTH repeated overnight for agitation which demonstrated mildly enlarged bleed; this morning Mr Bell is neurologically back to baseline; CTH " repeated at 11 AM shows stable bleed.   08/14/2019: ON given bolus, for hypotension; off precedex; more interactive; used home cpap at night. PM CTH from yesterday with stable bleed. observe vitals until PM can be TTF after;   08/15/2019: AICD interrogation yesterday with EP; no abnormalities found. Had another episode this morning when HR on monitor went down to 20s which is likely artifact; patient remained asymptomatic; repeat CTH with stable bleed. Neurologically stable, working with PT. Cards to see pt today.             Procedure Date: n/a    Hospital Course (updated, brief assessment by system or problem, significant events):   Neuro  Subdural hematoma  Admit to [facility] s/p fall x2, R SDH  8/12  Repeat CTH after 6h revealed R tentorial SDH 1.2 cm thickness, stable, no increase or new bleed.   NSGY consulted, no immediate neurosurgical  Intervention necessary at present  Neuro checks & VS q1h  SBPgoal <160  Hold anticoagulation and antiplatelets;   Should not be restarted on triple therapy because of high risk of bleeding  Vimpat for Sz PPx X 7 days  PT/OT/SLP  CTH w stable bleed yesterday; started DVT PPx;        Cardiac/Vascular  Ventricular fibrillation  Has AICD  Had an episode where monitor showed HR was in 20s yesterday; interrogation did not demonstrate the same, patient was asymptomatic; likely this was an artifact;Interrogation done by EP yesterday, no issues; had another episode today- again asymptomatic; will remove and reapply leads however to try to solve the artifact; no further intervention needed at this point.     Hypertension  SBP goal <160  Continue coreg 12.5mg bid  Lasix 20mg daily  sacubitril- valsartan 24-26 bid  Prn hydralazine, labetalol        Hyperlipidemia  Continue crestor 5mg every Monday and Wednesday    ezetimbe 10mg daily     STEMI (ST elevation myocardial infarction)  Hx of MI x4  Last MI 4/2019  Hx of multiple stents.  4 placed 4/2019  ASA and plavix on hold for 48h 2/2 SDH  Will  restart ASA once stable from bleeding perspective; do not recommend triple therapy given significant risk of bleeding  Have asked cardiology for their input re assessment of risk/benefit for antiplatelets/anticoags given high chadsvasc score and bleed     Chronic CHF  Continue sacubitril-valsartan 24-26mg bid   coreg 12.5mg bid  Furosemide 20mg daily  Echo repeated,  Normal ef     ASCVD (arteriosclerotic cardiovascular disease)  Continue ezetembe 10mg daily  Crestor 5mg every Monday, & Wednesday  Ranolazine SR 1000mg bid  NTG SL prn chest pain  ECG pending  Echo w normal EF  Hold ASA, plavix for 48h, 2/2 SDH  Was on triple therapy - DAPT and Eliquis; holding all for now; will restart ASA at some point; would not continue triple therapy given high risk of bleeding  Have asked cardiology for their input re risk/benefit discussion with family for aniplatelet/anticoag therapy;         Hx of MI x4 with multiple stents. Most recent MI 4/2019     Other  Impaired mobility and activities of daily living  PT/OT following     Sleep apnea  Continue CPAP  QHS    Estimated Discharge Date: 8/16    Discharge Disposition: Admitted as an Inpatient    Mentored By: Dr. Lees

## 2019-08-15 NOTE — PT/OT/SLP PROGRESS
Occupational Therapy   Treatment    Name: Reese Bell  MRN: [id number]  Admitting Diagnosis: SDH    Recommendations:     Discharge Recommendations: nursing facility, skilled (short-stay)  Discharge Equipment Recommendations:  walker, rolling  Barriers to discharge:  None    Assessment:     Reese Bell is a 88 y.o. male with a medical diagnosis of SDH.  He presents with performance deficits including weakness, impaired self care skills, impaired functional mobilty, impaired endurance, gait instability, impaired balance, decreased safety awareness, impaired cardiopulmonary response to activity. Pt would continue to benefit from OT to increase functional independence and safety. Discussed D/C recommendation change with pt and son-- changed to short-stay SNF as pt would benefit from increased therapy to return to PLOF.     Rehab Prognosis:  Good; patient would benefit from acute skilled OT services to address these deficits and reach maximum level of function.       Plan:     Patient to be seen 4 x/week to address the above listed problems via self-care/home management, therapeutic activities, therapeutic exercises  · Plan of Care Expires: 09/13/19  · Plan of Care Reviewed with: patient, son    Subjective     Pain/Comfort:  · Pain Rating 1: 0/10  · Pain Rating Post-Intervention 1: 0/10    Objective:     Communicated with: RN prior to session. Patient found with HOB elevated with blood pressure cuff, pulse ox (continuous), telemetry, peripheral IV, oxygen upon OT entry to room, son present.  Pt asked where his wife was several times throughout session, not recalling she passed away recently.    General Precautions: Standard, fall, aspiration, seizure   Orthopedic Precautions:N/A   Braces: N/A     Occupational Performance:     Bed Mobility:    · Patient completed Supine to Sit with contact guard assistance with HOB elevated    Functional Mobility/Transfers:  · Patient completed Sit <> Stand Transfer with  contact guard assistance with rolling walker from EOB and bedside chair-- cues for hand placement and controlled descent   · Functional Mobility: 2 short trials of household distance with CGA using RW and chair follow for safety-- seated rest break between trials and cues for breathing technique    Activities of Daily Living:  · Upper Body Dressing: minimum assistance to don gown around back  · Lower Body Dressing: maximal assistance to don socks while seated      AMPAC 6 Click ADL: 17    Treatment & Education:  Pt with increased confusion this date, agreeable to OOB activity and alert throughout session; completed UB/LB dressing, T/F training, and functional mobility as described above; discussed D/C recommendation and DME with pt and son    Patient left up in chair with all lines intact, call button in reach, RN notified and son presentEducation:      GOALS:   Multidisciplinary Problems     Occupational Therapy Goals        Problem: Occupational Therapy Goal    Goal Priority Disciplines Outcome Interventions   Occupational Therapy Goal     OT, PT/OT Ongoing (interventions implemented as appropriate)    Description:  Goals to be met by: 7 days (8/20/19)     Patient will increase functional independence with ADLs by performing:    UE Dressing with Supervision.  LE Dressing with Contact Guard Assistance.  Grooming while standing at sink with Contact Guard Assistance.  Toileting from toilet with Contact Guard Assistance for hygiene and clothing management.   Supine to sit with Supervision.  Toilet transfer to toilet with Stand-by Assistance.  Complete functional mobility household distance with Stand-by Assistance using AD as needed.                      Time Tracking:     OT Date of Treatment: 08/15/19  OT Start Time: 1044  OT Stop Time: 1107  OT Total Time (min): 23 min (co-treat with PT)    Billable Minutes:Self Care/Home Management 8  Therapeutic Activity 15    FAITH Lau  8/15/2019

## 2019-08-15 NOTE — SUBJECTIVE & OBJECTIVE
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    FRIDA (obstructive sleep apnea)     Thrombocytopenia 5/11/2017    Ventricular fibrillation 3/29/2019     Past Surgical History:   Procedure Laterality Date    APPENDECTOMY      CARDIAC PACEMAKER PLACEMENT  10/2016    CATHETERIZATION, HEART, LEFT Left 3/28/2019    [facility] CATH    DENTAL SURGERY  06/02/2016    HERNIA REPAIR      right inguinal    Right side catherization Right 2019    TONSILLECTOMY       Family History   Problem Relation Age of Onset    Stroke Mother     Coronary artery disease Father      Social History     Tobacco Use    Smoking status: Former Smoker    Smokeless tobacco: Never Used   Substance Use Topics    Alcohol use: No    Drug use: No     Review of patient's allergies indicates:  No Known Allergies    Medications: I have reviewed the current medication administration record.    Medications Prior to Admission   Medication Sig Dispense Refill Last Dose    aspirin (ECOTRIN) 81 MG EC tablet Take 1 tablet (81 mg total) by mouth once daily.  0 8/11/2019    carvedilol (COREG) 25 MG tablet Take 12.5 mg by mouth 2 (two) times daily with meals.    8/11/2019    clopidogrel (PLAVIX) 75 mg tablet Take 75 mg by mouth every evening.    8/11/2019    CRESTOR 5 mg tablet 10 mg. Take 1 tablet on Monday and 1 on Wednesdays   Past Week    cyclobenzaprine (FLEXERIL) 10 MG tablet Take 1 tablet (10 mg total) by mouth 3 (three) times daily as needed for Muscle spasms. 30 tablet 5 Past Week    ezetimibe (ZETIA) 10 mg tablet    8/11/2019    furosemide (LASIX) 20 MG tablet Take 1 tablet (20 mg total) by mouth once daily. 30 tablet 11 8/11/2019    HYDROcodone-acetaminophen (NORCO) 5-325 mg per tablet Take 1 tablet by mouth every 12  (twelve) hours as needed for Pain. 60 tablet 0 8/11/2019    memantine (NAMENDA) 5 MG Tab TAKE 1 TABLET (5 MG TOTAL) BY MOUTH 2 (TWO) TIMES DAILY. 180 tablet 3 8/11/2019    mirtazapine (REMERON) 15 MG tablet Take 1 tablet (15 mg total) by mouth every evening. 30 tablet 11     mupirocin (BACTROBAN) 2 % ointment Apply topically 2 (two) times daily. for 10 days 22 g 2 8/11/2019    sacubitril-valsartan (ENTRESTO) 24-26 mg per tablet Take 1 tablet by mouth 2 (two) times daily.   8/11/2019    tamsulosin (FLOMAX) 0.4 mg Cap CAN TAKE ONE AT BEDTIME FOR BLADDER & PROSTATE TO EASE URINATION 90 capsule 3 8/11/2019    walker Misc Quad walker with  wheels and a seat & brakes 1 each 0 Taking    apixaban (ELIQUIS) 2.5 mg Tab Take by mouth 2 (two) times daily.   Unknown at Unknown time    nitroGLYCERIN (NITROSTAT) 0.4 MG SL tablet TAKE 1 TABLET SUBLINGUALLY EVERY 5 MINS X 3 AS NEEDED FOR CHEST PAIN  2 Taking    RANEXA 1,000 mg Tb12 Take 1,000 mg by mouth 2 (two) times daily.    More than a month at Unknown time       Review of Systems   Constitutional: Positive for fatigue. Negative for chills and fever.   HENT: Negative for congestion, trouble swallowing and voice change.    Eyes: Negative for photophobia and pain.   Respiratory: Negative for chest tightness and shortness of breath.    Cardiovascular: Negative for chest pain and leg swelling.   Gastrointestinal: Negative for abdominal pain, nausea and vomiting.   Musculoskeletal: Positive for back pain. Negative for myalgias and neck pain.   Skin: Color change: bruising.   Neurological: Positive for speech difficulty. Negative for dizziness, weakness, light-headedness, numbness and headaches.     Objective:     Vital Signs (Most Recent):  Temp: 98 °F (36.7 °C) (08/15/19 1101)  Pulse: 89 (08/15/19 1101)  Resp: (!) 22 (08/15/19 1101)  BP: (!) 93/53 (08/15/19 1101)  SpO2: (!) 92 % (08/15/19 1101)    Vital Signs Range (Last 24H):  Temp:  [97.7 °F (36.5 °C)-98.4 °F (36.9 °C)]    Pulse:  [66-89]   Resp:  [14-24]   BP: ()/(53-99)   SpO2:  [92 %-100 %]     Examination:   Constitutional: Well-nourished and -developed. No apparent distress.   Eyes: Conjunctiva clear, anicteric. Lids no lesions.  Head/Ears/Nose/Mouth/Throat/Neck: [place] Moist mucous membranes. External ears, nose atraumatic.   Cardiovascular:  V paced rhythm, Regular rhythm.heart tones distatnt. No murmurs. No leg edema.  Respiratory: O2 per NC. Comfortable respirations. bilat Breath sounds diminished.  Gastrointestinal: No hernia. Soft, nondistended, nontender. + bowel sounds.     Neurologic:     -[id number]  -Alert. Oriented to person, place and time and situation. . Follows commands  - PERRL , EOMI, did not check brainstem reflexes  -Strength 5/5 and symmetric in arms, legs.   -Tone normal. moves all extremities equally  -Sensation bilaterally intact and equal to light touch in arms, legs.           Laboratory:  CMP:   Recent Labs   Lab 08/15/19  0311   CALCIUM 8.2*   ALBUMIN 3.3*   PROT 6.3      K 4.8   CO2 20*      BUN 38*   CREATININE 1.9*   ALKPHOS 122   ALT 13   AST 17   BILITOT 0.4     CBC:   Recent Labs   Lab 08/15/19  0311   WBC 8.50   RBC 3.90*   HGB 12.8*   HCT 40.9   *   *   MCH 32.8*   MCHC 31.3*     Lipid Panel:   Recent Labs   Lab 08/12/19  1420   CHOL 147   LDLCALC 78.2   HDL 41   TRIG 139     Coagulation:   Recent Labs   Lab 08/13/19  0104   INR 1.0   APTT 32.2*     Hgb A1C:   Recent Labs   Lab 08/12/19  1420   HGBA1C 5.2     TSH:   Recent Labs   Lab 08/12/19  1420   TSH 0.802       Diagnostic Results:      Brain imaging:  CT head w/o: 8/12/19  Right tentorial subdural hematoma measuring 1.2 cm in thickness, not largely changed from initial CT head from earlier in the day.   Moderate chronic microvascular ischemic changes  Probable minimally displaced fracture of the inferior orbital wall with hemorrhage throughout the left maxillary sinus.    Vessel Imaging:  None    Cardiac  Evaluation:   None

## 2019-08-15 NOTE — PLAN OF CARE
"Hospital Medicine ICU Acceptance Note    Date of Admit: 2019  Date of Transfer / Stepdown: 8/15/2019  Liz, C/J, L, Onc (IV chemo w/in 1 month), Gyn/Onc, or other special case?: no   ICU team stepping patient down: MICU  ICU team member giving verbal handoff: [phone]  Accepting  team: D    Brief History of Present Illness:      Pt is an88 yo m, h/o CAD MI x4 (STEMI in March, on ASA/plavix), HTN, v fib with ICD and pacemaker, a fib on eliquis, Multiple TIAs, last one year ago severe debility (uses walker and wheelchair at baseline), s/p fall last night and early this am.while trying to walk to bathroom.  Has had 2-3 falls in past 2 days bc "legs give out" which happens often for pt but more frequently over past week. Also, He was recently prescribed flexiril for shoulder pain. Family noticed  an increase in falls over past 3-4 days.   Of note,Spouse  within past 2 weeks.  Seen at [facility] overnight, extensive work-up including CT head which showed small subdural.  [place] [facility] [place] Pt neurologically stable, disorientation to time and place. No immediate neurosurgical intervention necessary at present.   Decision made to not give platelets. No reversal of anticoagulation. Repeat CTH after 6h revealed R tentorial SDH 1.2 cm thickness, stable, no increase or new bleed. CT C-spine showed no acute fractures; 1cm lucent lesion at C7; cervical spondylosis. Pt will be admitted to [facility] over night for close neurological monitoring    Hospital/ICU Course:      Admit to [facility]. Repeat CTH and C-spine. SBP goal <160. No platelets. Hold anticoagulation.  2019 CTH repeated overnight for agitation which demonstrated mildly enlarged bleed; this morning Mr Bell is neurologically back to baseline; CTH repeated at 11 AM shows stable bleed.   2019: ON given bolus, for hypotension; off precedex; more interactive; used home cpap at night. PM CTH from yesterday with stable bleed. observe " vitals until PM can be TTF after;   08/14/2019.  Patient was due to be downgraded to telemetry floor.  However patient had an episode of bradycardia with HR in 20s.  EP was consulted.  Pacemaker was interrogated and patient was determined to have normal sinus rhythm. HR in 20s was likely misread.  Cardiology was also consulted for risk versus benefit discussion of anticoagulation.    Consultants and Procedures:     Consultants:  Neurocritical care  PT, OT  PMR  Vascular Neurology  Wound care    Procedures:    CT head    Transfer Information:     Diet:  Cardiac diet    Physical Activity:  PT/OT ordered     To Do / Pending Studies / Follow ups:  -CT head to be done by Critical Care today  -restart heparin for DVT ppx in AM if head CT is ok  -restart asa 1 week after first stable CT  -restart plavix in outpatient setting  -likely not stable to be on eliquis  -cardiology consulted, follow-up rec  -maintain delirium precautions   -DC home with home health once medically stable     Patient has been accepted by Hospital Medicine Team D, who will assume care of the patient upon arrival to the floor from the ICU. Please contact ICU team with any concerns prior to arrival. Please contact Hospital Medicine at [phone] or [phone] (please do NOT leave a voicemail) when patient arrives to the floor.    Maria M Earl MD  8/15/2019 2:14 PM  Department of Hospital medicine

## 2019-08-15 NOTE — ASSESSMENT & PLAN NOTE
Recommendations  -  Encourage mobility, OOB in chair at least 3 hours per day, and early ambulation as appropriate  -  PT/OT evaluate and treat  -  Pain management  -  Monitor for and prevent skin breakdown and pressure ulcers  · Early mobility, repositioning/weight shifting every 20-30 minutes when sitting, turn patient every 2 hours, proper mattress/overlay and chair cushioning, pressure relief/heel protector boots  -  Reviewed discharge options (IP rehab, SNF, HH therapy, and OP therapy)

## 2019-08-15 NOTE — PLAN OF CARE
08/15/19 1547   Discharge Reassessment   Assessment Type Discharge Planning Reassessment   Provided patient/caregiver education on the expected discharge date and the discharge plan Yes   Do you have any problems affording any of your prescribed medications? No   Discharge Plan A Skilled Nursing Facility   Discharge Plan B Rehab   DME Needed Upon Discharge  other (see comments)  (vicki)   Anticipated Discharge Disposition SNF   Can the patient answer the patient profile reliably? Yes, cognitively intact   How does the patient rate their overall health at the present time? Good   Describe the patient's ability to walk at the present time. Major restrictions/daily assistance from another person   How often would a person be available to care for the patient? Often   Number of comorbid conditions (as recorded on the chart) Two   During the past month, has the patient often been bothered by feeling down, depressed or hopeless? No   During the past month, has the patient often been bothered by little interest or pleasure in doing things? No   Post-Acute Status   Post-Acute Authorization Placement   Post-Acute Placement Status Awaiting Internal Medical Clearance   Discharge Delays None known at this time  (Reg Diet )       Jeanne Dickinson RN, CCRN-K, [facility]  Neuro-Critical Care   [phone]

## 2019-08-15 NOTE — NURSING
Handoff report given to RUFINA Frederick in [facility]. Pt transferring to room 658A as soon as tele box arrives. Will continue to monitor very closely.

## 2019-08-15 NOTE — PT/OT/SLP PROGRESS
"Speech Language Pathology Treatment    Patient Name:  Reese Bell   MRN:  [id number]  Admitting Diagnosis: <principal problem not specified>    Recommendations:                 General Recommendations:  Cognitive-linguistic therapy  Diet recommendations:  Regular, Liquid Diet Level: Thin   Aspiration Precautions: Strict aspiration precautions   General Precautions: Standard, aspiration, fall, seizure  Communication strategies:  provide increased time to answer    Subjective     "My wife handles that."   "He seems back to himself." "No, normally he would have remembered that."     Pain/Comfort:  · Pain Rating 1: 0/10  · Pain Rating Post-Intervention 1: 0/10    Objective:     Has the patient been evaluated by SLP for swallowing?   Yes  Keep patient NPO? No   Current Respiratory Status: nasal cannula      Pt seen bedside with son present.  Pt alert and cooperative.  Impaired short term recall evident with pt oriented to person and place only given A and poor recall of am/recent events.  Pt unable to recall recent passing of his spouse.  Min A required for pt to complete simple verbal problem solving though max cues for functional problem solving given current situation.  SLP reviewed safety consideration and POC. Pt in agreement.        Assessment:     Reese Bell is a 88 y.o. male with an SLP diagnosis of Cognitive-Linguistic Impairment.      Goals:   Multidisciplinary Problems     SLP Goals        Problem: SLP Goal    Goal Priority Disciplines Outcome   SLP Goal     SLP Ongoing (interventions implemented as appropriate)   Description:  Speech Language Pathology Goals  Goals expected to be met by 8/20  1. Pt will tolerate regular diet with thin liquids with strict aspiration precautions.   2. Pt will orient x4 given min cues.   3. Pt will complete simple problem solving with 90% accy indptly.   4. Pt will complete further assessment of simple recall and reasoning.                         Plan:     · Patient " to be seen:  3 x/week   · Plan of Care expires:  09/12/19  · Plan of Care reviewed with:  patient, son   · SLP Follow-Up:  Yes       Discharge recommendations:  (per PT/OT)   Barriers to Discharge:  Level of Skilled Assistance Needed      Time Tracking:     SLP Treatment Date:   08/15/19  Speech Start Time:  1024  Speech Stop Time:  1036     Speech Total Time (min):  12 min    Billable Minutes: Speech Therapy Individual 12    CORI Islas, CCC-SLP  08/15/2019

## 2019-08-15 NOTE — CONSULTS
[facility]  Cardiology  Consult Note    Patient Name: Reese Bell  MRN: [id number]  Admission Date: 8/12/2019  Hospital Length of Stay: 3 days  Code Status: Full Code   Attending Provider: Akin Moreno MD   Consulting Provider: Dain Pike MD  Primary Care Physician: Oracio Johnson MD  Principal Problem:<principal problem not specified>    Patient information was obtained from relative(s) and past medical records.     Inpatient consult to Cardiology  Consult performed by: Dain Pike MD  Consult ordered by: Tyrone Bustamante MD        Subjective:     Chief Complaint:  SDH on triple therapy     HPI:   89 y/o M CAD s/p multiple PCI (recent STEMI in 03/2019 on ASA/plavix), HTN, A fib on Eliquis, multiple TIA's and HFrEF (recovered EF) s/p CRT-D. Admitted on 8/12 after a fall resulting in SDH. CT head on 8/13 showed - Right tentorial acute subdural hematoma with interval increase size, now measuring 1.5 cm in maximum thickness (previously measuring 1.2 cm).  There is stable localized mass effect without significant midline shift.  Additional probable small volume of acute subdural hemorrhage now overlies the right posterior parietooccipital convexity. Eliquis, plavix and aspirin were stopped. After serial CT's his SDH is now stable. We are consulted for recommendations regarding antiplatelets and anticoagulation. Today patient reports feeling well. He is sad given that his wife passed way about 2 weeks ago and he didn't remember. No chest pain, SOB or palpitations.     LHC in 3/2019 during a STEMI showed % (stented), LAD ostial 50%, 1st diagonal patent stent, prox LAD stent, LCx stent. TTE at that time showed LVEF 30-40%. Repeat TTe here showed EF 60% with no WMA.     Past Medical History:   Diagnosis Date    ASCVD (arteriosclerotic cardiovascular disease)     DJD (degenerative joint disease)     Hyperlipidemia     Hypertension     MGUS (monoclonal gammopathy of  unknown significance) 5/11/2017    FRIDA (obstructive sleep apnea)     Thrombocytopenia 5/11/2017    Ventricular fibrillation 3/29/2019       Past Surgical History:   Procedure Laterality Date    APPENDECTOMY      CARDIAC PACEMAKER PLACEMENT  10/2016    CATHETERIZATION, HEART, LEFT Left 3/28/2019    [facility] CATH    DENTAL SURGERY  06/02/2016    HERNIA REPAIR      right inguinal    Right side catherization Right 2019    TONSILLECTOMY         Review of patient's allergies indicates:  No Known Allergies    No current facility-administered medications on file prior to encounter.      Current Outpatient Medications on File Prior to Encounter   Medication Sig    aspirin (ECOTRIN) 81 MG EC tablet Take 1 tablet (81 mg total) by mouth once daily.    carvedilol (COREG) 25 MG tablet Take 12.5 mg by mouth 2 (two) times daily with meals.     clopidogrel (PLAVIX) 75 mg tablet Take 75 mg by mouth every evening.     CRESTOR 5 mg tablet 10 mg. Take 1 tablet on Monday and 1 on Wednesdays    cyclobenzaprine (FLEXERIL) 10 MG tablet Take 1 tablet (10 mg total) by mouth 3 (three) times daily as needed for Muscle spasms.    ezetimibe (ZETIA) 10 mg tablet     furosemide (LASIX) 20 MG tablet Take 1 tablet (20 mg total) by mouth once daily.    HYDROcodone-acetaminophen (NORCO) 5-325 mg per tablet Take 1 tablet by mouth every 12 (twelve) hours as needed for Pain.    memantine (NAMENDA) 5 MG Tab TAKE 1 TABLET (5 MG TOTAL) BY MOUTH 2 (TWO) TIMES DAILY.    mirtazapine (REMERON) 15 MG tablet Take 1 tablet (15 mg total) by mouth every evening.    mupirocin (BACTROBAN) 2 % ointment Apply topically 2 (two) times daily. for 10 days    sacubitril-valsartan (ENTRESTO) 24-26 mg per tablet Take 1 tablet by mouth 2 (two) times daily.    tamsulosin (FLOMAX) 0.4 mg Cap CAN TAKE ONE AT BEDTIME FOR BLADDER & PROSTATE TO EASE URINATION    walker Misc Quad walker with  wheels and a seat & brakes    apixaban  (ELIQUIS) 2.5 mg Tab Take by mouth 2 (two) times daily.    nitroGLYCERIN (NITROSTAT) 0.4 MG SL tablet TAKE 1 TABLET SUBLINGUALLY EVERY 5 MINS X 3 AS NEEDED FOR CHEST PAIN    RANEXA 1,000 mg Tb12 Take 1,000 mg by mouth 2 (two) times daily.      Family History     Problem Relation (Age of Onset)    Coronary artery disease Father    Stroke Mother        Tobacco Use    Smoking status: Former Smoker    Smokeless tobacco: Never Used   Substance and Sexual Activity    Alcohol use: No    Drug use: No    Sexual activity: Not on file     Review of Systems   Constitution: Negative for chills and fever.   Cardiovascular: Negative for chest pain, dyspnea on exertion, palpitations and syncope.   Respiratory: Negative for cough and sleep disturbances due to breathing.    Musculoskeletal: Negative for back pain.   Gastrointestinal: Negative for abdominal pain, nausea and vomiting.   Neurological: Negative for dizziness and focal weakness.   Psychiatric/Behavioral: Negative for altered mental status.     Objective:     Vital Signs (Most Recent):  Temp: 98 °F (36.7 °C) (08/15/19 1202)  Pulse: 79 (08/15/19 1202)  Resp: 19 (08/15/19 1202)  BP: (!) 107/59 (08/15/19 1202)  SpO2: 100 % (08/15/19 1202) Vital Signs (24h Range):  Temp:  [97.7 °F (36.5 °C)-98.4 °F (36.9 °C)] 98 °F (36.7 °C)  Pulse:  [66-89] 79  Resp:  [14-24] 19  SpO2:  [92 %-100 %] 100 %  BP: ()/(53-99) 107/59     Weight: 94.3 kg (207 lb 14.3 oz)  Body mass index is 33.55 kg/m².    SpO2: 100 %  O2 Device (Oxygen Therapy): nasal cannula      Intake/Output Summary (Last 24 hours) at 8/15/2019 1233  Last data filed at 8/15/2019 1101  Gross per 24 hour   Intake 550 ml   Output 700 ml   Net -150 ml       Lines/Drains/Airways     Peripheral Intravenous Line                 Peripheral IV - Single Lumen 08/12/19 0343 20 G Left Antecubital 3 days         Peripheral IV - Single Lumen 08/13/19 1821 20 G Posterior;Right Hand 1 day                Physical Exam    Constitutional: He is oriented to person, place, and time. He appears well-developed and well-nourished. No distress.   HENT:   Head: Normocephalic and atraumatic.   Left periorbital ecchymosis    Eyes: Conjunctivae and EOM are normal.   Neck: Normal range of motion. No tracheal deviation present.   Cardiovascular: Normal rate, regular rhythm and normal heart sounds.   No murmur heard.  Pulmonary/Chest: Effort normal and breath sounds normal. No respiratory distress. He has no wheezes.   Bibasilar crackles   Abdominal: Soft. Bowel sounds are normal. He exhibits no distension. There is no tenderness.   Musculoskeletal: Normal range of motion. He exhibits no edema.   Neurological: He is alert and oriented to person, place, and time.   Skin: He is not diaphoretic.       Significant Labs: All pertinent lab results from the last 24 hours have been reviewed.    Significant Imaging: All pertinent images from the last 24h have been reviewed.      Assessment and Plan:     Coronary artery disease involving native coronary artery of native heart without angina pectoris  - Patient history of CAD with multiple stents. Most recently, STEMI in 03/2019 with RCA stent.   - Was on aspirin and plavix. Both on hold now. '  - He is almost 6 months out from his STEMI which is the minimum requirement for DAPT. Per [facility], they are ok restarting aspirin a week the last stable CT head.  - At that time, we would recommend starting only aspirin and holding Plavix given that he will be very close to completing the minimum 6 months. Given his high risk of falling and recent SDH, the risk outweigh the benefit.     Paroxysmal atrial fibrillation  - Patient with history of pAF. CHADS-Vasc is 7, HAS BLED 5. Currently Eliquis on hold.   - His device was interrogated yesterday - no atrial fibrillation since around March 2019  - Given recent SDH, high risk of falling and the fact that he has not had atrial fibrillation since March, we recommend  discontinuing Eliquis  - We can consider anti arrhythmic therapy to keep in NSR, will defer to Dr. De La Cruz         VTE Risk Mitigation (From admission, onward)        Ordered     heparin (porcine) injection 5,000 Units  Every 8 hours      08/15/19 1034     Reason for No Pharmacological VTE Prophylaxis  Once      08/12/19 1237     IP VTE HIGH RISK PATIENT  Once      08/12/19 1237     Place sequential compression device  Until discontinued      08/12/19 1237          Thank you for your consult. I will sign off. Please contact us if you have any additional questions.    Dain Pike MD  Cardiology   [facility]

## 2019-08-15 NOTE — PLAN OF CARE
08/15/2019  1:22 PM    Device interrogated and os functioning normally. No abnormality in thresholds or lead impedance. The bradycardia on telemetry appears to be a mis-reading of the device's backup rate.     Guillermo Alegria  Cardiology Fellow  [id number]

## 2019-08-15 NOTE — PHYSICIAN QUERY
"PT Name: Reese Bell  MR #: [id number]    Physician Query Form - Heart  Condition Clarification     CDS/: Michael Francis               Contact information: [phone]  This form is a permanent document in the medical record.     Query Date: August 15, 2019    By submitting this query, we are merely seeking further clarification of documentation. Please utilize your independent clinical judgment when addressing the question(s) below.    The medical record contains the following   Indicators     Supporting Clinical Findings Location in Medical Record    BNP      EF     x Radiology findings FINDINGS:  Left-sided pacer device is in place.  There is left basilar subsegmental atelectasis.  No consolidation or pleural effusions.  The heart is normal in size.  Calcified atheromatous disease affects the aorta.  Age-appropriate degenerative changes affect the skeleton Chest Xray 8/12   x Echo Results · Technically challening study. Valves not well seen.  · Normal left ventricular systolic function. The estimated ejection fraction is 60%  · Normal LV diastolic function.  · No wall motion abnormalities.  · Normal right ventricular systolic function. Echo 8/13    "Ascites" documented      "SOB" or "JESSICA" documented      "Hypoxia" documented     x Heart Failure documented Chronic CHF  Continue sacubitril-valsartan 24-26mg bid   coreg 12.5mg bid  Furosemide 20mg daily (hold today's dose because of episode of hypotension)  Echo repeated,  Normal ef Progress note Neuro Cc (Dianna)    "Edema" documented     x Diuretics/Meds Continue sacubitril-valsartan 24-26mg bid   coreg 12.5mg bid  Furosemide 20mg daily (hold today's dose because of episode of hypotension) Progress note Neuro Cc (Dianna)    Treatment:      Other:      Heart failure (HF) can be acute, chronic or both. It is generally further specificed as systolic, diastolic, or combined. Lastly, it is important to identify an underlying etiology if known or " suspected.     Common clues to acute exacerbation:  Rapidly progressive symptoms (w/in 2 weeks of presentation), using IV diuretics to treat, using supplemental O2, pulmonary edema on Xray, MI w/in 4 weeks, and/or BNP >500    Systolic Heart Failure: is defined as chart documentation of a left ventricular ejection fraction (LVEF) less than 40%     Diastolic Heart Failure: is defined as a left ventricular ejection fraction (LVEF) greater than 40%   +      Evidence of diastolic dysfunction on echocardiography OR    Right heart catheterization wedge pressure above 12 mm Hg OR    Left heart catheterization left ventricular end diastolic pressure 18 mm Hg or above.    References: *American Heart Association    The clinical guidelines noted below are only system guidelines, and do not replace the providers clinical judgment.     Provider, please specify the diagnosis associated with above clinical findings  [ x  ] Chronic Diastolic Heart Failure - Pre-existing diastolic HF diagnosis.  EF > 40%  without  acute HF symptoms documented   [   ] Other Type of Heart Failure (please specify type):   [   ] Other (please specify):   [  ] Clinically Undetermined                           Please document in your progress notes daily for the duration of treatment until resolved and include in your discharge summary.

## 2019-08-15 NOTE — ASSESSMENT & PLAN NOTE
Admit to [facility] s/p fall x2, R SDH  8/12  Repeat CTH after 6h revealed R tentorial SDH 1.2 cm thickness, stable, no increase or new bleed.   NSGY consulted, no immediate neurosurgical  Intervention necessary at present  Neuro checks & VS q1h  SBPgoal <160  Hold anticoagulation and antiplatelets;   Should not be restarted on triple therapy because of high risk of bleeding  Vimpat for Sz PPx X 7 days  PT/OT/SLP  CTH w stable bleed yesterday; started DVT PPx;

## 2019-08-15 NOTE — ASSESSMENT & PLAN NOTE
"Pt has been present in the milieu and attending programming this evening. States her mood, \"pretty good.\" Pt showered this evening and played a game in the [employer] with peers. States that she is, \"working on her gratitude list.\" Denies SI/SIB. Denies thoughts of wanting to die. States that she continues to have, \"some,\" anxiety and depression. States that her anxiety, \"is not as high today as some days.\" Reports sleep and appetite have been good. Will continue to monitor and assess.     Pt c/o of ringing in her left ear. No pain present. Placed on internal med board.     21:56- PRN Trazodone given with bedtime medications for sleep.   " - Patient with history of pAF. CHADS-Vasc is 7, HAS BLED 5. Currently Eliquis on hold.   - His device was interrogated yesterday - no atrial fibrillation since around March 2019  - Given recent SDH, high risk of falling and the fact that he has not had atrial fibrillation since March, we recommend discontinuing Eliquis  - We can consider anti arrhythmic therapy to keep in NSR, will defer to Dr. De La Cruz

## 2019-08-15 NOTE — HPI
87 y/o M CAD s/p multiple PCI (recent STEMI in 03/2019 on ASA/plavix), HTN, A fib on Eliquis, multiple TIA's and HFrEF (recovered EF) s/p CRT-D. Admitted on 8/12 after a fall resulting in SDH. CT head on 8/13 showed - Right tentorial acute subdural hematoma with interval increase size, now measuring 1.5 cm in maximum thickness (previously measuring 1.2 cm).  There is stable localized mass effect without significant midline shift.  Additional probable small volume of acute subdural hemorrhage now overlies the right posterior parietooccipital convexity. Eliquis, plavix and aspirin were stopped. After serial CT's his SDH is now stable. We are consulted for recommendations regarding antiplatelets and anticoagulation. Today patient reports feeling well. He is sad given that his wife passed way about 2 weeks ago and he didn't remember. No chest pain, SOB or palpitations.     LHC in 3/2019 during a STEMI showed % (stented), LAD ostial 50%, 1st diagonal patent stent, prox LAD stent, LCx stent. TTE at that time showed LVEF 30-40%. Repeat TTe here showed EF 60% with no WMA.

## 2019-08-15 NOTE — ASSESSMENT & PLAN NOTE
Continue ezetembe 10mg daily  Crestor 5mg every Monday, & Wednesday  Ranolazine SR 1000mg bid  NTG SL prn chest pain  ECG pending  Echo w normal EF  Hold ASA, plavix for 48h, 2/2 SDH  Was on triple therapy - DAPT and Eliquis; holding all for now; will restart ASA at some point; would not continue triple therapy given high risk of bleeding  Have asked cardiology for their input re risk/benefit discussion with family for aniplatelet/anticoag therapy;       Hx of MI x4 with multiple stents. Most recent MI 4/2019

## 2019-08-15 NOTE — PROGRESS NOTES
"[facility]  Neurocritical Care  Progress Note    Admit Date: 2019  Service Date: 08/15/2019  Length of Stay: 3    Subjective:     Chief Complaint: <principal problem not specified>    History of Present Illness:   Transfer from [facility]:  Pt is an88 yo m, h/o CAD MI x4 (STEMI in March, on ASA/plavix), HTN, v fib with ICD and pacemaker, a fib on eliquis, Multiple TIAs, last one year ago severe debility (uses walker and wheelchair at baseline), s/p fall last night and early this am.while trying to walk to bathroom.  Has had 2-3 falls in past 2 days bc "legs give out" which happens often for pt but more frequently over past week. Also, He was recently prescribed flexiril for shoulder pain. Family noticed  an increase in falls over past 3-4 days.   Of note,Spouse  within past 2 weeks.  Seen at [place] overnight, extensive work-up including CT head which showed small subdural.  Pt [place] [facility] [place] eval. Pt neurologically stable, disorientation to time and place. No immediate neurosurgical intervention necessary at present.   Decision made to not give platelets. No reversal of anticoagulation. Repeat CTH after 6h revealed R tentorial SDH 1.2 cm thickness, stable, no increase or new bleed. CT C-spine showed no acute fractures; 1cm lucent lesion at C7; cervical spondylosis. Pt will be admitted to [facility] over night for close neurological monitoring       Hospital Course:  Admit to [facility]. Repeat CTH and C-spine. SBP goal <160. No platelets. Hold anticoagulation.  2019 CTH repeated overnight for agitation which demonstrated mildly enlarged bleed; this morning Mr Bell is neurologically back to baseline; CTH repeated at 11 AM shows stable bleed.   2019: ON given bolus, for hypotension; off precedex; more interactive; used home cpap at night. PM CTH from yesterday with stable bleed. observe vitals until PM can be TTF after;   08/15/2019: AICD interrogation " yesterday with EP; no abnormalities found. Had another episode this morning when HR on monitor went down to 20s which is likely artifact; patient remained asymptomatic; repeat CTH with stable bleed. Neurologically stable, working with PT. Cards to see pt today.        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    FRIDA (obstructive sleep apnea)     Thrombocytopenia 5/11/2017    Ventricular fibrillation 3/29/2019     Past Surgical History:   Procedure Laterality Date    APPENDECTOMY      CARDIAC PACEMAKER PLACEMENT  10/2016    CATHETERIZATION, HEART, LEFT Left 3/28/2019    Performed by [facility] at [facility] CATH    DENTAL SURGERY  06/02/2016    HERNIA REPAIR      right inguinal    Right side catherization Right 2019    TONSILLECTOMY       Family History   Problem Relation Age of Onset    Stroke Mother     Coronary artery disease Father      Social History     Tobacco Use    Smoking status: Former Smoker    Smokeless tobacco: Never Used   Substance Use Topics    Alcohol use: No    Drug use: No     Review of patient's allergies indicates:  No Known Allergies    Medications: I have reviewed the current medication administration record.    Medications Prior to Admission   Medication Sig Dispense Refill Last Dose    aspirin (ECOTRIN) 81 MG EC tablet Take 1 tablet (81 mg total) by mouth once daily.  0 8/11/2019    carvedilol (COREG) 25 MG tablet Take 12.5 mg by mouth 2 (two) times daily with meals.    8/11/2019    clopidogrel (PLAVIX) 75 mg tablet Take 75 mg by mouth every evening.    8/11/2019    CRESTOR 5 mg tablet 10 mg. Take 1 tablet on Monday and 1 on Wednesdays   Past Week    cyclobenzaprine (FLEXERIL) 10 MG tablet Take 1 tablet (10 mg total) by mouth 3 (three) times daily as needed for Muscle spasms. 30  tablet 5 Past Week    ezetimibe (ZETIA) 10 mg tablet    8/11/2019    furosemide (LASIX) 20 MG tablet Take 1 tablet (20 mg total) by mouth once daily. 30 tablet 11 8/11/2019    HYDROcodone-acetaminophen (NORCO) 5-325 mg per tablet Take 1 tablet by mouth every 12 (twelve) hours as needed for Pain. 60 tablet 0 8/11/2019    memantine (NAMENDA) 5 MG Tab TAKE 1 TABLET (5 MG TOTAL) BY MOUTH 2 (TWO) TIMES DAILY. 180 tablet 3 8/11/2019    mirtazapine (REMERON) 15 MG tablet Take 1 tablet (15 mg total) by mouth every evening. 30 tablet 11     mupirocin (BACTROBAN) 2 % ointment Apply topically 2 (two) times daily. for 10 days 22 g 2 8/11/2019    sacubitril-valsartan (ENTRESTO) 24-26 mg per tablet Take 1 tablet by mouth 2 (two) times daily.   8/11/2019    tamsulosin (FLOMAX) 0.4 mg Cap CAN TAKE ONE AT BEDTIME FOR BLADDER & PROSTATE TO EASE URINATION 90 capsule 3 8/11/2019    walker Misc Quad walker with  wheels and a seat & brakes 1 each 0 Taking    apixaban (ELIQUIS) 2.5 mg Tab Take by mouth 2 (two) times daily.   Unknown at Unknown time    nitroGLYCERIN (NITROSTAT) 0.4 MG SL tablet TAKE 1 TABLET SUBLINGUALLY EVERY 5 MINS X 3 AS NEEDED FOR CHEST PAIN  2 Taking    RANEXA 1,000 mg Tb12 Take 1,000 mg by mouth 2 (two) times daily.    More than a month at Unknown time       Review of Systems   Constitutional: Positive for fatigue. Negative for chills and fever.   HENT: Negative for congestion, trouble swallowing and voice change.    Eyes: Negative for photophobia and pain.   Respiratory: Negative for chest tightness and shortness of breath.    Cardiovascular: Negative for chest pain and leg swelling.   Gastrointestinal: Negative for abdominal pain, nausea and vomiting.   Musculoskeletal: Positive for back pain. Negative for myalgias and neck pain.   Skin: Color change: bruising.   Neurological: Positive for speech difficulty. Negative for dizziness, weakness, light-headedness, numbness and headaches.     Objective:      Vital Signs (Most Recent):  Temp: 98 °F (36.7 °C) (08/15/19 1101)  Pulse: 89 (08/15/19 1101)  Resp: (!) 22 (08/15/19 1101)  BP: (!) 93/53 (08/15/19 1101)  SpO2: (!) 92 % (08/15/19 1101)    Vital Signs Range (Last 24H):  Temp:  [97.7 °F (36.5 °C)-98.4 °F (36.9 °C)]   Pulse:  [66-89]   Resp:  [14-24]   BP: ()/(53-99)   SpO2:  [92 %-100 %]     Examination:   Constitutional: Well-nourished and -developed. No apparent distress.   Eyes: Conjunctiva clear, anicteric. Lids no lesions.  Head/Ears/Nose/Mouth/Throat/Neck: Resighini Moist mucous membranes. External ears, nose atraumatic.   Cardiovascular:  V paced rhythm, Regular rhythm.heart tones distatnt. No murmurs. No leg edema.  Respiratory: O2 per NC. Comfortable respirations. bilat Breath sounds diminished.  Gastrointestinal: No hernia. Soft, nondistended, nontender. + bowel sounds.     Neurologic:     -[id number]  -Alert. Oriented to person, place and time and situation. . Follows commands  - PERRL , EOMI, did not check brainstem reflexes  -Strength 5/5 and symmetric in arms, legs.   -Tone normal. moves all extremities equally  -Sensation bilaterally intact and equal to light touch in arms, legs.           Laboratory:  CMP:   Recent Labs   Lab 08/15/19  0311   CALCIUM 8.2*   ALBUMIN 3.3*   PROT 6.3      K 4.8   CO2 20*      BUN 38*   CREATININE 1.9*   ALKPHOS 122   ALT 13   AST 17   BILITOT 0.4     CBC:   Recent Labs   Lab 08/15/19  0311   WBC 8.50   RBC 3.90*   HGB 12.8*   HCT 40.9   *   *   MCH 32.8*   MCHC 31.3*     Lipid Panel:   Recent Labs   Lab 08/12/19  1420   CHOL 147   LDLCALC 78.2   HDL 41   TRIG 139     Coagulation:   Recent Labs   Lab 08/13/19  0104   INR 1.0   APTT 32.2*     Hgb A1C:   Recent Labs   Lab 08/12/19  1420   HGBA1C 5.2     TSH:   Recent Labs   Lab 08/12/19  1420   TSH 0.802       Diagnostic Results:      Brain imaging:  CT head w/o: 8/12/19  Right tentorial subdural hematoma measuring 1.2 cm in thickness, not largely  changed from initial CT head from earlier in the day.   Moderate chronic microvascular ischemic changes  Probable minimally displaced fracture of the inferior orbital wall with hemorrhage throughout the left maxillary sinus.    Vessel Imaging:  None    Cardiac Evaluation:   None    Assessment/Plan:     Neuro  Subdural hematoma  Admit to [facility] s/p fall x2, R SDH  8/12  Repeat CTH after 6h revealed R tentorial SDH 1.2 cm thickness, stable, no increase or new bleed.   NSGY consulted, no immediate neurosurgical  Intervention necessary at present  Neuro checks & VS q1h  SBPgoal <160  Hold anticoagulation and antiplatelets;   Should not be restarted on triple therapy because of high risk of bleeding  Vimpat for Sz PPx X 7 days  PT/OT/SLP  CTH w stable bleed yesterday; started DVT PPx;      Cardiac/Vascular  Ventricular fibrillation  Has AICD  Had an episode where monitor showed HR was in 20s yesterday; interrogation did not demonstrate the same, patient was asymptomatic; likely this was an artifact;Interrogation done by EP yesterday, no issues; had another episode today- again asymptomatic; will remove and reapply leads however to try to solve the artifact; no further intervention needed at this point.    Hypertension  SBP goal <160  Continue coreg 12.5mg bid  Lasix 20mg daily  sacubitril- valsartan 24-26 bid  Prn hydralazine, labetalol      Hyperlipidemia  Continue crestor 5mg every Monday and Wednesday    ezetimbe 10mg daily    STEMI (ST elevation myocardial infarction)  Hx of MI x4  Last MI 4/2019  Hx of multiple stents.  4 placed 4/2019  ASA and plavix on hold for 48h 2/2 SDH  Will restart ASA once stable from bleeding perspective; do not recommend triple therapy given significant risk of bleeding  Have asked cardiology for their input re assessment of risk/benefit for antiplatelets/anticoags given high chadsvasc score and bleed    Chronic CHF  Continue sacubitril-valsartan 24-26mg bid   coreg 12.5mg bid  Furosemide  20mg daily  Echo repeated,  Normal ef    ASCVD (arteriosclerotic cardiovascular disease)  Continue ezetembe 10mg daily  Crestor 5mg every Monday, & Wednesday  Ranolazine SR 1000mg bid  NTG SL prn chest pain  ECG pending  Echo w normal EF  Hold ASA, plavix for 48h, 2/2 SDH  Was on triple therapy - DAPT and Eliquis; holding all for now; will restart ASA at some point; would not continue triple therapy given high risk of bleeding  Have asked cardiology for their input re risk/benefit discussion with family for aniplatelet/anticoag therapy;       Hx of MI x4 with multiple stents. Most recent MI 4/2019    Other  Impaired mobility and activities of daily living  PT/OT to treat and evaluate    Sleep apnea  Continue CPAP  QHS          The patient is being Prophylaxed for:  Venous Thromboembolism with: Chemical  Stress Ulcer with: Not Applicable   Ventilator Pneumonia with: not applicable    Activity Orders          Straight Cath starting at 08/14 2039    Diet Adult Regular (IDDSI Level 7): Regular starting at 08/12 1640        Full Code    Tyrone Bustamante MD  Neurocritical Care  [facility]

## 2019-08-15 NOTE — ASSESSMENT & PLAN NOTE
Hx of MI x4  Last MI 4/2019  Hx of multiple stents.  4 placed 4/2019  ASA and plavix on hold for 48h 2/2 SDH  Will restart ASA once stable from bleeding perspective; do not recommend triple therapy given significant risk of bleeding  Have asked cardiology for their input re assessment of risk/benefit for antiplatelets/anticoags given high chadsvasc score and bleed

## 2019-08-15 NOTE — PLAN OF CARE
Problem: SLP Goal  Goal: SLP Goal  Speech Language Pathology Goals  Goals expected to be met by 8/20  1. Pt will tolerate regular diet with thin liquids with strict aspiration precautions.   2. Pt will orient x4 given min cues.   3. Pt will complete simple problem solving with 90% accy indptly.   4. Pt will complete further assessment of simple recall and reasoning.        Outcome: Ongoing (interventions implemented as appropriate)  Goals remain appropriate, cont POC. CORI Islas, TI/SLP  8/15/2019

## 2019-08-15 NOTE — ASSESSMENT & PLAN NOTE
Has AICD  Had an episode where monitor showed HR was in 20s yesterday; interrogation did not demonstrate the same, patient was asymptomatic; likely this was an artifact;Interrogation done by EP yesterday, no issues; had another episode today- again asymptomatic; will remove and reapply leads however to try to solve the artifact; no further intervention needed at this point.

## 2019-08-15 NOTE — PLAN OF CARE
Problem: Adult Inpatient Plan of Care  Goal: Plan of Care Review  POC reviewed with pt and family at 1400. Pt verbalized understanding. Questions and concerns addressed. No acute events today. Pt progressing toward goals. Plan is TTF when room available. Will continue to monitor. See flowsheets for full assessment and VS info.

## 2019-08-15 NOTE — PROGRESS NOTES
[facility]  Physical Medicine & Rehab  Progress Note    Patient Name: Reese Bell  MRN: [id number]  Admission Date: 8/12/2019  Length of Stay: 3 days  Attending Physician: Akin Moreno MD    Subjective:     Principal Problem: Subdural hematoma    Hospital Course:    08/13/2019: Bed mobility SBA.  Sit to stand CGA & RW.  Ambulated 8 ft Radha w/ small LOB.  UBD Radha and LBD ModA. SLP diagnosis of Dysarthria and Cognitive-Linguistic Impairment. Passed for regular diet and thin liquids.   08/14/2019: Therex w/ PT.     Interval History 8/15/2019:  Patient is seen for follow-up rehab evaluation and recommendations: Participating w/ therapy.    HPI, Past Medical, Family, and Social History remains the same as documented in the initial encounter.    Scheduled Medications:    carvedilol  12.5 mg Oral BID    ezetimibe  10 mg Oral Daily    furosemide  20 mg Oral Daily    heparin (porcine)  5,000 Units Subcutaneous Q8H    lacosamide  50 mg Oral Q12H    memantine  5 mg Oral BID    mirtazapine  15 mg Oral QHS    mupirocin   Topical (Top) BID    polyethylene glycol  17 g Oral Daily    ranolazine  1,000 mg Oral BID    rosuvastatin  10 mg Oral Every Mon, Wed, Fri    sacubitril-valsartan  1 tablet Oral BID    senna-docusate 8.6-50 mg  1 tablet Oral BID    tamsulosin  0.4 mg Oral Daily       Diagnostic Results:   Labs: Reviewed  CT: Reviewed    PRN Medications: acetaminophen, albuterol-ipratropium, hydrALAZINE, labetalol, magnesium oxide, magnesium oxide, midazolam, nitroGLYCERIN, ondansetron, potassium chloride 10%, potassium chloride 10%, potassium chloride 10%, potassium, sodium phosphates, potassium, sodium phosphates, potassium, sodium phosphates, sodium chloride 0.9%    Review of Systems   Constitutional: Positive for activity change and fatigue.   HENT: Negative for trouble swallowing and voice change.    Respiratory: Negative for cough and shortness of breath.    Cardiovascular:  "Negative for chest pain and palpitations.   Gastrointestinal: Negative for nausea and vomiting.   Musculoskeletal: Positive for gait problem. Negative for arthralgias.   Skin:        bruising   Neurological: Positive for speech difficulty and weakness.   Psychiatric/Behavioral: Positive for confusion. Negative for agitation.     Objective:     Vital Signs (Most Recent):  Temp: 98 °F (36.7 °C) (08/15/19 1101)  Pulse: 89 (08/15/19 1101)  Resp: (!) 22 (08/15/19 1101)  BP: (!) 93/53 (08/15/19 1101)  SpO2: (!) 92 % (08/15/19 1101)    Vital Signs (24h Range):  Temp:  [97.7 °F (36.5 °C)-98.4 °F (36.9 °C)] 98 °F (36.7 °C)  Pulse:  [66-89] 89  Resp:  [14-24] 22  SpO2:  [92 %-100 %] 92 %  BP: ()/(53-99) 93/53     Physical Exam   Constitutional: He appears well-developed and well-nourished.   HENT:   Head: Normocephalic and atraumatic.   Eyes: Right eye exhibits no discharge. Left eye exhibits no discharge.   Neck: Neck supple.   Cardiovascular: Normal rate and intact distal pulses.   Pulmonary/Chest: Effort normal. No respiratory distress.   Abdominal: Soft. He exhibits no distension.   Musculoskeletal: He exhibits no edema or deformity.   Neurological: He exhibits normal muscle tone.   somnlent but awakens to voice and tactile stimuli   Oriented to self   +dysarthria   Follows commands   No focal weakness    Skin: Skin is warm and dry. Bruising (facial ) noted.   Psychiatric: He has a normal mood and affect. His behavior is normal.     Assessment/Plan:     Subdural hematoma  - CT Head revealed R SDH.   - Repeat CTH stable R tentorial SDH.   - NSGY consulted and no emergent sx intervention at this time.   - confusion 8/13 repeat CTH shows worsening SDH but no midline shift or worsening edema.     Ventricular fibrillation  -concern for bradycardia-->Cardiac device interrogation and/or reprogramming completed 8/14     STEMI (ST elevation myocardial infarction)  -Last MI 4/2019 w/ stents placed   -per NCC, "Will restart " "ASA once stable from bleeding perspective; should not be restarted on triple therapy given significant risk of bleeding."    Dementia in Alzheimer's disease  -Short term memory loss per daughter     Impaired mobility and activities of daily living    Recommendations  -  Encourage mobility, OOB in chair at least 3 hours per day, and early ambulation as appropriate  -  PT/OT evaluate and treat  -  Pain management  -  Monitor for and prevent skin breakdown and pressure ulcers  · Early mobility, repositioning/weight shifting every 20-30 minutes when sitting, turn patient every 2 hours, proper mattress/overlay and chair cushioning, pressure relief/heel protector boots  -  Reviewed discharge options (IP rehab, SNF, HH therapy, and OP therapy)    Recommend Skilled Nursing per family request.       Yary Freitas NP  Department of Physical Medicine & Rehab   [facility]  "

## 2019-08-15 NOTE — PLAN OF CARE
Problem: Occupational Therapy Goal  Goal: Occupational Therapy Goal  Goals to be met by: 7 days (8/20/19)     Patient will increase functional independence with ADLs by performing:    UE Dressing with Supervision.  LE Dressing with Contact Guard Assistance.  Grooming while standing at sink with Contact Guard Assistance.  Toileting from toilet with Contact Guard Assistance for hygiene and clothing management.   Supine to sit with Supervision.  Toilet transfer to toilet with Stand-by Assistance.  Complete functional mobility household distance with Stand-by Assistance using AD as needed.     Outcome: Ongoing (interventions implemented as appropriate)  Continue OT plan of care.

## 2019-08-16 LAB
ALBUMIN SERPL BCP-MCNC: 3.2 G/DL (ref 3.5–5.2)
ALP SERPL-CCNC: 118 U/L (ref 55–135)
ALT SERPL W/O P-5'-P-CCNC: 12 U/L (ref 10–44)
ANION GAP SERPL CALC-SCNC: 10 MMOL/L (ref 8–16)
AST SERPL-CCNC: 14 U/L (ref 10–40)
BASOPHILS # BLD AUTO: 0.02 K/UL (ref 0–0.2)
BASOPHILS NFR BLD: 0.3 % (ref 0–1.9)
BILIRUB SERPL-MCNC: 0.4 MG/DL (ref 0.1–1)
BUN SERPL-MCNC: 35 MG/DL (ref 8–23)
CALCIUM SERPL-MCNC: 9.1 MG/DL (ref 8.7–10.5)
CHLORIDE SERPL-SCNC: 105 MMOL/L (ref 95–110)
CO2 SERPL-SCNC: 22 MMOL/L (ref 23–29)
CREAT SERPL-MCNC: 1.7 MG/DL (ref 0.5–1.4)
DIFFERENTIAL METHOD: ABNORMAL
EOSINOPHIL # BLD AUTO: 0.5 K/UL (ref 0–0.5)
EOSINOPHIL NFR BLD: 6.1 % (ref 0–8)
ERYTHROCYTE [DISTWIDTH] IN BLOOD BY AUTOMATED COUNT: 13.3 % (ref 11.5–14.5)
EST. GFR  (AFRICAN AMERICAN): 40.7 ML/MIN/1.73 M^2
EST. GFR  (NON AFRICAN AMERICAN): 35.2 ML/MIN/1.73 M^2
GLUCOSE SERPL-MCNC: 113 MG/DL (ref 70–110)
HCT VFR BLD AUTO: 41.5 % (ref 40–54)
HGB BLD-MCNC: 13.4 G/DL (ref 14–18)
IMM GRANULOCYTES # BLD AUTO: 0.09 K/UL (ref 0–0.04)
IMM GRANULOCYTES NFR BLD AUTO: 1.2 % (ref 0–0.5)
LYMPHOCYTES # BLD AUTO: 0.6 K/UL (ref 1–4.8)
LYMPHOCYTES NFR BLD: 8.4 % (ref 18–48)
MCH RBC QN AUTO: 32.9 PG (ref 27–31)
MCHC RBC AUTO-ENTMCNC: 32.3 G/DL (ref 32–36)
MCV RBC AUTO: 102 FL (ref 82–98)
MONOCYTES # BLD AUTO: 0.9 K/UL (ref 0.3–1)
MONOCYTES NFR BLD: 11.9 % (ref 4–15)
NEUTROPHILS # BLD AUTO: 5.5 K/UL (ref 1.8–7.7)
NEUTROPHILS NFR BLD: 72.1 % (ref 38–73)
NRBC BLD-RTO: 0 /100 WBC
PLATELET # BLD AUTO: 111 K/UL (ref 150–350)
PMV BLD AUTO: 9 FL (ref 9.2–12.9)
POTASSIUM SERPL-SCNC: 4.7 MMOL/L (ref 3.5–5.1)
PROT SERPL-MCNC: 6.2 G/DL (ref 6–8.4)
RBC # BLD AUTO: 4.07 M/UL (ref 4.6–6.2)
SODIUM SERPL-SCNC: 137 MMOL/L (ref 136–145)
WBC # BLD AUTO: 7.65 K/UL (ref 3.9–12.7)

## 2019-08-16 PROCEDURE — 25000003 PHARM REV CODE 250: Performed by: NURSE PRACTITIONER

## 2019-08-16 PROCEDURE — 63600175 PHARM REV CODE 636 W HCPCS: Performed by: STUDENT IN AN ORGANIZED HEALTH CARE EDUCATION/TRAINING PROGRAM

## 2019-08-16 PROCEDURE — 80053 COMPREHEN METABOLIC PANEL: CPT

## 2019-08-16 PROCEDURE — 92526 ORAL FUNCTION THERAPY: CPT

## 2019-08-16 PROCEDURE — 86580 TB INTRADERMAL TEST: CPT | Performed by: INTERNAL MEDICINE

## 2019-08-16 PROCEDURE — 99232 PR SUBSEQUENT HOSPITAL CARE,LEVL II: ICD-10-PCS | Mod: ,,, | Performed by: INTERNAL MEDICINE

## 2019-08-16 PROCEDURE — 25000003 PHARM REV CODE 250: Performed by: EMERGENCY MEDICINE

## 2019-08-16 PROCEDURE — 11000001 HC ACUTE MED/SURG PRIVATE ROOM

## 2019-08-16 PROCEDURE — 30200315 PPD INTRADERMAL TEST REV CODE 302: Performed by: INTERNAL MEDICINE

## 2019-08-16 PROCEDURE — 94761 N-INVAS EAR/PLS OXIMETRY MLT: CPT

## 2019-08-16 PROCEDURE — 25000003 PHARM REV CODE 250: Performed by: STUDENT IN AN ORGANIZED HEALTH CARE EDUCATION/TRAINING PROGRAM

## 2019-08-16 PROCEDURE — 99900035 HC TECH TIME PER 15 MIN (STAT)

## 2019-08-16 PROCEDURE — 85025 COMPLETE CBC W/AUTO DIFF WBC: CPT

## 2019-08-16 PROCEDURE — 99232 SBSQ HOSP IP/OBS MODERATE 35: CPT | Mod: ,,, | Performed by: INTERNAL MEDICINE

## 2019-08-16 PROCEDURE — 92507 TX SP LANG VOICE COMM INDIV: CPT

## 2019-08-16 PROCEDURE — 36415 COLL VENOUS BLD VENIPUNCTURE: CPT

## 2019-08-16 RX ADMIN — MEMANTINE HYDROCHLORIDE 5 MG: 5 TABLET ORAL at 09:08

## 2019-08-16 RX ADMIN — HEPARIN SODIUM 5000 UNITS: 5000 INJECTION INTRAVENOUS; SUBCUTANEOUS at 07:08

## 2019-08-16 RX ADMIN — EZETIMIBE 10 MG: 10 TABLET ORAL at 09:08

## 2019-08-16 RX ADMIN — RANOLAZINE 1000 MG: 1000 TABLET, FILM COATED, EXTENDED RELEASE ORAL at 09:08

## 2019-08-16 RX ADMIN — CARVEDILOL 12.5 MG: 12.5 TABLET, FILM COATED ORAL at 09:08

## 2019-08-16 RX ADMIN — HEPARIN SODIUM 5000 UNITS: 5000 INJECTION INTRAVENOUS; SUBCUTANEOUS at 02:08

## 2019-08-16 RX ADMIN — ROSUVASTATIN CALCIUM 10 MG: 10 TABLET, FILM COATED ORAL at 09:08

## 2019-08-16 RX ADMIN — MUPIROCIN: 20 OINTMENT TOPICAL at 09:08

## 2019-08-16 RX ADMIN — SENNOSIDES,DOCUSATE SODIUM 1 TABLET: 8.6; 5 TABLET, FILM COATED ORAL at 09:08

## 2019-08-16 RX ADMIN — SACUBITRIL AND VALSARTAN 1 TABLET: 24; 26 TABLET, FILM COATED ORAL at 09:08

## 2019-08-16 RX ADMIN — LACOSAMIDE 50 MG: 50 TABLET, FILM COATED ORAL at 09:08

## 2019-08-16 RX ADMIN — TAMSULOSIN HYDROCHLORIDE 0.4 MG: 0.4 CAPSULE ORAL at 09:08

## 2019-08-16 RX ADMIN — Medication 5 UNITS: at 02:08

## 2019-08-16 RX ADMIN — FUROSEMIDE 20 MG: 20 TABLET ORAL at 09:08

## 2019-08-16 RX ADMIN — HEPARIN SODIUM 5000 UNITS: 5000 INJECTION INTRAVENOUS; SUBCUTANEOUS at 09:08

## 2019-08-16 RX ADMIN — ACETAMINOPHEN 650 MG: 325 TABLET ORAL at 07:08

## 2019-08-16 RX ADMIN — POLYETHYLENE GLYCOL 3350 17 G: 17 POWDER, FOR SOLUTION ORAL at 09:08

## 2019-08-16 RX ADMIN — LABETALOL HCL IV SOLN PREFILLED SYRINGE 20 MG/4ML (5 MG/ML) 10 MG: 20/4 SOLUTION PREFILLED SYRINGE at 03:08

## 2019-08-16 RX ADMIN — MIRTAZAPINE 15 MG: 15 TABLET, FILM COATED ORAL at 09:08

## 2019-08-16 NOTE — PROGRESS NOTES
"Hospital Medicine  Progress Note    Team: [facility] HOSP MED D Char Washington MD  Admit Date: 2019  LOUISE 2019  Length of Stay:  LOS: 4 days   Code status: Full Code    Principal Problem:  Subdural hematoma    HPI:   87 yo M, h/o CAD, MI x4 (STEMI in March, on ASA/Plavix), HTN, V-fib with ICD and pacemaker, A-fib on Eliquis, multiple TIAs, last one year ago, severe debility (uses walker and wheelchair at baseline), s/p fall last night and early this AM while trying to walk to bathroom. Has had 2-3 falls in past 2 days because "legs give out" which happens often but more frequently over past week. Also, He was recently prescribed Flexiril for shoulder pain. Family noticed  an increase in falls over past 3-4 days.  Of note,Spouse  within past 2 weeks.  Seen at [facility] overnight, extensive work-up including CT head which showed small subdural hematoma.  Pt transferred to [facility] for NSGY eval. Pt neurologically stable, disorientation to time and place. No immediate neurosurgical intervention necessary at present.   Decision made to not give platelets. No reversal of anticoagulation. Repeat CTH after 6h revealed R tentorial SDH 1.2 cm thickness, stable, no increase or new bleed. CT C-spine showed no acute fractures; 1cm lucent lesion at C7; cervical spondylosis. Pt admitted to [facility] overnight for close neurological monitoring    Interval history:  Patient with no new complaints. No significant events reported by Nursing.    Review of Systems   Constitutional: Negative for fever.   Respiratory: Negative for shortness of breath.    Neurological: Negative for headaches.       Physical Exam  Temp:  [96.1 °F (35.6 °C)-98 °F (36.7 °C)]   Pulse:  [64-78]   Resp:  [16-21]   BP: (112-178)/(56-87)   SpO2:  [91 %-100 %]     Intake/Output Summary (Last 24 hours) at 2019 1550  Last data filed at 8/15/2019 1730  Gross per 24 hour   Intake 400 ml   Output 600 ml   Net -200 ml     Physical Exam   Constitutional:  Non-toxic " appearance.   Eyes: Conjunctivae and lids are normal.   Cardiovascular: S1 normal and S2 normal.   Pulmonary/Chest: Effort normal and breath sounds normal.   Abdominal: Soft. Normal appearance and bowel sounds are normal. There is no tenderness.   Musculoskeletal: He exhibits no edema.   Neurological: He is alert.   Skin: Bruising noted. He is not diaphoretic.        Recent Labs   Lab 08/14/19  0310 08/15/19  0311 08/16/19  0331   WBC 7.09 8.50 7.65   HGB 12.9* 12.8* 13.4*   HCT 41.2 40.9 41.5   PLT 94* 106* 111*     Recent Labs   Lab 08/12/19  0340 08/13/19  0104 08/14/19  0310 08/15/19  0311 08/16/19  0331   * 133* 135* 136 137   K 4.9 4.7 4.8 4.8 4.7    103 104 107 105   CO2 23 21* 20* 20* 22*   BUN 27* 25* 30* 38* 35*   CREATININE 1.5* 1.6* 1.8* 1.9* 1.7*   * 106 100 120* 113*   CALCIUM 9.1 8.4* 9.0 8.2* 9.1   MG 2.0 1.9 2.1  --   --    PHOS 2.7 2.9 3.6  --   --      Recent Labs   Lab 08/12/19  0340 08/13/19 0104 08/14/19  0310 08/15/19  0311 08/16/19  0331   ALKPHOS 121 112 121 122 118   ALT 22 17 17 13 12   AST 19 17 18 17 14   ALBUMIN 4.0 3.4* 3.5 3.3* 3.2*   PROT 7.2 6.0 6.5 6.3 6.2   BILITOT 0.5 0.6 0.6 0.4 0.4   INR 1.0 1.0  --   --   --         Recent Labs   Lab 08/15/19  1213   POCTGLUCOSE 130*     Recent Labs   Lab 08/12/19  1420   HGBA1C 5.2       Scheduled Meds:   carvedilol  12.5 mg Oral BID    ezetimibe  10 mg Oral Daily    furosemide  20 mg Oral Daily    heparin (porcine)  5,000 Units Subcutaneous Q8H    lacosamide  50 mg Oral Q12H    memantine  5 mg Oral BID    mirtazapine  15 mg Oral QHS    mupirocin   Topical (Top) BID    polyethylene glycol  17 g Oral Daily    ranolazine  1,000 mg Oral BID    rosuvastatin  10 mg Oral Every Mon, Wed, Fri    sacubitril-valsartan  1 tablet Oral BID    senna-docusate 8.6-50 mg  1 tablet Oral BID    tamsulosin  0.4 mg Oral Daily     Continuous Infusions:  As Needed:  acetaminophen, albuterol-ipratropium, labetalol, magnesium oxide,  magnesium oxide, midazolam, nitroGLYCERIN, ondansetron, potassium chloride 10%, potassium chloride 10%, potassium chloride 10%, potassium, sodium phosphates, potassium, sodium phosphates, potassium, sodium phosphates, sodium chloride 0.9%    Active Hospital Problems    Diagnosis  POA    *Subdural hematoma [S06.5X9A]  Yes    Paroxysmal atrial fibrillation [I48.0]  Yes    Coronary artery disease involving native coronary artery of native heart without angina pectoris [I25.10]  Yes    Subdural hemorrhage [I62.00]  Yes    Brain compression [G93.5]  Yes    Ventricular fibrillation [I49.01]  Yes    STEMI (ST elevation myocardial infarction) [I21.3]  Yes    Hyperlipidemia [E78.5]  Yes    Hypertension [I10]  Yes    Ischemic cardiomyopathy [I25.5]  Yes    Stage 3 chronic kidney disease [N18.3]  Yes    Dementia in Alzheimer's disease [G30.9, F02.80]  Yes    Impaired mobility and activities of daily living [Z74.09]  Yes    Sleep apnea [G47.30]  Yes    ASCVD (arteriosclerotic cardiovascular disease) [I25.10]  Yes    Chronic CHF [I50.9]  Yes    Benign prostatic hyperplasia [N40.0]  Yes      Resolved Hospital Problems   No resolved problems to display.       Overview of Hospital Course:  89 yo M with CAD, MI x4 (STEMI in March, on ASA/Plavix), HTN, V-fib with ICD and pacemaker, A-fib on Eliquis, multiple TIAs, debility (uses walker and wheelchair at baseline) admitted to [facility] with SDH s/p fall at home.  8/12 Admit to [facility]. Repeat CTH and C-spine. SBP goal <160. No platelets. Hold anticoagulation.  08/13/2019 CTH repeated overnight for agitation which demonstrated mildly enlarged bleed; this morning Mr Bell is neurologically back to baseline; CTH repeated at 11 AM shows stable bleed.   08/14/2019: ON given bolus, for hypotension; off precedex; more interactive; used home CPAP at night. PM CTH from yesterday with stable bleed. observe vitals until PM can be TTF after;   08/15/2019.  Patient was due to be  transferred to telemetry floor.  However patient had an episode of bradycardia with HR in 20s.  EP was consulted.  Pacemaker was interrogated and patient was determined to have normal sinus rhythm. HR in 20s was likely misread.  Cardiology was also consulted for risk versus benefit discussion of anticoagulation.  Cardiac device interrogated and no abnormality or error found   Patient was on DAPT and Eliquis at time of injury. Family states he fell while under supervision. Patient is high risk for falls and bleeding. Discussed at length with family at bedside. Patient needs to restart a minimum of anti-platelet therapy; discontinuation of Plavix and Eliquis recommended by Cardiology.     Assessment and Plan:      Subdural hematoma  Admit to [facility] s/p fall x2, R SDH  8/12  Repeat CTH after 6h revealed R tentorial SDH 1.2 cm thickness, stable, no increase or new bleed.   NSGY consulted, no immediate neurosurgical  Intervention necessary at present  Neuro checks & VS q1h  SBPgoal <160  Hold anticoagulation and antiplatelets;   Should not be restarted on triple therapy because of high risk of bleeding  Vimpat for Sz PPx X 7 days  PT/OT/SLP  CTH w stable bleed; started DVT PPx;     Ventricular fibrillation  Has AICD  Had an episode where monitor showed HR was in 20s; interrogation did not demonstrate the same, patient was asymptomatic; likely this was an artifact; Interrogation done by EP, no issues; had another episode - asymptomatic; no further intervention needed at this point.     Hypertension  SBP goal <160  Continue coreg 12.5mg bid  Lasix 20mg daily  sacubitril- valsartan 24-26 bid  Prn hydralazine, labetalol     Hyperlipidemia  Continue crestor 5mg every Monday and Wednesday    ezetimbe 10mg daily     History of STEMI (ST elevation myocardial infarction)  Hx of MI x4  Last MI 4/2019  Hx of multiple stents.  4 placed 4/2019  ASA and Plavix on hold for 48h due to SDH  Restart ASA once stable from bleeding  perspective; do not recommend triple therapy given significant risk of bleeding  Cardiology recommends only ASA for antiplatelets/anticoags     Chronic CHF  Continue sacubitril-valsartan 24-26mg bid   coreg 12.5mg bid  Furosemide 20mg daily  Echo repeated,  Normal ef     ASCVD (arteriosclerotic cardiovascular disease)  Continue ezetembe 10mg daily  Crestor 5mg every Monday, & Wednesday  Ranolazine SR 1000mg bid  NTG SL prn chest pain  ECG pending  Echo w normal EF  Held ASA, Plavix for 48h, due to SDH  Was on triple therapy - DAPT and Eliquis; holding all for now; Restart ASA at some point; would not continue triple therapy given high risk of bleeding     Impaired mobility and activities of daily living  PT/OT following     Sleep apnea  Continue CPAP  QHS     High Risk Conditions  Patient has an abrupt change in neurologic status: Weakness and SDH     Diet:  Diet Adult Regular (IDDSI Level 7) [facility]; Cardiac (Low Na/Chol)  GI Prophylaxis: Not indicated  DVT Prophylaxis:     Anticoagulants   Medication Route Frequency    heparin (porcine) injection 5,000 Units Subcutaneous Q8H     Lines/ Drains/ Airways: PIV  Urinary Catheter Indicated: Patient Does Not Have Urinary Catheter  Other Lines/Tubes/Drains:  Wounds:    Goals of Care: Treatment Decisions and Advance Care Planning  Discharge plan:  Skilled Nursing Facility    Char Washington MD  Department of Hospital Medicine  [id number]

## 2019-08-16 NOTE — PLAN OF CARE
Patient stepdown from [facility]. Patient asleep in bed with daughter, Serene Hi ([phone]), & s-in-l at the bedside & daughter, Mikala Reddy ([phone]) present via phone when CM rounded. CM informed both that the patient has been accepted to [facility] & that the request for insurance auth is still needed. PPD & CXR ordered. Cards & PT/OT/SLP continue to follow the patient. Previously scheduled appointment with Dr. Oracio Johnson (PCP) on 8/20/19 rescheduled for 9/5/19 at 1100. Will continue to follow.

## 2019-08-16 NOTE — PROGRESS NOTES
Nursing Transfer Note       Transfer TRANSFER   FROM:9090    Transfer via bed    Transfer with cardiac monitoring    Transported by Kevin RN and Cristy RN    Medicines sent: yes    Chart sent with patient: Yes    Notified: daughter, son    Bedside Neuro assessment performed: Yes    Bedside Handoff given to: Jairo ALMARAZ    Upon arrival to floor: cardiac monitor applied, patient oriented to room, call bell in reach and bed in lowest position. Pt son and daughter accompanied pt while travelling. All pt longings removed from unit. Pt was awake and alert and following commands at time of handoff. Last set of vitals stable. Will continue to monitor.

## 2019-08-16 NOTE — PLAN OF CARE
Problem: SLP Goal  Goal: SLP Goal  Speech Language Pathology Goals  Goals expected to be met by 8/20  1. Pt will tolerate regular diet with thin liquids with strict aspiration precautions.   2. Pt will orient x4 given min cues.   3. Pt will complete simple problem solving with 90% accy indptly.   4. Pt will complete further assessment of simple recall and reasoning.        Patient seen for ongoing swallow assessment and cognitive-linguistic therapy.     Johan Bernabe CCC-SLP  Speech-Language Pathology  Pager: [phone]

## 2019-08-16 NOTE — PLAN OF CARE
08/15/19 1630   Post-Acute Status   Post-Acute Authorization Placement   Post-Acute Placement Status Referrals Sent  (SNF list provided, sent to 1. [facility])       DEYA met with Pt, Pt wife, and family member at bedside. Discussed dc planning, therapy recs, and family concerns. Provided in network list from N of SNF facilities. Pt wife would like a referral sent to [facility], but she advised she will call a family member who is a RN and ask them for help with additional choices.     DEYA sent referral via .    Delfina Samano LMSW  Neurocritical Care   [facility]  [id number]

## 2019-08-16 NOTE — PT/OT/SLP PROGRESS
"Speech Language Pathology Treatment    Patient Name:  Reese Bell   MRN:  [id number]  Admitting Diagnosis: <principal problem not specified>    Recommendations:                 General Recommendations:  Cognitive-linguistic therapy  Diet recommendations:  Regular, Liquid Diet Level: Thin   Aspiration Precautions: Standard aspiration precautions   General Precautions: Standard, fall, seizure  Communication strategies:  go to room if call light pushed and patient is hard of hearing    Subjective     Patient awake and alert during session  "He seems better today." Patient's daughter reporting current condition  "He does a lot of jigsaw puzzles and word searches." Patient's hobbies when he is at home  Communicated with nurse prior to session     Pain/Comfort:  · Pain Rating 1: 0/10  · Pain Rating Post-Intervention 1: 0/10    Objective:     Has the patient been evaluated by SLP for swallowing?   Yes  Keep patient NPO? No   Current Respiratory Status: nasal cannula      Patient seen for ongoing swallow assessment and cognitive-linguistic therapy. He was sitting up in bed eating lunch with son and daughter present in the room. Patient tolerated solid trials x13 (beef and vegetables) and thin liquids x11 (straw) with no overt signs of aspiration. He demonstrated timely and efficient oral and pharyngeal phases. Patient self-fed entire lunch without difficulty. During cog-ling therapy, he was oriented to person and place and eventually oriented to time with mod assist; he answered chest pain when asked why he was in hospital. He independently gave accurate directions to his town, but was unable to recall his address. He recalled 0/3 types of therapy he was receiving despite max assist. SLP educated him regarding ST/PT/OT and their functions. Following a 5-minute delay, he independently recalled 1/3 therapies, increasing to 3/3 with max assist. Patient recalled general information about his life, family and career " independently and with good accuracy per family. SLP educated patient and family regarding role of ST and they verbalized understanding. Patient left in room with call light within reach.    Assessment:     Reese Bell is a 88 y.o. male with an SLP diagnosis of Cognitive-Linguistic Impairment.      Goals:   Multidisciplinary Problems     SLP Goals        Problem: SLP Goal    Goal Priority Disciplines Outcome   SLP Goal     SLP Ongoing (interventions implemented as appropriate)   Description:  Speech Language Pathology Goals  Goals expected to be met by 8/20  1. Pt will tolerate regular diet with thin liquids with strict aspiration precautions.   2. Pt will orient x4 given min cues.   3. Pt will complete simple problem solving with 90% accy indptly.   4. Pt will complete further assessment of simple recall and reasoning.                         Plan:     · Patient to be seen:  3 x/week   · Plan of Care expires:  09/12/19  · Plan of Care reviewed with:  patient, daughter, son   · SLP Follow-Up:  Yes       Discharge recommendations:  nursing facility, skilled   Barriers to Discharge:  None    Time Tracking:     SLP Treatment Date:   08/16/19  Speech Start Time:  1303  Speech Stop Time:  1321     Speech Total Time (min):  18 min    Billable Minutes: Speech Therapy Individual 8 and Treatment Swallowing Dysfunction 10    Johan Bernabe CCC-SLP  Speech-Language Pathology  Pager: [phone]   08/16/2019

## 2019-08-16 NOTE — PLAN OF CARE
08/16/19 0841   Post-Acute Status   Post-Acute Authorization Placement   Post-Acute Placement Status Awaiting Internal Medical Clearance     Pt accepted to [facility], Sw to send to [facility] for auth and Sw needs pasrr and [facility], Sw to follow with stability and d/c. Locet completed and pasrr faxed, Sw to send for PHN to [phone]. 142 uploaded to chart.

## 2019-08-17 LAB
ALBUMIN SERPL BCP-MCNC: 3.1 G/DL (ref 3.5–5.2)
ALP SERPL-CCNC: 112 U/L (ref 55–135)
ALT SERPL W/O P-5'-P-CCNC: 12 U/L (ref 10–44)
ANION GAP SERPL CALC-SCNC: 6 MMOL/L (ref 8–16)
AST SERPL-CCNC: 14 U/L (ref 10–40)
BASOPHILS # BLD AUTO: 0.02 K/UL (ref 0–0.2)
BASOPHILS NFR BLD: 0.3 % (ref 0–1.9)
BILIRUB SERPL-MCNC: 0.4 MG/DL (ref 0.1–1)
BUN SERPL-MCNC: 39 MG/DL (ref 8–23)
CALCIUM SERPL-MCNC: 8.2 MG/DL (ref 8.7–10.5)
CHLORIDE SERPL-SCNC: 106 MMOL/L (ref 95–110)
CO2 SERPL-SCNC: 23 MMOL/L (ref 23–29)
CREAT SERPL-MCNC: 1.9 MG/DL (ref 0.5–1.4)
DIFFERENTIAL METHOD: ABNORMAL
EOSINOPHIL # BLD AUTO: 0.4 K/UL (ref 0–0.5)
EOSINOPHIL NFR BLD: 6.8 % (ref 0–8)
ERYTHROCYTE [DISTWIDTH] IN BLOOD BY AUTOMATED COUNT: 13.3 % (ref 11.5–14.5)
EST. GFR  (AFRICAN AMERICAN): 35.6 ML/MIN/1.73 M^2
EST. GFR  (NON AFRICAN AMERICAN): 30.8 ML/MIN/1.73 M^2
GLUCOSE SERPL-MCNC: 106 MG/DL (ref 70–110)
HCT VFR BLD AUTO: 37.9 % (ref 40–54)
HGB BLD-MCNC: 11.8 G/DL (ref 14–18)
IMM GRANULOCYTES # BLD AUTO: 0.04 K/UL (ref 0–0.04)
IMM GRANULOCYTES NFR BLD AUTO: 0.7 % (ref 0–0.5)
LYMPHOCYTES # BLD AUTO: 0.5 K/UL (ref 1–4.8)
LYMPHOCYTES NFR BLD: 8.3 % (ref 18–48)
MCH RBC QN AUTO: 32.7 PG (ref 27–31)
MCHC RBC AUTO-ENTMCNC: 31.1 G/DL (ref 32–36)
MCV RBC AUTO: 105 FL (ref 82–98)
MONOCYTES # BLD AUTO: 0.6 K/UL (ref 0.3–1)
MONOCYTES NFR BLD: 10.6 % (ref 4–15)
NEUTROPHILS # BLD AUTO: 4.2 K/UL (ref 1.8–7.7)
NEUTROPHILS NFR BLD: 73.3 % (ref 38–73)
NRBC BLD-RTO: 0 /100 WBC
PLATELET # BLD AUTO: 112 K/UL (ref 150–350)
PMV BLD AUTO: 8.9 FL (ref 9.2–12.9)
POTASSIUM SERPL-SCNC: 5.1 MMOL/L (ref 3.5–5.1)
PROT SERPL-MCNC: 6 G/DL (ref 6–8.4)
RBC # BLD AUTO: 3.61 M/UL (ref 4.6–6.2)
SODIUM SERPL-SCNC: 135 MMOL/L (ref 136–145)
WBC # BLD AUTO: 5.76 K/UL (ref 3.9–12.7)

## 2019-08-17 PROCEDURE — 94660 CPAP INITIATION&MGMT: CPT

## 2019-08-17 PROCEDURE — 94760 N-INVAS EAR/PLS OXIMETRY 1: CPT

## 2019-08-17 PROCEDURE — 63600175 PHARM REV CODE 636 W HCPCS: Performed by: STUDENT IN AN ORGANIZED HEALTH CARE EDUCATION/TRAINING PROGRAM

## 2019-08-17 PROCEDURE — 85025 COMPLETE CBC W/AUTO DIFF WBC: CPT

## 2019-08-17 PROCEDURE — 99231 PR SUBSEQUENT HOSPITAL CARE,LEVL I: ICD-10-PCS | Mod: ,,, | Performed by: INTERNAL MEDICINE

## 2019-08-17 PROCEDURE — 25000003 PHARM REV CODE 250: Performed by: EMERGENCY MEDICINE

## 2019-08-17 PROCEDURE — 99900035 HC TECH TIME PER 15 MIN (STAT)

## 2019-08-17 PROCEDURE — 36415 COLL VENOUS BLD VENIPUNCTURE: CPT

## 2019-08-17 PROCEDURE — 27000221 HC OXYGEN, UP TO 24 HOURS

## 2019-08-17 PROCEDURE — 25000003 PHARM REV CODE 250: Performed by: STUDENT IN AN ORGANIZED HEALTH CARE EDUCATION/TRAINING PROGRAM

## 2019-08-17 PROCEDURE — 25000003 PHARM REV CODE 250: Performed by: NURSE PRACTITIONER

## 2019-08-17 PROCEDURE — 99231 SBSQ HOSP IP/OBS SF/LOW 25: CPT | Mod: ,,, | Performed by: INTERNAL MEDICINE

## 2019-08-17 PROCEDURE — 11000001 HC ACUTE MED/SURG PRIVATE ROOM

## 2019-08-17 PROCEDURE — 80053 COMPREHEN METABOLIC PANEL: CPT

## 2019-08-17 PROCEDURE — 94761 N-INVAS EAR/PLS OXIMETRY MLT: CPT

## 2019-08-17 RX ADMIN — LACOSAMIDE 50 MG: 50 TABLET, FILM COATED ORAL at 10:08

## 2019-08-17 RX ADMIN — CARVEDILOL 12.5 MG: 12.5 TABLET, FILM COATED ORAL at 10:08

## 2019-08-17 RX ADMIN — HEPARIN SODIUM 5000 UNITS: 5000 INJECTION INTRAVENOUS; SUBCUTANEOUS at 02:08

## 2019-08-17 RX ADMIN — SENNOSIDES,DOCUSATE SODIUM 1 TABLET: 8.6; 5 TABLET, FILM COATED ORAL at 10:08

## 2019-08-17 RX ADMIN — LACOSAMIDE 50 MG: 50 TABLET, FILM COATED ORAL at 08:08

## 2019-08-17 RX ADMIN — MIRTAZAPINE 15 MG: 15 TABLET, FILM COATED ORAL at 08:08

## 2019-08-17 RX ADMIN — SACUBITRIL AND VALSARTAN 1 TABLET: 24; 26 TABLET, FILM COATED ORAL at 10:08

## 2019-08-17 RX ADMIN — HEPARIN SODIUM 5000 UNITS: 5000 INJECTION INTRAVENOUS; SUBCUTANEOUS at 05:08

## 2019-08-17 RX ADMIN — ACETAMINOPHEN 650 MG: 325 TABLET ORAL at 08:08

## 2019-08-17 RX ADMIN — MUPIROCIN: 20 OINTMENT TOPICAL at 10:08

## 2019-08-17 RX ADMIN — TAMSULOSIN HYDROCHLORIDE 0.4 MG: 0.4 CAPSULE ORAL at 10:08

## 2019-08-17 RX ADMIN — POLYETHYLENE GLYCOL 3350 17 G: 17 POWDER, FOR SOLUTION ORAL at 10:08

## 2019-08-17 RX ADMIN — RANOLAZINE 1000 MG: 1000 TABLET, FILM COATED, EXTENDED RELEASE ORAL at 10:08

## 2019-08-17 RX ADMIN — SACUBITRIL AND VALSARTAN 1 TABLET: 24; 26 TABLET, FILM COATED ORAL at 08:08

## 2019-08-17 RX ADMIN — FUROSEMIDE 20 MG: 20 TABLET ORAL at 10:08

## 2019-08-17 RX ADMIN — RANOLAZINE 1000 MG: 1000 TABLET, FILM COATED, EXTENDED RELEASE ORAL at 08:08

## 2019-08-17 RX ADMIN — MEMANTINE HYDROCHLORIDE 5 MG: 5 TABLET ORAL at 08:08

## 2019-08-17 RX ADMIN — EZETIMIBE 10 MG: 10 TABLET ORAL at 10:08

## 2019-08-17 RX ADMIN — HEPARIN SODIUM 5000 UNITS: 5000 INJECTION INTRAVENOUS; SUBCUTANEOUS at 09:08

## 2019-08-17 RX ADMIN — MEMANTINE HYDROCHLORIDE 5 MG: 5 TABLET ORAL at 10:08

## 2019-08-17 RX ADMIN — MUPIROCIN: 20 OINTMENT TOPICAL at 08:08

## 2019-08-17 NOTE — PLAN OF CARE
Additional choices received from family. CM sent additional referrals to [facility] and [facility].

## 2019-08-17 NOTE — PLAN OF CARE
On call CM requested to obtain additional choice for SNF from family as they do not want patient to go to [facility].  Family states that they toured [facility] and they they do not feel they can meet the patients needs.  Family chose [facility] and [facility].  Referral sent to [facility] as [facility] not in referral system.  CM called [facility] and they do not have a Skilled Nursing Facility.  Cm advised family and will attempt to obtain additional choice.

## 2019-08-17 NOTE — PLAN OF CARE
Problem: Adult Inpatient Plan of Care  Goal: Plan of Care Review  Went over plan of care - all questions answered. No complaints voiced during night. Daughter at bedside. Daughter is asking for something for pt at night for coughing. She states when he has the cpap on  - he coughs more than any other time. Will continue to monitor

## 2019-08-17 NOTE — PLAN OF CARE
Problem: Fall Injury Risk  Goal: Absence of Fall and Fall-Related Injury  Outcome: Ongoing (interventions implemented as appropriate)  Meds given as ordered tolerated well. No complaints of pain. No signs or symptoms of distress/discomfort noted. Vitals stable. Safety maintained. Will continue to monitor.

## 2019-08-18 LAB
ALBUMIN SERPL BCP-MCNC: 2.9 G/DL (ref 3.5–5.2)
ALP SERPL-CCNC: 100 U/L (ref 55–135)
ALT SERPL W/O P-5'-P-CCNC: 11 U/L (ref 10–44)
ANION GAP SERPL CALC-SCNC: 9 MMOL/L (ref 8–16)
AST SERPL-CCNC: 11 U/L (ref 10–40)
BASOPHILS # BLD AUTO: 0.02 K/UL (ref 0–0.2)
BASOPHILS NFR BLD: 0.4 % (ref 0–1.9)
BILIRUB SERPL-MCNC: 0.3 MG/DL (ref 0.1–1)
BNP SERPL-MCNC: 33 PG/ML (ref 0–99)
BUN SERPL-MCNC: 40 MG/DL (ref 8–23)
CALCIUM SERPL-MCNC: 8.5 MG/DL (ref 8.7–10.5)
CHLORIDE SERPL-SCNC: 108 MMOL/L (ref 95–110)
CO2 SERPL-SCNC: 22 MMOL/L (ref 23–29)
CREAT SERPL-MCNC: 1.8 MG/DL (ref 0.5–1.4)
DIFFERENTIAL METHOD: ABNORMAL
EOSINOPHIL # BLD AUTO: 0.3 K/UL (ref 0–0.5)
EOSINOPHIL NFR BLD: 6.7 % (ref 0–8)
ERYTHROCYTE [DISTWIDTH] IN BLOOD BY AUTOMATED COUNT: 13.3 % (ref 11.5–14.5)
EST. GFR  (AFRICAN AMERICAN): 38 ML/MIN/1.73 M^2
EST. GFR  (NON AFRICAN AMERICAN): 32.9 ML/MIN/1.73 M^2
GLUCOSE SERPL-MCNC: 117 MG/DL (ref 70–110)
HCT VFR BLD AUTO: 37.4 % (ref 40–54)
HGB BLD-MCNC: 11.8 G/DL (ref 14–18)
IMM GRANULOCYTES # BLD AUTO: 0.06 K/UL (ref 0–0.04)
IMM GRANULOCYTES NFR BLD AUTO: 1.3 % (ref 0–0.5)
LYMPHOCYTES # BLD AUTO: 0.6 K/UL (ref 1–4.8)
LYMPHOCYTES NFR BLD: 12.5 % (ref 18–48)
MCH RBC QN AUTO: 32.6 PG (ref 27–31)
MCHC RBC AUTO-ENTMCNC: 31.6 G/DL (ref 32–36)
MCV RBC AUTO: 103 FL (ref 82–98)
MONOCYTES # BLD AUTO: 0.7 K/UL (ref 0.3–1)
MONOCYTES NFR BLD: 15.1 % (ref 4–15)
NEUTROPHILS # BLD AUTO: 3 K/UL (ref 1.8–7.7)
NEUTROPHILS NFR BLD: 64 % (ref 38–73)
NRBC BLD-RTO: 0 /100 WBC
PLATELET # BLD AUTO: 107 K/UL (ref 150–350)
PMV BLD AUTO: 9.1 FL (ref 9.2–12.9)
POTASSIUM SERPL-SCNC: 5.2 MMOL/L (ref 3.5–5.1)
PROT SERPL-MCNC: 5.8 G/DL (ref 6–8.4)
RBC # BLD AUTO: 3.62 M/UL (ref 4.6–6.2)
SODIUM SERPL-SCNC: 139 MMOL/L (ref 136–145)
TB INDURATION 48 - 72 HR READ: 0 MM
WBC # BLD AUTO: 4.63 K/UL (ref 3.9–12.7)

## 2019-08-18 PROCEDURE — 99232 SBSQ HOSP IP/OBS MODERATE 35: CPT | Mod: ,,, | Performed by: INTERNAL MEDICINE

## 2019-08-18 PROCEDURE — 27000221 HC OXYGEN, UP TO 24 HOURS

## 2019-08-18 PROCEDURE — 36415 COLL VENOUS BLD VENIPUNCTURE: CPT

## 2019-08-18 PROCEDURE — 25000003 PHARM REV CODE 250: Performed by: STUDENT IN AN ORGANIZED HEALTH CARE EDUCATION/TRAINING PROGRAM

## 2019-08-18 PROCEDURE — 83880 ASSAY OF NATRIURETIC PEPTIDE: CPT

## 2019-08-18 PROCEDURE — 99900035 HC TECH TIME PER 15 MIN (STAT)

## 2019-08-18 PROCEDURE — 25000003 PHARM REV CODE 250: Performed by: NURSE PRACTITIONER

## 2019-08-18 PROCEDURE — 11000001 HC ACUTE MED/SURG PRIVATE ROOM

## 2019-08-18 PROCEDURE — 85025 COMPLETE CBC W/AUTO DIFF WBC: CPT

## 2019-08-18 PROCEDURE — 94761 N-INVAS EAR/PLS OXIMETRY MLT: CPT

## 2019-08-18 PROCEDURE — 94660 CPAP INITIATION&MGMT: CPT

## 2019-08-18 PROCEDURE — 63600175 PHARM REV CODE 636 W HCPCS: Performed by: INTERNAL MEDICINE

## 2019-08-18 PROCEDURE — 99232 PR SUBSEQUENT HOSPITAL CARE,LEVL II: ICD-10-PCS | Mod: ,,, | Performed by: INTERNAL MEDICINE

## 2019-08-18 PROCEDURE — 80053 COMPREHEN METABOLIC PANEL: CPT

## 2019-08-18 PROCEDURE — 63600175 PHARM REV CODE 636 W HCPCS: Performed by: STUDENT IN AN ORGANIZED HEALTH CARE EDUCATION/TRAINING PROGRAM

## 2019-08-18 PROCEDURE — 25000003 PHARM REV CODE 250: Performed by: EMERGENCY MEDICINE

## 2019-08-18 RX ORDER — FUROSEMIDE 20 MG/1
20 TABLET ORAL 2 TIMES DAILY
Status: DISCONTINUED | OUTPATIENT
Start: 2019-08-19 | End: 2019-08-21 | Stop reason: HOSPADM

## 2019-08-18 RX ORDER — FUROSEMIDE 10 MG/ML
20 INJECTION INTRAMUSCULAR; INTRAVENOUS ONCE
Status: COMPLETED | OUTPATIENT
Start: 2019-08-18 | End: 2019-08-18

## 2019-08-18 RX ADMIN — MEMANTINE HYDROCHLORIDE 5 MG: 5 TABLET ORAL at 08:08

## 2019-08-18 RX ADMIN — MEMANTINE HYDROCHLORIDE 5 MG: 5 TABLET ORAL at 09:08

## 2019-08-18 RX ADMIN — MIRTAZAPINE 15 MG: 15 TABLET, FILM COATED ORAL at 08:08

## 2019-08-18 RX ADMIN — ACETAMINOPHEN 650 MG: 325 TABLET ORAL at 08:08

## 2019-08-18 RX ADMIN — MUPIROCIN: 20 OINTMENT TOPICAL at 08:08

## 2019-08-18 RX ADMIN — SACUBITRIL AND VALSARTAN 1 TABLET: 24; 26 TABLET, FILM COATED ORAL at 09:08

## 2019-08-18 RX ADMIN — CARVEDILOL 12.5 MG: 12.5 TABLET, FILM COATED ORAL at 08:08

## 2019-08-18 RX ADMIN — SENNOSIDES,DOCUSATE SODIUM 1 TABLET: 8.6; 5 TABLET, FILM COATED ORAL at 09:08

## 2019-08-18 RX ADMIN — RANOLAZINE 1000 MG: 1000 TABLET, FILM COATED, EXTENDED RELEASE ORAL at 09:08

## 2019-08-18 RX ADMIN — RANOLAZINE 1000 MG: 1000 TABLET, FILM COATED, EXTENDED RELEASE ORAL at 08:08

## 2019-08-18 RX ADMIN — POLYETHYLENE GLYCOL 3350 17 G: 17 POWDER, FOR SOLUTION ORAL at 09:08

## 2019-08-18 RX ADMIN — FUROSEMIDE 20 MG: 20 TABLET ORAL at 09:08

## 2019-08-18 RX ADMIN — LACOSAMIDE 50 MG: 50 TABLET, FILM COATED ORAL at 09:08

## 2019-08-18 RX ADMIN — FUROSEMIDE 20 MG: 10 INJECTION, SOLUTION INTRAMUSCULAR; INTRAVENOUS at 04:08

## 2019-08-18 RX ADMIN — TAMSULOSIN HYDROCHLORIDE 0.4 MG: 0.4 CAPSULE ORAL at 09:08

## 2019-08-18 RX ADMIN — EZETIMIBE 10 MG: 10 TABLET ORAL at 09:08

## 2019-08-18 RX ADMIN — CARVEDILOL 12.5 MG: 12.5 TABLET, FILM COATED ORAL at 09:08

## 2019-08-18 RX ADMIN — SACUBITRIL AND VALSARTAN 1 TABLET: 24; 26 TABLET, FILM COATED ORAL at 08:08

## 2019-08-18 RX ADMIN — HEPARIN SODIUM 5000 UNITS: 5000 INJECTION INTRAVENOUS; SUBCUTANEOUS at 01:08

## 2019-08-18 RX ADMIN — MUPIROCIN: 20 OINTMENT TOPICAL at 09:08

## 2019-08-18 RX ADMIN — LACOSAMIDE 50 MG: 50 TABLET, FILM COATED ORAL at 08:08

## 2019-08-18 RX ADMIN — HEPARIN SODIUM 5000 UNITS: 5000 INJECTION INTRAVENOUS; SUBCUTANEOUS at 05:08

## 2019-08-18 RX ADMIN — HEPARIN SODIUM 5000 UNITS: 5000 INJECTION INTRAVENOUS; SUBCUTANEOUS at 09:08

## 2019-08-18 NOTE — NURSING
Bladder scanned output greater than 639. PT up to restroom hat placed in toilet but pt states he missed the hat and urine went into toilet. Will bladder scan again to confirm.

## 2019-08-18 NOTE — PLAN OF CARE
Problem: Fall Injury Risk  Goal: Absence of Fall and Fall-Related Injury  Outcome: Ongoing (interventions implemented as appropriate)  Meds given as ordered tolerated well. No complaints of pain. No signs or symptoms of distress/discomfort noted. Resting most of the day. Bladder scanned as needed. Vitals stable. Safety maintained. Will continue to monitor.

## 2019-08-18 NOTE — PLAN OF CARE
Problem: Adult Inpatient Plan of Care  Goal: Plan of Care Review  Went over plan of care - all questions answered. No complaints voiced. Slept well. Will continue to monitor. Daughter at bedside

## 2019-08-18 NOTE — PROGRESS NOTES
"Hospital Medicine  Progress Note    [place]: [facility] [place] [place] D Char Washington MD  Admit Date: 2019  LOUISE 2019  Length of Stay:  LOS: 6 days   Code status: Full Code    Principal Problem:  Subdural hematoma    HPI:   89 yo M, h/o CAD, MI x4 (STEMI in March, on ASA/Plavix), HTN, V-fib with ICD and pacemaker, A-fib on Eliquis, multiple TIAs, last one year ago, severe debility (uses walker and wheelchair at baseline), s/p fall last night and early this AM while trying to walk to bathroom. Has had 2-3 falls in past 2 days because "legs give out" which happens often but more frequently over past week. Also, He was recently prescribed Flexiril for shoulder pain. Family noticed  an increase in falls over past 3-4 days.  Of note,Spouse  within past 2 weeks.  Seen at [place] overnight, extensive work-up including CT head which showed small subdural hematoma.  Pt transferred to [facility] [place] eval. Pt neurologically stable, disorientation to time and place. No immediate neurosurgical intervention necessary at present.   Decision made to not give platelets. No reversal of anticoagulation. Repeat CTH after 6h revealed R tentorial SDH 1.2 cm thickness, stable, no increase or new bleed. CT C-spine showed no acute fractures; 1cm lucent lesion at C7; cervical spondylosis. Pt admitted to [facility] overnight for close neurological monitoring    Interval history:  Patient complains of shortness of breath. Urinary retention reported by Nursing.    Review of Systems   Constitutional: Positive for malaise/fatigue. Negative for fever.   Respiratory: Positive for shortness of breath.    Psychiatric/Behavioral: Positive for memory loss.       Physical Exam  Temp:  [96.4 °F (35.8 °C)-98.5 °F (36.9 °C)]   Pulse:  [64-78]   Resp:  [12-20]   BP: (100-171)/(54-81)   SpO2:  [94 %-99 %]     Intake/Output Summary (Last 24 hours) at 2019 1342  Last data filed at 2019 1200  Gross per 24 hour   Intake 120 ml   Output 350 ml   Net -230 ml "     Physical Exam   Constitutional:  Non-toxic appearance.   Eyes: Conjunctivae and lids are normal.   Cardiovascular: S1 normal and S2 normal.   Pulmonary/Chest: Effort normal. He has decreased breath sounds.   Abdominal: Soft. Bowel sounds are normal. There is no tenderness.   Musculoskeletal: He exhibits edema.   Neurological: He is alert.   Skin: Bruising noted. He is not diaphoretic.        Recent Labs   Lab 08/16/19  0331 08/17/19  0303 08/18/19  0410   WBC 7.65 5.76 4.63   HGB 13.4* 11.8* 11.8*   HCT 41.5 37.9* 37.4*   * 112* 107*     Recent Labs   Lab 08/12/19  0340 08/13/19  0104 08/14/19  0310  08/16/19  0331 08/17/19  0303 08/18/19  0410   * 133* 135*   < > 137 135* 139   K 4.9 4.7 4.8   < > 4.7 5.1 5.2*    103 104   < > 105 106 108   CO2 23 21* 20*   < > 22* 23 22*   BUN 27* 25* 30*   < > 35* 39* 40*   CREATININE 1.5* 1.6* 1.8*   < > 1.7* 1.9* 1.8*   * 106 100   < > 113* 106 117*   CALCIUM 9.1 8.4* 9.0   < > 9.1 8.2* 8.5*   MG 2.0 1.9 2.1  --   --   --   --    PHOS 2.7 2.9 3.6  --   --   --   --     < > = values in this interval not displayed.     Recent Labs   Lab 08/12/19  0340 08/13/19  0104  08/16/19  0331 08/17/19  0303 08/18/19  0410   ALKPHOS 121 112   < > 118 112 100   ALT 22 17   < > 12 12 11   AST 19 17   < > 14 14 11   ALBUMIN 4.0 3.4*   < > 3.2* 3.1* 2.9*   PROT 7.2 6.0   < > 6.2 6.0 5.8*   BILITOT 0.5 0.6   < > 0.4 0.4 0.3   INR 1.0 1.0  --   --   --   --     < > = values in this interval not displayed.      Recent Labs   Lab 08/12/19  1420   HGBA1C 5.2       Scheduled Meds:   carvedilol  12.5 mg Oral BID    ezetimibe  10 mg Oral Daily    furosemide  20 mg Intravenous Once    [START ON 8/19/2019] furosemide  20 mg Oral BID    heparin (porcine)  5,000 Units Subcutaneous Q8H    lacosamide  50 mg Oral Q12H    memantine  5 mg Oral BID    mirtazapine  15 mg Oral QHS    mupirocin   Topical (Top) BID    polyethylene glycol  17 g Oral Daily    ranolazine  1,000  mg Oral BID    rosuvastatin  10 mg Oral Every Mon, Wed, Fri    sacubitril-valsartan  1 tablet Oral BID    senna-docusate 8.6-50 mg  1 tablet Oral BID    tamsulosin  0.4 mg Oral Daily     Continuous Infusions:  As Needed:  acetaminophen, albuterol-ipratropium, labetalol, nitroGLYCERIN, ondansetron, sodium chloride 0.9%    Active Hospital Problems    Diagnosis  POA    *Subdural hematoma [S06.5X9A]  Yes    Paroxysmal atrial fibrillation [I48.0]  Yes    Coronary artery disease involving native coronary artery of native heart without angina pectoris [I25.10]  Yes    Subdural hemorrhage [I62.00]  Yes    Brain compression [G93.5]  Yes    Ventricular fibrillation [I49.01]  Yes    Hyperlipidemia [E78.5]  Yes    Hypertension [I10]  Yes    Ischemic cardiomyopathy [I25.5]  Yes    Stage 3 chronic kidney disease [N18.3]  Yes    Dementia in Alzheimer's disease [G30.9, F02.80]  Yes    Impaired mobility and activities of daily living [Z74.09]  Yes    Sleep apnea [G47.30]  Yes    ASCVD (arteriosclerotic cardiovascular disease) [I25.10]  Yes    Chronic CHF [I50.9]  Yes    Benign prostatic hyperplasia [N40.0]  Yes      Resolved Hospital Problems   No resolved problems to display.       Overview of Hospital Course:  89 yo M with CAD, MI x4 (STEMI in March, on ASA/Plavix), HTN, V-fib with ICD and pacemaker, A-fib on Eliquis, multiple TIAs, debility (uses walker and wheelchair at baseline) admitted to [facility] with SDH s/p fall at home.  8/12 Admit to [facility]. Repeat CTH and C-spine. SBP goal <160. No platelets. Hold anticoagulation.  08/13/2019 CTH repeated overnight for agitation which demonstrated mildly enlarged bleed; this morning Mr Bell is neurologically back to baseline; CTH repeated at 11 AM shows stable bleed.   08/14/2019: ON given bolus, for hypotension; off precedex; more interactive; used home CPAP at night. PM CTH from yesterday with stable bleed. observe vitals until PM can be TTF after;   08/15/2019.   Patient was due to be transferred to telemetry floor.  However patient had an episode of bradycardia with HR in 20s.  EP was consulted.  Pacemaker was interrogated and patient was determined to have normal sinus rhythm. HR in 20s was likely misread.  Cardiology was also consulted for risk versus benefit discussion of anticoagulation.  Cardiac device interrogated and no abnormality or error found   Patient was on DAPT and Eliquis at time of injury. Family states he fell while under supervision. Patient is high risk for falls and bleeding. Discussed at length with family at bedside. Patient needs to restart a minimum of anti-platelet therapy; discontinuation of Plavix and Eliquis recommended by Cardiology.     Assessment and Plan:      Subdural hematoma  Admit to [facility] s/p fall x2, R SDH  8/12  Repeat CTH after 6h revealed R tentorial SDH 1.2 cm thickness, stable, no increase or new bleed.   NSGY consulted, no immediate neurosurgical  Intervention necessary at present  Neuro checks & VS q1h  SBPgoal <160  Hold anticoagulation and antiplatelets;   Should not be restarted on triple therapy because of high risk of bleeding  Vimpat for Sz PPx X 7 days  PT/OT/SLP  CTH with stable bleed; started DVT PPx;     Ventricular fibrillation  Has AICD  Had an episode where monitor showed HR was in 20s; interrogation did not demonstrate the same, patient was asymptomatic; likely this was an artifact; Interrogation done by EP, no issues; had another episode - asymptomatic; no further intervention needed at this point.     Hypertension  SBP goal <160  Continue Coreg 12.5mg bid  Lasix 20mg daily; increased to BID on 8/18 as patient was taking at home.  sacubitril- valsartan 24-26 BID     Hyperlipidemia  Continue crestor 5mg every Monday and Wednesday  ezetimbe 10mg daily     History of STEMI (ST elevation myocardial infarction)  Hx of MI x4  Last MI 4/2019  Hx of multiple stents.  4 placed 4/2019  ASA and Plavix on hold for 48h due to  SDH  Restart ASA once stable from bleeding perspective; do not recommend triple therapy given significant risk of bleeding  Cardiology recommends only ASA for antiplatelets/anticoags     Chronic CHF  Continue sacubitril-valsartan 24-26mg bid  Coreg 12.5mg bid  Furosemide 20mg BID  Echo repeated,  Normal EF  BNP 8/18 low but patient short of breath; Lasix 20 mh IV x 1 and resume oral Lasix BID.     ASCVD (arteriosclerotic cardiovascular disease)  Continue ezetembe 10mg daily  Crestor 5mg every Monday, & Wednesday  Ranolazine SR 1000mg bid  NTG SL prn chest pain  ECG pending  Echo w normal EF  Held ASA, Plavix for 48h, due to SDH  Was on triple therapy - DAPT and Eliquis; holding all for now; Restart ASA at some point; would not continue triple therapy given high risk of bleeding     Impaired mobility and activities of daily living  PT/OT following  Concern for urinary retention; trial of bladder program, scheduled urination attempts, bladder scans , strait cath as needed.     Sleep apnea  Continue CPAP  QHS     High Risk Conditions  Patient has an abrupt change in neurologic status: Weakness and SDH     Diet:  Diet Adult Regular (IDDSI Level 7) [facility]; Cardiac (Low Na/Chol)  GI Prophylaxis: Not indicated  DVT Prophylaxis:     Anticoagulants   Medication Route Frequency    heparin (porcine) injection 5,000 Units Subcutaneous Q8H     Lines/ Drains/ Airways: PIV  Urinary Catheter Indicated: Patient Does Not Have Urinary Catheter  Other Lines/Tubes/Drains:  Wounds:    Goals of Care: Treatment Decisions and Advance Care Planning  Discharge plan:  Skilled Nursing Facility    Char Washington MD  Department of Hospital Medicine  [id number]

## 2019-08-18 NOTE — NURSING
Called and advised Kelly that I held carvedilol bc of bp of 100/56 and heart rate 75. Advised it was ok to do that. Informed daughter of decision

## 2019-08-19 LAB
ALBUMIN SERPL BCP-MCNC: 3.1 G/DL (ref 3.5–5.2)
ALP SERPL-CCNC: 100 U/L (ref 55–135)
ALT SERPL W/O P-5'-P-CCNC: 10 U/L (ref 10–44)
ANION GAP SERPL CALC-SCNC: 8 MMOL/L (ref 8–16)
AST SERPL-CCNC: 12 U/L (ref 10–40)
BASOPHILS # BLD AUTO: 0.02 K/UL (ref 0–0.2)
BASOPHILS NFR BLD: 0.4 % (ref 0–1.9)
BILIRUB SERPL-MCNC: 0.4 MG/DL (ref 0.1–1)
BUN SERPL-MCNC: 38 MG/DL (ref 8–23)
CALCIUM SERPL-MCNC: 9 MG/DL (ref 8.7–10.5)
CHLORIDE SERPL-SCNC: 107 MMOL/L (ref 95–110)
CO2 SERPL-SCNC: 24 MMOL/L (ref 23–29)
CREAT SERPL-MCNC: 1.8 MG/DL (ref 0.5–1.4)
DIFFERENTIAL METHOD: ABNORMAL
EOSINOPHIL # BLD AUTO: 0.4 K/UL (ref 0–0.5)
EOSINOPHIL NFR BLD: 8.2 % (ref 0–8)
ERYTHROCYTE [DISTWIDTH] IN BLOOD BY AUTOMATED COUNT: 13.4 % (ref 11.5–14.5)
EST. GFR  (AFRICAN AMERICAN): 38 ML/MIN/1.73 M^2
EST. GFR  (NON AFRICAN AMERICAN): 32.9 ML/MIN/1.73 M^2
GLUCOSE SERPL-MCNC: 113 MG/DL (ref 70–110)
HCT VFR BLD AUTO: 40.1 % (ref 40–54)
HGB BLD-MCNC: 12.4 G/DL (ref 14–18)
IMM GRANULOCYTES # BLD AUTO: 0.07 K/UL (ref 0–0.04)
IMM GRANULOCYTES NFR BLD AUTO: 1.4 % (ref 0–0.5)
LYMPHOCYTES # BLD AUTO: 0.6 K/UL (ref 1–4.8)
LYMPHOCYTES NFR BLD: 12.7 % (ref 18–48)
MCH RBC QN AUTO: 33 PG (ref 27–31)
MCHC RBC AUTO-ENTMCNC: 30.9 G/DL (ref 32–36)
MCV RBC AUTO: 107 FL (ref 82–98)
MONOCYTES # BLD AUTO: 0.8 K/UL (ref 0.3–1)
MONOCYTES NFR BLD: 15.3 % (ref 4–15)
NEUTROPHILS # BLD AUTO: 3 K/UL (ref 1.8–7.7)
NEUTROPHILS NFR BLD: 62 % (ref 38–73)
NRBC BLD-RTO: 0 /100 WBC
PLATELET # BLD AUTO: 106 K/UL (ref 150–350)
PMV BLD AUTO: 8.9 FL (ref 9.2–12.9)
POTASSIUM SERPL-SCNC: 5.1 MMOL/L (ref 3.5–5.1)
PROT SERPL-MCNC: 6.1 G/DL (ref 6–8.4)
RBC # BLD AUTO: 3.76 M/UL (ref 4.6–6.2)
SODIUM SERPL-SCNC: 139 MMOL/L (ref 136–145)
WBC # BLD AUTO: 4.89 K/UL (ref 3.9–12.7)

## 2019-08-19 PROCEDURE — 25000003 PHARM REV CODE 250: Performed by: EMERGENCY MEDICINE

## 2019-08-19 PROCEDURE — 36415 COLL VENOUS BLD VENIPUNCTURE: CPT

## 2019-08-19 PROCEDURE — 25000003 PHARM REV CODE 250: Performed by: NURSE PRACTITIONER

## 2019-08-19 PROCEDURE — 99232 SBSQ HOSP IP/OBS MODERATE 35: CPT | Mod: ,,, | Performed by: INTERNAL MEDICINE

## 2019-08-19 PROCEDURE — 85025 COMPLETE CBC W/AUTO DIFF WBC: CPT

## 2019-08-19 PROCEDURE — 94761 N-INVAS EAR/PLS OXIMETRY MLT: CPT

## 2019-08-19 PROCEDURE — 94660 CPAP INITIATION&MGMT: CPT

## 2019-08-19 PROCEDURE — 94640 AIRWAY INHALATION TREATMENT: CPT

## 2019-08-19 PROCEDURE — 80053 COMPREHEN METABOLIC PANEL: CPT

## 2019-08-19 PROCEDURE — 11000001 HC ACUTE MED/SURG PRIVATE ROOM

## 2019-08-19 PROCEDURE — 25000003 PHARM REV CODE 250: Performed by: INTERNAL MEDICINE

## 2019-08-19 PROCEDURE — 27000221 HC OXYGEN, UP TO 24 HOURS

## 2019-08-19 PROCEDURE — 99900035 HC TECH TIME PER 15 MIN (STAT)

## 2019-08-19 PROCEDURE — 97535 SELF CARE MNGMENT TRAINING: CPT

## 2019-08-19 PROCEDURE — 97530 THERAPEUTIC ACTIVITIES: CPT

## 2019-08-19 PROCEDURE — 25000242 PHARM REV CODE 250 ALT 637 W/ HCPCS: Performed by: PSYCHIATRY & NEUROLOGY

## 2019-08-19 PROCEDURE — 99232 PR SUBSEQUENT HOSPITAL CARE,LEVL II: ICD-10-PCS | Mod: ,,, | Performed by: INTERNAL MEDICINE

## 2019-08-19 PROCEDURE — 25000003 PHARM REV CODE 250: Performed by: STUDENT IN AN ORGANIZED HEALTH CARE EDUCATION/TRAINING PROGRAM

## 2019-08-19 PROCEDURE — 97116 GAIT TRAINING THERAPY: CPT

## 2019-08-19 PROCEDURE — 63600175 PHARM REV CODE 636 W HCPCS: Performed by: STUDENT IN AN ORGANIZED HEALTH CARE EDUCATION/TRAINING PROGRAM

## 2019-08-19 RX ADMIN — CARVEDILOL 12.5 MG: 12.5 TABLET, FILM COATED ORAL at 09:08

## 2019-08-19 RX ADMIN — HEPARIN SODIUM 5000 UNITS: 5000 INJECTION INTRAVENOUS; SUBCUTANEOUS at 05:08

## 2019-08-19 RX ADMIN — IPRATROPIUM BROMIDE AND ALBUTEROL SULFATE 3 ML: .5; 3 SOLUTION RESPIRATORY (INHALATION) at 09:08

## 2019-08-19 RX ADMIN — FUROSEMIDE 20 MG: 20 TABLET ORAL at 05:08

## 2019-08-19 RX ADMIN — FUROSEMIDE 20 MG: 20 TABLET ORAL at 09:08

## 2019-08-19 RX ADMIN — SENNOSIDES,DOCUSATE SODIUM 1 TABLET: 8.6; 5 TABLET, FILM COATED ORAL at 09:08

## 2019-08-19 RX ADMIN — EZETIMIBE 10 MG: 10 TABLET ORAL at 09:08

## 2019-08-19 RX ADMIN — HEPARIN SODIUM 5000 UNITS: 5000 INJECTION INTRAVENOUS; SUBCUTANEOUS at 09:08

## 2019-08-19 RX ADMIN — SACUBITRIL AND VALSARTAN 1 TABLET: 24; 26 TABLET, FILM COATED ORAL at 09:08

## 2019-08-19 RX ADMIN — RANOLAZINE 1000 MG: 1000 TABLET, FILM COATED, EXTENDED RELEASE ORAL at 09:08

## 2019-08-19 RX ADMIN — POLYETHYLENE GLYCOL 3350 17 G: 17 POWDER, FOR SOLUTION ORAL at 09:08

## 2019-08-19 RX ADMIN — MUPIROCIN: 20 OINTMENT TOPICAL at 09:08

## 2019-08-19 RX ADMIN — TAMSULOSIN HYDROCHLORIDE 0.4 MG: 0.4 CAPSULE ORAL at 09:08

## 2019-08-19 RX ADMIN — LACOSAMIDE 50 MG: 50 TABLET, FILM COATED ORAL at 09:08

## 2019-08-19 RX ADMIN — ROSUVASTATIN CALCIUM 10 MG: 10 TABLET, FILM COATED ORAL at 09:08

## 2019-08-19 RX ADMIN — MEMANTINE HYDROCHLORIDE 5 MG: 5 TABLET ORAL at 09:08

## 2019-08-19 RX ADMIN — MIRTAZAPINE 15 MG: 15 TABLET, FILM COATED ORAL at 09:08

## 2019-08-19 RX ADMIN — HEPARIN SODIUM 5000 UNITS: 5000 INJECTION INTRAVENOUS; SUBCUTANEOUS at 02:08

## 2019-08-19 NOTE — PLAN OF CARE
Problem: Adult Inpatient Plan of Care  Goal: Plan of Care Review  Went over plan of care- all questions answered.  No complaints voiced. Slept well. Will continue to monitor. Daughter at bedside. Advised her I would report off to day shift nurse - pt needed a bath.

## 2019-08-19 NOTE — PROGRESS NOTES
"Hospital Medicine  Progress Note    [place] [facility] [place] Char Washington MD  Admit Date: 2019  LOUISE 2019  Length of Stay:  LOS: 7 days   Code status: Full Code    Principal Problem:  Subdural hematoma    HPI:   87 yo M, h/o CAD, MI x4 (STEMI in March, on ASA/Plavix), HTN, V-fib with ICD and pacemaker, A-fib on Eliquis, multiple TIAs, last one year ago, severe debility (uses walker and wheelchair at baseline), s/p fall last night and early this AM while trying to walk to bathroom. Has had 2-3 falls in past 2 days because "legs give out" which happens often but more frequently over past week. Also, He was recently prescribed Flexiril for shoulder pain. Family noticed  an increase in falls over past 3-4 days.  Of note,Spouse  within past 2 weeks.  Seen at [place] overnight, extensive work-up including CT head which showed small subdural hematoma.  [place] [facility] [place]. Pt neurologically stable, disorientation to time and place. No immediate neurosurgical intervention necessary at present.   Decision made to not give platelets. No reversal of anticoagulation. Repeat CTH after 6h revealed R tentorial SDH 1.2 cm thickness, stable, no increase or new bleed. CT C-spine showed no acute fractures; 1cm lucent lesion at C7; cervical spondylosis. Pt admitted to [facility] overnight for close neurological monitoring    Interval history:  Patient complains of shortness of breath. Voiding better. Still requiring O2.    Review of Systems   Constitutional: Positive for malaise/fatigue. Negative for fever.   Respiratory: Positive for shortness of breath.    Psychiatric/Behavioral: Positive for memory loss.       Physical Exam  Temp:  [97.2 °F (36.2 °C)-98.2 °F (36.8 °C)]   Pulse:  [65-77]   Resp:  [14-18]   BP: (136-176)/(62-79)   SpO2:  [97 %-100 %]     Intake/Output Summary (Last 24 hours) at 2019 1533  Last data filed at 2019 1400  Gross per 24 hour   Intake 120 ml   Output 1900 ml   Net -1780 ml "     Physical Exam   Constitutional:  Non-toxic appearance.   Eyes: Conjunctivae and lids are normal.   Cardiovascular: S1 normal and S2 normal.   Pulmonary/Chest: Effort normal. He has decreased breath sounds.   Abdominal: Soft. Bowel sounds are normal. There is no tenderness.   Musculoskeletal: He exhibits edema.   Neurological: He is alert.   Skin: Bruising noted. He is not diaphoretic.        Recent Labs   Lab 08/17/19  0303 08/18/19  0410 08/19/19  0753   WBC 5.76 4.63 4.89   HGB 11.8* 11.8* 12.4*   HCT 37.9* 37.4* 40.1   * 107* 106*     Recent Labs   Lab 08/13/19  0104 08/14/19  0310  08/17/19  0303 08/18/19 0410 08/19/19  0753   * 135*   < > 135* 139 139   K 4.7 4.8   < > 5.1 5.2* 5.1    104   < > 106 108 107   CO2 21* 20*   < > 23 22* 24   BUN 25* 30*   < > 39* 40* 38*   CREATININE 1.6* 1.8*   < > 1.9* 1.8* 1.8*    100   < > 106 117* 113*   CALCIUM 8.4* 9.0   < > 8.2* 8.5* 9.0   MG 1.9 2.1  --   --   --   --    PHOS 2.9 3.6  --   --   --   --     < > = values in this interval not displayed.     Recent Labs   Lab 08/13/19  0104 08/17/19  0303 08/18/19 0410 08/19/19  0753   ALKPHOS 112   < > 112 100 100   ALT 17   < > 12 11 10   AST 17   < > 14 11 12   ALBUMIN 3.4*   < > 3.1* 2.9* 3.1*   PROT 6.0   < > 6.0 5.8* 6.1   BILITOT 0.6   < > 0.4 0.3 0.4   INR 1.0  --   --   --   --     < > = values in this interval not displayed.      Recent Labs   Lab 08/12/19  1420   HGBA1C 5.2       Scheduled Meds:   carvedilol  12.5 mg Oral BID    ezetimibe  10 mg Oral Daily    furosemide  20 mg Oral BID    heparin (porcine)  5,000 Units Subcutaneous Q8H    lacosamide  50 mg Oral Q12H    memantine  5 mg Oral BID    mirtazapine  15 mg Oral QHS    mupirocin   Topical (Top) BID    polyethylene glycol  17 g Oral Daily    ranolazine  1,000 mg Oral BID    rosuvastatin  10 mg Oral Every Mon, Wed, Fri    sacubitril-valsartan  1 tablet Oral BID    senna-docusate 8.6-50 mg  1 tablet Oral BID     tamsulosin  0.4 mg Oral Daily     Continuous Infusions:  As Needed:  acetaminophen, albuterol-ipratropium, labetalol, nitroGLYCERIN, ondansetron, sodium chloride 0.9%    Active Hospital Problems    Diagnosis  POA    *Subdural hematoma [S06.5X9A]  Yes    Paroxysmal atrial fibrillation [I48.0]  Yes    Coronary artery disease involving native coronary artery of native heart without angina pectoris [I25.10]  Yes    Subdural hemorrhage [I62.00]  Yes    Brain compression [G93.5]  Yes    Ventricular fibrillation [I49.01]  Yes    Hyperlipidemia [E78.5]  Yes    Hypertension [I10]  Yes    Ischemic cardiomyopathy [I25.5]  Yes    Stage 3 chronic kidney disease [N18.3]  Yes    Dementia in Alzheimer's disease [G30.9, F02.80]  Yes    Impaired mobility and activities of daily living [Z74.09]  Yes    Sleep apnea [G47.30]  Yes    ASCVD (arteriosclerotic cardiovascular disease) [I25.10]  Yes    Chronic CHF [I50.9]  Yes    Benign prostatic hyperplasia [N40.0]  Yes      Resolved Hospital Problems   No resolved problems to display.       Overview of Hospital Course:  87 yo M with CAD, MI x4 (STEMI in March, on ASA/Plavix), HTN, V-fib with ICD and pacemaker, A-fib on Eliquis, multiple TIAs, debility (uses walker and wheelchair at baseline) admitted to [facility] with SDH s/p fall at home.  8/12 Admit to [facility]. Repeat CTH and C-spine. SBP goal <160. No platelets. Hold anticoagulation.  08/13/2019 CTH repeated overnight for agitation which demonstrated mildly enlarged bleed; this morning Mr Bell is neurologically back to baseline; CTH repeated at 11 AM shows stable bleed.   08/14/2019: ON given bolus, for hypotension; off precedex; more interactive; used home CPAP at night. PM CTH from yesterday with stable bleed. observe vitals until PM can be TTF after;   08/15/2019.  Patient was due to be transferred to telemetry floor.  However patient had an episode of bradycardia with HR in 20s.  EP was consulted.  Pacemaker was  interrogated and patient was determined to have normal sinus rhythm. HR in 20s was likely misread.  Cardiology was also consulted for risk versus benefit discussion of anticoagulation.  Cardiac device interrogated and no abnormality or error found   Patient was on DAPT and Eliquis at time of injury. Family states he fell while under supervision. Patient is high risk for falls and bleeding. Discussed at length with family at bedside. Patient needs to restart a minimum of anti-platelet therapy; discontinuation of Plavix and Eliquis recommended by Cardiology.     Assessment and Plan:      Subdural hematoma  Admit to [facility] s/p fall x2, R SDH  8/12  Repeat CTH after 6h revealed R tentorial SDH 1.2 cm thickness, stable, no increase or new bleed.   NSGY consulted, no immediate neurosurgical  Intervention necessary at present  Neuro checks & VS q1h  SBPgoal <160  Hold anticoagulation and antiplatelets;   Should not be restarted on triple therapy because of high risk of bleeding  Vimpat for Sz PPx X 7 days - completes 8/20  PT/OT/SLP  CTH with stable bleed; started DVT PPx;     Ventricular fibrillation  Has AICD  Had an episode where monitor showed HR was in 20s; interrogation did not demonstrate the same, patient was asymptomatic; likely this was an artifact; Interrogation done by EP, no issues; had another episode - asymptomatic; no further intervention needed at this point.     Hypertension  SBP goal <160  Continue Coreg 12.5mg bid  Lasix 20mg daily; increased to BID on 8/18 as patient was taking at home.  sacubitril- valsartan 24-26 BID     Hyperlipidemia  Continue crestor 5mg every Monday and Wednesday  ezetimbe 10mg daily     History of STEMI (ST elevation myocardial infarction)  Hx of MI x4  Last MI 4/2019  Hx of multiple stents.  4 placed 4/2019  ASA and Plavix on hold for 48h due to SDH  Restart ASA once stable from bleeding perspective; do not recommend triple therapy given significant risk of bleeding  Cardiology  recommends only ASA for antiplatelets/anticoags     Chronic CHF  Continue sacubitril-valsartan 24-26mg bid  Coreg 12.5mg bid  Furosemide 20mg BID  Echo repeated,  Normal EF  BNP 8/18 low but patient short of breath; Lasix 20 mg IV x 1 and resume oral Lasix BID.  Remains hypoxic; check CXR     ASCVD (arteriosclerotic cardiovascular disease)  Continue ezetembe 10mg daily  Crestor 5mg every Monday, & Wednesday  Ranolazine SR 1000mg bid  NTG SL prn chest pain  ECG pending  Echo w normal EF  Held ASA, Plavix for 48h, due to SDH  Was on triple therapy - DAPT and Eliquis; holding all for now; Restart ASA at some point; would not continue triple therapy given high risk of bleeding     Impaired mobility and activities of daily living  PT/OT following  Concern for urinary retention; trial of bladder program, scheduled urination attempts, bladder scans , strait cath as needed.     Sleep apnea  Continue CPAP  QHS     High Risk Conditions  Patient has an abrupt change in neurologic status: Weakness and SDH     Diet:  Diet Adult Regular (IDDSI Level 7) [facility]; Cardiac (Low Na/Chol)  GI Prophylaxis: Not indicated  DVT Prophylaxis:     Anticoagulants   Medication Route Frequency    heparin (porcine) injection 5,000 Units Subcutaneous Q8H     Lines/ Drains/ Airways: PIV  Urinary Catheter Indicated: Patient Does Not Have Urinary Catheter  Other Lines/Tubes/Drains:  Wounds:    Goals of Care: Treatment Decisions and Advance Care Planning  Discharge plan:  Skilled Nursing Facility    Char Washington MD  Department of Hospital Medicine  [id number]

## 2019-08-19 NOTE — PT/OT/SLP PROGRESS
Occupational Therapy   Treatment    Name: Reese Bell  MRN: [id number]  Admitting Diagnosis:  Subdural hematoma       Recommendations:     Discharge Recommendations: nursing facility, skilled  Discharge Equipment Recommendations:  walker, rolling  Barriers to discharge:  None    Assessment:     Reese Bell is . Performance deficits affecting function are weakness, impaired endurance, impaired self care skills, impaired functional mobilty, impaired balance, gait instability, decreased lower extremity function, decreased safety awareness, impaired cardiopulmonary response to activity.     Rehab Prognosis:  Good; patient would benefit from acute skilled OT services to address these deficits and reach maximum level of function.       Plan:     Patient to be seen 4 x/week to address the above listed problems via self-care/home management, therapeutic activities, therapeutic exercises  · Plan of Care Expires: 09/13/19  · Plan of Care Reviewed with: patient, family    Subjective     Pain/Comfort:  · Pain Rating 1: 0/10  · Pain Rating Post-Intervention 1: 0/10    Objective:     Communicated with: patient prior to session.  Patient found HOB elevated with telemetry, oxygen upon OT entry to room.    General Precautions: Standard, fall, seizure   Orthopedic Precautions:N/A   Braces: N/A     Occupational Performance:     Bed Mobility:    · Patient completed Rolling/Turning to Left with contact guard assistance /c HOB flat  · Patient completed Rolling/Turning to Right with minimum assistance using handrails and HOB elevated  · Patient completed Scooting/Bridging with minimum assistance to EOB  · Patient completed Supine to Sit with minimum assistance /c HOB elevated and using handrail  · Patient completed Sit to Supine with contact guard assistance /c HOB flat and no handrails    Functional Mobility/Transfers:  · Patient completed Sit <> Stand Transfer with contact guard assistance  with  rolling walker and grab bar    · Patient completed Toilet Transfer bed<>toilet /c functional ambulation technique with /c functional ambulation with  rolling walker and grab bars    Activities of Daily Living:  · Upper Body Dressing: minimum assistance Donning back gown  · Lower Body Dressing: total assistance [place] and donning socks      [facility] 6 Click ADL: 17    Treatment & Education:  Role of OT and POC  Safety with mobility  ADL retraining  Patient and family educated on benefit of rollator vs. RW and safety when using them including hand placement during sit<>stands and transfers.    Patient left up in chair with call button in reach, family present and all needs met. Education:      GOALS:   Multidisciplinary Problems     Occupational Therapy Goals        Problem: Occupational Therapy Goal    Goal Priority Disciplines Outcome Interventions   Occupational Therapy Goal     OT, PT/OT Ongoing (interventions implemented as appropriate)    Description:  Goals to be met by: 7 days (8/20/19)     Patient will increase functional independence with ADLs by performing:    UE Dressing with Supervision.  LE Dressing with Contact Guard Assistance.  Grooming while standing at sink with Contact Guard Assistance.  Toileting from toilet with Contact Guard Assistance for hygiene and clothing management.   Supine to sit with Supervision.  Toilet transfer to toilet with Stand-by Assistance.  Complete functional mobility household distance with Stand-by Assistance using AD as needed.                      Time Tracking:     OT Date of Treatment: 08/19/19  OT Start Time: 1122  OT Stop Time: 1139  OT Total Time (min): 17 min    Billable Minutes:Self Care/Home Management 17    FAITH Joseph  8/19/2019

## 2019-08-19 NOTE — PLAN OF CARE
Problem: Adult Inpatient Plan of Care  Goal: Plan of Care Review     08/19/19 8599   Plan of Care Review   Plan of Care Reviewed With patient;family   Progress improving     Pt had no acute event during shift. Pts VSS and WNL. Pts family is at bedside and is involved in his care. Pt can ambulate with assist to bathroom. Pt is now on 1 liter NC in the process of weaning him off O2 and he is tolerating well, o2 sats remain 95% or greater. Pt was bladder scanned post void and residual was less than 100.  Made aware. [facility]

## 2019-08-19 NOTE — PLAN OF CARE
Problem: Physical Therapy Goal  Goal: Physical Therapy Goal  Goals to be met by: 19    Patient will increase functional independence with mobility by performin. Supine to sit with Modified [place]  2. Sit to supine with Modified [place]  3. Sit to stand transfer with Supervision  4. Gait  x 100 feet with Supervision using Rolling Walker.   5. Lower extremity exercise program x10 reps per handout, with supervision     Outcome: Ongoing (interventions implemented as appropriate)  Goals remain appropriate.

## 2019-08-19 NOTE — PT/OT/SLP PROGRESS
"Physical Therapy Treatment    Patient Name:  Reese Bell   MRN:  [id number]    Recommendations:     Discharge Recommendations:  nursing facility, skilled   Discharge Equipment Recommendations: walker, rolling   Barriers to discharge: decreased functional mobility requiring increased assistance    Assessment:     Reese Bell is a 88 y.o. male admitted with a medical diagnosis of Subdural hematoma.  He presents with the following impairments/functional limitations:  weakness, impaired endurance, impaired self care skills, impaired functional mobilty, gait instability, impaired balance, decreased lower extremity function, decreased safety awareness, impaired cardiopulmonary response to activity. Pt tolerated session well with focus on transfers and gait training. Pt progressing well with improvement in gait distance this day. Pt remains falls risk d/t lateral lean, FFP, and reliance on BUE support. Per pts daughter, pt primarily used w/c except to ambulate short household distances. Pt will continue to benefit from therapy services to address impairments listed above.     Rehab Prognosis: Fair; patient would benefit from acute skilled PT services to address these deficits and reach maximum level of function.    Recent Surgery: * No surgery found *      Plan:     During this hospitalization, patient to be seen 4 x/week to address the identified rehab impairments via gait training, therapeutic activities, therapeutic exercises, neuromuscular re-education and progress toward the following goals:    · Plan of Care Expires:  09/12/19    Subjective     Chief Complaint: no c/o  Patient/Family Comments/goals: "No I don't feel like it, but I'll do it."   Pain/Comfort:  · Pain Rating 1: 0/10  · Pain Rating Post-Intervention 1: 0/10      Objective:     Communicated with NSG prior to session.  Patient found up in chair with oxygen, telemetry upon PTA entry to room. Daughter and granddaughter present.    General " Precautions: Standard, fall, seizure   Orthopedic Precautions:N/A   Braces: N/A     Functional Mobility:  · Transfers:     · Sit to Stand:  contact guard assistance with rolling walker  · Gait: Pt ambulates 82 ft with RW and Min A. Pt limited by fatigue, lateral lean L>R, and narrow ARI.       AM-PAC 6 CLICK MOBILITY  Turning over in bed (including adjusting bedclothes, sheets and blankets)?: 3  Sitting down on and standing up from a chair with arms (e.g., wheelchair, bedside commode, etc.): 3  Moving from lying on back to sitting on the side of the bed?: 3  Moving to and from a bed to a chair (including a wheelchair)?: 3  Need to walk in hospital room?: 3  Climbing 3-5 steps with a railing?: 2  Basic Mobility Total Score: 17       Therapeutic Activities and Exercises:   Pt assisted with functional mobility as noted above.   Pt and family educated on increased frequency UIC/OOB. Family urged to encourage pt to be more active but to work with his low activity tolerance.     Patient left up in chair with all lines intact, call button in reach and family present.    GOALS:   Multidisciplinary Problems     Physical Therapy Goals        Problem: Physical Therapy Goal    Goal Priority Disciplines Outcome Goal Variances Interventions   Physical Therapy Goal     PT, PT/OT Ongoing (interventions implemented as appropriate)     Description:  Goals to be met by: 19    Patient will increase functional independence with mobility by performin. Supine to sit with Modified [place]  2. Sit to supine with Modified [place]  3. Sit to stand transfer with Supervision  4. Gait  x 100 feet with Supervision using Rolling Walker.   5. Lower extremity exercise program x10 reps per handout, with supervision                      Time Tracking:     PT Received On: 19  PT Start Time: 941     PT Stop Time:   PT Total Time (min): 29 min     Billable Minutes: Gait Training 14 and Therapeutic Activity 15    Treatment  Type: Treatment  PT/PTA: PTA     PTA Visit Number: 1     Uday Mcwilliams, PTA  08/19/2019

## 2019-08-19 NOTE — PLAN OF CARE
Problem: Occupational Therapy Goal  Goal: Occupational Therapy Goal  Goals to be met by: 7 days (8/20/19)     Patient will increase functional independence with ADLs by performing:    UE Dressing with Supervision.  LE Dressing with Contact Guard Assistance.  Grooming while standing at sink with Contact Guard Assistance.  Toileting from toilet with Contact Guard Assistance for hygiene and clothing management.   Supine to sit with Supervision.  Toilet transfer to toilet with Stand-by Assistance.  Complete functional mobility household distance with Stand-by Assistance using AD as needed.     Outcome: Ongoing (interventions implemented as appropriate)  Patient's goals are appropriate.   FAITH Joseph  8/19/2019

## 2019-08-19 NOTE — PLAN OF CARE
08/19/19 0831   Post-Acute Status   Post-Acute Authorization Placement   Post-Acute Placement Status Referrals Sent   Discharge Delays (!) Patient and Family Barriers     Pt's family wanting other SNF choices, [facility] had accepted, [facility] to follow. Sw uploaded [facility] Pt's information, Sw following.

## 2019-08-19 NOTE — PLAN OF CARE
CM was informed by the patient's daughter, Serene Hi ([phone]), that the family does not want the patient to be admitted to [facility] & requested that additional referrals be sent to [facility] & AM. Additional referrals were also sent to [facility] over the weekend per the family's request.

## 2019-08-20 PROBLEM — I62.00 SUBDURAL HEMORRHAGE: Status: RESOLVED | Noted: 2019-08-12 | Resolved: 2019-08-20

## 2019-08-20 PROBLEM — I49.01 VENTRICULAR FIBRILLATION: Status: RESOLVED | Noted: 2019-03-29 | Resolved: 2019-08-20

## 2019-08-20 LAB
ALBUMIN SERPL BCP-MCNC: 3 G/DL (ref 3.5–5.2)
ALP SERPL-CCNC: 99 U/L (ref 55–135)
ALT SERPL W/O P-5'-P-CCNC: 8 U/L (ref 10–44)
ANION GAP SERPL CALC-SCNC: 10 MMOL/L (ref 8–16)
AST SERPL-CCNC: 12 U/L (ref 10–40)
BASOPHILS # BLD AUTO: 0.02 K/UL (ref 0–0.2)
BASOPHILS NFR BLD: 0.4 % (ref 0–1.9)
BILIRUB SERPL-MCNC: 0.5 MG/DL (ref 0.1–1)
BUN SERPL-MCNC: 39 MG/DL (ref 8–23)
CALCIUM SERPL-MCNC: 9.2 MG/DL (ref 8.7–10.5)
CHLORIDE SERPL-SCNC: 106 MMOL/L (ref 95–110)
CO2 SERPL-SCNC: 23 MMOL/L (ref 23–29)
CREAT SERPL-MCNC: 1.7 MG/DL (ref 0.5–1.4)
DIFFERENTIAL METHOD: ABNORMAL
EOSINOPHIL # BLD AUTO: 0.3 K/UL (ref 0–0.5)
EOSINOPHIL NFR BLD: 6.8 % (ref 0–8)
ERYTHROCYTE [DISTWIDTH] IN BLOOD BY AUTOMATED COUNT: 13.2 % (ref 11.5–14.5)
EST. GFR  (AFRICAN AMERICAN): 40.7 ML/MIN/1.73 M^2
EST. GFR  (NON AFRICAN AMERICAN): 35.2 ML/MIN/1.73 M^2
GLUCOSE SERPL-MCNC: 121 MG/DL (ref 70–110)
HCT VFR BLD AUTO: 39.3 % (ref 40–54)
HGB BLD-MCNC: 12 G/DL (ref 14–18)
IMM GRANULOCYTES # BLD AUTO: 0.05 K/UL (ref 0–0.04)
IMM GRANULOCYTES NFR BLD AUTO: 1 % (ref 0–0.5)
LYMPHOCYTES # BLD AUTO: 0.6 K/UL (ref 1–4.8)
LYMPHOCYTES NFR BLD: 13.2 % (ref 18–48)
MCH RBC QN AUTO: 32.7 PG (ref 27–31)
MCHC RBC AUTO-ENTMCNC: 30.5 G/DL (ref 32–36)
MCV RBC AUTO: 107 FL (ref 82–98)
MONOCYTES # BLD AUTO: 0.7 K/UL (ref 0.3–1)
MONOCYTES NFR BLD: 13.6 % (ref 4–15)
NEUTROPHILS # BLD AUTO: 3.2 K/UL (ref 1.8–7.7)
NEUTROPHILS NFR BLD: 65 % (ref 38–73)
NRBC BLD-RTO: 0 /100 WBC
PLATELET # BLD AUTO: 119 K/UL (ref 150–350)
PMV BLD AUTO: 9 FL (ref 9.2–12.9)
POTASSIUM SERPL-SCNC: 5.2 MMOL/L (ref 3.5–5.1)
PROT SERPL-MCNC: 5.9 G/DL (ref 6–8.4)
RBC # BLD AUTO: 3.67 M/UL (ref 4.6–6.2)
SODIUM SERPL-SCNC: 139 MMOL/L (ref 136–145)
WBC # BLD AUTO: 4.85 K/UL (ref 3.9–12.7)

## 2019-08-20 PROCEDURE — 25000003 PHARM REV CODE 250: Performed by: STUDENT IN AN ORGANIZED HEALTH CARE EDUCATION/TRAINING PROGRAM

## 2019-08-20 PROCEDURE — 99232 PR SUBSEQUENT HOSPITAL CARE,LEVL II: ICD-10-PCS | Mod: ,,, | Performed by: INTERNAL MEDICINE

## 2019-08-20 PROCEDURE — 36415 COLL VENOUS BLD VENIPUNCTURE: CPT

## 2019-08-20 PROCEDURE — 92507 TX SP LANG VOICE COMM INDIV: CPT

## 2019-08-20 PROCEDURE — 27000646 HC AEROBIKA DEVICE

## 2019-08-20 PROCEDURE — 11000001 HC ACUTE MED/SURG PRIVATE ROOM

## 2019-08-20 PROCEDURE — 97110 THERAPEUTIC EXERCISES: CPT

## 2019-08-20 PROCEDURE — 63600175 PHARM REV CODE 636 W HCPCS: Performed by: STUDENT IN AN ORGANIZED HEALTH CARE EDUCATION/TRAINING PROGRAM

## 2019-08-20 PROCEDURE — 25000003 PHARM REV CODE 250: Performed by: EMERGENCY MEDICINE

## 2019-08-20 PROCEDURE — 94664 DEMO&/EVAL PT USE INHALER: CPT

## 2019-08-20 PROCEDURE — 99232 SBSQ HOSP IP/OBS MODERATE 35: CPT | Mod: ,,, | Performed by: INTERNAL MEDICINE

## 2019-08-20 PROCEDURE — 25000003 PHARM REV CODE 250: Performed by: NURSE PRACTITIONER

## 2019-08-20 PROCEDURE — 99900035 HC TECH TIME PER 15 MIN (STAT)

## 2019-08-20 PROCEDURE — 80053 COMPREHEN METABOLIC PANEL: CPT

## 2019-08-20 PROCEDURE — 25000003 PHARM REV CODE 250: Performed by: INTERNAL MEDICINE

## 2019-08-20 PROCEDURE — 85025 COMPLETE CBC W/AUTO DIFF WBC: CPT

## 2019-08-20 PROCEDURE — 94660 CPAP INITIATION&MGMT: CPT

## 2019-08-20 PROCEDURE — 94761 N-INVAS EAR/PLS OXIMETRY MLT: CPT

## 2019-08-20 RX ORDER — MUPIROCIN 20 MG/G
OINTMENT TOPICAL 2 TIMES DAILY
Start: 2019-08-20 | End: 2020-05-01

## 2019-08-20 RX ORDER — FUROSEMIDE 20 MG/1
20 TABLET ORAL 2 TIMES DAILY WITH MEALS
Qty: 60 TABLET | Refills: 11 | Status: SHIPPED | OUTPATIENT
Start: 2019-08-20 | End: 2021-06-28 | Stop reason: SDUPTHER

## 2019-08-20 RX ADMIN — MEMANTINE HYDROCHLORIDE 5 MG: 5 TABLET ORAL at 09:08

## 2019-08-20 RX ADMIN — MIRTAZAPINE 15 MG: 15 TABLET, FILM COATED ORAL at 09:08

## 2019-08-20 RX ADMIN — SENNOSIDES,DOCUSATE SODIUM 1 TABLET: 8.6; 5 TABLET, FILM COATED ORAL at 09:08

## 2019-08-20 RX ADMIN — RANOLAZINE 1000 MG: 1000 TABLET, FILM COATED, EXTENDED RELEASE ORAL at 09:08

## 2019-08-20 RX ADMIN — MUPIROCIN: 20 OINTMENT TOPICAL at 09:08

## 2019-08-20 RX ADMIN — HEPARIN SODIUM 5000 UNITS: 5000 INJECTION INTRAVENOUS; SUBCUTANEOUS at 09:08

## 2019-08-20 RX ADMIN — CARVEDILOL 12.5 MG: 12.5 TABLET, FILM COATED ORAL at 09:08

## 2019-08-20 RX ADMIN — FUROSEMIDE 20 MG: 20 TABLET ORAL at 09:08

## 2019-08-20 RX ADMIN — TAMSULOSIN HYDROCHLORIDE 0.4 MG: 0.4 CAPSULE ORAL at 09:08

## 2019-08-20 RX ADMIN — LACOSAMIDE 50 MG: 50 TABLET, FILM COATED ORAL at 09:08

## 2019-08-20 RX ADMIN — HEPARIN SODIUM 5000 UNITS: 5000 INJECTION INTRAVENOUS; SUBCUTANEOUS at 06:08

## 2019-08-20 RX ADMIN — HEPARIN SODIUM 5000 UNITS: 5000 INJECTION INTRAVENOUS; SUBCUTANEOUS at 02:08

## 2019-08-20 RX ADMIN — SACUBITRIL AND VALSARTAN 1 TABLET: 24; 26 TABLET, FILM COATED ORAL at 09:08

## 2019-08-20 RX ADMIN — EZETIMIBE 10 MG: 10 TABLET ORAL at 09:08

## 2019-08-20 RX ADMIN — FUROSEMIDE 20 MG: 20 TABLET ORAL at 05:08

## 2019-08-20 NOTE — PT/OT/SLP PROGRESS
"Speech Language Pathology Treatment    Patient Name:  Reese Bell   MRN:  [id number]  Admitting Diagnosis: Subdural hematoma    Recommendations:                 General Recommendations:  Cognitive-linguistic therapy  Diet recommendations:  Regular, Liquid Diet Level: Thin   Aspiration Precautions: Small bites/sips and Standard aspiration precautions   General Precautions: Standard, fall, seizure, hearing impaired  Communication strategies:  pt is hard of hearing    Subjective     " I was an  for 26 years"  Patient goals: go home      Pain/Comfort:  · Pain Rating 1: 0/10  · Pain Rating Post-Intervention 1: 0/10    Objective:     Has the patient been evaluated by SLP for swallowing?   Yes  Keep patient NPO? No   Current Respiratory Status: nasal cannula      Pt seen bedside with his daughter present. Pt oriented to month and hospital but not year. Daughter reported pt would not have known that pta. Pt with good recall of personal information including where all of his children live and his past work history. Pt did perseverate on stating he was an . Pt responded to simple problem solving task with 25% acc and with cues 75% acc. Pt tired at the beginning of the session with eyes closed but did rouse toward the end and able to participate. Daughter reported pt very [place] and he cannot hear out of his left ear. Pt did not recall the recent death of his wife. Daughter reported pt has someone with him 24 hours a day at home. White board updated.     Assessment:     Reese Bell is a 88 y.o. male with an SLP diagnosis of Cognitive-Linguistic Impairment.  He presents with progress towards goals.    Goals:   Multidisciplinary Problems     SLP Goals        Problem: SLP Goal    Goal Priority Disciplines Outcome   SLP Goal     SLP Ongoing (interventions implemented as appropriate)   Description:  Speech Language Pathology Goals  Goals expected to be met by 8/27  1. Pt will tolerate regular " diet with thin liquids with strict aspiration precautions.   2. Pt will orient x4 given min cues.   3. Pt will complete simple problem solving with 90% accy indptly.   4. Pt will complete further assessment of simple recall and reasoning.                          Plan:     · Patient to be seen:  3 x/week   · Plan of Care expires:  09/12/19  · Plan of Care reviewed with:  patient, daughter   · SLP Follow-Up:  Yes       Discharge recommendations:  nursing facility, skilled       Time Tracking:     SLP Treatment Date:   08/20/19  Speech Start Time:  1047  Speech Stop Time:  1100     Speech Total Time (min):  13 min    Billable Minutes: Speech Therapy Individual 13    CORI Garrido, CCC-SLP  08/20/2019

## 2019-08-20 NOTE — PLAN OF CARE
"SYDNIE was informed by the patient's daughter, Mikala Reddy ([phone]), that [facility] has given insurance authorization for SNF placement [place] with admission tomorrow. Per "right care" [facility] not [facility]. CM informed Dr. Washington & DEYA Hnery of above.   "

## 2019-08-20 NOTE — PLAN OF CARE
CM informed the patient's daughter, Mikala Reddy ([phone]), via phone that [facility] submitted for insurance authorization from [facility] yesterday. Mikala verbalized understanding & agreement with discharge plan.

## 2019-08-20 NOTE — PROGRESS NOTES
"Hospital Medicine  Progress Note    Team: [facility] HOSP MED D Char Washington MD  Admit Date: 2019  LOUISE 2019  Length of Stay:  LOS: 8 days   Code status: Full Code    Principal Problem:  Subdural hematoma    HPI:   89 yo M, h/o CAD, MI x4 (STEMI in March, on ASA/Plavix), HTN, V-fib with ICD and pacemaker, A-fib on Eliquis, multiple TIAs, last one year ago, severe debility (uses walker and wheelchair at baseline), s/p fall last night and early this AM while trying to walk to bathroom. Has had 2-3 falls in past 2 days because "legs give out" which happens often but more frequently over past week. Also, He was recently prescribed Flexiril for shoulder pain. Family noticed  an increase in falls over past 3-4 days.  Of note,Spouse  within past 2 weeks.  Seen at [place] overnight, extensive work-up including CT head which showed small subdural hematoma.  Pt transferred to [facility] for NSGY eval. Pt neurologically stable, disorientation to time and place. No immediate neurosurgical intervention necessary at present.   Decision made to not give platelets. No reversal of anticoagulation. Repeat CTH after 6h revealed R tentorial SDH 1.2 cm thickness, stable, no increase or new bleed. CT C-spine showed no acute fractures; 1cm lucent lesion at C7; cervical spondylosis. Pt admitted to [facility] overnight for close neurological monitoring    Interval history:  Patient complains fatigue. Voiding better. Weaning O2.    Review of Systems   Constitutional: Positive for malaise/fatigue. Negative for fever.   Respiratory: Positive for shortness of breath.    Psychiatric/Behavioral: Positive for memory loss.       Physical Exam  Temp:  [97.3 °F (36.3 °C)-97.9 °F (36.6 °C)]   Pulse:  [67-80]   Resp:  [11-20]   BP: (116-148)/(56-67)   SpO2:  [91 %-100 %]     Intake/Output Summary (Last 24 hours) at 2019 1203  Last data filed at 2019 0600  Gross per 24 hour   Intake 300 ml   Output 500 ml   Net -200 ml     Physical Exam "   Constitutional:  Non-toxic appearance.   Eyes: Conjunctivae and lids are normal.   Cardiovascular: S1 normal and S2 normal.   Pulmonary/Chest: Effort normal. He has decreased breath sounds.   Abdominal: Soft. Bowel sounds are normal. There is no tenderness.   Musculoskeletal: He exhibits edema.   Neurological: He is alert.   Skin: Bruising noted. He is not diaphoretic.        Recent Labs   Lab 08/18/19  0410 08/19/19  0753 08/20/19  0416   WBC 4.63 4.89 4.85   HGB 11.8* 12.4* 12.0*   HCT 37.4* 40.1 39.3*   * 106* 119*     Recent Labs   Lab 08/14/19  0310  08/18/19  0410 08/19/19  0753 08/20/19  0413   *   < > 139 139 139   K 4.8   < > 5.2* 5.1 5.2*      < > 108 107 106   CO2 20*   < > 22* 24 23   BUN 30*   < > 40* 38* 39*   CREATININE 1.8*   < > 1.8* 1.8* 1.7*      < > 117* 113* 121*   CALCIUM 9.0   < > 8.5* 9.0 9.2   MG 2.1  --   --   --   --    PHOS 3.6  --   --   --   --     < > = values in this interval not displayed.     Recent Labs   Lab 08/18/19 0410 08/19/19 0753 08/20/19  0413   ALKPHOS 100 100 99   ALT 11 10 8*   AST 11 12 12   ALBUMIN 2.9* 3.1* 3.0*   PROT 5.8* 6.1 5.9*   BILITOT 0.3 0.4 0.5      Recent Labs   Lab 08/12/19  1420   HGBA1C 5.2       Scheduled Meds:   carvedilol  12.5 mg Oral BID    ezetimibe  10 mg Oral Daily    furosemide  20 mg Oral BID    heparin (porcine)  5,000 Units Subcutaneous Q8H    lacosamide  50 mg Oral Q12H    memantine  5 mg Oral BID    mirtazapine  15 mg Oral QHS    mupirocin   Topical (Top) BID    polyethylene glycol  17 g Oral Daily    ranolazine  1,000 mg Oral BID    rosuvastatin  10 mg Oral Every Mon, Wed, Fri    sacubitril-valsartan  1 tablet Oral BID    senna-docusate 8.6-50 mg  1 tablet Oral BID    tamsulosin  0.4 mg Oral Daily     Continuous Infusions:  As Needed:  acetaminophen, albuterol-ipratropium, labetalol, nitroGLYCERIN, ondansetron, sodium chloride 0.9%    Active Hospital Problems    Diagnosis  POA    *Subdural  hematoma [S06.5X9A]  Yes    Paroxysmal atrial fibrillation [I48.0]  Yes    Coronary artery disease involving native coronary artery of native heart without angina pectoris [I25.10]  Yes    Subdural hemorrhage [I62.00]  Yes    Brain compression [G93.5]  Yes    Ventricular fibrillation [I49.01]  Yes    Hyperlipidemia [E78.5]  Yes    Hypertension [I10]  Yes    Ischemic cardiomyopathy [I25.5]  Yes    Stage 3 chronic kidney disease [N18.3]  Yes    Dementia in Alzheimer's disease [G30.9, F02.80]  Yes    Impaired mobility and activities of daily living [Z74.09]  Yes    Sleep apnea [G47.30]  Yes    ASCVD (arteriosclerotic cardiovascular disease) [I25.10]  Yes    Chronic CHF [I50.9]  Yes    Benign prostatic hyperplasia [N40.0]  Yes      Resolved Hospital Problems   No resolved problems to display.       Overview of Hospital Course:  87 yo M with CAD, MI x4 (STEMI in March, on ASA/Plavix), HTN, V-fib with ICD and pacemaker, A-fib on Eliquis, multiple TIAs, debility (uses walker and wheelchair at baseline) admitted to [facility] with SDH s/p fall at home.  8/12 Admit to [facility]. Repeat CTH and C-spine. SBP goal <160. No platelets. Hold anticoagulation.  08/13/2019 CTH repeated overnight for agitation which demonstrated mildly enlarged bleed; this morning Mr Bell is neurologically back to baseline; CTH repeated at 11 AM shows stable bleed.   08/14/2019: ON given bolus, for hypotension; off precedex; more interactive; used home CPAP at night. PM CTH from yesterday with stable bleed. observe vitals until PM can be TTF after;   08/15/2019.  Patient was due to be transferred to telemetry floor.  However patient had an episode of bradycardia with HR in 20s.  EP was consulted.  Pacemaker was interrogated and patient was determined to have normal sinus rhythm. HR in 20s was likely misread. Cardiology was also consulted for risk versus benefit discussion of anticoagulation.  Cardiac device interrogated and no abnormality or  error found   Patient was on DAPT and Eliquis at time of injury. Family reports he fell while under supervision. Patient is high risk for falls and bleeding. Patient needs to restart a minimum of anti-platelet therapy; discontinuation of Plavix and Eliquis recommended by Cardiology.     Assessment and Plan:      Subdural hematoma  Admit to [facility] s/p fall x2, R SDH  8/12  Repeat CTH after 6h revealed R tentorial SDH 1.2 cm thickness, stable, no increase or new bleed.   NSGY consulted, no immediate neurosurgical  Intervention necessary at present  Neuro checks & VS q1h  SBPgoal <160  Hold anticoagulation and antiplatelets;   Should not be restarted on triple therapy because of high risk of bleeding  Vimpat for Sz PPx X 7 days - completed 8/20  PT/OT/SLP  CTH with stable bleed; started DVT PPx;     Ventricular fibrillation  Has AICD  Had an episode where monitor showed HR was in 20s; interrogation did not demonstrate the same, patient was asymptomatic; likely this was an artifact; Interrogation done by EP, no issues; had another episode - asymptomatic; no further intervention needed at this point.     Hypertension  SBP goal <160  Continue Coreg 12.5mg bid  Lasix 20mg daily; increased to BID on 8/18 as patient was taking at home.  sacubitril- valsartan 24-26 BID     Hyperlipidemia  Continue crestor 5mg every Monday and Wednesday  ezetimbe 10mg daily     History of STEMI (ST elevation myocardial infarction)  Hx of MI x4  Last MI 4/2019  Hx of multiple stents.  4 placed 4/2019  ASA and Plavix on hold for 48h due to SDH  Restart ASA once stable from bleeding perspective; do not recommend triple therapy given significant risk of bleeding  Cardiology recommends only ASA for antiplatelets/anticoags     Chronic Systolic  CHF  Continue sacubitril-valsartan 24-26mg bid  Coreg 12.5mg bid  Furosemide 20mg BID  Echo repeated,  Normal EF  BNP 8/18 low but patient short of breath; Lasix 20 mg IV x 1 and resume oral Lasix BID.  Remained  hypoxic; check CXR - no evidence of pulmonary edema  O2 weaned to RA     ASCVD (arteriosclerotic cardiovascular disease)  Continue ezetembe 10mg daily  Crestor 5mg every Monday, & Wednesday  Ranolazine SR 1000mg bid  NTG SL prn chest pain  ECG pending  Echo w normal EF  Held ASA, Plavix for 48h, due to SDH  Was on triple therapy - DAPT and Eliquis; holding all for now; Restart ASA at some point; Would not continue triple therapy given high risk of bleeding     Impaired mobility and activities of daily living  Dementia  PT/OT following  Concern for urinary retention; trial of bladder program, scheduled urination attempts, bladder scans , strait cath as needed. Improved.     Sleep apnea  Continue CPAP  QHS. Recorded setting is 16 on RA     High Risk Conditions  Patient has an abrupt change in neurologic status: Weakness and SDH     Diet:  Diet Adult Regular (IDDSI Level 7) [facility]; Cardiac (Low Na/Chol)  GI Prophylaxis: Not indicated  DVT Prophylaxis:     Anticoagulants   Medication Route Frequency    heparin (porcine) injection 5,000 Units Subcutaneous Q8H     Lines/ Drains/ Airways: PIV  Urinary Catheter Indicated: Patient Does Not Have Urinary Catheter  Other Lines/Tubes/Drains:  Wounds:    Goals of Care: Treatment Decisions and Advance Care Planning  Discharge plan:  Skilled Nursing Facility    Char Washington MD  Department of Hospital Medicine  [id number]

## 2019-08-20 NOTE — PLAN OF CARE
Teofilo did receive message with Lauren with PHN at  will provide auth to [facility], Sw will follow and updated staff when auth approved and facility ready for Pt.

## 2019-08-20 NOTE — PT/OT/SLP PROGRESS
Occupational Therapy      Patient Name:  Reese Bell   MRN:  [id number]    Patient not seen today secondary to refusing OOB activity and OT therapy services three attempts on this date  . Will follow-up next treatment session as scheduled.    FAITH Joseph  8/20/2019

## 2019-08-20 NOTE — PLAN OF CARE
08/20/19 0835   Post-Acute Status   Post-Acute Authorization Placement   Post-Acute Placement Status Referrals Sent

## 2019-08-20 NOTE — PLAN OF CARE
Problem: Occupational Therapy Goal  Goal: Occupational Therapy Goal  Goals to be met by: 7 days (8/20/19)     Patient will increase functional independence with ADLs by performing:    UE Dressing with Supervision.  LE Dressing with Contact Guard Assistance.  Grooming while standing at sink with Contact Guard Assistance.  Toileting from toilet with Contact Guard Assistance for hygiene and clothing management.   Supine to sit with Supervision.  Toilet transfer to toilet with Stand-by Assistance.  Complete functional mobility household distance with Stand-by Assistance using AD as needed.     Outcome: Ongoing (interventions implemented as appropriate)  Patient's goals are appropriate.   FAITH Joseph  8/20/2019

## 2019-08-20 NOTE — PLAN OF CARE
08/19/19 1917   Discharge Reassessment   Assessment Type Discharge Planning Reassessment   Discharge Plan A Skilled Nursing Facility   Discharge Plan B Home Health   DME Needed Upon Discharge  other (see comments)  (tbd)   Anticipated Discharge Disposition SNF   Can the patient answer the patient profile reliably? Yes, cognitively intact   How does the patient rate their overall health at the present time? Good   Describe the patient's ability to walk at the present time. Major restrictions/daily assistance from another person   How often would a person be available to care for the patient? Often   Number of comorbid conditions (as recorded on the chart) Five or more   Post-Acute Status   Post-Acute Authorization Placement   Post-Acute Placement Status Awaiting Internal Medical Clearance   Home Health/Hospice Status Awaiting Internal Medical Clearance

## 2019-08-21 VITALS
RESPIRATION RATE: 18 BRPM | TEMPERATURE: 98 F | BODY MASS INDEX: 32.71 KG/M2 | HEART RATE: 70 BPM | SYSTOLIC BLOOD PRESSURE: 153 MMHG | WEIGHT: 203.5 LBS | DIASTOLIC BLOOD PRESSURE: 72 MMHG | HEIGHT: 66 IN | OXYGEN SATURATION: 92 %

## 2019-08-21 LAB
ALBUMIN SERPL BCP-MCNC: 3.4 G/DL (ref 3.5–5.2)
ALP SERPL-CCNC: 108 U/L (ref 55–135)
ALT SERPL W/O P-5'-P-CCNC: 12 U/L (ref 10–44)
ANION GAP SERPL CALC-SCNC: 9 MMOL/L (ref 8–16)
AST SERPL-CCNC: 15 U/L (ref 10–40)
BASOPHILS # BLD AUTO: 0.02 K/UL (ref 0–0.2)
BASOPHILS NFR BLD: 0.4 % (ref 0–1.9)
BILIRUB SERPL-MCNC: 0.5 MG/DL (ref 0.1–1)
BUN SERPL-MCNC: 31 MG/DL (ref 8–23)
CALCIUM SERPL-MCNC: 9.2 MG/DL (ref 8.7–10.5)
CHLORIDE SERPL-SCNC: 103 MMOL/L (ref 95–110)
CO2 SERPL-SCNC: 25 MMOL/L (ref 23–29)
CREAT SERPL-MCNC: 1.5 MG/DL (ref 0.5–1.4)
DIFFERENTIAL METHOD: ABNORMAL
EOSINOPHIL # BLD AUTO: 0.4 K/UL (ref 0–0.5)
EOSINOPHIL NFR BLD: 7.8 % (ref 0–8)
ERYTHROCYTE [DISTWIDTH] IN BLOOD BY AUTOMATED COUNT: 13.1 % (ref 11.5–14.5)
EST. GFR  (AFRICAN AMERICAN): 47.4 ML/MIN/1.73 M^2
EST. GFR  (NON AFRICAN AMERICAN): 41 ML/MIN/1.73 M^2
GLUCOSE SERPL-MCNC: 115 MG/DL (ref 70–110)
HCT VFR BLD AUTO: 41.7 % (ref 40–54)
HGB BLD-MCNC: 12.9 G/DL (ref 14–18)
IMM GRANULOCYTES # BLD AUTO: 0.08 K/UL (ref 0–0.04)
IMM GRANULOCYTES NFR BLD AUTO: 1.6 % (ref 0–0.5)
LYMPHOCYTES # BLD AUTO: 0.6 K/UL (ref 1–4.8)
LYMPHOCYTES NFR BLD: 12.8 % (ref 18–48)
MCH RBC QN AUTO: 32.8 PG (ref 27–31)
MCHC RBC AUTO-ENTMCNC: 30.9 G/DL (ref 32–36)
MCV RBC AUTO: 106 FL (ref 82–98)
MONOCYTES # BLD AUTO: 0.6 K/UL (ref 0.3–1)
MONOCYTES NFR BLD: 11 % (ref 4–15)
NEUTROPHILS # BLD AUTO: 3.3 K/UL (ref 1.8–7.7)
NEUTROPHILS NFR BLD: 66.4 % (ref 38–73)
NRBC BLD-RTO: 0 /100 WBC
PLATELET # BLD AUTO: 122 K/UL (ref 150–350)
PMV BLD AUTO: 9 FL (ref 9.2–12.9)
POTASSIUM SERPL-SCNC: 4.7 MMOL/L (ref 3.5–5.1)
PROT SERPL-MCNC: 6.5 G/DL (ref 6–8.4)
RBC # BLD AUTO: 3.93 M/UL (ref 4.6–6.2)
SODIUM SERPL-SCNC: 137 MMOL/L (ref 136–145)
WBC # BLD AUTO: 4.99 K/UL (ref 3.9–12.7)

## 2019-08-21 PROCEDURE — 94660 CPAP INITIATION&MGMT: CPT

## 2019-08-21 PROCEDURE — 25000003 PHARM REV CODE 250: Performed by: EMERGENCY MEDICINE

## 2019-08-21 PROCEDURE — 94761 N-INVAS EAR/PLS OXIMETRY MLT: CPT

## 2019-08-21 PROCEDURE — 85025 COMPLETE CBC W/AUTO DIFF WBC: CPT

## 2019-08-21 PROCEDURE — 36415 COLL VENOUS BLD VENIPUNCTURE: CPT

## 2019-08-21 PROCEDURE — 99239 HOSP IP/OBS DSCHRG MGMT >30: CPT | Mod: ,,, | Performed by: INTERNAL MEDICINE

## 2019-08-21 PROCEDURE — 25000003 PHARM REV CODE 250: Performed by: NURSE PRACTITIONER

## 2019-08-21 PROCEDURE — 97535 SELF CARE MNGMENT TRAINING: CPT

## 2019-08-21 PROCEDURE — 25000003 PHARM REV CODE 250: Performed by: INTERNAL MEDICINE

## 2019-08-21 PROCEDURE — 97803 MED NUTRITION INDIV SUBSEQ: CPT

## 2019-08-21 PROCEDURE — 63600175 PHARM REV CODE 636 W HCPCS: Performed by: STUDENT IN AN ORGANIZED HEALTH CARE EDUCATION/TRAINING PROGRAM

## 2019-08-21 PROCEDURE — 99900035 HC TECH TIME PER 15 MIN (STAT)

## 2019-08-21 PROCEDURE — 94664 DEMO&/EVAL PT USE INHALER: CPT

## 2019-08-21 PROCEDURE — 80053 COMPREHEN METABOLIC PANEL: CPT

## 2019-08-21 PROCEDURE — 27000221 HC OXYGEN, UP TO 24 HOURS

## 2019-08-21 PROCEDURE — 99239 PR HOSPITAL DISCHARGE DAY,>30 MIN: ICD-10-PCS | Mod: ,,, | Performed by: INTERNAL MEDICINE

## 2019-08-21 RX ADMIN — SENNOSIDES,DOCUSATE SODIUM 1 TABLET: 8.6; 5 TABLET, FILM COATED ORAL at 10:08

## 2019-08-21 RX ADMIN — RANOLAZINE 1000 MG: 1000 TABLET, FILM COATED, EXTENDED RELEASE ORAL at 10:08

## 2019-08-21 RX ADMIN — TAMSULOSIN HYDROCHLORIDE 0.4 MG: 0.4 CAPSULE ORAL at 10:08

## 2019-08-21 RX ADMIN — MUPIROCIN: 20 OINTMENT TOPICAL at 10:08

## 2019-08-21 RX ADMIN — FUROSEMIDE 20 MG: 20 TABLET ORAL at 10:08

## 2019-08-21 RX ADMIN — MEMANTINE HYDROCHLORIDE 5 MG: 5 TABLET ORAL at 10:08

## 2019-08-21 RX ADMIN — ROSUVASTATIN CALCIUM 10 MG: 10 TABLET, FILM COATED ORAL at 10:08

## 2019-08-21 RX ADMIN — HEPARIN SODIUM 5000 UNITS: 5000 INJECTION INTRAVENOUS; SUBCUTANEOUS at 05:08

## 2019-08-21 RX ADMIN — SACUBITRIL AND VALSARTAN 1 TABLET: 24; 26 TABLET, FILM COATED ORAL at 10:08

## 2019-08-21 RX ADMIN — EZETIMIBE 10 MG: 10 TABLET ORAL at 10:08

## 2019-08-21 RX ADMIN — CARVEDILOL 12.5 MG: 12.5 TABLET, FILM COATED ORAL at 10:08

## 2019-08-21 NOTE — PROGRESS NOTES
"[facility]  Adult Nutrition  Progress Note    SUMMARY       Recommendations  Recommendation/Intervention:   1. Encourage continued good PO intake.   2. If PO intake decreases, recommend adding Boost Plus OS to all meals.   RD to monitor.    Goals: Pt to continue tolerating >85% EEN and EPN during admission  Nutrition Goal Status: goal met  Communication of RD Recs: reviewed with RN    Reason for Assessment  Reason For Assessment: RD follow-up  Diagnosis: other (see comments)(SDH)  Relevant Medical History: CAD, HTN, v fib, a fib  Interdisciplinary Rounds: did not attend  General Information Comments: Patient with AMS/disoriented. Patient still with very good PO intake. Physical exam remains unchanged, continues to appear nourished.  Nutrition Discharge Planning: adequate po intake for optimal nutrition    Nutrition Risk Screen  Nutrition Risk Screen: no indicators present    Nutrition/Diet History  Spiritual, Cultural Beliefs, Moravian Practices, Values that Affect Care: no  Factors Affecting Nutritional Intake: None identified at this time    Anthropometrics  Temp: 97.6 °F (36.4 °C)  Height Method: Stated  Height: 5' 6" (167.6 cm)  Height (inches): 66 in  Weight Method: Bed Scale  Weight: 92.3 kg (203 lb 7.8 oz)  Weight (lb): 203.49 lb  Ideal Body Weight (IBW), Male: 142 lb  % Ideal Body Weight, Male (lb): 147.18 lb  BMI (Calculated): 33.8  BMI Grade: 35 - 39.9 - obesity - grade II    Lab/Procedures/Meds  Pertinent Labs Reviewed: reviewed  Pertinent Labs Comments: BUN 31, Creat 1.5, Glu 115, Alb 3.4  Pertinent Medications Reviewed: reviewed  Pertinent Medications Comments: lasix, senna-docusate    Estimated/Assessed Needs  Weight Used For Calorie Calculations: 92.3 kg (203 lb 7.8 oz)  Energy Calorie Requirements (kcal): 1920 kcal/day  Energy Need Method: Moore-St Jeor(x 1.25)  Protein Requirements:  g/day(1.0-1.2 g/kg)  Weight Used For Protein Calculations: 92.3 kg (203 lb 7.8 " oz)  Fluid Requirements (mL): 1mL/kcal or per MD  Estimated Fluid Requirement Method: RDA Method  RDA Method (mL): 1920    Nutrition Prescription Ordered  Current Diet Order: Cardiac    Evaluation of Received Nutrient/Fluid Intake  I/O: -3.9L since admit  Comments: LBM 8/20  % Intake of Estimated Energy Needs: 75 - 100 %  % Meal Intake: 75 - 100 %    Nutrition Risk  Level of Risk/Frequency of Follow-up: low(f/u 1x/week)     Assessment and Plan  No nutrition problem identified at this time.    Monitor and Evaluation  Food and Nutrient Intake: energy intake, food and beverage intake  Food and Nutrient Adminstration: diet order  Anthropometric Measurements: weight, weight change, body mass index  Biochemical Data, Medical Tests and Procedures: electrolyte and renal panel, gastrointestinal profile, glucose/endocrine profile, inflammatory profile, lipid profile  Nutrition-Focused Physical Findings: overall appearance     Nutrition Follow-Up  RD Follow-up?: Yes

## 2019-08-21 NOTE — PLAN OF CARE
Disc with all imaging done during the patient's hospitalization hand delivered to the patient's son, Jian Bell ([phone]), at the patient's bedside. Plan to discharge patient to [facility] today. Jian requested that the patient's discharge summary be faxed to his interventional cardiologist, Dr. Mason De La Cruz, in [facility]

## 2019-08-21 NOTE — PT/OT/SLP PROGRESS
Occupational Therapy   Treatment    Name: Reese Bell  MRN: [id number]  Admitting Diagnosis:  Subdural hematoma       Recommendations:     Discharge Recommendations: nursing facility, skilled  Discharge Equipment Recommendations:  walker, rolling  Barriers to discharge:  None    Assessment:     Reese Bell is a 88 y.o. male with a medical diagnosis of Subdural hematoma.  He presents with Performance deficits affecting function are weakness, impaired endurance, impaired self care skills, impaired functional mobilty, gait instability, impaired balance, decreased lower extremity function, impaired cardiopulmonary response to activity.     Rehab Prognosis:  Good; patient would benefit from acute skilled OT services to address these deficits and reach maximum level of function.       Plan:     Patient to be seen 4 x/week to address the above listed problems via self-care/home management, therapeutic activities, therapeutic exercises  · Plan of Care Expires: 09/13/19  · Plan of Care Reviewed with: patient, family    Subjective     Pain/Comfort:  · Pain Rating 1: 0/10  · Pain Rating Post-Intervention 1: 0/10    Objective:     Communicated with: Patient prior to session.  Patient found up in chair with telemetry upon OT entry to room.    General Precautions: Standard, fall, seizure, hearing impaired   Orthopedic Precautions:N/A   Braces: N/A     Occupational Performance:     Bed Mobility:    ·     Functional Mobility/Transfers:  · Patient completed Sit <> Stand Transfer with stand by assistance with rolling walker and grab bar by toilet  · Patient completed Toilet Transfer bedside chair<>toilet with stand by assistance with use of grab bars and RW    Activities of Daily Living:  · Grooming: washing/drying hands standing at sink with supervision and rolling walker  · LE dressing: Total A for doffing and donning socks  · UE dressing: SBA [place] and donning front gown.   · Toileting: SBA (simulating task) Patient  did not perform toileting during OT session as he performed task prior to OT session.    [facility] 6 Click ADL: 17    Treatment & Education:  ADL retraining  Functional mobility  Safety education with RW during sit<>stands and transfers    Patient left up in chair with Education:  all needs met (family present).    GOALS:   Multidisciplinary Problems     Occupational Therapy Goals        Problem: Occupational Therapy Goal    Goal Priority Disciplines Outcome Interventions   Occupational Therapy Goal     OT, PT/OT Ongoing (interventions implemented as appropriate)    Description:  Goals to be met by: 7 days (8/28/19)     Patient will increase functional independence with ADLs by performing:    UE Dressing with Supervision.  LE Dressing with Contact Guard Assistance.  Grooming while standing at sink with Contact Guard Assistance. Met   Toileting from toilet with Contact Guard Assistance for hygiene and clothing management.   Supine to sit with Supervision.  Toilet transfer to toilet with Stand-by Assistance. Met  Complete functional mobility household distance with Stand-by Assistance using AD as needed.                       Time Tracking:     OT Date of Treatment: 08/21/19  OT Start Time: 0845  OT Stop Time: 0855  OT Total Time (min): 10 min    Billable Minutes:Self Care/Home Management 10 minutes    ERIC Goldberg  8/21/2019    I certify that I was present in the room directing the student in service delivery and guiding them using my skilled judgment. As the co-signing therapist I have reviewed the students documentation and am responsible for the treatment, assessment, and plan.     FAITH Joseph  8/21/2019

## 2019-08-21 NOTE — PLAN OF CARE
Teofilo [facility] nurse aware and will need to call report to , packet with Pt's disc and paperwork to be transported with Pt, family aware. Teofilo was updated by Aniya with [facility] at [id number] that [facility] will pickup Pt at 1245pm, family may consider transport, Sw to follow.

## 2019-08-21 NOTE — PLAN OF CARE
Problem: Occupational Therapy Goal  Goal: Occupational Therapy Goal  Goals to be met by: 7 days (8/28/19)     Patient will increase functional independence with ADLs by performing:    UE Dressing with Supervision.  LE Dressing with Contact Guard Assistance.  Grooming while standing at sink with Contact Guard Assistance. Met   Toileting from toilet with Contact Guard Assistance for hygiene and clothing management.   Supine to sit with Supervision.  Toilet transfer to toilet with Stand-by Assistance. Met  Complete functional mobility household distance with Stand-by Assistance using AD as needed.     Outcome: Ongoing (interventions implemented as appropriate)  Patient's goals remain appropriate. Goals extended in case patient does not discharge on this date. Patient is progressing.  FAITH Joseph  8/21/2019

## 2019-08-21 NOTE — PLAN OF CARE
Problem: Adult Inpatient Plan of Care  Goal: Plan of Care Review  Outcome: Ongoing (interventions implemented as appropriate)     08/21/19 1144   Plan of Care Review   Plan of Care Reviewed With patient;family   Progress no change   Pt oriented to self only, able to state that he is in a hospital but unsure of what city or state he is in, remains calm and cooperative throughout shift. Family at bedside. Pt voiding in BR w/o difficulty. Neuros Q4H. Delirium precautions maintained overnight. Possible d/c to rehab today. Safety maintained throughout shift.

## 2019-08-21 NOTE — PLAN OF CARE
[facility]     Department of Hospital Medicine     [address] [place] [address]     [phone] after hours  [phone] fax      08/21/2019    Admit to  Rehab:                                                     Diagnoses:  Active Hospital Problems    Diagnosis  POA    *Subdural hematoma [S06.5X9A]  Yes    Paroxysmal atrial fibrillation [I48.0]  Yes    Coronary artery disease involving native coronary artery of native heart without angina pectoris [I25.10]  Yes    Hyperlipidemia [E78.5]  Yes    Hypertension [I10]  Yes    Ischemic cardiomyopathy [I25.5]  Yes    Stage 3 chronic kidney disease [N18.3]  Yes    Dementia in Alzheimer's disease [G30.9, F02.80]  Yes    Impaired mobility and activities of daily living [Z74.09]  Yes    Sleep apnea [G47.30]  Yes    ASCVD (arteriosclerotic cardiovascular disease) [I25.10]  Yes    Chronic CHF [I50.9]  Yes    Benign prostatic hyperplasia [N40.0]  Yes      Resolved Hospital Problems    Diagnosis Date Resolved POA    Subdural hemorrhage [I62.00] 08/20/2019 Yes    Brain compression [G93.5] 08/20/2019 Yes    Ventricular fibrillation [I49.01] 08/20/2019 Yes       Patient is homebound due to:  Subdural hematoma    Allergies:Review of patient's allergies indicates:  No Known Allergies    Vitals:   Routine    Diet:   Cardiac (Low Na/Chol) + Boost Plus    Acitivities:     - Up in a chair each morning as tolerated   - Ambulate with assistance to bathroom   - May use walker    LABS:  Per facility protocol    Nursing Precautions:     - Aspiration precautions:             -  Assistance with meals            -  Upright 90 degrees before, during and after meals    - Fall precautions per protocol   - Seizure precaution per protocol   - Decubitus precautions:        -  for positioning   - Pressure reducing foam mattress   - Turn patient every two hours. Use wedge pillows to anchor patient    CONSULTS:         Physical Therapy to evaluate and treat     Occupational Therapy to evaluate and treat     Speech Therapy  to evaluate and treat     Nutrition to evaluate and recommend diet      MISCELLANEOUS CARE:               CPAP EP 16 QHS     Routine Skin Care:  Apply moisture barrier cream to all    skin folds and wet areas in perineal area daily and after baths and                           all bowel movements.      Medications: Discontinue all previous medication orders, if any. See new list below.    carvedilol (COREG) 25 MG tablet  Take 12.5 mg by mouth 2 (two) times daily with meals.      CRESTOR 5 mg tablet  10 mg. Take 1 tablet on Monday and 1 on Wednesdays     ezetimibe (ZETIA) 10 mg tablet  Take 1 tablet (10 mg total) by mouth daily      furosemide (LASIX) 20 MG tablet  Take 1 tablet (20 mg total) by mouth 2 (two) times daily with meals.     memantine (NAMENDA) 5 MG Tab  TAKE 1 TABLET (5 MG TOTAL) BY MOUTH 2 (TWO) TIMES DAILY.     mirtazapine (REMERON) 15 MG tablet  Take 1 tablet (15 mg total) by mouth every evening.     mupirocin (BACTROBAN) 2 % ointment  Apply topically 2 (two) times daily between 1st and 2nd toes     nitroGLYCERIN (NITROSTAT) 0.4 MG SL tablet  TAKE 1 TABLET SUBLINGUALLY EVERY 5 MINS X 3 AS NEEDED FOR CHEST PAIN     RANEXA 1,000 mg Tb12  Take 1,000 mg by mouth 2 (two) times daily.      sacubitril-valsartan (ENTRESTO) 24-26 mg per tablet  Take 1 tablet by mouth 2 (two) times daily.     tamsulosin (FLOMAX) 0.4 mg Cap  CAN TAKE ONE AT BEDTIME FOR BLADDER & PROSTATE TO EASE URINATION         _________________________________  Char Washington MD  08/21/2019

## 2019-08-21 NOTE — PLAN OF CARE
Problem: Fall Injury Risk  Goal: Absence of Fall and Fall-Related Injury  Outcome: Ongoing (interventions implemented as appropriate)  Meds given as  Ordered tolerated well. No complaitns o pain. No signs or symptoms of distress/discomfort noted. Vitals stable. Will continue to monitor.

## 2019-08-21 NOTE — DISCHARGE SUMMARY
"Discharge Summary  Hospital Medicine    Patient Name:   Reese Bell  MRN:   [id number]  Attending Provider on Discharge: Char Washington MD  Hospital Medicine Team: [facility] [place] [place] HOSP MED D  Date of Admission:  2019     Date of Discharge:  2019 12:46 PM  Code status:  Full Code    Active Hospital Problems    Diagnosis  POA    *Subdural hematoma [S06.5X9A]  Yes    Paroxysmal atrial fibrillation [I48.0]  Yes    Coronary artery disease involving native coronary artery of native heart without angina pectoris [I25.10]  Yes    Hyperlipidemia [E78.5]  Yes    Hypertension [I10]  Yes    Ischemic cardiomyopathy [I25.5]  Yes    Stage 3 chronic kidney disease [N18.3]  Yes    Dementia in Alzheimer's disease [G30.9, F02.80]  Yes    Impaired mobility and activities of daily living [Z74.09]  Yes    Sleep apnea [G47.30]  Yes    ASCVD (arteriosclerotic cardiovascular disease) [I25.10]  Yes    Chronic CHF [I50.9]  Yes    Benign prostatic hyperplasia [N40.0]  Yes      Resolved Hospital Problems    Diagnosis Date Resolved POA    Subdural hemorrhage [I62.00] 2019 Yes    Brain compression [G93.5] 2019 Yes    Ventricular fibrillation [I49.01] 2019 Yes        HPI: 87 yo M, h/o CAD, MI x4 (STEMI in March, on ASA/Plavix), HTN, V-fib with ICD and pacemaker, A-fib on Eliquis, multiple TIAs, last one year ago, severe debility (uses walker and wheelchair at baseline), s/p fall last night and early this AM while trying to walk to bathroom. Has had 2-3 falls in past 2 days because "legs give out" which happens often but more frequently over past week. Also, He was recently prescribed Flexiril for shoulder pain. Family noticed  an increase in falls over past 3-4 days.  Of note, Spouse  within past 2 weeks.  Seen at [facility] overnight, extensive work-up including CT head which showed small subdural hematoma.  Pt transferred to [facility] [place] [place] for NSGY eval. Pt neurologically stable, disorientation to time and place. No " immediate neurosurgical intervention necessary at present.   Decision made to not give platelets. No reversal of anticoagulation. Repeat CTH after 6h revealed R tentorial SDH 1.2 cm thickness, stable, no increase or new bleed. CT C-spine showed no acute fractures; 1cm lucent lesion at C7; cervical spondylosis. Pt admitted to [facility] overnight for close neurological monitoring     Hospital Course:  Patient with CAD, MI x4 (STEMI in March, on ASA/Plavix), HTN, V-fib with ICD and pacemaker, A-fib on Eliquis, multiple TIAs, debility (uses walker and wheelchair at baseline) admitted to [facility] with SDH s/p fall at home.  8/12 Admit to [facility]. Repeat CTH and C-spine. SBP goal <160. No platelets. Hold anticoagulation.  08/13/2019 CTH repeated overnight for agitation which demonstrated mildly enlarged bleed; this morning Mr Bell is neurologically back to baseline; CTH repeated at 11 AM shows stable bleed.   08/14/2019: ON given bolus, for hypotension; off precedex; more interactive; used home CPAP at night. PM CTH from yesterday with stable bleed. observe vitals until PM can be TTF after;   08/15/2019.  Patient was due to be transferred to telemetry floor.  However patient had an episode of bradycardia with HR in 20s. EP was consulted.  Pacemaker was interrogated and patient was found to have normal sinus rhythm. HR in 20s was likely misread. Cardiology was also consulted for risk versus benefit discussion of anticoagulation.  Cardiac device interrogated and no abnormality or error found   Patient was on DAPT and Eliquis at time of injury. Family reports he fell while under supervision. Patient is high risk for falls and bleeding. Patient needs to restart a minimum of anti-platelet therapy; discontinuation of Plavix and Eliquis recommended by Cardiology.      Subdural hematoma  Admit to [facility] s/p fall x2, R SDH  8/12  Repeat CTH after 6h revealed R tentorial SDH 1.2 cm thickness, stable, no increase or new bleed.   NSGY consulted,  no immediate neurosurgical  Intervention necessary at present  Neuro checks & VS q1h  SBPgoal <160  Hold anticoagulation and antiplatelets;   Should not be restarted on triple therapy because of high risk of bleeding  Vimpat for Sz PPx X 7 days - completed 8/20  PT/OT/SLP  CTH with stable bleed; started DVT prophylaxis     Ventricular fibrillation  Has AICD  Had an episode where monitor showed HR was in 20s; interrogation did not demonstrate the same, patient was asymptomatic; likely this was an artifact; Interrogation done by EP, no issues; had another episode - asymptomatic; no further intervention needed at this point.     Hypertension  SBP goal <160  Continue Coreg 12.5mg bid  Lasix 20mg daily; increased to BID on 8/18 as patient was taking at home.  sacubitril- valsartan 24-26 BID     Hyperlipidemia  Continue crestor 5mg every Monday and Wednesday  ezetimbe 10mg daily     History of STEMI (ST elevation myocardial infarction)  Hx of MI x4  Last MI 4/2019  Hx of multiple stents.  4 placed 4/2019  ASA and Plavix on hold for 48h due to SDH  Restart ASA once stable from bleeding perspective; do not recommend triple therapy given significant risk of bleeding  Cardiology recommends only ASA for antiplatelets/anticoags     Chronic Systolic  CHF  Continue sacubitril-valsartan 24-26mg bid  Coreg 12.5mg bid  Furosemide 20mg BID  Echo repeated,  Normal EF  BNP 8/18 low but patient short of breath; Lasix 20 mg IV x 1 and resume oral Lasix BID.  Remained hypoxic; check CXR - no evidence of pulmonary edema  O2 weaned to RA     ASCVD (arteriosclerotic cardiovascular disease)  Continue ezetembe 10mg daily  Crestor 5mg every Monday, & Wednesday  Ranolazine SR 1000mg bid  NTG SL prn chest pain  ECG pending  Echo w normal EF  Held ASA, Plavix for 48h, due to SDH  Was on triple therapy - DAPT and Eliquis; holding all for now; Restart ASA at some point; Would not continue triple therapy given high risk of bleeding. Follow up with  his Cardiologist as OP to discuss resumption of ASA.     Impaired mobility and activities of daily living  Dementia  PT/OT following  Concern for urinary retention; trial of bladder program, scheduled urination attempts, bladder scans , strait cath as needed. Improved.     Sleep apnea  Continue CPAP  QHS. Recorded setting is 16 on RA       Recent Labs   Lab 08/19/19 0753 08/20/19  0416 08/21/19  0313   WBC 4.89 4.85 4.99   HGB 12.4* 12.0* 12.9*   HCT 40.1 39.3* 41.7   * 119* 122*     Recent Labs   Lab 08/19/19  0753 08/20/19  0413 08/21/19  0313    139 137   K 5.1 5.2* 4.7    106 103   CO2 24 23 25   BUN 38* 39* 31*   CREATININE 1.8* 1.7* 1.5*   * 121* 115*   CALCIUM 9.0 9.2 9.2     Recent Labs   Lab 08/19/19 0753 08/20/19  0413 08/21/19  0313   ALKPHOS 100 99 108   ALT 10 8* 12   AST 12 12 15   ALBUMIN 3.1* 3.0* 3.4*   PROT 6.1 5.9* 6.5   BILITOT 0.4 0.5 0.5      Recent Labs   Lab 08/15/19  1213   POCTGLUCOSE 130*        Procedures: none    Consultants:   Consults (From admission, onward)        Status Ordering Provider     Inpatient consult to Cardiology  Once     Provider:  (Not yet assigned)    Completed MISSAEL REYNA     Inpatient consult to Neuro Critical Care  Once     Provider:  (Not yet assigned)    Acknowledged ROSLYN RANDALL     Inpatient consult to Neurosurgery  Once     Provider:  (Not yet assigned)    Completed ROSLYN RANDALL     Inpatient consult to Physical Medicine Rehab  Once     Provider:  (Not yet assigned)    Completed MIKA ARIZMENDI     Inpatient consult to Registered Dietitian/Nutritionist  Once     Provider:  (Not yet assigned)    Completed MIKA ARIZMENDI     Inpatient consult to Vascular (Stroke) Neurology  Once     Provider:  (Not yet assigned)    Completed MIKA ARIZMENDI     IP consult to case management/social work  Once     Provider:  (Not yet assigned)    Completed MIKA ARIZMENDI          Medications:    Current Discharge Medication List       CONTINUE these medications which have CHANGED    Details   furosemide (LASIX) 20 MG tablet Take 1 tablet (20 mg total) by mouth 2 (two) times daily with meals.  Qty: 60 tablet, Refills: 11      mupirocin (BACTROBAN) 2 % ointment Apply topically 2 (two) times daily. Between 1st and 2nd toes         CONTINUE these medications which have NOT CHANGED    Details   carvedilol (COREG) 25 MG tablet Take 12.5 mg by mouth 2 (two) times daily with meals.       CRESTOR 5 mg tablet 10 mg. Take 1 tablet on Monday and 1 on Wednesdays      ezetimibe (ZETIA) 10 mg tablet       memantine (NAMENDA) 5 MG Tab TAKE 1 TABLET (5 MG TOTAL) BY MOUTH 2 (TWO) TIMES DAILY.  Qty: 180 tablet, Refills: 3    Associated Diagnoses: Dementia in Alzheimer's disease      mirtazapine (REMERON) 15 MG tablet Take 1 tablet (15 mg total) by mouth every evening.  Qty: 30 tablet, Refills: 11      sacubitril-valsartan (ENTRESTO) 24-26 mg per tablet Take 1 tablet by mouth 2 (two) times daily.      tamsulosin (FLOMAX) 0.4 mg Cap CAN TAKE ONE AT BEDTIME FOR BLADDER & PROSTATE TO EASE URINATION  Qty: 90 capsule, Refills: 3    Associated Diagnoses: Benign nodular prostatic hyperplasia without lower urinary tract symptoms      nitroGLYCERIN (NITROSTAT) 0.4 MG SL tablet TAKE 1 TABLET SUBLINGUALLY EVERY 5 MINS X 3 AS NEEDED FOR CHEST PAIN  Refills: 2      RANEXA 1,000 mg Tb12 Take 1,000 mg by mouth 2 (two) times daily.          STOP taking these medications       aspirin (ECOTRIN) 81 MG EC tablet Comments:   Reason for Stopping:         clopidogrel (PLAVIX) 75 mg tablet Comments:   Reason for Stopping:         cyclobenzaprine (FLEXERIL) 10 MG tablet Comments:   Reason for Stopping:         HYDROcodone-acetaminophen (NORCO) 5-325 mg per tablet Comments:   Reason for Stopping:         walker Misc Comments:   Reason for Stopping:         apixaban (ELIQUIS) 2.5 mg Tab Comments:   Reason for Stopping:               Discharge Instructions:  Discharge Procedure Orders    Diet Cardiac     Notify your health care provider if you experience any of the following:  temperature >100.4     Notify your health care provider if you experience any of the following:  persistent nausea and vomiting or diarrhea     Notify your health care provider if you experience any of the following:  difficulty breathing or increased cough     Notify your health care provider if you experience any of the following:  persistent dizziness, light-headedness, or visual disturbances     Notify your health care provider if you experience any of the following:  increased confusion or weakness     Activity as tolerated       Discharge Condition: stable    Disposition: [facility]    Indwelling Lines/Drains at time of discharge: none     Tests pending at the time of discharge: none      Time spent on the discharge of the patient including review of hospital course with the patient, reviewing discharge medications and arranging follow-up care: 50 minutes.    Discharge examination Patient was seen and examined on 8/21/2019 and determined to be suitable for discharge.    Discharge plan and follow up:  Follow-up Information     Oracio Johnson MD On 9/5/2019.    Specialty:  Family Medicine  Why:  at 11:00 AM; hospital follow up appointment  Contact information:  [address] [place] [address] [address]azrao [address]TO [id number]  [phone]         Cardiologist. Schedule an appointment as soon as possible for a visit in 1 week.    Why:  For discharge from hospital follow up               Future Appointments   Date Time Provider Department Center   9/5/2019 11:00 AM Oracio Johnson MD [facility]       Provider  Char Washington MD  Department of Hospital Medicine  [facility]

## 2019-08-22 NOTE — PT/OT/SLP DISCHARGE
Occupational Therapy Discharge Summary    Reese Bell  MRN: [id number]   Principal Problem: Subdural hematoma      Patient Discharged from acute Occupational Therapy on 8/21/2019.  Please refer to prior OT note dated 8/21/2019 for functional status.    Assessment:      Patient has not met goals.    Objective:     GOALS:   Multidisciplinary Problems     Occupational Therapy Goals        Problem: Occupational Therapy Goal    Goal Priority Disciplines Outcome Interventions   Occupational Therapy Goal     OT, PT/OT Ongoing (interventions implemented as appropriate)    Description:  Goals to be met by: 7 days (8/28/19)     Patient will increase functional independence with ADLs by performing:    UE Dressing with Supervision.  LE Dressing with Contact Guard Assistance.  Grooming while standing at sink with Contact Guard Assistance. Met   Toileting from toilet with Contact Guard Assistance for hygiene and clothing management.   Supine to sit with Supervision.  Toilet transfer to toilet with Stand-by Assistance. Met  Complete functional mobility household distance with Stand-by Assistance using AD as needed.                       Reasons for Discontinuation of Therapy Services  Transfer to alternate level of care.      Plan:     Patient Discharged to: [facility]    FAITH Joseph  8/22/2019

## 2019-08-22 NOTE — PLAN OF CARE
08/22/19 0731   Final Note   Assessment Type Final Discharge Note     Patient discharged to [facility] on 8/21/19. Discharge summary faxed to Dr. Mason De La Cruz ([facility] [place]) f [phone] as requested by the patient's family.

## 2019-09-03 ENCOUNTER — OFFICE VISIT (OUTPATIENT)
Dept: FAMILY MEDICINE | Facility: CLINIC | Age: 84
End: 2019-09-03
Payer: MEDICARE

## 2019-09-03 VITALS
WEIGHT: 211 LBS | HEART RATE: 78 BPM | OXYGEN SATURATION: 95 % | SYSTOLIC BLOOD PRESSURE: 120 MMHG | RESPIRATION RATE: 20 BRPM | BODY MASS INDEX: 33.91 KG/M2 | DIASTOLIC BLOOD PRESSURE: 60 MMHG | HEIGHT: 66 IN

## 2019-09-03 DIAGNOSIS — Z78.9 IMPAIRED MOBILITY AND ACTIVITIES OF DAILY LIVING: ICD-10-CM

## 2019-09-03 DIAGNOSIS — E78.2 MIXED HYPERLIPIDEMIA: ICD-10-CM

## 2019-09-03 DIAGNOSIS — Z74.09 IMPAIRED MOBILITY AND ACTIVITIES OF DAILY LIVING: ICD-10-CM

## 2019-09-03 DIAGNOSIS — I10 ESSENTIAL HYPERTENSION: Primary | ICD-10-CM

## 2019-09-03 DIAGNOSIS — I50.32 CHRONIC DIASTOLIC CONGESTIVE HEART FAILURE: ICD-10-CM

## 2019-09-03 DIAGNOSIS — H61.21 IMPACTED CERUMEN OF RIGHT EAR: ICD-10-CM

## 2019-09-03 PROCEDURE — 99999 PR PBB SHADOW E&M-EST. PATIENT-LVL III: CPT | Mod: PBBFAC,,, | Performed by: FAMILY MEDICINE

## 2019-09-03 PROCEDURE — 99999 PR PBB SHADOW E&M-EST. PATIENT-LVL III: ICD-10-PCS | Mod: PBBFAC,,, | Performed by: FAMILY MEDICINE

## 2019-09-03 PROCEDURE — 69209 REMOVE IMPACTED EAR WAX UNI: CPT | Mod: RT,S$GLB,, | Performed by: FAMILY MEDICINE

## 2019-09-03 PROCEDURE — 99499 UNLISTED E&M SERVICE: CPT | Mod: S$GLB,,, | Performed by: FAMILY MEDICINE

## 2019-09-03 PROCEDURE — 1101F PT FALLS ASSESS-DOCD LE1/YR: CPT | Mod: CPTII,S$GLB,, | Performed by: FAMILY MEDICINE

## 2019-09-03 PROCEDURE — 99499 RISK ADDL DX/OHS AUDIT: ICD-10-PCS | Mod: S$GLB,,, | Performed by: FAMILY MEDICINE

## 2019-09-03 PROCEDURE — 1101F PR PT FALLS ASSESS DOC 0-1 FALLS W/OUT INJ PAST YR: ICD-10-PCS | Mod: CPTII,S$GLB,, | Performed by: FAMILY MEDICINE

## 2019-09-03 PROCEDURE — 99213 OFFICE O/P EST LOW 20 MIN: CPT | Mod: 25,S$GLB,, | Performed by: FAMILY MEDICINE

## 2019-09-03 PROCEDURE — 69209 PR REMOVAL IMPACTED CERUMEN USING IRRIGATION/LAVAGE, UNILATERAL: ICD-10-PCS | Mod: RT,S$GLB,, | Performed by: FAMILY MEDICINE

## 2019-09-03 PROCEDURE — 99213 PR OFFICE/OUTPT VISIT, EST, LEVL III, 20-29 MIN: ICD-10-PCS | Mod: 25,S$GLB,, | Performed by: FAMILY MEDICINE

## 2019-09-03 RX ORDER — TRAMADOL HYDROCHLORIDE 50 MG/1
50 TABLET ORAL EVERY 12 HOURS PRN
Qty: 60 TABLET | Refills: 5 | Status: SHIPPED | OUTPATIENT
Start: 2019-09-03 | End: 2020-05-01

## 2019-09-03 RX ORDER — NAPROXEN SODIUM 220 MG/1
81 TABLET, FILM COATED ORAL DAILY
COMMUNITY

## 2019-09-03 NOTE — PROGRESS NOTES
Subjective:       Patient ID: Reese Bell is a 88 y.o. male.    Chief Complaint: Follow-up    Pt is a 88 y.o. male who presents for check up for R shoulder and R hip pain.Essential hypertension  (primary encounter diagnosis)  Mixed hyperlipidemia  Impaired mobility and activities of daily living  Chronic diastolic congestive heart failure. Doing well on current meds. Denies any side effects.    Review of Systems   Constitutional: Negative for appetite change.   HENT: Negative for congestion, ear pain, sneezing and sore throat.    Eyes: Negative for redness and visual disturbance.   Respiratory: Negative for cough, chest tightness and stridor.         Pt had Kristine, and uses his CPAP regularly- he is need of a new proper fitting face mask-present old one doesn't fit   Cardiovascular: Negative for chest pain.   Gastrointestinal: Negative for abdominal pain, blood in stool, diarrhea, nausea and vomiting.   Genitourinary: Negative for difficulty urinating, dysuria and hematuria.   Musculoskeletal: Positive for arthralgias and joint swelling. Negative for back pain, myalgias and neck pain.        R shoulder and R hip pain from DJD   Skin: Negative for rash.   Neurological: Negative for dizziness and weakness.        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 The patient is not nervous/anxious.        Objective:      Physical Exam   Constitutional: He is oriented to person, place, and time. He appears well-developed and well-nourished. He appears distressed.   HENT:   Head: Normocephalic.   R ear canal w excess ear wax   Eyes: Pupils are equal, round, and reactive to light.   Neck: Normal range of motion. Neck supple. No thyromegaly present.   Cardiovascular: Normal rate and regular rhythm. Exam reveals no friction rub.   No murmur heard.  Pulmonary/Chest: Effort normal. No respiratory distress. He has no wheezes.   Abdominal: There is no tenderness. There is no rebound  and no guarding.   Musculoskeletal: He exhibits tenderness. He exhibits no edema.   L great toe is healed   Lymphadenopathy:     He has no cervical adenopathy.   Neurological: He is alert and oriented to person, place, and time. He has normal reflexes. No cranial nerve deficit.   Skin: Skin is warm and dry.   Psychiatric: He has a normal mood and affect. Judgment and thought content normal.       Assessment:       1. Essential hypertension    2. Mixed hyperlipidemia    3. Impaired mobility and activities of daily living    4. Chronic diastolic congestive heart failure    5. Impacted cerumen of right ear        Plan:   Reese RAUSCH was seen today for follow-up.    Diagnoses and all orders for this visit:    Essential hypertension    Mixed hyperlipidemia    Impaired mobility and activities of daily living    Chronic diastolic congestive heart failure    Impacted cerumen of right ear    Other orders  -     traMADol (ULTRAM) 50 mg tablet; Take 1 tablet (50 mg total) by mouth every 12 (twelve) hours as needed for Pain.

## 2019-09-10 ENCOUNTER — TELEPHONE (OUTPATIENT)
Dept: FAMILY MEDICINE | Facility: CLINIC | Age: 84
End: 2019-09-10

## 2019-09-10 ENCOUNTER — LAB VISIT (OUTPATIENT)
Dept: LAB | Facility: HOSPITAL | Age: 84
End: 2019-09-10
Attending: FAMILY MEDICINE
Payer: MEDICARE

## 2019-09-10 DIAGNOSIS — R31.9 BLOOD IN URINE: ICD-10-CM

## 2019-09-10 DIAGNOSIS — S06.5XAD: ICD-10-CM

## 2019-09-10 DIAGNOSIS — I25.5 ISCHEMIC CARDIOMYOPATHY: Primary | ICD-10-CM

## 2019-09-10 DIAGNOSIS — I50.22 CHRONIC SYSTOLIC HEART FAILURE: ICD-10-CM

## 2019-09-10 LAB
ALBUMIN SERPL BCP-MCNC: 3.4 G/DL (ref 3.5–5.2)
ALP SERPL-CCNC: 105 U/L (ref 55–135)
ALT SERPL W/O P-5'-P-CCNC: 11 U/L (ref 10–44)
ANION GAP SERPL CALC-SCNC: 10 MMOL/L (ref 8–16)
AST SERPL-CCNC: 14 U/L (ref 10–40)
BASOPHILS # BLD AUTO: 0.01 K/UL (ref 0–0.2)
BASOPHILS NFR BLD: 0.2 % (ref 0–1.9)
BILIRUB SERPL-MCNC: 0.4 MG/DL (ref 0.1–1)
BILIRUB UR QL STRIP: NEGATIVE
BUN SERPL-MCNC: 25 MG/DL (ref 8–23)
CALCIUM SERPL-MCNC: 8.9 MG/DL (ref 8.7–10.5)
CHLORIDE SERPL-SCNC: 106 MMOL/L (ref 95–110)
CLARITY UR: CLEAR
CO2 SERPL-SCNC: 24 MMOL/L (ref 23–29)
COLOR UR: YELLOW
CREAT SERPL-MCNC: 1.5 MG/DL (ref 0.5–1.4)
DIFFERENTIAL METHOD: ABNORMAL
EOSINOPHIL # BLD AUTO: 0.3 K/UL (ref 0–0.5)
EOSINOPHIL NFR BLD: 5.8 % (ref 0–8)
ERYTHROCYTE [DISTWIDTH] IN BLOOD BY AUTOMATED COUNT: 13.6 % (ref 11.5–14.5)
EST. GFR  (AFRICAN AMERICAN): 47 ML/MIN/1.73 M^2
EST. GFR  (NON AFRICAN AMERICAN): 41 ML/MIN/1.73 M^2
GLUCOSE SERPL-MCNC: 114 MG/DL (ref 70–110)
GLUCOSE UR QL STRIP: NEGATIVE
HCT VFR BLD AUTO: 37.7 % (ref 40–54)
HGB BLD-MCNC: 12 G/DL (ref 14–18)
HGB UR QL STRIP: NEGATIVE
IMM GRANULOCYTES # BLD AUTO: 0.05 K/UL (ref 0–0.04)
IMM GRANULOCYTES NFR BLD AUTO: 0.9 % (ref 0–0.5)
KETONES UR QL STRIP: NEGATIVE
LEUKOCYTE ESTERASE UR QL STRIP: NEGATIVE
LYMPHOCYTES # BLD AUTO: 0.6 K/UL (ref 1–4.8)
LYMPHOCYTES NFR BLD: 10.2 % (ref 18–48)
MCH RBC QN AUTO: 32.3 PG (ref 27–31)
MCHC RBC AUTO-ENTMCNC: 31.8 G/DL (ref 32–36)
MCV RBC AUTO: 101 FL (ref 82–98)
MONOCYTES # BLD AUTO: 0.6 K/UL (ref 0.3–1)
MONOCYTES NFR BLD: 10.4 % (ref 4–15)
NEUTROPHILS # BLD AUTO: 4.1 K/UL (ref 1.8–7.7)
NEUTROPHILS NFR BLD: 72.5 % (ref 38–73)
NITRITE UR QL STRIP: NEGATIVE
NRBC BLD-RTO: 0 /100 WBC
PH UR STRIP: 6 [PH] (ref 5–8)
PLATELET # BLD AUTO: 117 K/UL (ref 150–350)
PMV BLD AUTO: 9.1 FL (ref 9.2–12.9)
POTASSIUM SERPL-SCNC: 4.3 MMOL/L (ref 3.5–5.1)
PROT SERPL-MCNC: 6.3 G/DL (ref 6–8.4)
PROT UR QL STRIP: NEGATIVE
RBC # BLD AUTO: 3.72 M/UL (ref 4.6–6.2)
SODIUM SERPL-SCNC: 140 MMOL/L (ref 136–145)
SP GR UR STRIP: 1.02 (ref 1–1.03)
URN SPEC COLLECT METH UR: NORMAL
UROBILINOGEN UR STRIP-ACNC: NEGATIVE EU/DL
WBC # BLD AUTO: 5.69 K/UL (ref 3.9–12.7)

## 2019-09-10 PROCEDURE — 85025 COMPLETE CBC W/AUTO DIFF WBC: CPT

## 2019-09-10 PROCEDURE — 80053 COMPREHEN METABOLIC PANEL: CPT

## 2019-09-10 PROCEDURE — 87077 CULTURE AEROBIC IDENTIFY: CPT

## 2019-09-10 PROCEDURE — 83540 ASSAY OF IRON: CPT

## 2019-09-10 PROCEDURE — 87186 SC STD MICRODIL/AGAR DIL: CPT

## 2019-09-10 PROCEDURE — 87086 URINE CULTURE/COLONY COUNT: CPT

## 2019-09-10 PROCEDURE — 82728 ASSAY OF FERRITIN: CPT

## 2019-09-10 PROCEDURE — 81003 URINALYSIS AUTO W/O SCOPE: CPT

## 2019-09-10 PROCEDURE — 87088 URINE BACTERIA CULTURE: CPT

## 2019-09-10 NOTE — TELEPHONE ENCOUNTER
[facility].  pts wife reports excessive sleepiness and pink urine.        Per Dr. Johnson draw CBC, CMP, Iron & TIBC, Ferritin UA   spoke with Whit, and she will have her nurse draw labs today.

## 2019-09-10 NOTE — TELEPHONE ENCOUNTER
----- Message from Helga Bey sent at 9/10/2019  8:58 AM CDT -----  Contact: Marleny  [facility]  Reese Bell  MRN: [id number]  : 1931  PCP: Oracio Johnson  Home Phone      [phone]  Work Phone      Not on file.  Mobile          [phone]      MESSAGE:   Marleny is calling because  Patient wife told her he is sleepy all the time, and has pink urine. Wants to see if they should get lab order for blood work and a urine.     [phone]  [place]

## 2019-09-11 LAB
FERRITIN SERPL-MCNC: 108 NG/ML (ref 20–300)
IRON SERPL-MCNC: 96 UG/DL (ref 45–160)
SATURATED IRON: 31 % (ref 20–50)
TOTAL IRON BINDING CAPACITY: 312 UG/DL (ref 250–450)
TRANSFERRIN SERPL-MCNC: 211 MG/DL (ref 200–375)

## 2019-09-12 ENCOUNTER — TELEPHONE (OUTPATIENT)
Dept: FAMILY MEDICINE | Facility: CLINIC | Age: 84
End: 2019-09-12

## 2019-09-12 NOTE — TELEPHONE ENCOUNTER
----- Message from Magda Segura sent at 2019 11:42 AM CDT -----  Contact: kartik sharif [facility]   Reese Bell  MRN: [id number]  : 1931  PCP: Oracio Johnson  Home Phone      [phone]  Work Phone      Not on file.  [place]          [phone]      MESSAGE:     Asking if  reviewed the pt's diet that was started two days ago?  Daughter called [facility] saying the pt is sleeping all the time, worried about him.     [phone]

## 2019-09-13 LAB — BACTERIA UR CULT: ABNORMAL

## 2019-09-13 NOTE — PROGRESS NOTES
The following lab results were abnormal. The following recommendations were made:Mr Kathleen has mod kidney insuff, so 4-5 glasses fluids/day might help

## 2019-09-13 NOTE — PROGRESS NOTES
Please inform patient that the urine culture is borderline Neg--push fluids 4-5 glasses/day and watch for fever. Keep up the good job...doc oliverio

## 2019-09-16 ENCOUNTER — TELEPHONE (OUTPATIENT)
Dept: FAMILY MEDICINE | Facility: CLINIC | Age: 84
End: 2019-09-16

## 2019-09-16 DIAGNOSIS — Z74.09 IMPAIRED MOBILITY AND ACTIVITIES OF DAILY LIVING: Primary | ICD-10-CM

## 2019-09-16 DIAGNOSIS — Z78.9 IMPAIRED MOBILITY AND ACTIVITIES OF DAILY LIVING: Primary | ICD-10-CM

## 2019-09-16 NOTE — TELEPHONE ENCOUNTER
----- Message from Helga Bey sent at 2019  2:08 PM CDT -----  Contact: gisel-daughter  Reese Bell  MRN: [id number]  : 1931  PCP: Oracio Johnson  Home Phone      [phone]  Work Phone      Not on file.  [employer]          [phone]      MESSAGE:   Patient would like to get a wheel chair order sent to [employer].    [phone]

## 2019-09-20 ENCOUNTER — TELEPHONE (OUTPATIENT)
Dept: FAMILY MEDICINE | Facility: CLINIC | Age: 84
End: 2019-09-20

## 2019-09-20 DIAGNOSIS — G47.33 OBSTRUCTIVE SLEEP APNEA (ADULT) (PEDIATRIC): Primary | ICD-10-CM

## 2019-09-20 NOTE — TELEPHONE ENCOUNTER
----- Message from Helga Bey sent at 2019  2:18 PM CDT -----  Contact: daughter-Mikala Bell  MRN: [id number]  : 1931  PCP: Oracio Johnson  Home Phone      [phone]  Work Phone      Not on file.  Mobile          [phone]      MESSAGE:   Patient needs a order for another CPAP mask. The new one they received in august does not fit him.     [phone]

## 2019-09-20 NOTE — TELEPHONE ENCOUNTER
----- Message from Helga Bey sent at 2019  2:18 PM CDT -----  Contact: daughter-Mikala Bell  MRN: [id number]  : 1931  PCP: Oracio Johnson  Home Phone      [phone]  Work Phone      Not on file.  Mobile          [phone]      MESSAGE:   Patient needs a order for another CPAP mask. The new one they received in august does not fit him.     [phone]

## 2019-09-20 NOTE — TELEPHONE ENCOUNTER
"Please addend office visit from 09/03/19 to state    "Pts face mask does not fit, and he is compliant with his CPAP  "

## 2019-09-25 ENCOUNTER — TELEPHONE (OUTPATIENT)
Dept: NEUROLOGY | Facility: CLINIC | Age: 84
End: 2019-09-25

## 2019-09-25 ENCOUNTER — TELEPHONE (OUTPATIENT)
Dept: FAMILY MEDICINE | Facility: CLINIC | Age: 84
End: 2019-09-25

## 2019-09-25 DIAGNOSIS — R06.2 WHEEZING: Primary | ICD-10-CM

## 2019-09-25 NOTE — TELEPHONE ENCOUNTER
----- Message from Sen Coronado sent at 2019 11:25 AM CDT -----  Contact: Daughter - Mikala Bell  MRN: [id number]  : 1931  PCP: Oracio Johnson  Home Phone      [phone]  Work Phone      Not on file.  Mobile          [phone]      MESSAGE: had requested a wheelchair -- spoke with [facility], they say they did not receive anything form us -- on 19 looks like order was sent to  [facility] -- please send order to [facility]    Call Mikala @ [phone]    PCP: Alex

## 2019-09-25 NOTE — TELEPHONE ENCOUNTER
----- Message from Sen Coronado sent at 2019 11:23 AM CDT -----  Contact: Daughter - Mikala Bell  MRN: [id number]  : 1931  PCP: Oracio Johnson  Home Phone      [phone]  Work Phone      Not on file.  Mobile          [phone]      MESSAGE: wheezing -- has nebulizer -- needs Rx for meds -- uses [facility] in [place]    Call Mikala @ [phone]    PCP: Alex

## 2019-09-26 RX ORDER — IPRATROPIUM BROMIDE AND ALBUTEROL SULFATE 2.5; .5 MG/3ML; MG/3ML
3 SOLUTION RESPIRATORY (INHALATION) EVERY 6 HOURS PRN
Qty: 120 VIAL | Refills: 2 | Status: SHIPPED | OUTPATIENT
Start: 2019-09-26 | End: 2020-05-01

## 2019-09-26 RX ORDER — BUDESONIDE 0.5 MG/2ML
0.5 INHALANT ORAL DAILY
Qty: 60 ML | Refills: 5 | Status: SHIPPED | OUTPATIENT
Start: 2019-09-26 | End: 2021-02-08 | Stop reason: SDUPTHER

## 2019-10-01 NOTE — TELEPHONE ENCOUNTER
Spoke w/ patient, they still have not heard anything from [facility]. I attempted to call them but there were 11 callers ahead of me. I will try again later.

## 2019-10-10 ENCOUNTER — TELEPHONE (OUTPATIENT)
Dept: FAMILY MEDICINE | Facility: CLINIC | Age: 84
End: 2019-10-10

## 2019-10-10 NOTE — TELEPHONE ENCOUNTER
----- Message from Magda Segura sent at 10/10/2019  9:59 AM CDT -----  Contact: Mikala - daughter  Reese Bell  MRN: [id number]  : 1931  PCP: Oracio Johnson  Home Phone      [phone]  Work Phone      Not on file.  Mobile          [phone]      MESSAGE:     daughter thinks the pt's body is use to his mirtazapine (REMERON) 15 MG tablet medication. He is not sleeping through the night, he is not rested, his appetite has increased at night.   They are giving him the medication between 7 & 7:30 at night  Can not be taken off of the medication, pt gets very depressed without it.     [phone]

## 2019-10-14 ENCOUNTER — TELEPHONE (OUTPATIENT)
Dept: FAMILY MEDICINE | Facility: CLINIC | Age: 84
End: 2019-10-14

## 2019-10-14 NOTE — TELEPHONE ENCOUNTER
Received fax from [employer] needing information on why pt needed another wheelchair, when they just paid for one in August 2017.    Spoke with pts salvador Bach, and she stated the wheelchair is not broken he is just having trouble with locking the breaks    Spoke with Martha at [employer] [facility], and she stated no order is needed they will send Duramed out to evaluate the wheelchair.      
No

## 2019-10-24 DIAGNOSIS — G30.9 DEMENTIA IN ALZHEIMER'S DISEASE: ICD-10-CM

## 2019-10-24 DIAGNOSIS — F02.80 DEMENTIA IN ALZHEIMER'S DISEASE: ICD-10-CM

## 2019-10-24 RX ORDER — MEMANTINE HYDROCHLORIDE 5 MG/1
TABLET ORAL
Qty: 180 TABLET | Refills: 3 | Status: SHIPPED | OUTPATIENT
Start: 2019-10-24 | End: 2020-11-05

## 2019-10-30 ENCOUNTER — TELEPHONE (OUTPATIENT)
Dept: FAMILY MEDICINE | Facility: CLINIC | Age: 84
End: 2019-10-30

## 2019-10-30 NOTE — TELEPHONE ENCOUNTER
----- Message from Sen Coronado sent at 10/30/2019  9:32 AM CDT -----  Contact: Liz @ [employer]  Reese Bell  MRN: [id number]  : 1931  PCP: Oracio Johnson  Home Phone      [phone]  Work Phone      Not on file.  [employer]          [phone]      MESSAGE: Rx for wheelchair sent to [facility] -- not a contracted provider -- requesting to send to contracted company    Call Liz @ [phone]    PCP: Alex

## 2019-10-30 NOTE — TELEPHONE ENCOUNTER
Spoke with Liz with [employer]. She asked for ok to send wheelchair rx to [facility] instead of [facility].   ok'd given

## 2019-11-05 ENCOUNTER — OFFICE VISIT (OUTPATIENT)
Dept: FAMILY MEDICINE | Facility: CLINIC | Age: 84
End: 2019-11-05
Payer: MEDICARE

## 2019-11-05 ENCOUNTER — TELEPHONE (OUTPATIENT)
Dept: FAMILY MEDICINE | Facility: CLINIC | Age: 84
End: 2019-11-05

## 2019-11-05 VITALS
HEIGHT: 66 IN | DIASTOLIC BLOOD PRESSURE: 80 MMHG | RESPIRATION RATE: 20 BRPM | HEART RATE: 72 BPM | BODY MASS INDEX: 34.06 KG/M2 | SYSTOLIC BLOOD PRESSURE: 136 MMHG

## 2019-11-05 DIAGNOSIS — I50.32 CHRONIC DIASTOLIC CONGESTIVE HEART FAILURE: ICD-10-CM

## 2019-11-05 DIAGNOSIS — I10 ESSENTIAL HYPERTENSION: ICD-10-CM

## 2019-11-05 DIAGNOSIS — H60.90 OTITIS EXTERNA, UNSPECIFIED CHRONICITY, UNSPECIFIED LATERALITY, UNSPECIFIED TYPE: ICD-10-CM

## 2019-11-05 DIAGNOSIS — R59.9 ADENOPATHY: ICD-10-CM

## 2019-11-05 DIAGNOSIS — G47.33 OBSTRUCTIVE SLEEP APNEA (ADULT) (PEDIATRIC): ICD-10-CM

## 2019-11-05 DIAGNOSIS — G47.33 OBSTRUCTIVE SLEEP APNEA SYNDROME: Primary | ICD-10-CM

## 2019-11-05 LAB
ALBUMIN SERPL BCP-MCNC: 3.8 G/DL (ref 3.5–5.2)
ALP SERPL-CCNC: 120 U/L (ref 55–135)
ALT SERPL W/O P-5'-P-CCNC: 8 U/L (ref 10–44)
ANION GAP SERPL CALC-SCNC: 11 MMOL/L (ref 8–16)
AST SERPL-CCNC: 15 U/L (ref 10–40)
BASOPHILS # BLD AUTO: 0.03 K/UL (ref 0–0.2)
BASOPHILS NFR BLD: 0.5 % (ref 0–1.9)
BILIRUB SERPL-MCNC: 0.5 MG/DL (ref 0.1–1)
BUN SERPL-MCNC: 35 MG/DL (ref 8–23)
CALCIUM SERPL-MCNC: 9 MG/DL (ref 8.7–10.5)
CHLORIDE SERPL-SCNC: 110 MMOL/L (ref 95–110)
CO2 SERPL-SCNC: 23 MMOL/L (ref 23–29)
CREAT SERPL-MCNC: 2 MG/DL (ref 0.5–1.4)
DIFFERENTIAL METHOD: ABNORMAL
EOSINOPHIL # BLD AUTO: 0.3 K/UL (ref 0–0.5)
EOSINOPHIL NFR BLD: 4.3 % (ref 0–8)
ERYTHROCYTE [DISTWIDTH] IN BLOOD BY AUTOMATED COUNT: 13.5 % (ref 11.5–14.5)
EST. GFR  (AFRICAN AMERICAN): 33 ML/MIN/1.73 M^2
EST. GFR  (NON AFRICAN AMERICAN): 29 ML/MIN/1.73 M^2
GLUCOSE SERPL-MCNC: 93 MG/DL (ref 70–110)
HCT VFR BLD AUTO: 40.9 % (ref 40–54)
HGB BLD-MCNC: 12.8 G/DL (ref 14–18)
IMM GRANULOCYTES # BLD AUTO: 0.12 K/UL (ref 0–0.04)
IMM GRANULOCYTES NFR BLD AUTO: 1.8 % (ref 0–0.5)
LYMPHOCYTES # BLD AUTO: 0.8 K/UL (ref 1–4.8)
LYMPHOCYTES NFR BLD: 12.6 % (ref 18–48)
MCH RBC QN AUTO: 32.4 PG (ref 27–31)
MCHC RBC AUTO-ENTMCNC: 31.3 G/DL (ref 32–36)
MCV RBC AUTO: 104 FL (ref 82–98)
MONOCYTES # BLD AUTO: 0.7 K/UL (ref 0.3–1)
MONOCYTES NFR BLD: 10.6 % (ref 4–15)
NEUTROPHILS # BLD AUTO: 4.6 K/UL (ref 1.8–7.7)
NEUTROPHILS NFR BLD: 70.2 % (ref 38–73)
NRBC BLD-RTO: 0 /100 WBC
PLATELET # BLD AUTO: 129 K/UL (ref 150–350)
PMV BLD AUTO: 9 FL (ref 9.2–12.9)
POTASSIUM SERPL-SCNC: 4.9 MMOL/L (ref 3.5–5.1)
PROT SERPL-MCNC: 7.1 G/DL (ref 6–8.4)
RBC # BLD AUTO: 3.95 M/UL (ref 4.6–6.2)
SODIUM SERPL-SCNC: 144 MMOL/L (ref 136–145)
WBC # BLD AUTO: 6.53 K/UL (ref 3.9–12.7)

## 2019-11-05 PROCEDURE — 85025 COMPLETE CBC W/AUTO DIFF WBC: CPT

## 2019-11-05 PROCEDURE — 99999 PR PBB SHADOW E&M-EST. PATIENT-LVL II: ICD-10-PCS | Mod: PBBFAC,,, | Performed by: FAMILY MEDICINE

## 2019-11-05 PROCEDURE — 80053 COMPREHEN METABOLIC PANEL: CPT

## 2019-11-05 PROCEDURE — 99499 RISK ADDL DX/OHS AUDIT: ICD-10-PCS | Mod: S$GLB,,, | Performed by: FAMILY MEDICINE

## 2019-11-05 PROCEDURE — 99213 OFFICE O/P EST LOW 20 MIN: CPT | Mod: S$GLB,,, | Performed by: FAMILY MEDICINE

## 2019-11-05 PROCEDURE — 36415 COLL VENOUS BLD VENIPUNCTURE: CPT | Mod: S$GLB,,, | Performed by: FAMILY MEDICINE

## 2019-11-05 PROCEDURE — 36415 PR COLLECTION VENOUS BLOOD,VENIPUNCTURE: ICD-10-PCS | Mod: S$GLB,,, | Performed by: FAMILY MEDICINE

## 2019-11-05 PROCEDURE — 99999 PR PBB SHADOW E&M-EST. PATIENT-LVL II: CPT | Mod: PBBFAC,,, | Performed by: FAMILY MEDICINE

## 2019-11-05 PROCEDURE — 99499 UNLISTED E&M SERVICE: CPT | Mod: S$GLB,,, | Performed by: FAMILY MEDICINE

## 2019-11-05 PROCEDURE — 1101F PT FALLS ASSESS-DOCD LE1/YR: CPT | Mod: CPTII,S$GLB,, | Performed by: FAMILY MEDICINE

## 2019-11-05 PROCEDURE — 1101F PR PT FALLS ASSESS DOC 0-1 FALLS W/OUT INJ PAST YR: ICD-10-PCS | Mod: CPTII,S$GLB,, | Performed by: FAMILY MEDICINE

## 2019-11-05 PROCEDURE — 99213 PR OFFICE/OUTPT VISIT, EST, LEVL III, 20-29 MIN: ICD-10-PCS | Mod: S$GLB,,, | Performed by: FAMILY MEDICINE

## 2019-11-05 RX ORDER — NYSTATIN AND TRIAMCINOLONE ACETONIDE 100000; 1 [USP'U]/G; MG/G
CREAM TOPICAL
Qty: 30 G | Refills: 5 | Status: SHIPPED | OUTPATIENT
Start: 2019-11-05 | End: 2019-11-06 | Stop reason: SDUPTHER

## 2019-11-05 RX ORDER — CARVEDILOL 12.5 MG/1
12.5 TABLET ORAL 2 TIMES DAILY
Refills: 3 | COMMUNITY
Start: 2019-09-19

## 2019-11-05 NOTE — TELEPHONE ENCOUNTER
----- Message from Helga Bey sent at 2019  8:25 AM CST -----  Contact: daughter-gisel Bell  MRN: [id number]  : 1931  PCP: Oracio Johnson  Home Phone      [phone]  Work Phone      Not on file.  Mobile          [phone]      MESSAGE:   Pt requests a sooner appointment than the  can schedule.  Does patient feel like they need to be seen today:  Yes  What is the nature of the appointment:  Right ear swollen, cant hear throw it  What visit type:  est  Did you check other providers/department schedules for availability:   Only fco johnson  Comments:     Phone:  [phone]

## 2019-11-05 NOTE — PROGRESS NOTES
Subjective:       Patient ID: Reese Bell is a 88 y.o. male.    Chief Complaint: Otalgia (R ear )    Pt is a 88 y.o. male who presents for check up for R otalgia with lose of hearing. Doing well on current meds. Denies any side effects. Prevention si up to date.    Review of Systems   Constitutional: Negative for appetite change.   HENT: Negative for congestion, ear pain, sneezing and sore throat.         R otalgia with pre and post auricular swelling   Eyes: Negative for redness and visual disturbance.   Respiratory: Negative for cough, chest tightness and stridor.    Cardiovascular: Negative for chest pain.   Gastrointestinal: Negative for abdominal pain, blood in stool, diarrhea, nausea and vomiting.   Genitourinary: Negative for difficulty urinating, dysuria and hematuria.   Musculoskeletal: Negative for arthralgias, back pain, joint swelling, myalgias and neck pain.   Skin: Negative for rash.   Neurological: Negative for dizziness.   Psychiatric/Behavioral: Negative for agitation. The patient is not nervous/anxious.        Objective:      Physical Exam   Constitutional: He is oriented to person, place, and time. He appears well-developed. He appears distressed.   In a wheelchair Obese 87 y/o W M   HENT:   Head: Normocephalic.   Eyes: Pupils are equal, round, and reactive to light.   Neck: Normal range of motion. Neck supple. No thyromegaly present.   Cardiovascular: Normal rate and regular rhythm. Exam reveals no friction rub.   No murmur heard.  Pulmonary/Chest: Effort normal. No respiratory distress. He has no wheezes.   Abdominal: There is no tenderness. There is no rebound and no guarding.   Musculoskeletal: Normal range of motion. He exhibits no edema or tenderness.   Lymphadenopathy:     He has no cervical adenopathy.   Neurological: He is alert and oriented to person, place, and time. He has normal reflexes. No cranial nerve deficit.   Skin: Skin is warm and dry.   R pre and psot auricular 2 cm  swelling   Psychiatric: He has a normal mood and affect. Judgment and thought content normal.       Assessment:       No diagnosis found.    Plan:   There are no diagnoses linked to this encounter.

## 2019-11-06 ENCOUNTER — TELEPHONE (OUTPATIENT)
Dept: NEUROLOGY | Facility: CLINIC | Age: 84
End: 2019-11-06

## 2019-11-06 ENCOUNTER — TELEPHONE (OUTPATIENT)
Dept: FAMILY MEDICINE | Facility: CLINIC | Age: 84
End: 2019-11-06

## 2019-11-06 RX ORDER — NYSTATIN AND TRIAMCINOLONE ACETONIDE 100000; 1 [USP'U]/G; MG/G
CREAM TOPICAL
Qty: 30 G | Refills: 5 | Status: SHIPPED | OUTPATIENT
Start: 2019-11-06 | End: 2020-05-01

## 2019-11-06 NOTE — TELEPHONE ENCOUNTER
----- Message from Estefany Don sent at 2019 12:21 PM CST -----  Contact: Daughter- Mikala Bell  MRN: [id number]  : 1931  PCP: Oracio Johnson  Home Phone      [phone]  Work Phone      Not on file.  Mobile          [phone]      MESSAGE:   Mikala states myclog is not covered and is not affordable for the patient, she would like to have something else called in for the patient to replace this medication.    -[facility] [place]    Phone:  [phone]

## 2019-11-06 NOTE — TELEPHONE ENCOUNTER
----- Message from Sen Coronado sent at 2019  8:22 AM CST -----  Contact: Daughter - Mikala Bell  MRN: [id number]  : 1931  PCP: Oracio Johnson  Home Phone      [phone]  Work Phone      Not on file.  Mobile          [phone]      MESSAGE: was seen yesterday by Dr Johnson -- Rx for Nystatin triamcinolone cream was not received by pharmacy -- please send to [facility] in [place] -- notify Mikala when done    Call Mikala @ [phone]    PCP: Alex

## 2019-11-06 NOTE — TELEPHONE ENCOUNTER
Dr. Johnson's pt.    pts daughter would like a provider in clinic today to review labs.    She was able to review his results on mychart and has some concerns.    I did inform her Dr. Johnson will be in tomorrow

## 2019-11-06 NOTE — TELEPHONE ENCOUNTER
Nothing needs to be addressed on his labs today. Only change is his kidney function declined a tad---just make sure he isn't taking any NSAIDs OTC. Drink enough water to keep urine light yellow to clear. Dr. Johnson can take care of the rest later this week thanks

## 2019-11-08 NOTE — PROGRESS NOTES
The following lab results were abnormal. The following recommendations were made:Would like Mr Kathleen to cut back on his fluid med to Mon,Wed, Fri and Sunday to see if it would improve his kidney function & still allow his breathing & CHF to remain

## 2019-11-19 ENCOUNTER — TELEPHONE (OUTPATIENT)
Dept: FAMILY MEDICINE | Facility: CLINIC | Age: 84
End: 2019-11-19

## 2019-11-19 NOTE — TELEPHONE ENCOUNTER
----- Message from Helga Bey sent at 2019  1:19 PM CST -----  Contact: wife-gisel Bell  MRN: [id number]  : 1931  PCP: Oracio Johnson  Home Phone      [phone]  Work Phone      Not on file.  Mobile          [phone]      MESSAGE:   Patient wife is calling because they have an appointment at [facility] tomorrow and was wondering if it would be okay if she brought the orders from them for lab work, if he can just get them done Thursday over here.      [phone]

## 2019-11-21 ENCOUNTER — OFFICE VISIT (OUTPATIENT)
Dept: FAMILY MEDICINE | Facility: CLINIC | Age: 84
End: 2019-11-21
Payer: MEDICARE

## 2019-11-21 VITALS
BODY MASS INDEX: 34.06 KG/M2 | HEART RATE: 80 BPM | TEMPERATURE: 98 F | RESPIRATION RATE: 20 BRPM | OXYGEN SATURATION: 91 % | HEIGHT: 66 IN | SYSTOLIC BLOOD PRESSURE: 114 MMHG | DIASTOLIC BLOOD PRESSURE: 64 MMHG

## 2019-11-21 DIAGNOSIS — I50.9 CHRONIC CONGESTIVE HEART FAILURE, UNSPECIFIED HEART FAILURE TYPE: ICD-10-CM

## 2019-11-21 DIAGNOSIS — E78.2 MIXED HYPERLIPIDEMIA: ICD-10-CM

## 2019-11-21 DIAGNOSIS — I10 ESSENTIAL HYPERTENSION: Primary | ICD-10-CM

## 2019-11-21 LAB
ALBUMIN SERPL BCP-MCNC: 4 G/DL (ref 3.5–5.2)
ALP SERPL-CCNC: 119 U/L (ref 55–135)
ALT SERPL W/O P-5'-P-CCNC: 20 U/L (ref 10–44)
ANION GAP SERPL CALC-SCNC: 7 MMOL/L (ref 8–16)
AST SERPL-CCNC: 18 U/L (ref 10–40)
BILIRUB SERPL-MCNC: 0.6 MG/DL (ref 0.1–1)
BNP SERPL-MCNC: 112 PG/ML (ref 0–99)
BUN SERPL-MCNC: 32 MG/DL (ref 8–23)
CALCIUM SERPL-MCNC: 9.4 MG/DL (ref 8.7–10.5)
CHLORIDE SERPL-SCNC: 103 MMOL/L (ref 95–110)
CHOLEST SERPL-MCNC: 168 MG/DL (ref 120–199)
CHOLEST/HDLC SERPL: 3.3 {RATIO} (ref 2–5)
CO2 SERPL-SCNC: 26 MMOL/L (ref 23–29)
CREAT SERPL-MCNC: 1.9 MG/DL (ref 0.5–1.4)
EST. GFR  (AFRICAN AMERICAN): 36 ML/MIN/1.73 M^2
EST. GFR  (NON AFRICAN AMERICAN): 31 ML/MIN/1.73 M^2
GLUCOSE SERPL-MCNC: 99 MG/DL (ref 70–110)
HDLC SERPL-MCNC: 51 MG/DL (ref 40–75)
HDLC SERPL: 30.4 % (ref 20–50)
LDLC SERPL CALC-MCNC: 91.6 MG/DL (ref 63–159)
NONHDLC SERPL-MCNC: 117 MG/DL
POTASSIUM SERPL-SCNC: 5.5 MMOL/L (ref 3.5–5.1)
PROT SERPL-MCNC: 7.2 G/DL (ref 6–8.4)
SODIUM SERPL-SCNC: 136 MMOL/L (ref 136–145)
TRIGL SERPL-MCNC: 127 MG/DL (ref 30–150)

## 2019-11-21 PROCEDURE — 99213 OFFICE O/P EST LOW 20 MIN: CPT | Mod: S$GLB,,, | Performed by: FAMILY MEDICINE

## 2019-11-21 PROCEDURE — 99213 PR OFFICE/OUTPT VISIT, EST, LEVL III, 20-29 MIN: ICD-10-PCS | Mod: S$GLB,,, | Performed by: FAMILY MEDICINE

## 2019-11-21 PROCEDURE — 1159F PR MEDICATION LIST DOCUMENTED IN MEDICAL RECORD: ICD-10-PCS | Mod: S$GLB,,, | Performed by: FAMILY MEDICINE

## 2019-11-21 PROCEDURE — 99999 PR PBB SHADOW E&M-EST. PATIENT-LVL IV: CPT | Mod: PBBFAC,,, | Performed by: FAMILY MEDICINE

## 2019-11-21 PROCEDURE — 1101F PT FALLS ASSESS-DOCD LE1/YR: CPT | Mod: CPTII,S$GLB,, | Performed by: FAMILY MEDICINE

## 2019-11-21 PROCEDURE — 80061 LIPID PANEL: CPT

## 2019-11-21 PROCEDURE — 1126F PR PAIN SEVERITY QUANTIFIED, NO PAIN PRESENT: ICD-10-PCS | Mod: S$GLB,,, | Performed by: FAMILY MEDICINE

## 2019-11-21 PROCEDURE — 36415 COLL VENOUS BLD VENIPUNCTURE: CPT | Mod: S$GLB,,, | Performed by: FAMILY MEDICINE

## 2019-11-21 PROCEDURE — 1126F AMNT PAIN NOTED NONE PRSNT: CPT | Mod: S$GLB,,, | Performed by: FAMILY MEDICINE

## 2019-11-21 PROCEDURE — 36415 PR COLLECTION VENOUS BLOOD,VENIPUNCTURE: ICD-10-PCS | Mod: S$GLB,,, | Performed by: FAMILY MEDICINE

## 2019-11-21 PROCEDURE — 1159F MED LIST DOCD IN RCRD: CPT | Mod: S$GLB,,, | Performed by: FAMILY MEDICINE

## 2019-11-21 PROCEDURE — 83880 ASSAY OF NATRIURETIC PEPTIDE: CPT

## 2019-11-21 PROCEDURE — 80053 COMPREHEN METABOLIC PANEL: CPT

## 2019-11-21 PROCEDURE — 99999 PR PBB SHADOW E&M-EST. PATIENT-LVL IV: ICD-10-PCS | Mod: PBBFAC,,, | Performed by: FAMILY MEDICINE

## 2019-11-21 PROCEDURE — 1101F PR PT FALLS ASSESS DOC 0-1 FALLS W/OUT INJ PAST YR: ICD-10-PCS | Mod: CPTII,S$GLB,, | Performed by: FAMILY MEDICINE

## 2019-11-21 RX ORDER — NYSTATIN 100000 U/G
CREAM TOPICAL
Refills: 5 | COMMUNITY
Start: 2019-11-07 | End: 2020-05-01

## 2019-11-21 RX ORDER — TRIAMCINOLONE ACETONIDE 1 MG/G
CREAM TOPICAL
Refills: 5 | COMMUNITY
Start: 2019-11-07 | End: 2020-05-01

## 2019-11-21 NOTE — PROGRESS NOTES
Subjective:       Patient ID: Reese Bell is a 88 y.o. male.    Chief Complaint: Follow-up (2 week follow up; fluid pills changed); Wheezing; and Numbness (to bilateral feet)    Pt is a 88 y.o. male who presents for check up for Essential hypertension  (primary encounter diagnosis)& CHF.  Mixed hyperlipidemia  Chronic congestive heart failure, unspecified heart failure type. Doing well on current meds. Denies any side effects. Prevention is up to date.    Review of Systems   Constitutional: Negative for appetite change.   HENT: Negative for congestion, ear pain, sneezing and sore throat.    Eyes: Negative for redness and visual disturbance.   Respiratory: Negative for cough, chest tightness and stridor.    Cardiovascular: Positive for chest pain.        Hx of CHF   Gastrointestinal: Negative for abdominal pain, blood in stool, diarrhea, nausea and vomiting.   Genitourinary: Negative for difficulty urinating, dysuria and hematuria.   Musculoskeletal: Negative for arthralgias, back pain, joint swelling, myalgias and neck pain.   Skin: Negative for rash.   Neurological: Negative for dizziness.   Psychiatric/Behavioral: Positive for hallucinations. Negative for agitation. The patient is not nervous/anxious.         Bad dreams       Objective:      Physical Exam   Constitutional: He is oriented to person, place, and time. He appears well-developed and well-nourished.   HENT:   Head: Normocephalic.   Eyes: Pupils are equal, round, and reactive to light.   Neck: Normal range of motion. Neck supple. No thyromegaly present.   Cardiovascular: Normal rate and regular rhythm. Exam reveals no friction rub.   No murmur heard.  Pulmonary/Chest: Effort normal. No respiratory distress. He has no wheezes.   Abdominal: There is no tenderness. There is no rebound and no guarding.   Musculoskeletal: Normal range of motion. He exhibits no edema or tenderness.   Tr edema   Lymphadenopathy:     He has no cervical adenopathy.    Neurological: He is alert and oriented to person, place, and time. He has normal reflexes. No cranial nerve deficit.   Skin: Skin is warm and dry.   Psychiatric: He has a normal mood and affect. Judgment and thought content normal.       Assessment:       1. Essential hypertension    2. Mixed hyperlipidemia    3. Chronic congestive heart failure, unspecified heart failure type        Plan:   Reese RAUSCH was seen today for follow-up, wheezing and numbness.    Diagnoses and all orders for this visit:    Essential hypertension  -     Comprehensive metabolic panel; Future  -     Comprehensive metabolic panel    Mixed hyperlipidemia  -     Lipid panel; Future  -     Lipid panel    Chronic congestive heart failure, unspecified heart failure type  -     Lipid panel; Future  -     Comprehensive metabolic panel; Future  -     Brain natriuretic peptide; Future  -     Brain natriuretic peptide  -     Comprehensive metabolic panel  -     Lipid panel

## 2019-11-22 NOTE — PROGRESS NOTES
The following lab results were abnormal. The following recommendations were made:Should have been forwarded to Dr VALENTIN De La Cruz([facility] in [place])

## 2019-11-26 ENCOUNTER — TELEPHONE (OUTPATIENT)
Dept: FAMILY MEDICINE | Facility: CLINIC | Age: 84
End: 2019-11-26

## 2019-11-26 NOTE — TELEPHONE ENCOUNTER
----- Message from Estefany Don sent at 2019  8:58 AM CST -----  Contact: Mikala- Daughter  Reese Bell  MRN: [id number]  : 1931  PCP: Oracio Johnson  Home Phone      [phone]  Work Phone      Not on file.  Mobile          [phone]      MESSAGE:   Daughter would like to know if the patient's blood work was sent to Dr. Killian at [facility] in [place]. Patient has an appt with Chavez Killian today and would like to make sure this was done for him    Phone:  [phone]

## 2019-12-20 NOTE — TELEPHONE ENCOUNTER
----- Message from Leni Hardy sent at 2018  3:36 PM CST -----  Contact: Wife Arleth Bell  MRN: [id number]  : 1931  PCP: Oracio Johnson  Home Phone      [phone]  Work Phone      Not on file.  Mobile          Not on file.      MESSAGE:   Patient's wife has some follow up questions concerning the wheel chair that had been previously discussed. Please give her a call back.    Arleth  [phone]   Migraine headache

## 2020-01-08 ENCOUNTER — TELEPHONE (OUTPATIENT)
Dept: FAMILY MEDICINE | Facility: CLINIC | Age: 85
End: 2020-01-08

## 2020-01-08 NOTE — TELEPHONE ENCOUNTER
----- Message from Sen Coronado sent at 2020  8:30 AM CST -----  Contact: Daughter - Mikala Bell  MRN: [id number]  : 1931  PCP: Oracio Johnson  Home Phone      [phone]  Work Phone      Not on file.  [employer]          [phone]      MESSAGE: rash under belly (rt side) bleeding -- requesting to speak with nurse    Call Mikala @ [phone]    PCP: Alex

## 2020-01-08 NOTE — TELEPHONE ENCOUNTER
Contacted pt daughter states he has a rash under his stomach in the crease and is now bleeding. States it may be from his depends. Would like to know if she can clean it with hibiclens? Please advise, thank you.

## 2020-01-20 ENCOUNTER — OFFICE VISIT (OUTPATIENT)
Dept: FAMILY MEDICINE | Facility: CLINIC | Age: 85
End: 2020-01-20
Payer: MEDICARE

## 2020-01-20 ENCOUNTER — TELEPHONE (OUTPATIENT)
Dept: FAMILY MEDICINE | Facility: CLINIC | Age: 85
End: 2020-01-20

## 2020-01-20 VITALS
DIASTOLIC BLOOD PRESSURE: 56 MMHG | TEMPERATURE: 98 F | BODY MASS INDEX: 34.06 KG/M2 | HEIGHT: 66 IN | RESPIRATION RATE: 16 BRPM | HEART RATE: 62 BPM | SYSTOLIC BLOOD PRESSURE: 102 MMHG

## 2020-01-20 DIAGNOSIS — H60.90 OTITIS EXTERNA, UNSPECIFIED CHRONICITY, UNSPECIFIED LATERALITY, UNSPECIFIED TYPE: Primary | ICD-10-CM

## 2020-01-20 DIAGNOSIS — G47.33 OSA (OBSTRUCTIVE SLEEP APNEA): Primary | ICD-10-CM

## 2020-01-20 PROCEDURE — 1126F PR PAIN SEVERITY QUANTIFIED, NO PAIN PRESENT: ICD-10-PCS | Mod: S$GLB,,, | Performed by: FAMILY MEDICINE

## 2020-01-20 PROCEDURE — 1101F PR PT FALLS ASSESS DOC 0-1 FALLS W/OUT INJ PAST YR: ICD-10-PCS | Mod: CPTII,S$GLB,, | Performed by: FAMILY MEDICINE

## 2020-01-20 PROCEDURE — 99999 PR PBB SHADOW E&M-EST. PATIENT-LVL III: ICD-10-PCS | Mod: PBBFAC,,, | Performed by: FAMILY MEDICINE

## 2020-01-20 PROCEDURE — 99213 PR OFFICE/OUTPT VISIT, EST, LEVL III, 20-29 MIN: ICD-10-PCS | Mod: S$GLB,,, | Performed by: FAMILY MEDICINE

## 2020-01-20 PROCEDURE — 99213 OFFICE O/P EST LOW 20 MIN: CPT | Mod: S$GLB,,, | Performed by: FAMILY MEDICINE

## 2020-01-20 PROCEDURE — 1101F PT FALLS ASSESS-DOCD LE1/YR: CPT | Mod: CPTII,S$GLB,, | Performed by: FAMILY MEDICINE

## 2020-01-20 PROCEDURE — 99999 PR PBB SHADOW E&M-EST. PATIENT-LVL III: CPT | Mod: PBBFAC,,, | Performed by: FAMILY MEDICINE

## 2020-01-20 PROCEDURE — 1126F AMNT PAIN NOTED NONE PRSNT: CPT | Mod: S$GLB,,, | Performed by: FAMILY MEDICINE

## 2020-01-20 PROCEDURE — 1159F PR MEDICATION LIST DOCUMENTED IN MEDICAL RECORD: ICD-10-PCS | Mod: S$GLB,,, | Performed by: FAMILY MEDICINE

## 2020-01-20 PROCEDURE — 1159F MED LIST DOCD IN RCRD: CPT | Mod: S$GLB,,, | Performed by: FAMILY MEDICINE

## 2020-01-20 NOTE — PROGRESS NOTES
Subjective:       Patient ID: Reese Bell is a 88 y.o. male.    Chief Complaint: Cough and Otalgia (R ear )    Pt is a 88 y.o. male who presents for check up for Otitis externa, unspecified chronicity, unspecified laterality, unspecified type  (primary encounter diagnosis). Doing well on current meds. Denies any side effects. Prevention is up to date.    Review of Systems   HENT:        R ear canal and pre auricular area is very painful   Musculoskeletal: Positive for arthralgias.        R shoulder with poor Limited ROM and pain on abduction   Skin:        Pt has an allergy to his CPAP straps and mask; any where it touches his skin he develops a rash and a painful allergic skin reaction       Objective:      Physical Exam   Constitutional: He is oriented to person, place, and time. He appears well-developed and well-nourished.   In a W/C   HENT:   Head: Normocephalic.   R ear canal is swollen and tender    Eyes: Pupils are equal, round, and reactive to light.   Neck: Normal range of motion. Neck supple. No thyromegaly present.   Cardiovascular: Normal rate and regular rhythm. Exam reveals no friction rub.   No murmur heard.  Pulmonary/Chest: No respiratory distress. He has wheezes.   Abdominal: There is no tenderness. There is no rebound and no guarding.   Musculoskeletal: He exhibits no edema or tenderness.   Poor ROM R shoulder   Lymphadenopathy:     He has no cervical adenopathy.   Neurological: He is alert and oriented to person, place, and time. He has normal reflexes. No cranial nerve deficit.   Skin: Skin is warm and dry.   Psychiatric: He has a normal mood and affect. Judgment and thought content normal.       Assessment:       1. Otitis externa, unspecified chronicity, unspecified laterality, unspecified type        Plan:   Reese RAUSCH was seen today for cough and otalgia.    Diagnoses and all orders for this visit:    Otitis externa, unspecified chronicity, unspecified laterality, unspecified type  -      carbamide peroxide (DEBROX) 6.5 % otic solution; 1-2 qtts to R ear bid for 7-10 days

## 2020-01-21 NOTE — TELEPHONE ENCOUNTER
----- Message from Helga Bey sent at 2020 12:08 PM CST -----  Contact: daughter- gisel Bell  MRN: [id number]  : 1931  PCP: Oracio Johnson  Home Phone      [phone]  Work Phone      Not on file.  Mobile          [phone]      MESSAGE:   Patient is currently allergic to his cpap machine head gear and mask would like to get a order to for a new one.     [phone]

## 2020-01-22 ENCOUNTER — TELEPHONE (OUTPATIENT)
Dept: FAMILY MEDICINE | Facility: CLINIC | Age: 85
End: 2020-01-22

## 2020-01-22 NOTE — TELEPHONE ENCOUNTER
Doc can you addend chart note from 01/20/20.   Pt needs a new head gear equipment because he is allegric to the straps, and it keeps causing a rash anywhere the strap touches.on his head and behind the ears        Patient is in need of new CPAP supply due to an allergy with his current headgear. Patient breaks out in a rash where ever the headgear touches.         Once notes have been addended order, MNF and chart notes can be faxed to [facility]

## 2020-01-22 NOTE — TELEPHONE ENCOUNTER
Spoke with pts daughter and she stated pt is allergic to the material his current mask strap is made from    She spoke with [facility], and they informed her they have a strap made of different material that they could send the pt

## 2020-01-27 ENCOUNTER — TELEPHONE (OUTPATIENT)
Dept: FAMILY MEDICINE | Facility: CLINIC | Age: 85
End: 2020-01-27

## 2020-01-27 NOTE — TELEPHONE ENCOUNTER
----- Message from Paige Waldrop sent at 2020  2:31 PM CST -----  Contact: gisel/daughter  Reese Bell  MRN: [id number]  : 1931  PCP: Oracio Johnson  Home Phone      [phone]  Work Phone      Not on file.  Mobile          [phone]      MESSAGE:  Would like a medication called out for chipped tooth.  Phone: [phone]

## 2020-01-27 NOTE — TELEPHONE ENCOUNTER
Spoke with pts daughter Mikala and she stated pt has a tooth that looks infected. Stated pt is having pain going from his tooth to his ear. Daughter thinks it may be infected. Advised pt to contact dentist. If he cannot get in touch with a dentist to let me know.

## 2020-01-28 ENCOUNTER — TELEPHONE (OUTPATIENT)
Dept: FAMILY MEDICINE | Facility: CLINIC | Age: 85
End: 2020-01-28

## 2020-01-28 DIAGNOSIS — N40.0 BENIGN NODULAR PROSTATIC HYPERPLASIA WITHOUT LOWER URINARY TRACT SYMPTOMS: ICD-10-CM

## 2020-01-28 RX ORDER — TAMSULOSIN HYDROCHLORIDE 0.4 MG/1
CAPSULE ORAL
Qty: 90 CAPSULE | Refills: 3 | Status: SHIPPED | OUTPATIENT
Start: 2020-01-28 | End: 2020-12-17

## 2020-01-28 NOTE — TELEPHONE ENCOUNTER
Contacted pt daughter states the hydrocodone they have is dated 12/14/18. Would like to know if pt can still take? Please advise, thank you.

## 2020-01-28 NOTE — TELEPHONE ENCOUNTER
----- Message from Paige Waldrop sent at 2020  8:39 AM CST -----  Contact: Mikala/Daughter  Reese Bell  MRN: [id number]  : 1931  PCP: Oracio Johnson  Home Phone      [phone]  Work Phone      Not on file.  Mobile          [phone]      MESSAGE:  Pt's daughter has questions regarding giving pain medication.  Phone: [phone]

## 2020-01-31 ENCOUNTER — TELEPHONE (OUTPATIENT)
Dept: FAMILY MEDICINE | Facility: CLINIC | Age: 85
End: 2020-01-31

## 2020-01-31 DIAGNOSIS — R30.0 DYSURIA: Primary | ICD-10-CM

## 2020-01-31 NOTE — TELEPHONE ENCOUNTER
Patient daughter would like to come drop off a urine for patient to see if he has a UTI    Is this ok? Or does he need appointment?

## 2020-01-31 NOTE — TELEPHONE ENCOUNTER
----- Message from Helga Bey sent at 2020  9:14 AM CST -----  Contact: daughter- gisel Bell  MRN: [id number]  : 1931  PCP: Oracio Johnson  Home Phone      [phone]  Work Phone      Not on file.  Mobile          [phone]      MESSAGE:   Patient daughter would like to come drop off a urine for patient to see if he has a UTI    [phone]

## 2020-02-01 ENCOUNTER — LAB VISIT (OUTPATIENT)
Dept: LAB | Facility: HOSPITAL | Age: 85
End: 2020-02-01
Attending: FAMILY MEDICINE
Payer: MEDICARE

## 2020-02-01 DIAGNOSIS — R30.0 DYSURIA: ICD-10-CM

## 2020-02-01 LAB
BILIRUB UR QL STRIP: NEGATIVE
CLARITY UR: CLEAR
COLOR UR: YELLOW
GLUCOSE UR QL STRIP: NEGATIVE
HGB UR QL STRIP: NEGATIVE
KETONES UR QL STRIP: NEGATIVE
LEUKOCYTE ESTERASE UR QL STRIP: NEGATIVE
NITRITE UR QL STRIP: NEGATIVE
PH UR STRIP: 7 [PH] (ref 5–8)
PROT UR QL STRIP: NEGATIVE
SP GR UR STRIP: 1.02 (ref 1–1.03)
URN SPEC COLLECT METH UR: NORMAL
UROBILINOGEN UR STRIP-ACNC: NEGATIVE EU/DL

## 2020-02-01 PROCEDURE — 87086 URINE CULTURE/COLONY COUNT: CPT

## 2020-02-01 PROCEDURE — 81003 URINALYSIS AUTO W/O SCOPE: CPT

## 2020-02-02 LAB — BACTERIA UR CULT: NO GROWTH

## 2020-02-04 ENCOUNTER — PATIENT MESSAGE (OUTPATIENT)
Dept: FAMILY MEDICINE | Facility: CLINIC | Age: 85
End: 2020-02-04

## 2020-03-05 ENCOUNTER — TELEPHONE (OUTPATIENT)
Dept: FAMILY MEDICINE | Facility: CLINIC | Age: 85
End: 2020-03-05

## 2020-03-05 DIAGNOSIS — G25.81 RLS (RESTLESS LEGS SYNDROME): Primary | ICD-10-CM

## 2020-03-05 RX ORDER — ROPINIROLE 2 MG/1
2 TABLET, FILM COATED ORAL NIGHTLY
Qty: 30 TABLET | Refills: 11 | Status: SHIPPED | OUTPATIENT
Start: 2020-03-05 | End: 2021-06-28

## 2020-03-05 NOTE — TELEPHONE ENCOUNTER
----- Message from Sen Coronado sent at 3/5/2020  2:24 PM CST -----  Contact: Daughter - Mikala Bell  MRN: [id number]  : 1931  PCP: Oracio Johnson  Home Phone      [phone]  Work Phone      Not on file.  Mobile          [phone]      MESSAGE: RLS unable to sleep - itching & moving legs X 2 wks -- takes Remeron (gave 1 1/2 last night- didn't help) -- would like something else -- [facility] [place]    Call Mikala @ [phone]    PCP: Alex

## 2020-04-07 ENCOUNTER — TELEPHONE (OUTPATIENT)
Dept: FAMILY MEDICINE | Facility: CLINIC | Age: 85
End: 2020-04-07

## 2020-04-07 NOTE — TELEPHONE ENCOUNTER
----- Message from Paige Waldrop sent at 2020  8:18 AM CDT -----  Contact: gisel/daughter  Reese Bell  MRN: [id number]  : 1931  PCP: Oracio Johnson  Home Phone      [phone]  Work Phone      Not on file.  Mobile          [phone]      MESSAGE:  Pt's daughter states pt has sinus issues going on for past week and has been taking benadryl. Would like to know if pt can take anything else to help it go away. Pt has no fever  Phone: [phone]

## 2020-04-07 NOTE — TELEPHONE ENCOUNTER
No fever    Pt would like to know what is safe for him to take for cough & post nasal drip since he has Afib

## 2020-04-09 ENCOUNTER — TELEPHONE (OUTPATIENT)
Dept: FAMILY MEDICINE | Facility: CLINIC | Age: 85
End: 2020-04-09

## 2020-04-09 NOTE — TELEPHONE ENCOUNTER
----- Message from Sen Coronado sent at 2020  4:26 PM CDT -----  Contact: Daughter - Mikala Bell  MRN: [id number]  : 1931  PCP: Oracio Johnson  Home Phone      [phone]  Work Phone      Not on file.  Mobile          [phone]      MESSAGE: was instructed earlier to take Mucinex DM -- she says it is more nasal congestion -- wants to know if he can take Mucinex D instead -- please advise    Call [phone]    PCP: Alex

## 2020-04-09 NOTE — TELEPHONE ENCOUNTER
Spoke with Mikala--had questions about previous meds recommended. Wanted to know if he can take mucinex d. Per MM absolutely not only Robitussin DM and or mucinex dm. Nothing with D, voiced understanding.

## 2020-07-15 ENCOUNTER — TELEPHONE (OUTPATIENT)
Dept: FAMILY MEDICINE | Facility: CLINIC | Age: 85
End: 2020-07-15

## 2020-07-15 NOTE — TELEPHONE ENCOUNTER
Contacted pt daughter states pt can not take pain medicine, but he is having pain in knees. Would like to know if pt is a candidate for CBD oil? Pt would like to set up an audio. Please advise thank you.

## 2020-07-15 NOTE — TELEPHONE ENCOUNTER
----- Message from Helga Bey sent at 7/15/2020  1:15 PM CDT -----  Contact: gisel- daughter  Reese Bell  MRN: [id number]  : 1931  PCP: Oracio Johnson  Home Phone      [phone]  Work Phone      Not on file.  Mobile          [phone]      MESSAGE:   Patient daughter would like to talk to nurse regarding patients pain in his knees and seeing what kind of medication he can be put on.     [phone]

## 2020-08-24 ENCOUNTER — TELEPHONE (OUTPATIENT)
Dept: FAMILY MEDICINE | Facility: CLINIC | Age: 85
End: 2020-08-24

## 2020-08-24 DIAGNOSIS — R21 PENILE RASH: Primary | ICD-10-CM

## 2020-08-24 RX ORDER — CLOTRIMAZOLE AND BETAMETHASONE DIPROPIONATE 10; .64 MG/G; MG/G
CREAM TOPICAL 2 TIMES DAILY
Qty: 45 G | Refills: 3 | Status: SHIPPED | OUTPATIENT
Start: 2020-08-24 | End: 2021-01-21

## 2020-08-24 NOTE — TELEPHONE ENCOUNTER
----- Message from Paige Waldrop sent at 2020  8:10 AM CDT -----  Contact: gisel/daughter  Reese Bell  MRN: [id number]  : 1931  PCP: Oracio Johnson  Home Phone      [phone]  Work Phone      Not on file.  Mobile          [phone]      MESSAGE:  Pt having some rawness to penis and would like to know if there is a cream that can be put on it to make it better  Pharmacy[facility] [place]  Phone: [phone]

## 2020-10-20 ENCOUNTER — TELEPHONE (OUTPATIENT)
Dept: FAMILY MEDICINE | Facility: CLINIC | Age: 85
End: 2020-10-20

## 2020-10-20 NOTE — TELEPHONE ENCOUNTER
----- Message from Paige Waldrop sent at 10/20/2020 10:19 AM CDT -----  Contact: gisel/daughter  Reese Bell  MRN: [id number]  : 1931  PCP: Oracio Johnson  Home Phone      [phone]  Work Phone      Not on file.  Mobile          [phone]      MESSAGE:  Pt's daughter states pt is having muscular pain in right shoulder. Pt's daughter doesn't want him to receive pain meds but just would like advice on what to do.   Phone:[phone]

## 2020-10-22 ENCOUNTER — OFFICE VISIT (OUTPATIENT)
Dept: INTERNAL MEDICINE | Facility: CLINIC | Age: 85
End: 2020-10-22
Payer: MEDICARE

## 2020-10-22 VITALS
BODY MASS INDEX: 37.42 KG/M2 | RESPIRATION RATE: 16 BRPM | WEIGHT: 218 LBS | DIASTOLIC BLOOD PRESSURE: 64 MMHG | SYSTOLIC BLOOD PRESSURE: 130 MMHG | HEART RATE: 60 BPM

## 2020-10-22 DIAGNOSIS — M19.011 PRIMARY OSTEOARTHRITIS OF RIGHT SHOULDER: ICD-10-CM

## 2020-10-22 DIAGNOSIS — E78.2 MIXED HYPERLIPIDEMIA: ICD-10-CM

## 2020-10-22 DIAGNOSIS — I10 ESSENTIAL HYPERTENSION: Primary | ICD-10-CM

## 2020-10-22 DIAGNOSIS — I25.5 ISCHEMIC CARDIOMYOPATHY: ICD-10-CM

## 2020-10-22 DIAGNOSIS — H10.9 BACTERIAL CONJUNCTIVITIS: ICD-10-CM

## 2020-10-22 PROCEDURE — 99999 PR PBB SHADOW E&M-EST. PATIENT-LVL III: ICD-10-PCS | Mod: PBBFAC,,, | Performed by: FAMILY MEDICINE

## 2020-10-22 PROCEDURE — 1125F PR PAIN SEVERITY QUANTIFIED, PAIN PRESENT: ICD-10-PCS | Mod: S$GLB,,, | Performed by: FAMILY MEDICINE

## 2020-10-22 PROCEDURE — 99213 OFFICE O/P EST LOW 20 MIN: CPT | Mod: S$GLB,,, | Performed by: FAMILY MEDICINE

## 2020-10-22 PROCEDURE — 99213 PR OFFICE/OUTPT VISIT, EST, LEVL III, 20-29 MIN: ICD-10-PCS | Mod: S$GLB,,, | Performed by: FAMILY MEDICINE

## 2020-10-22 PROCEDURE — 99499 RISK ADDL DX/OHS AUDIT: ICD-10-PCS | Mod: S$GLB,,, | Performed by: FAMILY MEDICINE

## 2020-10-22 PROCEDURE — 99999 PR PBB SHADOW E&M-EST. PATIENT-LVL III: CPT | Mod: PBBFAC,,, | Performed by: FAMILY MEDICINE

## 2020-10-22 PROCEDURE — 1125F AMNT PAIN NOTED PAIN PRSNT: CPT | Mod: S$GLB,,, | Performed by: FAMILY MEDICINE

## 2020-10-22 PROCEDURE — 1159F PR MEDICATION LIST DOCUMENTED IN MEDICAL RECORD: ICD-10-PCS | Mod: S$GLB,,, | Performed by: FAMILY MEDICINE

## 2020-10-22 PROCEDURE — 1101F PT FALLS ASSESS-DOCD LE1/YR: CPT | Mod: CPTII,S$GLB,, | Performed by: FAMILY MEDICINE

## 2020-10-22 PROCEDURE — 1159F MED LIST DOCD IN RCRD: CPT | Mod: S$GLB,,, | Performed by: FAMILY MEDICINE

## 2020-10-22 PROCEDURE — 1101F PR PT FALLS ASSESS DOC 0-1 FALLS W/OUT INJ PAST YR: ICD-10-PCS | Mod: CPTII,S$GLB,, | Performed by: FAMILY MEDICINE

## 2020-10-22 PROCEDURE — 99499 UNLISTED E&M SERVICE: CPT | Mod: S$GLB,,, | Performed by: FAMILY MEDICINE

## 2020-10-22 RX ORDER — HYDROCORTISONE 25 MG/G
CREAM TOPICAL
Qty: 20 G | Refills: 1 | Status: SHIPPED | OUTPATIENT
Start: 2020-10-22 | End: 2021-06-28

## 2020-10-22 RX ORDER — GENTAMICIN SULFATE 3 MG/ML
SOLUTION/ DROPS OPHTHALMIC
Qty: 5 ML | Refills: 1 | Status: SHIPPED | OUTPATIENT
Start: 2020-10-22 | End: 2021-06-28

## 2020-10-22 RX ORDER — LIDOCAINE 50 MG/G
1 PATCH TOPICAL DAILY
Qty: 30 PATCH | Refills: 11 | Status: SHIPPED | OUTPATIENT
Start: 2020-10-22 | End: 2021-06-28

## 2020-10-22 RX ORDER — HYDROCODONE BITARTRATE AND ACETAMINOPHEN 5; 325 MG/1; MG/1
TABLET ORAL
Qty: 30 TABLET | Refills: 0 | Status: SHIPPED | OUTPATIENT
Start: 2020-10-22 | End: 2021-06-28

## 2020-10-22 NOTE — PROGRESS NOTES
Patient, Reese Bell (MRN #[id number]), presented with a recorded BMI of 37.42 kg/m^2 and a documented comorbidity(s):  - Hypertension  - Hyperlipidemia  to which the severe obesity is a contributing factor. This is consistent with the definition of severe obesity (BMI 35.0-39.9) with comorbidity (ICD-10 E66.01, Z68.35). The patient's severe obesity was monitored, evaluated, addressed and/or treated. This addendum to the medical record is made on 10/22/2020.

## 2020-10-22 NOTE — PROGRESS NOTES
Subjective:       Patient ID: Reese Bell is a 89 y.o. male.    Chief Complaint: Shoulder Pain (right)    Pt is a 89 y.o. male who presents for check up for Essential hypertension  (primary encounter diagnosis)  Mixed hyperlipidemia  Ischemic cardiomyopathy  Bacterial conjunctivitis. Doing well on current meds. Denies any side effects. Prevention is  up to date.    Review of Systems   Eyes: Positive for discharge and itching.   Cardiovascular:        CHF   Musculoskeletal: Positive for arthralgias.        R shoulder is aching       Objective:      Physical Exam  Constitutional:       Appearance: He is well-developed.   HENT:      Head: Normocephalic.   Eyes:      Pupils: Pupils are equal, round, and reactive to light.   Neck:      Musculoskeletal: Normal range of motion and neck supple.      Thyroid: No thyromegaly.   Cardiovascular:      Rate and Rhythm: Normal rate and regular rhythm.      Heart sounds: No murmur. No friction rub.   Pulmonary:      Effort: Pulmonary effort is normal. No respiratory distress.      Breath sounds: No wheezing.   Abdominal:      Tenderness: There is no abdominal tenderness. There is no guarding or rebound.   Musculoskeletal: Normal range of motion.         General: No tenderness.      Comments: Shoulder with TTP  1+on ROM   Lymphadenopathy:      Cervical: No cervical adenopathy.   Skin:     General: Skin is warm and dry.   Neurological:      Mental Status: He is alert and oriented to person, place, and time.      Cranial Nerves: No cranial nerve deficit.      Deep Tendon Reflexes: Reflexes are normal and symmetric.   Psychiatric:         Thought Content: Thought content normal.         Judgment: Judgment normal.         Assessment:       1. Essential hypertension    2. Mixed hyperlipidemia    3. Ischemic cardiomyopathy    4. Bacterial conjunctivitis    5. Primary osteoarthritis of right shoulder        Plan:   Reese RAUSCH was seen today for shoulder pain.    Diagnoses and all  orders for this visit:    Essential hypertension    Mixed hyperlipidemia    Ischemic cardiomyopathy    Bacterial conjunctivitis    Primary osteoarthritis of right shoulder    Other orders  -     gentamicin (GARAMYCIN) 0.3 % ophthalmic solution; 1-2 qtts to each eye qid for 7-10 days

## 2020-12-28 ENCOUNTER — PATIENT MESSAGE (OUTPATIENT)
Dept: INTERNAL MEDICINE | Facility: CLINIC | Age: 85
End: 2020-12-28

## 2020-12-29 ENCOUNTER — OFFICE VISIT (OUTPATIENT)
Dept: FAMILY MEDICINE | Facility: CLINIC | Age: 85
End: 2020-12-29
Payer: MEDICARE

## 2020-12-29 ENCOUNTER — CLINICAL SUPPORT (OUTPATIENT)
Dept: FAMILY MEDICINE | Facility: CLINIC | Age: 85
End: 2020-12-29
Payer: MEDICARE

## 2020-12-29 VITALS
SYSTOLIC BLOOD PRESSURE: 130 MMHG | BODY MASS INDEX: 37.56 KG/M2 | HEIGHT: 64 IN | DIASTOLIC BLOOD PRESSURE: 94 MMHG | RESPIRATION RATE: 18 BRPM | WEIGHT: 220 LBS | TEMPERATURE: 98 F

## 2020-12-29 DIAGNOSIS — H10.503 BLEPHAROCONJUNCTIVITIS OF BOTH EYES, UNSPECIFIED BLEPHAROCONJUNCTIVITIS TYPE: ICD-10-CM

## 2020-12-29 DIAGNOSIS — I10 ESSENTIAL HYPERTENSION: ICD-10-CM

## 2020-12-29 DIAGNOSIS — Z20.822 EXPOSURE TO COVID-19 VIRUS: ICD-10-CM

## 2020-12-29 DIAGNOSIS — Z20.822 CLOSE EXPOSURE TO COVID-19 VIRUS: ICD-10-CM

## 2020-12-29 DIAGNOSIS — H60.90 OTITIS EXTERNA, UNSPECIFIED CHRONICITY, UNSPECIFIED LATERALITY, UNSPECIFIED TYPE: Primary | ICD-10-CM

## 2020-12-29 PROCEDURE — 1101F PT FALLS ASSESS-DOCD LE1/YR: CPT | Mod: CPTII,S$GLB,, | Performed by: FAMILY MEDICINE

## 2020-12-29 PROCEDURE — 99213 OFFICE O/P EST LOW 20 MIN: CPT | Mod: S$GLB,,, | Performed by: FAMILY MEDICINE

## 2020-12-29 PROCEDURE — 99499 RISK ADDL DX/OHS AUDIT: ICD-10-PCS | Mod: S$GLB,,, | Performed by: FAMILY MEDICINE

## 2020-12-29 PROCEDURE — 99499 UNLISTED E&M SERVICE: CPT | Mod: S$GLB,,, | Performed by: FAMILY MEDICINE

## 2020-12-29 PROCEDURE — 1159F PR MEDICATION LIST DOCUMENTED IN MEDICAL RECORD: ICD-10-PCS | Mod: S$GLB,,, | Performed by: FAMILY MEDICINE

## 2020-12-29 PROCEDURE — 99213 PR OFFICE/OUTPT VISIT, EST, LEVL III, 20-29 MIN: ICD-10-PCS | Mod: S$GLB,,, | Performed by: FAMILY MEDICINE

## 2020-12-29 PROCEDURE — 1126F PR PAIN SEVERITY QUANTIFIED, NO PAIN PRESENT: ICD-10-PCS | Mod: S$GLB,,, | Performed by: FAMILY MEDICINE

## 2020-12-29 PROCEDURE — 1101F PR PT FALLS ASSESS DOC 0-1 FALLS W/OUT INJ PAST YR: ICD-10-PCS | Mod: CPTII,S$GLB,, | Performed by: FAMILY MEDICINE

## 2020-12-29 PROCEDURE — U0003 INFECTIOUS AGENT DETECTION BY NUCLEIC ACID (DNA OR RNA); SEVERE ACUTE RESPIRATORY SYNDROME CORONAVIRUS 2 (SARS-COV-2) (CORONAVIRUS DISEASE [COVID-19]), AMPLIFIED PROBE TECHNIQUE, MAKING USE OF HIGH THROUGHPUT TECHNOLOGIES AS DESCRIBED BY CMS-2020-01-R: HCPCS

## 2020-12-29 PROCEDURE — 99999 PR PBB SHADOW E&M-EST. PATIENT-LVL IV: ICD-10-PCS | Mod: PBBFAC,,, | Performed by: FAMILY MEDICINE

## 2020-12-29 PROCEDURE — 3288F FALL RISK ASSESSMENT DOCD: CPT | Mod: CPTII,S$GLB,, | Performed by: FAMILY MEDICINE

## 2020-12-29 PROCEDURE — 99999 PR PBB SHADOW E&M-EST. PATIENT-LVL IV: CPT | Mod: PBBFAC,,, | Performed by: FAMILY MEDICINE

## 2020-12-29 PROCEDURE — 1159F MED LIST DOCD IN RCRD: CPT | Mod: S$GLB,,, | Performed by: FAMILY MEDICINE

## 2020-12-29 PROCEDURE — 3288F PR FALLS RISK ASSESSMENT DOCUMENTED: ICD-10-PCS | Mod: CPTII,S$GLB,, | Performed by: FAMILY MEDICINE

## 2020-12-29 PROCEDURE — 1126F AMNT PAIN NOTED NONE PRSNT: CPT | Mod: S$GLB,,, | Performed by: FAMILY MEDICINE

## 2020-12-29 RX ORDER — TRIAMCINOLONE ACETONIDE 1 MG/G
CREAM TOPICAL
Qty: 28 G | Refills: 3 | Status: SHIPPED | OUTPATIENT
Start: 2020-12-29 | End: 2021-06-28

## 2020-12-29 NOTE — PROGRESS NOTES
Subjective:       Patient ID: Reese Bell is a 89 y.o. male.    Chief Complaint: Eye Drainage (appr 2 weeks), Otalgia (appr 1 week, infection on top of left ear), and COVID-19 Concerns (exposure dec 25)    Pt is a 89 y.o. male who presents for check up for Exposure to covid-19 virus a few days ago  Otitis externa, unspecified chronicity, unspecified laterality, unspecified type  (primary encounter diagnosis)  Essential hypertension  Blepharoconjunctivitis of both eyes, unspecified blepharoconjunctivitis type. Doing well on current meds. Denies any side effects. Prevention is up to date.    Review of Systems   Constitutional: Negative for activity change and unexpected weight change.   HENT: Positive for rhinorrhea. Negative for hearing loss and trouble swallowing.         L ear pinna is TTP1+   Eyes: Positive for discharge, redness and itching. Negative for visual disturbance.   Respiratory: Negative for chest tightness and wheezing.    Cardiovascular: Negative for chest pain and palpitations.   Gastrointestinal: Negative for blood in stool, constipation, diarrhea and vomiting.   Endocrine: Negative for polydipsia and polyuria.   Genitourinary: Negative for difficulty urinating, hematuria and urgency.   Musculoskeletal: Negative for arthralgias, joint swelling and neck pain.   Neurological: Negative for weakness and headaches.   Psychiatric/Behavioral: Negative for confusion and dysphoric mood.       Objective:      Physical Exam  Constitutional:       Appearance: He is well-developed.   HENT:      Head: Normocephalic.      Comments: L pinna is red and TTP  Eyes:      General:         Right eye: Discharge present.         Left eye: Discharge present.     Pupils: Pupils are equal, round, and reactive to light.   Neck:      Musculoskeletal: Normal range of motion and neck supple.      Thyroid: No thyromegaly.   Cardiovascular:      Rate and Rhythm: Normal rate and regular rhythm.      Heart sounds: No murmur.  No friction rub.   Pulmonary:      Effort: Pulmonary effort is normal. No respiratory distress.      Breath sounds: No wheezing.   Abdominal:      Tenderness: There is no abdominal tenderness. There is no guarding or rebound.   Musculoskeletal: Normal range of motion.         General: No tenderness.   Lymphadenopathy:      Cervical: No cervical adenopathy.   Skin:     General: Skin is warm and dry.   Neurological:      Mental Status: He is alert and oriented to person, place, and time.      Cranial Nerves: No cranial nerve deficit.      Deep Tendon Reflexes: Reflexes are normal and symmetric.   Psychiatric:         Thought Content: Thought content normal.         Judgment: Judgment normal.         Assessment:       1. Otitis externa, unspecified chronicity, unspecified laterality, unspecified type    2. Exposure to COVID-19 virus    3. Essential hypertension    4. Blepharoconjunctivitis of both eyes, unspecified blepharoconjunctivitis type    5. Close exposure to COVID-19 virus        Plan:   Reese RAUSCH was seen today for eye drainage, otalgia and covid-19 concerns.    Diagnoses and all orders for this visit:    Otitis externa, unspecified chronicity, unspecified laterality, unspecified type    Exposure to COVID-19 virus  -     Cancel: COVID-19 Routine Screening  -     COVID-19 Routine Screening; Future    Essential hypertension    Blepharoconjunctivitis of both eyes, unspecified blepharoconjunctivitis type    Close exposure to COVID-19 virus    Other orders  -     tobramycin-dexamethasone (TOBRADEX ST) 0.3-0.05 % DrpS; Apply 1 drop to eye 3 (three) times daily. for 10 days  -     triamcinolone acetonide 0.1% (KENALOG) 0.1 % cream; Apply to L ear BID

## 2020-12-29 NOTE — PROGRESS NOTES
Patient, Reese Bell (MRN #[id number]), presented with a recent Estimated Glumerular Filtration Rate (EGFR) between 30 and 45 consistent with the definition of chronic kidney disease stage 3 - moderate (ICD10 - N18.3).    eGFR if non    Date Value Ref Range Status   05/01/2020 38 (A) >60 mL/min/1.73 m^2 Final     Comment:     Calculation used to obtain the estimated glomerular filtration  rate (eGFR) is the CKD-EPI equation.          The patient's chronic kidney disease stage 3 was monitored, evaluated, addressed and/or treated. This addendum to the medical record is made on 12/29/2020.

## 2020-12-30 LAB — SARS-COV-2 RNA RESP QL NAA+PROBE: NOT DETECTED

## 2021-01-06 ENCOUNTER — IMMUNIZATION (OUTPATIENT)
Dept: FAMILY MEDICINE | Facility: CLINIC | Age: 86
End: 2021-01-06
Payer: MEDICARE

## 2021-01-06 DIAGNOSIS — Z23 NEED FOR VACCINATION: ICD-10-CM

## 2021-01-06 PROCEDURE — 91300 COVID-19, MRNA, LNP-S, PF, 30 MCG/0.3 ML DOSE VACCINE: CPT | Mod: PBBFAC | Performed by: INTERNAL MEDICINE

## 2021-01-26 ENCOUNTER — PATIENT MESSAGE (OUTPATIENT)
Dept: ADMINISTRATIVE | Facility: OTHER | Age: 86
End: 2021-01-26

## 2021-01-27 ENCOUNTER — PATIENT MESSAGE (OUTPATIENT)
Dept: FAMILY MEDICINE | Facility: CLINIC | Age: 86
End: 2021-01-27

## 2021-01-28 ENCOUNTER — IMMUNIZATION (OUTPATIENT)
Dept: FAMILY MEDICINE | Facility: CLINIC | Age: 86
End: 2021-01-28
Payer: MEDICARE

## 2021-01-28 DIAGNOSIS — Z23 NEED FOR VACCINATION: Primary | ICD-10-CM

## 2021-01-28 PROCEDURE — 91300 COVID-19, MRNA, LNP-S, PF, 30 MCG/0.3 ML DOSE VACCINE: ICD-10-PCS | Mod: ,,, | Performed by: INTERNAL MEDICINE

## 2021-01-28 PROCEDURE — 91300 COVID-19, MRNA, LNP-S, PF, 30 MCG/0.3 ML DOSE VACCINE: CPT | Mod: ,,, | Performed by: INTERNAL MEDICINE

## 2021-01-28 PROCEDURE — 0002A COVID-19, MRNA, LNP-S, PF, 30 MCG/0.3 ML DOSE VACCINE: CPT | Mod: CV19,,, | Performed by: INTERNAL MEDICINE

## 2021-01-28 PROCEDURE — 0002A COVID-19, MRNA, LNP-S, PF, 30 MCG/0.3 ML DOSE VACCINE: ICD-10-PCS | Mod: CV19,,, | Performed by: INTERNAL MEDICINE

## 2021-02-08 DIAGNOSIS — J44.9 CHRONIC OBSTRUCTIVE PULMONARY DISEASE, UNSPECIFIED COPD TYPE: Primary | ICD-10-CM

## 2021-02-08 DIAGNOSIS — R06.2 WHEEZING: ICD-10-CM

## 2021-02-08 RX ORDER — BUDESONIDE 0.5 MG/2ML
0.5 INHALANT ORAL DAILY
Qty: 60 ML | Refills: 11 | Status: SHIPPED | OUTPATIENT
Start: 2021-02-08 | End: 2021-06-28

## 2021-02-08 RX ORDER — IPRATROPIUM BROMIDE AND ALBUTEROL SULFATE 2.5; .5 MG/3ML; MG/3ML
3 SOLUTION RESPIRATORY (INHALATION) EVERY 6 HOURS PRN
Qty: 180 ML | Refills: 11 | Status: SHIPPED | OUTPATIENT
Start: 2021-02-08 | End: 2021-06-28

## 2021-04-19 ENCOUNTER — TELEPHONE (OUTPATIENT)
Dept: FAMILY MEDICINE | Facility: CLINIC | Age: 86
End: 2021-04-19

## 2021-04-19 ENCOUNTER — CLINICAL SUPPORT (OUTPATIENT)
Dept: FAMILY MEDICINE | Facility: CLINIC | Age: 86
End: 2021-04-19
Payer: MEDICARE

## 2021-04-19 DIAGNOSIS — R35.0 URINARY FREQUENCY: Primary | ICD-10-CM

## 2021-04-19 LAB
BACTERIA SPEC CULT: NORMAL /HPF
BILIRUB SERPL-MCNC: NORMAL MG/DL
BLOOD URINE, POC: NORMAL
CASTS: NORMAL
COLOR, POC UA: YELLOW
CRYSTALS: NORMAL
GLUCOSE UR QL STRIP: NORMAL
KETONES UR QL STRIP: NORMAL
LEUKOCYTE ESTERASE URINE, POC: NORMAL
NITRITE, POC UA: NORMAL
PH, POC UA: 6
PROTEIN, POC: NORMAL
RBC CELLS COUNTED: NORMAL
SPECIFIC GRAVITY, POC UA: 1.01
UROBILINOGEN, POC UA: NORMAL
WHITE BLOOD CELLS: NORMAL

## 2021-04-19 PROCEDURE — 81001 POCT URINALYSIS, DIPSTICK OR TABLET REAGENT, AUTOMATED, WITH MICROSCOP: ICD-10-PCS | Mod: S$GLB,,, | Performed by: FAMILY MEDICINE

## 2021-04-19 PROCEDURE — 81001 URINALYSIS AUTO W/SCOPE: CPT | Mod: S$GLB,,, | Performed by: FAMILY MEDICINE

## 2021-07-08 ENCOUNTER — TELEPHONE (OUTPATIENT)
Dept: FAMILY MEDICINE | Facility: CLINIC | Age: 86
End: 2021-07-08

## 2021-07-08 RX ORDER — CLOTRIMAZOLE AND BETAMETHASONE DIPROPIONATE 10; .64 MG/G; MG/G
CREAM TOPICAL 2 TIMES DAILY
Qty: 45 G | Refills: 3 | Status: SHIPPED | OUTPATIENT
Start: 2021-07-08 | End: 2022-02-04

## 2021-07-13 ENCOUNTER — OFFICE VISIT (OUTPATIENT)
Dept: FAMILY MEDICINE | Facility: CLINIC | Age: 86
End: 2021-07-13
Payer: MEDICARE

## 2021-07-13 VITALS
HEART RATE: 64 BPM | DIASTOLIC BLOOD PRESSURE: 78 MMHG | HEIGHT: 65 IN | WEIGHT: 222 LBS | RESPIRATION RATE: 20 BRPM | SYSTOLIC BLOOD PRESSURE: 136 MMHG | BODY MASS INDEX: 36.99 KG/M2

## 2021-07-13 DIAGNOSIS — I10 ESSENTIAL HYPERTENSION: Primary | ICD-10-CM

## 2021-07-13 DIAGNOSIS — G47.33 OSA (OBSTRUCTIVE SLEEP APNEA): ICD-10-CM

## 2021-07-13 DIAGNOSIS — R18.8 OTHER ASCITES: ICD-10-CM

## 2021-07-13 PROCEDURE — 99499 RISK ADDL DX/OHS AUDIT: ICD-10-PCS | Mod: S$GLB,,, | Performed by: FAMILY MEDICINE

## 2021-07-13 PROCEDURE — 3288F PR FALLS RISK ASSESSMENT DOCUMENTED: ICD-10-PCS | Mod: CPTII,S$GLB,, | Performed by: FAMILY MEDICINE

## 2021-07-13 PROCEDURE — 1159F MED LIST DOCD IN RCRD: CPT | Mod: S$GLB,,, | Performed by: FAMILY MEDICINE

## 2021-07-13 PROCEDURE — 1125F PR PAIN SEVERITY QUANTIFIED, PAIN PRESENT: ICD-10-PCS | Mod: S$GLB,,, | Performed by: FAMILY MEDICINE

## 2021-07-13 PROCEDURE — 1101F PT FALLS ASSESS-DOCD LE1/YR: CPT | Mod: CPTII,S$GLB,, | Performed by: FAMILY MEDICINE

## 2021-07-13 PROCEDURE — 1125F AMNT PAIN NOTED PAIN PRSNT: CPT | Mod: S$GLB,,, | Performed by: FAMILY MEDICINE

## 2021-07-13 PROCEDURE — 99999 PR PBB SHADOW E&M-EST. PATIENT-LVL III: ICD-10-PCS | Mod: PBBFAC,,, | Performed by: FAMILY MEDICINE

## 2021-07-13 PROCEDURE — 99499 UNLISTED E&M SERVICE: CPT | Mod: S$GLB,,, | Performed by: FAMILY MEDICINE

## 2021-07-13 PROCEDURE — 99213 PR OFFICE/OUTPT VISIT, EST, LEVL III, 20-29 MIN: ICD-10-PCS | Mod: S$GLB,,, | Performed by: FAMILY MEDICINE

## 2021-07-13 PROCEDURE — 99999 PR PBB SHADOW E&M-EST. PATIENT-LVL III: CPT | Mod: PBBFAC,,, | Performed by: FAMILY MEDICINE

## 2021-07-13 PROCEDURE — 1101F PR PT FALLS ASSESS DOC 0-1 FALLS W/OUT INJ PAST YR: ICD-10-PCS | Mod: CPTII,S$GLB,, | Performed by: FAMILY MEDICINE

## 2021-07-13 PROCEDURE — 1159F PR MEDICATION LIST DOCUMENTED IN MEDICAL RECORD: ICD-10-PCS | Mod: S$GLB,,, | Performed by: FAMILY MEDICINE

## 2021-07-13 PROCEDURE — 99213 OFFICE O/P EST LOW 20 MIN: CPT | Mod: S$GLB,,, | Performed by: FAMILY MEDICINE

## 2021-07-13 PROCEDURE — 3288F FALL RISK ASSESSMENT DOCD: CPT | Mod: CPTII,S$GLB,, | Performed by: FAMILY MEDICINE

## 2021-07-13 RX ORDER — HYDROCODONE BITARTRATE AND ACETAMINOPHEN 5; 325 MG/1; MG/1
1 TABLET ORAL EVERY 12 HOURS PRN
Qty: 60 TABLET | Refills: 0 | Status: SHIPPED | OUTPATIENT
Start: 2021-07-13 | End: 2021-07-23

## 2021-07-13 RX ORDER — SPIRONOLACTONE 25 MG/1
TABLET ORAL
Qty: 30 TABLET | Refills: 5 | Status: SHIPPED | OUTPATIENT
Start: 2021-07-13

## 2021-07-13 RX ORDER — IPRATROPIUM BROMIDE 42 UG/1
SPRAY, METERED NASAL
COMMUNITY
Start: 2021-06-30

## 2021-08-20 ENCOUNTER — PATIENT MESSAGE (OUTPATIENT)
Dept: FAMILY MEDICINE | Facility: CLINIC | Age: 86
End: 2021-08-20

## 2021-08-23 DIAGNOSIS — Z78.9 IMPAIRED MOBILITY AND ACTIVITIES OF DAILY LIVING: Primary | ICD-10-CM

## 2021-08-23 DIAGNOSIS — Z74.09 IMPAIRED MOBILITY AND ACTIVITIES OF DAILY LIVING: Primary | ICD-10-CM

## 2021-10-28 ENCOUNTER — PATIENT MESSAGE (OUTPATIENT)
Dept: FAMILY MEDICINE | Facility: CLINIC | Age: 86
End: 2021-10-28
Payer: MEDICARE

## 2021-10-29 ENCOUNTER — TELEPHONE (OUTPATIENT)
Dept: FAMILY MEDICINE | Facility: CLINIC | Age: 86
End: 2021-10-29
Payer: MEDICARE

## 2021-10-29 RX ORDER — TRIAMCINOLONE ACETONIDE 1 MG/G
CREAM TOPICAL 2 TIMES DAILY
Qty: 80 G | Refills: 5 | Status: SHIPPED | OUTPATIENT
Start: 2021-10-29 | End: 2021-11-08

## 2021-10-29 RX ORDER — CLOTRIMAZOLE AND BETAMETHASONE DIPROPIONATE 10; .64 MG/G; MG/G
CREAM TOPICAL 2 TIMES DAILY
Qty: 45 G | Refills: 3 | Status: CANCELLED | OUTPATIENT
Start: 2021-10-29

## 2021-11-12 ENCOUNTER — PATIENT MESSAGE (OUTPATIENT)
Dept: FAMILY MEDICINE | Facility: CLINIC | Age: 86
End: 2021-11-12
Payer: MEDICARE

## 2021-11-16 ENCOUNTER — PATIENT MESSAGE (OUTPATIENT)
Dept: FAMILY MEDICINE | Facility: CLINIC | Age: 86
End: 2021-11-16
Payer: MEDICARE

## 2021-12-28 ENCOUNTER — TELEPHONE (OUTPATIENT)
Dept: FAMILY MEDICINE | Facility: CLINIC | Age: 86
End: 2021-12-28
Payer: MEDICARE

## 2021-12-28 DIAGNOSIS — Z74.09 IMPAIRED MOBILITY: Primary | ICD-10-CM

## 2021-12-28 DIAGNOSIS — I50.9 CHRONIC CONGESTIVE HEART FAILURE, UNSPECIFIED HEART FAILURE TYPE: ICD-10-CM

## 2021-12-30 ENCOUNTER — PATIENT MESSAGE (OUTPATIENT)
Dept: FAMILY MEDICINE | Facility: CLINIC | Age: 86
End: 2021-12-30
Payer: MEDICARE

## 2021-12-30 ENCOUNTER — TELEPHONE (OUTPATIENT)
Dept: FAMILY MEDICINE | Facility: CLINIC | Age: 86
End: 2021-12-30
Payer: MEDICARE

## 2021-12-30 DIAGNOSIS — I50.9 CHRONIC CONGESTIVE HEART FAILURE, UNSPECIFIED HEART FAILURE TYPE: Primary | ICD-10-CM

## 2021-12-30 DIAGNOSIS — Z78.9 IMPAIRED MOBILITY AND ACTIVITIES OF DAILY LIVING: ICD-10-CM

## 2021-12-30 DIAGNOSIS — Z74.09 IMPAIRED MOBILITY AND ACTIVITIES OF DAILY LIVING: ICD-10-CM

## 2022-01-05 ENCOUNTER — PATIENT MESSAGE (OUTPATIENT)
Dept: FAMILY MEDICINE | Facility: CLINIC | Age: 87
End: 2022-01-05
Payer: MEDICARE

## 2022-01-05 NOTE — TELEPHONE ENCOUNTER
----- Message from Gretta Frakn sent at 2022 11:30 AM CST -----  Contact: jo ann/LPN  Reese Bell  MRN: [id number]  : 1931  PCP: Oracio Johnson  Home Phone      [phone]  Work Phone      Not on file.  Mobile          [phone]      MESSAGE:   [facility] called stating no recent clinical info on pt to support him needing home health. States he needs at least 30 or 90 day clinic notes. He was last seen in office 2021. She is stating she wants to speak with a nurse. Stating it as it stands she would not be able to approve the home health request.       States that she doesn't know if [facility] can accept the request.  May have to try with a different provider. States a tele-health visit may suffice.    Request will pend until they get clarification.      Fax:[phone]  Phone:[phone]

## 2022-01-23 ENCOUNTER — PATIENT MESSAGE (OUTPATIENT)
Dept: FAMILY MEDICINE | Facility: CLINIC | Age: 87
End: 2022-01-23
Payer: MEDICARE

## 2022-01-24 ENCOUNTER — INFUSION (OUTPATIENT)
Dept: INFECTIOUS DISEASES | Facility: HOSPITAL | Age: 87
End: 2022-01-24
Attending: FAMILY MEDICINE
Payer: MEDICARE

## 2022-01-24 VITALS
DIASTOLIC BLOOD PRESSURE: 78 MMHG | HEART RATE: 64 BPM | TEMPERATURE: 98 F | SYSTOLIC BLOOD PRESSURE: 155 MMHG | RESPIRATION RATE: 17 BRPM | OXYGEN SATURATION: 96 %

## 2022-01-24 DIAGNOSIS — U07.1 COVID-19: Primary | ICD-10-CM

## 2022-01-24 DIAGNOSIS — U07.1 COVID-19: ICD-10-CM

## 2022-01-24 PROCEDURE — 25000003 PHARM REV CODE 250: Performed by: FAMILY MEDICINE

## 2022-01-24 PROCEDURE — M0243 CASIRIVI AND IMDEVI INFUSION: HCPCS | Performed by: FAMILY MEDICINE

## 2022-01-24 PROCEDURE — 63600175 PHARM REV CODE 636 W HCPCS: Performed by: FAMILY MEDICINE

## 2022-01-24 RX ORDER — ALBUTEROL SULFATE 90 UG/1
2 AEROSOL, METERED RESPIRATORY (INHALATION)
Status: ACTIVE | OUTPATIENT
Start: 2022-01-24

## 2022-01-24 RX ORDER — DIPHENHYDRAMINE HYDROCHLORIDE 50 MG/ML
25 INJECTION INTRAMUSCULAR; INTRAVENOUS ONCE AS NEEDED
Status: ACTIVE | OUTPATIENT
Start: 2022-01-24 | End: 2033-06-22

## 2022-01-24 RX ORDER — SODIUM CHLORIDE 0.9 % (FLUSH) 0.9 %
10 SYRINGE (ML) INJECTION
Status: ACTIVE | OUTPATIENT
Start: 2022-01-24

## 2022-01-24 RX ORDER — EPINEPHRINE 0.3 MG/.3ML
0.3 INJECTION SUBCUTANEOUS
Status: ACTIVE | OUTPATIENT
Start: 2022-01-24

## 2022-01-24 RX ORDER — ONDANSETRON 4 MG/1
4 TABLET, ORALLY DISINTEGRATING ORAL ONCE AS NEEDED
Status: ACTIVE | OUTPATIENT
Start: 2022-01-24 | End: 2033-06-22

## 2022-01-24 RX ADMIN — CASIRIVIMAB AND IMDEVIMAB 600 MG: 600; 600 INJECTION, SOLUTION, CONCENTRATE INTRAVENOUS at 12:01

## 2022-01-24 NOTE — PATIENT INSTRUCTIONS
FACT SHEET FOR PATIENTS, PARENTS AND CAREGIVERS   EMERGENCY USE AUTHORIZATION (EUA) OF REGEN-COVTM   (casirivimab and imdevimab) FOR CORONAVIRUS DISEASE 2019 (COVID-19)     You are being given a medicine called REGEN-COV (casirivimab and imdevimab) for the treatment or post-exposure prevention of coronavirus disease 2019 (COVID-19). SARS-CoV-2 is the virus that causes COVID-19. This Fact Sheet contains information to help you understand the potential risks and potential benefits of taking REGEN-COV.   Receiving REGEN-COV may benefit certain people with COVID-19 and may help prevent certain people who have been exposed to someone who is infected with SARS-CoV-2 from getting SARS-CoV-2 infection, or may prevent certain people who are at high risk of exposure to someone who is infected with SARS-CoV-2 from getting SARS-CoV-2 infection.   Read this Fact Sheet for information about REGEN-COV. Talk to your healthcare provider if you have questions. It is your choice to receive REGEN-COV or stop at any time.     WHAT IS COVID-19?   COVID-19 is caused by a virus called a coronavirus, SARS-CoV-2. People can get COVID-19 through contact with another person who has the virus.   COVID-19 illnesses have ranged from very mild (including some with no reported symptoms) to severe, including illness resulting in death. While information so far suggests that most COVID-19 illness is mild, serious illness can happen and may cause some of your other medical conditions to become worse. People of all ages with severe, long-lasting (chronic) medical conditions like heart disease, lung disease, and diabetes, for example, and other conditions including obesity, seem to be at higher risk of being hospitalized for COVID-19. Older age, with or without other conditions, also places people at higher risk of being hospitalized for COVID-19.     WHAT ARE THE SYMPTOMS OF COVID-19?   The symptoms of COVID-19 include fever, cough, and shortness of  breath, which may appear 2 to 14 days after exposure. Serious illness including breathing problems can occur and may cause your other medical conditions to become worse.     WHAT IS REGEN-COV (casirivimab and imdevimab)?   REGEN-COV is an investigational medicine used in adults and adolescents (12 years of age and older who weigh at least 88 pounds (40 kg)) who are at high risk for severe COVID-19, including hospitalization or death for:    treatment of mild to moderate symptoms of COVID-19    post-exposure prevention of COVID-19 in persons who are: o not fully vaccinated against COVID-19 (Individuals are considered to be fully vaccinated 2 weeks after their second vaccine dose in a 2-dose series [such as the Pfizer or Moderna vaccines], or 2 weeks after a single-dose vaccine [such as [employer] vaccine]), or,   o are not expected to build up enough of an immune response to the complete COVID-19 vaccination (for example, someone with immunocompromising conditions, including someone who is taking immunosuppressive medications), and- have been exposed to someone who is infected with SARS-CoV-2. Close contact with someone who is infected with SARS-CoV-2 is defined as being within 6 feet for a total of 15 minutes or more, providing care at home to someone who is sick, having direct physical contact with the person (hugging or kissing, for example), sharing eating or drinking utensils, or being exposed to respiratory droplets from an infected person (sneezing or coughing, for example). For additional details, go to https://www.cdc.gov/coronavirus/2019-ncov/if-you-are-sick/quarantine.html, or   - someone who is at high risk of being exposed to someone who is infected with SARS-CoV-2 because of occurrence of SARS-CoV-2 infection in other individuals in the same institutional setting (for example, as nursing homes, prisons,). REGEN-COV is investigational because it is still being studied. There is limited  "information known about the safety and effectiveness of using REGEN-COV to treat people with COVID-19 or to prevent COVID-19 in people who are at high risk of being exposed to someone who is infected with SARS-CoV-2. REGEN-COV is not authorized for pre-exposure prophylaxis for prevention of COVID-19.   The FDA has authorized the emergency use of REGEN-COV for the treatment of COVID-19 and the post-exposure prevention of COVID-19 under an Emergency Use Authorization (EUA). For more information on EUA, see the "What is an Emergency Use Authorization (EUA)?" section at the end of this Fact Sheet.     WHO SHOULD NOT TAKE REGEN-COV?   Do not take REGEN-COV if you have had a severe allergic reaction to REGEN-COV.     WHAT SHOULD I TELL MY HEALTH CARE PROVIDER BEFORE I RECEIVE REGEN-COV?   Tell your healthcare provider about all of your medical conditions, including if you:    Have any allergies    Have had a severe allergic reaction including anaphylaxis to REGEN-COV previously    Have received a COVID-19 vaccine.    Have any serious illnesses    Are pregnant or plan to become pregnant    Are breastfeeding or plan to breastfeed    Are taking any medications (prescription, over-the-counter, vitamins, and herbal products)     HOW WILL I RECEIVE REGEN-COV (casirivimab and imdevimab)?    REGEN-COV consists of two investigational medicines, casirivimab and imdevimab, given together at the same time through a vein (intravenous or IV) or injected in the tissue just under the skin (subcutaneous injections). Your healthcare provider will determine the most appropriate way for you to be given REGEN-COV.    Treatment: If you are receiving an intravenous infusion, the infusion will take 20 to 50 minutes or longer. Your healthcare provider will determine the duration of your infusion. o If your healthcare provider determines that you are unable to receive REGEN-COV as an intravenous infusion which would lead to a delay in " treatment, then as an alternative, REGEN-COV can be given in the form of subcutaneous injections. If you are receiving subcutaneous injections, your dose will be provided as multiple injections given in separate locations around the same time.    Post-exposure prevention: If you are receiving subcutaneous injections, your dose will be provided as multiple injections given in separate locations around the same time. If you are receiving an intravenous infusion, the infusion will take 20 to 50 minutes or longer. o After the initial dose, if your healthcare provider determines that you need to receive additional doses of REGEN-COV for ongoing protection, the additional intravenous or subcutaneous doses would be administered monthly.     WHAT ARE THE IMPORTANT POSSIBLE SIDE EFFECTS OF REGEN-COV (casirivimab and imdevimab)?   Possible side effects of REGEN-COV are:    Allergic reactions. Allergic reactions can happen during and after infusion or injection of REGEN-COV. Tell your healthcare provider right away or seek immediate medical attention if you get any of the following signs and symptoms of allergic reactions: fever, chills, nausea, headache, shortness of breath, low or high blood pressure, rapid or slow heart rate, chest discomfort or pain, weakness, confusion, feeling tired, wheezing, swelling of your lips, face, or throat, rash including hives, itching, muscle aches, feeling faint, dizziness and sweating. These reactions may be severe or life threatening.    Worsening symptoms after treatment: You may experience new or worsening symptoms after infusion or injection, including fever, difficulty breathing, rapid or slow heart rate, tiredness, weakness or confusion. If these symptoms occur, contact your healthcare provider or seek immediate medical attention as some of these symptoms have required hospitalization. It is unknown if these symptoms are related to treatment or are due to the progression of  COVID-19. The side effects of getting any medicine by vein may include brief pain, bleeding, bruising of the skin, soreness, swelling, and possible infection at the infusion site. The side effects of getting any medicine by subcutaneous injection may include pain, bruising of the skin, soreness, swelling, and possible infection at the injection site.   These are not all the possible side effects of REGEN-COV. Not a lot of people have been given REGEN-COV. Serious and unexpected side effects may happen. REGEN-COV is still being studied so it is possible that all of the risks are not known at this time.   It is possible that REGEN-COV could interfere with your body's own ability to fight off a future infection of SARS-CoV-2. Similarly, REGEN-COV may reduce your body's immune response to a vaccine for SARS-CoV-2. Specific studies have not been conducted to address these possible risks. Talk to your healthcare provider if you have any questions.    WHAT OTHER TREATMENT CHOICES ARE THERE?   Like REGEN-COV (casirivimab and imdevimab), FDA may allow for the emergency use of other medicines to treat people with COVID-19. Go to https://www.fda.gov/emergency-preparedness-and-response/mcm-legal-regulatory-and-policy-framework/emergency-use-authorization for information on the emergency use of other medicines that are not approved by FDA that are used to treat people with COVID-19. Your healthcare provider may talk with you about clinical trials you may be eligible for.   It is your choice to be treated or not to be treated with REGEN-COV. Should you decide not to receive REGEN-COV or stop it at any time, it will not change your standard medical care.     WHAT OTHER PREVENTION CHOICES ARE THERE?   Vaccines to prevent COVID-19 are also available under Emergency Use Authorization. Use of REGEN-COV does not replace vaccination against COVID-19. REGEN-COV is not authorized for pre-exposure prophylaxis for prevention of COVID-19.      WHAT IF I AM PREGNANT OR BREASTFEEDING?   There is limited experience using REGEN-COV (casirivimab and imdevimab) in pregnant women or breastfeeding mothers. For a mother and unborn baby, the benefit of receiving REGEN-COV may be greater than the risk of using the product. If you are pregnant or breastfeeding, discuss your options and specific situation with your healthcare provider.     HOW DO I REPORT SIDE EFFECTSWITH REGEN-COV (casirivimab and imdevimab)?   Tell your healthcare provider right away if you have any side effect that bothers you or does not go away.   Report side effects to FDA MedWatch at www.fda.gov/medwatch or call 2-429-JEP-8859 or call 1-910.692.1457.     HOW CAN I LEARN MORE?    Ask your health care provider.    Visit [email or web address]    Visit https://www.jgcdh07anndodfrnayujgwrmgf.nih.gov/    Contact your local or state public health department.     WHAT IS AN EMERGENCY USE AUTHORIZATION (EUA)?   The [place] FDA has made REGEN-COV (casirivimab and imdevimab) available under an emergency access mechanism called an EUA. The EUA is supported by a [place] of Health and Human Service (HHS) declaration that circumstances exist to justify the emergency use of drugs and biological products during the COVID-19 pandemic. REGEN-COV has not undergone the same type of review as an FDA-approved product. In issuing an EUA under the COVID-19 public health emergency, the FDA must determine, among other things, that based on the totality of scientific evidence available, it is reasonable to believe that the product may be effective for diagnosing, treating, or preventing COVID-19, or a serious or life-threatening disease or condition caused by COVID-19; that the known and potential benefits of the product, when used to diagnose, treat, or prevent such disease or condition, outweigh the known and potential risks of such product; and that there are no adequate, approved and available alternatives.  All of these criteria must be met to allow for the medicine to be used in the treatment of COVID-19 or prevention of COVID-19 during the COVID-19 pandemic.   The EUA for REGEN-COV is in effect for the duration of the COVID-19 declaration justifying emergency use of these products, unless terminated or revoked (after which the products may no longer be used).     Manufactured by:   [employer]   [address] [place] [id number]   ©2021 [employer] All rights reserved.   Revised: 07/2021

## 2022-01-24 NOTE — PROGRESS NOTES
Discharged home in stable condition, tolerated Regeneron infusion without incident, AVS given/reviewed & instructed to return to ED if symptoms worsen

## 2022-01-24 NOTE — PROGRESS NOTES
Patient arrived for Regeneron infusion. Home medications reviewed. Discussed Plan of Care with patient. Encouraged questions and answered adequately. Patient experiencing tiredness . VSS, will continue to monitor closely throughout infusion.

## 2022-01-26 ENCOUNTER — PATIENT MESSAGE (OUTPATIENT)
Dept: FAMILY MEDICINE | Facility: CLINIC | Age: 87
End: 2022-01-26
Payer: MEDICARE

## 2022-01-26 NOTE — LETTER
January 28, 2022                   [address] [address]  Phone: [phone]  Fax: [phone] January 28, 2022     Patient: Reese Bell   YOB: 1931   Date of Visit: 1/26/2022       To Whom It May Concern:    It was my recommendation that Reese Bell perform a home covid screening.    If you have any questions or concerns, please don't hesitate to call.    Sincerely,        MD Jennifer Fragoso LPN

## 2022-01-28 ENCOUNTER — PATIENT MESSAGE (OUTPATIENT)
Dept: FAMILY MEDICINE | Facility: CLINIC | Age: 87
End: 2022-01-28
Payer: MEDICARE

## 2022-02-01 ENCOUNTER — PATIENT MESSAGE (OUTPATIENT)
Dept: FAMILY MEDICINE | Facility: CLINIC | Age: 87
End: 2022-02-01
Payer: MEDICARE

## 2022-02-01 NOTE — TELEPHONE ENCOUNTER
Letter printed and mailed. Pt's daughter notified via [employer]

## 2022-02-10 ENCOUNTER — PATIENT MESSAGE (OUTPATIENT)
Dept: FAMILY MEDICINE | Facility: CLINIC | Age: 87
End: 2022-02-10
Payer: MEDICARE

## 2022-02-10 DIAGNOSIS — N30.00 ACUTE CYSTITIS WITHOUT HEMATURIA: Primary | ICD-10-CM

## 2022-02-11 ENCOUNTER — LAB VISIT (OUTPATIENT)
Dept: LAB | Facility: HOSPITAL | Age: 87
End: 2022-02-11
Attending: FAMILY MEDICINE
Payer: MEDICARE

## 2022-02-11 ENCOUNTER — TELEPHONE (OUTPATIENT)
Dept: FAMILY MEDICINE | Facility: CLINIC | Age: 87
End: 2022-02-11
Payer: MEDICARE

## 2022-02-11 DIAGNOSIS — N30.01 ACUTE CYSTITIS WITH HEMATURIA: Primary | ICD-10-CM

## 2022-02-11 DIAGNOSIS — N30.01 ACUTE CYSTITIS WITH HEMATURIA: ICD-10-CM

## 2022-02-11 LAB
BILIRUB UR QL STRIP: NEGATIVE
CLARITY UR: CLEAR
COLOR UR: YELLOW
GLUCOSE UR QL STRIP: NEGATIVE
HGB UR QL STRIP: NEGATIVE
KETONES UR QL STRIP: NEGATIVE
LEUKOCYTE ESTERASE UR QL STRIP: NEGATIVE
NITRITE UR QL STRIP: NEGATIVE
PH UR STRIP: 6 [PH] (ref 5–8)
PROT UR QL STRIP: NEGATIVE
SP GR UR STRIP: >=1.03 (ref 1–1.03)
URN SPEC COLLECT METH UR: ABNORMAL
UROBILINOGEN UR STRIP-ACNC: NEGATIVE EU/DL

## 2022-02-11 PROCEDURE — 81003 URINALYSIS AUTO W/O SCOPE: CPT | Performed by: FAMILY MEDICINE

## 2022-02-11 NOTE — TELEPHONE ENCOUNTER
----- Message from Meliza Lawton sent at 2/11/2022 10:18 AM CST -----  Contact: [phone]  Lab at [facility] called asking to change pt's urine orders from POC to lab.

## 2022-02-21 ENCOUNTER — PATIENT MESSAGE (OUTPATIENT)
Dept: FAMILY MEDICINE | Facility: CLINIC | Age: 87
End: 2022-02-21
Payer: MEDICARE

## 2022-02-23 ENCOUNTER — HOSPITAL ENCOUNTER (INPATIENT)
Facility: HOSPITAL | Age: 87
LOS: 1 days | Discharge: ANOTHER HOSPITAL | DRG: 389 | End: 2022-02-24
Attending: FAMILY MEDICINE | Admitting: FAMILY MEDICINE
Payer: MEDICARE

## 2022-02-23 DIAGNOSIS — K56.609 SBO (SMALL BOWEL OBSTRUCTION): Primary | ICD-10-CM

## 2022-02-23 PROCEDURE — 93005 ELECTROCARDIOGRAM TRACING: CPT

## 2022-02-23 PROCEDURE — 84484 ASSAY OF TROPONIN QUANT: CPT | Performed by: FAMILY MEDICINE

## 2022-02-23 PROCEDURE — 36415 COLL VENOUS BLD VENIPUNCTURE: CPT | Performed by: FAMILY MEDICINE

## 2022-02-23 PROCEDURE — 83690 ASSAY OF LIPASE: CPT | Performed by: FAMILY MEDICINE

## 2022-02-23 PROCEDURE — 63600175 PHARM REV CODE 636 W HCPCS: Performed by: FAMILY MEDICINE

## 2022-02-23 PROCEDURE — 93010 ELECTROCARDIOGRAM REPORT: CPT | Mod: ,,, | Performed by: INTERNAL MEDICINE

## 2022-02-23 PROCEDURE — 99285 EMERGENCY DEPT VISIT HI MDM: CPT | Mod: 25

## 2022-02-23 PROCEDURE — 83880 ASSAY OF NATRIURETIC PEPTIDE: CPT | Performed by: FAMILY MEDICINE

## 2022-02-23 PROCEDURE — 85025 COMPLETE CBC W/AUTO DIFF WBC: CPT | Performed by: FAMILY MEDICINE

## 2022-02-23 PROCEDURE — 80053 COMPREHEN METABOLIC PANEL: CPT | Performed by: FAMILY MEDICINE

## 2022-02-23 PROCEDURE — 93010 EKG 12-LEAD: ICD-10-PCS | Mod: ,,, | Performed by: INTERNAL MEDICINE

## 2022-02-23 PROCEDURE — 96365 THER/PROPH/DIAG IV INF INIT: CPT

## 2022-02-23 PROCEDURE — 96375 TX/PRO/DX INJ NEW DRUG ADDON: CPT

## 2022-02-23 RX ORDER — METOCLOPRAMIDE HYDROCHLORIDE 5 MG/ML
10 INJECTION INTRAMUSCULAR; INTRAVENOUS
Status: COMPLETED | OUTPATIENT
Start: 2022-02-23 | End: 2022-02-23

## 2022-02-23 RX ADMIN — METOCLOPRAMIDE 10 MG: 5 INJECTION, SOLUTION INTRAMUSCULAR; INTRAVENOUS at 11:02

## 2022-02-23 NOTE — TELEPHONE ENCOUNTER
Can you order cpap machine and addend most recent clinical notes to states why he needs cpap machine?

## 2022-02-24 ENCOUNTER — HOSPITAL ENCOUNTER (INPATIENT)
Facility: HOSPITAL | Age: 87
LOS: 10 days | Discharge: HOME HEALTH | DRG: 388 | End: 2022-03-06
Attending: STUDENT IN AN ORGANIZED HEALTH CARE EDUCATION/TRAINING PROGRAM | Admitting: STUDENT IN AN ORGANIZED HEALTH CARE EDUCATION/TRAINING PROGRAM
Payer: MEDICARE

## 2022-02-24 VITALS
OXYGEN SATURATION: 99 % | HEIGHT: 65 IN | WEIGHT: 231.69 LBS | DIASTOLIC BLOOD PRESSURE: 74 MMHG | RESPIRATION RATE: 20 BRPM | TEMPERATURE: 96 F | HEART RATE: 78 BPM | SYSTOLIC BLOOD PRESSURE: 131 MMHG | BODY MASS INDEX: 38.6 KG/M2

## 2022-02-24 DIAGNOSIS — K56.609 SMALL BOWEL OBSTRUCTION: Primary | ICD-10-CM

## 2022-02-24 DIAGNOSIS — R07.9 CHEST PAIN: ICD-10-CM

## 2022-02-24 DIAGNOSIS — R09.02 HYPOXIA: ICD-10-CM

## 2022-02-24 PROBLEM — Z71.89 ADVANCED CARE PLANNING/COUNSELING DISCUSSION: Status: ACTIVE | Noted: 2022-02-24

## 2022-02-24 LAB
ALBUMIN SERPL BCP-MCNC: 3.6 G/DL (ref 3.5–5.2)
ALBUMIN SERPL BCP-MCNC: 3.9 G/DL (ref 3.5–5.2)
ALP SERPL-CCNC: 70 U/L (ref 55–135)
ALP SERPL-CCNC: 85 U/L (ref 55–135)
ALT SERPL W/O P-5'-P-CCNC: 10 U/L (ref 10–44)
ALT SERPL W/O P-5'-P-CCNC: 9 U/L (ref 10–44)
ANION GAP SERPL CALC-SCNC: 15 MMOL/L (ref 8–16)
ANION GAP SERPL CALC-SCNC: 8 MMOL/L (ref 8–16)
AST SERPL-CCNC: 14 U/L (ref 10–40)
AST SERPL-CCNC: 16 U/L (ref 10–40)
BACTERIA #/AREA URNS HPF: NORMAL /HPF
BASOPHILS # BLD AUTO: 0.01 K/UL (ref 0–0.2)
BASOPHILS # BLD AUTO: 0.03 K/UL (ref 0–0.2)
BASOPHILS NFR BLD: 0.1 % (ref 0–1.9)
BASOPHILS NFR BLD: 0.3 % (ref 0–1.9)
BILIRUB SERPL-MCNC: 0.5 MG/DL (ref 0.1–1)
BILIRUB SERPL-MCNC: 0.6 MG/DL (ref 0.1–1)
BILIRUB UR QL STRIP: NEGATIVE
BNP SERPL-MCNC: 336 PG/ML (ref 0–99)
BNP SERPL-MCNC: 92 PG/ML (ref 0–99)
BUN SERPL-MCNC: 28 MG/DL (ref 8–23)
BUN SERPL-MCNC: 36 MG/DL (ref 8–23)
CALCIUM SERPL-MCNC: 9 MG/DL (ref 8.7–10.5)
CALCIUM SERPL-MCNC: 9.9 MG/DL (ref 8.7–10.5)
CHLORIDE SERPL-SCNC: 100 MMOL/L (ref 95–110)
CHLORIDE SERPL-SCNC: 97 MMOL/L (ref 95–110)
CLARITY UR: CLEAR
CO2 SERPL-SCNC: 26 MMOL/L (ref 23–29)
CO2 SERPL-SCNC: 29 MMOL/L (ref 23–29)
COLOR UR: YELLOW
CREAT SERPL-MCNC: 1.8 MG/DL (ref 0.5–1.4)
CREAT SERPL-MCNC: 1.8 MG/DL (ref 0.5–1.4)
DIFFERENTIAL METHOD: ABNORMAL
DIFFERENTIAL METHOD: ABNORMAL
EOSINOPHIL # BLD AUTO: 0 K/UL (ref 0–0.5)
EOSINOPHIL # BLD AUTO: 0.2 K/UL (ref 0–0.5)
EOSINOPHIL NFR BLD: 0 % (ref 0–8)
EOSINOPHIL NFR BLD: 1.8 % (ref 0–8)
ERYTHROCYTE [DISTWIDTH] IN BLOOD BY AUTOMATED COUNT: 13.6 % (ref 11.5–14.5)
ERYTHROCYTE [DISTWIDTH] IN BLOOD BY AUTOMATED COUNT: 13.8 % (ref 11.5–14.5)
EST. GFR  (AFRICAN AMERICAN): 37 ML/MIN/1.73 M^2
EST. GFR  (AFRICAN AMERICAN): 37 ML/MIN/1.73 M^2
EST. GFR  (NON AFRICAN AMERICAN): 32 ML/MIN/1.73 M^2
EST. GFR  (NON AFRICAN AMERICAN): 32 ML/MIN/1.73 M^2
GLUCOSE SERPL-MCNC: 161 MG/DL (ref 70–110)
GLUCOSE SERPL-MCNC: 169 MG/DL (ref 70–110)
GLUCOSE UR QL STRIP: NEGATIVE
HCT VFR BLD AUTO: 36.6 % (ref 40–54)
HCT VFR BLD AUTO: 41 % (ref 40–54)
HGB BLD-MCNC: 11 G/DL (ref 14–18)
HGB BLD-MCNC: 11.5 G/DL (ref 14–18)
HGB BLD-MCNC: 13 G/DL (ref 14–18)
HGB UR QL STRIP: NEGATIVE
HYALINE CASTS #/AREA URNS LPF: 1 /LPF
IMM GRANULOCYTES # BLD AUTO: 0.13 K/UL (ref 0–0.04)
IMM GRANULOCYTES # BLD AUTO: 0.15 K/UL (ref 0–0.04)
IMM GRANULOCYTES NFR BLD AUTO: 1.1 % (ref 0–0.5)
IMM GRANULOCYTES NFR BLD AUTO: 1.2 % (ref 0–0.5)
KETONES UR QL STRIP: ABNORMAL
LACTATE SERPL-SCNC: 2 MMOL/L (ref 0.5–2.2)
LEUKOCYTE ESTERASE UR QL STRIP: NEGATIVE
LIPASE SERPL-CCNC: 113 U/L (ref 4–60)
LYMPHOCYTES # BLD AUTO: 0.6 K/UL (ref 1–4.8)
LYMPHOCYTES # BLD AUTO: 0.7 K/UL (ref 1–4.8)
LYMPHOCYTES NFR BLD: 4.6 % (ref 18–48)
LYMPHOCYTES NFR BLD: 7 % (ref 18–48)
MCH RBC QN AUTO: 34.4 PG (ref 27–31)
MCH RBC QN AUTO: 34.8 PG (ref 27–31)
MCHC RBC AUTO-ENTMCNC: 31.4 G/DL (ref 32–36)
MCHC RBC AUTO-ENTMCNC: 31.7 G/DL (ref 32–36)
MCV RBC AUTO: 109 FL (ref 82–98)
MCV RBC AUTO: 111 FL (ref 82–98)
MICROSCOPIC COMMENT: NORMAL
MONOCYTES # BLD AUTO: 0.9 K/UL (ref 0.3–1)
MONOCYTES # BLD AUTO: 1.5 K/UL (ref 0.3–1)
MONOCYTES NFR BLD: 10.8 % (ref 4–15)
MONOCYTES NFR BLD: 8.1 % (ref 4–15)
NEUTROPHILS # BLD AUTO: 11.4 K/UL (ref 1.8–7.7)
NEUTROPHILS # BLD AUTO: 8.6 K/UL (ref 1.8–7.7)
NEUTROPHILS NFR BLD: 81.6 % (ref 38–73)
NEUTROPHILS NFR BLD: 83.4 % (ref 38–73)
NITRITE UR QL STRIP: NEGATIVE
NRBC BLD-RTO: 0 /100 WBC
NRBC BLD-RTO: 0 /100 WBC
PH UR STRIP: 6 [PH] (ref 5–8)
PLATELET # BLD AUTO: 132 K/UL (ref 150–450)
PLATELET # BLD AUTO: 148 K/UL (ref 150–450)
PMV BLD AUTO: 8.8 FL (ref 9.2–12.9)
PMV BLD AUTO: 8.9 FL (ref 9.2–12.9)
POTASSIUM SERPL-SCNC: 4.8 MMOL/L (ref 3.5–5.1)
POTASSIUM SERPL-SCNC: 5.3 MMOL/L (ref 3.5–5.1)
PROT SERPL-MCNC: 7.1 G/DL (ref 6–8.4)
PROT SERPL-MCNC: 7.6 G/DL (ref 6–8.4)
PROT UR QL STRIP: ABNORMAL
RBC # BLD AUTO: 3.3 M/UL (ref 4.6–6.2)
RBC # BLD AUTO: 3.78 M/UL (ref 4.6–6.2)
RBC #/AREA URNS HPF: 0 /HPF (ref 0–4)
SARS-COV-2 RDRP RESP QL NAA+PROBE: NEGATIVE
SODIUM SERPL-SCNC: 137 MMOL/L (ref 136–145)
SODIUM SERPL-SCNC: 138 MMOL/L (ref 136–145)
SP GR UR STRIP: >=1.03 (ref 1–1.03)
TROPONIN I SERPL DL<=0.01 NG/ML-MCNC: 0.02 NG/ML (ref 0–0.03)
URN SPEC COLLECT METH UR: ABNORMAL
UROBILINOGEN UR STRIP-ACNC: NEGATIVE EU/DL
WBC # BLD AUTO: 10.49 K/UL (ref 3.9–12.7)
WBC # BLD AUTO: 13.61 K/UL (ref 3.9–12.7)
WBC #/AREA URNS HPF: 3 /HPF (ref 0–5)

## 2022-02-24 PROCEDURE — 25500020 PHARM REV CODE 255: Performed by: FAMILY MEDICINE

## 2022-02-24 PROCEDURE — 99223 1ST HOSP IP/OBS HIGH 75: CPT | Mod: ,,, | Performed by: SURGERY

## 2022-02-24 PROCEDURE — 25000003 PHARM REV CODE 250: Performed by: FAMILY MEDICINE

## 2022-02-24 PROCEDURE — 94640 AIRWAY INHALATION TREATMENT: CPT

## 2022-02-24 PROCEDURE — 99234 HOSP IP/OBS SM DT SF/LOW 45: CPT | Mod: ,,, | Performed by: FAMILY MEDICINE

## 2022-02-24 PROCEDURE — 27000221 HC OXYGEN, UP TO 24 HOURS

## 2022-02-24 PROCEDURE — 25000003 PHARM REV CODE 250: Performed by: NURSE PRACTITIONER

## 2022-02-24 PROCEDURE — 85025 COMPLETE CBC W/AUTO DIFF WBC: CPT | Performed by: PHYSICIAN ASSISTANT

## 2022-02-24 PROCEDURE — 83880 ASSAY OF NATRIURETIC PEPTIDE: CPT | Performed by: PHYSICIAN ASSISTANT

## 2022-02-24 PROCEDURE — 11000001 HC ACUTE MED/SURG PRIVATE ROOM

## 2022-02-24 PROCEDURE — 80053 COMPREHEN METABOLIC PANEL: CPT | Performed by: PHYSICIAN ASSISTANT

## 2022-02-24 PROCEDURE — 99234 PR OBSERV/HOSP SAME DATE,LEVL III: ICD-10-PCS | Mod: ,,, | Performed by: FAMILY MEDICINE

## 2022-02-24 PROCEDURE — 25000003 PHARM REV CODE 250: Performed by: PHYSICIAN ASSISTANT

## 2022-02-24 PROCEDURE — 63600175 PHARM REV CODE 636 W HCPCS: Performed by: FAMILY MEDICINE

## 2022-02-24 PROCEDURE — 99223 PR INITIAL HOSPITAL CARE,LEVL III: ICD-10-PCS | Mod: ,,, | Performed by: SURGERY

## 2022-02-24 PROCEDURE — 99900035 HC TECH TIME PER 15 MIN (STAT)

## 2022-02-24 PROCEDURE — 94760 N-INVAS EAR/PLS OXIMETRY 1: CPT

## 2022-02-24 PROCEDURE — 63600175 PHARM REV CODE 636 W HCPCS: Performed by: NURSE PRACTITIONER

## 2022-02-24 PROCEDURE — 83605 ASSAY OF LACTIC ACID: CPT | Performed by: PHYSICIAN ASSISTANT

## 2022-02-24 PROCEDURE — 25000242 PHARM REV CODE 250 ALT 637 W/ HCPCS: Performed by: PHYSICIAN ASSISTANT

## 2022-02-24 PROCEDURE — 25000242 PHARM REV CODE 250 ALT 637 W/ HCPCS: Performed by: FAMILY MEDICINE

## 2022-02-24 PROCEDURE — 36415 COLL VENOUS BLD VENIPUNCTURE: CPT | Performed by: PHYSICIAN ASSISTANT

## 2022-02-24 PROCEDURE — 85018 HEMOGLOBIN: CPT | Performed by: PHYSICIAN ASSISTANT

## 2022-02-24 PROCEDURE — 81000 URINALYSIS NONAUTO W/SCOPE: CPT | Performed by: FAMILY MEDICINE

## 2022-02-24 PROCEDURE — U0002 COVID-19 LAB TEST NON-CDC: HCPCS | Performed by: FAMILY MEDICINE

## 2022-02-24 RX ORDER — LANOLIN ALCOHOL/MO/W.PET/CERES
800 CREAM (GRAM) TOPICAL
Status: DISCONTINUED | OUTPATIENT
Start: 2022-02-24 | End: 2022-02-24

## 2022-02-24 RX ORDER — PROCHLORPERAZINE EDISYLATE 5 MG/ML
5 INJECTION INTRAMUSCULAR; INTRAVENOUS EVERY 6 HOURS PRN
Status: DISCONTINUED | OUTPATIENT
Start: 2022-02-24 | End: 2022-02-24 | Stop reason: HOSPADM

## 2022-02-24 RX ORDER — METOPROLOL TARTRATE 1 MG/ML
5 INJECTION, SOLUTION INTRAVENOUS EVERY 6 HOURS
Status: DISCONTINUED | OUTPATIENT
Start: 2022-02-24 | End: 2022-02-24 | Stop reason: HOSPADM

## 2022-02-24 RX ORDER — SODIUM CHLORIDE 0.9 % (FLUSH) 0.9 %
10 SYRINGE (ML) INJECTION EVERY 8 HOURS
Status: DISCONTINUED | OUTPATIENT
Start: 2022-02-24 | End: 2022-02-24

## 2022-02-24 RX ORDER — SODIUM CHLORIDE 0.9 % (FLUSH) 0.9 %
10 SYRINGE (ML) INJECTION
Status: DISCONTINUED | OUTPATIENT
Start: 2022-02-24 | End: 2022-03-06 | Stop reason: HOSPADM

## 2022-02-24 RX ORDER — SODIUM CHLORIDE 9 MG/ML
INJECTION, SOLUTION INTRAVENOUS CONTINUOUS
Status: DISCONTINUED | OUTPATIENT
Start: 2022-02-24 | End: 2022-02-24 | Stop reason: HOSPADM

## 2022-02-24 RX ORDER — IPRATROPIUM BROMIDE AND ALBUTEROL SULFATE 2.5; .5 MG/3ML; MG/3ML
3 SOLUTION RESPIRATORY (INHALATION) ONCE
Status: COMPLETED | OUTPATIENT
Start: 2022-02-24 | End: 2022-02-24

## 2022-02-24 RX ORDER — ACETAMINOPHEN 325 MG/1
650 TABLET ORAL EVERY 8 HOURS PRN
Status: DISCONTINUED | OUTPATIENT
Start: 2022-02-24 | End: 2022-02-24 | Stop reason: HOSPADM

## 2022-02-24 RX ORDER — METOPROLOL TARTRATE 1 MG/ML
5 INJECTION, SOLUTION INTRAVENOUS EVERY 12 HOURS
Status: DISCONTINUED | OUTPATIENT
Start: 2022-02-25 | End: 2022-02-27

## 2022-02-24 RX ORDER — NALOXONE HCL 0.4 MG/ML
0.02 VIAL (ML) INJECTION
Status: DISCONTINUED | OUTPATIENT
Start: 2022-02-24 | End: 2022-03-06 | Stop reason: HOSPADM

## 2022-02-24 RX ORDER — GLUCAGON 1 MG
1 KIT INJECTION
Status: DISCONTINUED | OUTPATIENT
Start: 2022-02-24 | End: 2022-03-06 | Stop reason: HOSPADM

## 2022-02-24 RX ORDER — IPRATROPIUM BROMIDE AND ALBUTEROL SULFATE 2.5; .5 MG/3ML; MG/3ML
3 SOLUTION RESPIRATORY (INHALATION)
Status: COMPLETED | OUTPATIENT
Start: 2022-02-24 | End: 2022-02-24

## 2022-02-24 RX ORDER — IBUPROFEN 200 MG
24 TABLET ORAL
Status: DISCONTINUED | OUTPATIENT
Start: 2022-02-24 | End: 2022-03-06 | Stop reason: HOSPADM

## 2022-02-24 RX ORDER — FAMOTIDINE 10 MG/ML
20 INJECTION INTRAVENOUS DAILY
Status: DISCONTINUED | OUTPATIENT
Start: 2022-02-24 | End: 2022-02-24 | Stop reason: HOSPADM

## 2022-02-24 RX ORDER — FUROSEMIDE 10 MG/ML
20 INJECTION INTRAMUSCULAR; INTRAVENOUS DAILY
Status: DISCONTINUED | OUTPATIENT
Start: 2022-02-25 | End: 2022-02-25

## 2022-02-24 RX ORDER — IBUPROFEN 200 MG
16 TABLET ORAL
Status: DISCONTINUED | OUTPATIENT
Start: 2022-02-24 | End: 2022-03-06 | Stop reason: HOSPADM

## 2022-02-24 RX ORDER — SODIUM CHLORIDE 0.9 % (FLUSH) 0.9 %
10 SYRINGE (ML) INJECTION
Status: DISCONTINUED | OUTPATIENT
Start: 2022-02-24 | End: 2022-02-24 | Stop reason: HOSPADM

## 2022-02-24 RX ORDER — ONDANSETRON 2 MG/ML
4 INJECTION INTRAMUSCULAR; INTRAVENOUS EVERY 8 HOURS PRN
Status: DISCONTINUED | OUTPATIENT
Start: 2022-02-24 | End: 2022-02-24 | Stop reason: HOSPADM

## 2022-02-24 RX ORDER — SODIUM CHLORIDE, SODIUM LACTATE, POTASSIUM CHLORIDE, CALCIUM CHLORIDE 600; 310; 30; 20 MG/100ML; MG/100ML; MG/100ML; MG/100ML
INJECTION, SOLUTION INTRAVENOUS CONTINUOUS
Status: DISCONTINUED | OUTPATIENT
Start: 2022-02-25 | End: 2022-02-25

## 2022-02-24 RX ORDER — TALC
6 POWDER (GRAM) TOPICAL NIGHTLY PRN
Status: DISCONTINUED | OUTPATIENT
Start: 2022-02-24 | End: 2022-02-24

## 2022-02-24 RX ORDER — FUROSEMIDE 10 MG/ML
20 INJECTION INTRAMUSCULAR; INTRAVENOUS DAILY
Status: DISCONTINUED | OUTPATIENT
Start: 2022-02-24 | End: 2022-02-24 | Stop reason: HOSPADM

## 2022-02-24 RX ADMIN — FAMOTIDINE 20 MG: 10 INJECTION, SOLUTION INTRAVENOUS at 10:02

## 2022-02-24 RX ADMIN — LIDOCAINE-EPINEPHRINE-TETRACAINE GEL 4-0.05-0.5% 3 ML: 4-0.05-0.5 GEL at 10:02

## 2022-02-24 RX ADMIN — SODIUM CHLORIDE: 0.9 INJECTION, SOLUTION INTRAVENOUS at 06:02

## 2022-02-24 RX ADMIN — IOHEXOL 30 ML: 350 INJECTION, SOLUTION INTRAVENOUS at 12:02

## 2022-02-24 RX ADMIN — IPRATROPIUM BROMIDE AND ALBUTEROL SULFATE 3 ML: 2.5; .5 SOLUTION RESPIRATORY (INHALATION) at 12:02

## 2022-02-24 RX ADMIN — FUROSEMIDE 20 MG: 10 INJECTION, SOLUTION INTRAMUSCULAR; INTRAVENOUS at 01:02

## 2022-02-24 RX ADMIN — PROMETHAZINE HYDROCHLORIDE 25 MG: 25 INJECTION INTRAMUSCULAR; INTRAVENOUS at 02:02

## 2022-02-24 RX ADMIN — METOPROLOL TARTRATE 5 MG: 5 INJECTION INTRAVENOUS at 01:02

## 2022-02-24 RX ADMIN — IPRATROPIUM BROMIDE AND ALBUTEROL SULFATE 3 ML: 2.5; .5 SOLUTION RESPIRATORY (INHALATION) at 11:02

## 2022-02-24 RX ADMIN — PHENYLEPHRINE HYDROCHLORIDE 2 SPRAY: 0.5 SPRAY NASAL at 05:02

## 2022-02-24 NOTE — PLAN OF CARE
Arrived from [facility] via stretcher per ambulance. Oxygen infusing at 3 LPM via nasal cannula. Denies pain or discomfort. Large dark red blood clot noted. Dr mckeon and Chip kaplaner notified. CBC ordered

## 2022-02-24 NOTE — ED PROVIDER NOTES
Encounter Date: 2/23/2022       History     Chief Complaint   Patient presents with    Abdominal Pain    Nausea    Vomiting     Pt reports epigastric abd pain and vomiting for 3 hours since eating dinner.      89-year-old white male presents to the ER via EMS complaining of abdominal pain.  Patient has a significant past medical history for hypertension, hyperlipidemia, DJD, CAD, FRIDA, thrombocytopenia, history of ventricular fibrillation.  Patient presents to the emergency room complaining of nausea with multiple episodes of vomiting and constant, sharp epigastric abdominal pain without radiation.  Patient rates his current symptoms an 8/10.  Vomiting, palpation makes his pain worse.  Nothing makes his pain better.  Patient was given IV Zofran in route with minimal improvement in symptoms.  Patient believes he is having a bout of food poisoning after eating etouffee.  Patient denies recent travel or known sick contacts.  Nobody else is sick from the food that he ate.  Patient's last bowel movement was earlier today.  Family does report patient has had difficulty having bowel movements recently.  Patient has a significant past surgical history on his abdomen for an appendectomy and right inguinal hernia repaired.        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    FRIDA (obstructive sleep apnea)     Thrombocytopenia 5/11/2017    Ventricular fibrillation 3/29/2019     Past Surgical History:   Procedure Laterality Date    APPENDECTOMY      CARDIAC PACEMAKER PLACEMENT  10/2016    DENTAL SURGERY  06/02/2016    HERNIA REPAIR      right inguinal    LEFT  HEART CATHETERIZATION Left 3/28/2019    Procedure: CATHETERIZATION, HEART, LEFT;  Surgeon: Memo Lynn MD;  Location: [facility] CATH;  Service: Cardiology;  Laterality: Left;    Right side catherization Right 2019    TONSILLECTOMY       Family History   Problem Relation Age of Onset    Stroke Mother     Coronary artery disease Father      Social History     Tobacco Use    Smoking status: Former Smoker    Smokeless tobacco: Never Used   Substance Use Topics    Alcohol use: No    Drug use: No     Review of Systems   Constitutional: Negative for chills and fever.   HENT: Negative for ear pain, sinus pressure, sinus pain and sore throat.    Eyes: Negative for discharge and visual disturbance.   Respiratory: Negative for cough, chest tightness, shortness of breath and wheezing.    Cardiovascular: Negative for chest pain and palpitations.   Gastrointestinal: Positive for abdominal pain, nausea and vomiting. Negative for diarrhea.   Genitourinary: Negative for difficulty urinating, dysuria, flank pain, frequency and urgency.   Musculoskeletal: Negative for back pain.   Skin: Negative for rash.   Neurological: Negative for dizziness, syncope, weakness, light-headedness, numbness and headaches.       Physical Exam     Initial Vitals [02/23/22 2333]   BP Pulse Resp Temp SpO2   (!) 191/91 86 (!) 21 98.3 °F (36.8 °C) (!) 89 %      MAP       --         Physical Exam    Nursing note and vitals reviewed.  Constitutional: He appears well-developed and well-nourished.   Patient required assistance from EMS gurney to ED bed.  Patient speaking in full sentences without distress. Patient is non-toxic appearing      HENT:   Head: Normocephalic and atraumatic.   Mouth/Throat: Oropharynx is clear and moist.   Eyes: Conjunctivae and EOM are normal.   Neck: Neck supple.   Cardiovascular: Normal rate, regular rhythm and normal heart sounds.   Pulmonary/Chest: Effort normal. No accessory muscle usage. Tachypnea noted. No respiratory  distress. He has decreased breath sounds. He has no wheezes. He has no rhonchi. He has no rales.   Abdominal: Abdomen is soft. Bowel sounds are normal. He exhibits distension. There is abdominal tenderness. There is no rebound and no guarding.   Musculoskeletal:         General: Normal range of motion.      Cervical back: Neck supple.     Neurological: He is alert. GCS eye subscore is 4. GCS verbal subscore is 5. GCS motor subscore is 6.   Skin: Skin is warm and dry. Capillary refill takes less than 2 seconds. No rash noted.   Psychiatric: He has a normal mood and affect.         ED Course   Procedures  Labs Reviewed   CBC W/ AUTO DIFFERENTIAL - Abnormal; Notable for the following components:       Result Value    RBC 3.78 (*)     Hemoglobin 13.0 (*)      (*)     MCH 34.4 (*)     MCHC 31.7 (*)     Platelets 132 (*)     MPV 8.9 (*)     Immature Granulocytes 1.2 (*)     Gran # (ANC) 8.6 (*)     Immature Grans (Abs) 0.13 (*)     Lymph # 0.7 (*)     Gran % 81.6 (*)     Lymph % 7.0 (*)     All other components within normal limits   COMPREHENSIVE METABOLIC PANEL - Abnormal; Notable for the following components:    Glucose 161 (*)     BUN 28 (*)     Creatinine 1.8 (*)     eGFR if  37 (*)     eGFR if non  32 (*)     All other components within normal limits   LIPASE - Abnormal; Notable for the following components:    Lipase 113 (*)     All other components within normal limits   TROPONIN I   B-TYPE NATRIURETIC PEPTIDE   URINALYSIS, REFLEX TO URINE CULTURE   SARS-COV-2 RNA AMPLIFICATION, QUAL          Imaging Results          X-Ray Chest 1 View (Preliminary result)  Result time 02/24/22 02:06:38    ED Interpretation by Klaus Mendiola III, MD (02/24/22 02:06:38, formerly [facility] Emergency Dept, Emergency Medicine)    Actually upon 2nd look - NG tube appears to be curl in patient mouth                  ED Interpretation by Klaus Mendiola III, MD (02/24/22 02:03:04, formerly [facility]  Emergency Dept, Emergency Medicine)    Appears to be in the stomach.  Currently at 22 inches on NG tube. Will advance 4 more inches to 26 inches                             CT Abdomen Pelvis  Without Contrast (In process)                  Medications   promethazine (PHENERGAN) 25 mg in dextrose 5 % 50 mL IVPB (25 mg Intravenous New Bag 2/24/22 0214)   metoclopramide HCl injection 10 mg (10 mg Intravenous Given 2/23/22 2345)   albuterol-ipratropium 2.5 mg-0.5 mg/3 mL nebulizer solution 3 mL (3 mLs Nebulization Given 2/24/22 0019)   iohexoL (OMNIPAQUE 350) injection 30 mL (30 mLs Intravenous Given 2/24/22 0049)                 ED Course as of 02/24/22 0251   Wed Feb 23, 2022 2342 This EKG was interpreted by myself, Dr. Mendiola.  EKG - Rate 84. Atrial sensed ventricular paced rhythm with PACs with aberrant conduction.  Biventricular pacemaker detected.    [LG]   u Feb 24, 2022   0035 Patient unable to drink PO contrast.  Will proceed with CT scan without contrast [LG]   0224 Patient presently does not want CPR or intubation.  Daughter has advanced directive at home and will bring to the hospital later on this morning.  Advised her to give it to his nurse upstairs [LG]   0250 Multiple attempts in both nares by multiple nurses and multiple attempts by ER physician were unsuccessful in placing NG tube. [LG]      ED Course User Index  [LG] Klaus Mendiola III, MD             Clinical Impression:   Final diagnoses:  [K56.609] SBO (small bowel obstruction) (Primary)          ED Disposition Condition    Observation               Klaus Mendiola III, MD  02/24/22 0225       Klaus Mendiola III, MD  02/24/22 0251

## 2022-02-24 NOTE — HPI
89-year-old white male presents to the ER via EMS complaining of abdominal pain. Patient has a significant past medical history for hypertension, hyperlipidemia, DJD, CAD post cardiac stenting, FRIDA- CPAP, thrombocytopenia, history of ventricular fibrillation s/p ICD and recent defibrillator battery changed on Monday by Dr. De La Cruz in [place].     Patient presented  to the emergency room complaining of nausea with multiple episodes of vomiting and constant, sharp epigastric abdominal pain without radiation. Daughter notes he took his medications with his meal; shrimp stew around 6:30pm and was feeling fine. Then around 8:30 developed nausea and abominal pain. Later vomited. His last episode of vomiting was last night about 930 prior to coming to the emergency room.  The daughter states that he has had issues with constipation recently and needed his first enema on Monday 'ever'.  He is nonambulatory W/C bound - though he transitions from bed to wheelchair and to commode.  His CT scan performed around midnight was without oral contrast.  It did show a possible small bowel obstruction.  His only abdominal surgery was an appendectomy as a child and an inguinal hernia repair.  He does have a left inguinal hernia on a prior CT in October without complication. NG tube was attempted in the ER multiple times but unsuccessful; had some nasal bleeding post attempted NGT insertion x 3  Per general surgery Patient not a good operative candidate for [place].  Severe cardiac history.  He recommends transferring this patient to a higher level of care.

## 2022-02-24 NOTE — PROVIDER TRANSFER
Outside Transfer Acceptance Note / Regional Referral Center    Referring facility: [facility]   Referring provider: CHRISTIAN BELL ADAM P HARTMAN, MEGAN M.  Accepting facility: [facility]  Accepting provider: IVAN TORRES SHOMARI J. TRAN, HANH-MY T.  Admitting provider: Patricia Jenkins DO  Reason for transfer:  General surgery evaluation with assistance from anesthesia and clearance by cardiology   Transfer diagnosis: Small bowel obstruction  Transfer specialty requested: General surgery and cardiology   Transfer specialty notified: yes  Transfer level: ED  Med/Tele  Bed type requested: Standard  Isolation status: No active isolations   Admission class or status: Emergency  IP- Inpatient      Narrative     Mr. Bell is a 89 year old gentleman with PMH of HTN, HLD, CAD s/p 4 stents (2019), HFpEF, CKD III (baseline Scr 1.6-1.9), FRIDA on home CPAP, thrombocytopenia, and h/o ventricular fibrillation s/p ICD and pacemaker (2016) who presented to [facility] on 2/23 via EMS with chief complaint of abdominal pain and multiple episodes of non-bloody emesis. Patient suspected that his symptoms were associated with food poisoning after eating etouffee. Patient denies any recent travel or sick contacts. His last bowel movement was on 2/23 which was a small amount and the bowel movement prior to that required an enema but patient denies chronic issues with constipation. His last episode of emesis was on 2/23 around 9 pm which has been bilious but non-bloody. He just had his defibrillator battery changed several days prior to admission by Dr. De La Cruz in [place].     At [facility], he is hemodynamically stable and on his baseline supplemental oxygen of 2 L NC.  There is no clinical evidence of infection. CT scan without oral contrast showed small bowel obstruction. Patient has been NPO and receiving IV lasix, metoprolol,  famotidine and prn antiemetics. Multiple attempts were made to place NGT tube but they were unsuccessful. General surgery was consulted and evaluated the patient. Due to patient's extensive cardiac history, need for assistance from anesthesia and cardiology, general surgery recommended transferring this patient to a higher level of care. referring provider (Dr. Burger) contact [facility] to request transfer and general surgery at [facility] was notified. Dr. Burger discussed the case with Dr. Tran and agreed to consult on the patient but recommended that patient be admitted to Hospital Medicine.      Objective     Vitals: Temp: 97.8 °F (36.6 °C) (02/24/22 1100)  Pulse: 103 (02/24/22 1100)  Resp: (!) 7 (02/24/22 1100)  BP: (!) 140/89 (02/24/22 1100)  SpO2: 95 % (02/24/22 1100)  Recent Labs:   Recent Lab Results       02/24/22  0806   02/24/22  0301   02/23/22  2342        Albumin     3.9       Alkaline Phosphatase     85       ALT     10       Anion Gap     15       Appearance, UA Clear           AST     14       Bacteria, UA Occasional           Baso #     0.03       Basophil %     0.3       Bilirubin (UA) Negative           BILIRUBIN TOTAL     0.6  Comment: For infants and newborns, interpretation of results should be based  on gestational age, weight and in agreement with clinical  observations.    Premature Infant recommended reference ranges:  Up to 24 hours.............<8.0 mg/dL  Up to 48 hours............<12.0 mg/dL  3-5 days..................<15.0 mg/dL  6-29 days.................<15.0 mg/dL         BNP     92  Comment: Values of less than 100 pg/ml are consistent with non-CHF populations.       BUN     28       Calcium     9.9       Chloride     97       CO2     26       Color, UA Yellow           Creatinine     1.8       Differential Method     Automated       eGFR if      37       eGFR if non      32  Comment: Calculation used to obtain the estimated glomerular  filtration  rate (eGFR) is the CKD-EPI equation.          Eos #     0.2       Eosinophil %     1.8       Glucose     161       Glucose, UA Negative           Gran # (ANC)     8.6       Gran %     81.6       Hematocrit     41.0       Hemoglobin     13.0       Hyaline Casts, UA 1           Immature Grans (Abs)     0.13  Comment: Mild elevation in immature granulocytes is non specific and   can be seen in a variety of conditions including stress response,   acute inflammation, trauma and pregnancy. Correlation with other   laboratory and clinical findings is essential.         Immature Granulocytes     1.2       Ketones, UA Trace           Leukocytes, UA Negative           Lipase     113       Lymph #     0.7       Lymph %     7.0       MCH     34.4       MCHC     31.7       MCV     109       Microscopic Comment SEE COMMENT  Comment: Other formed elements not mentioned in the report are not   present in the microscopic examination.              Mono #     0.9       Mono %     8.1       MPV     8.9       NITRITE UA Negative           nRBC     0       Occult Blood UA Negative           pH, UA 6.0           Platelets     132       Potassium     4.8       PROTEIN TOTAL     7.6       Protein, UA 1+  Comment: Recommend a 24 hour urine protein or a urine   protein/creatinine ratio if globulin induced proteinuria is  clinically suspected.             RBC     3.78       RBC, UA 0           RDW     13.8       SARS-CoV-2 RNA, Amplification, Qual   Negative  Comment: This test utilizes isothermal nucleic acid amplification   technology to detect the SARS-CoV-2 RdRp nucleic acid segment.   The analytical sensitivity (limit of detection) is 125 genome   equivalents/mL.     A POSITIVE result implies infection with the SARS-CoV-2 virus;  the patient is presumed to be contagious.    A NEGATIVE result means that SARS-CoV-2 nucleic acids are not  present above the limit of detection. A NEGATIVE result should be   treated as presumptive.  It does not rule out the possibility of   COVID-19 and should not be the sole basis for treatment decisions.   If COVID-19 is strongly suspected based on clinical and exposure   history, re-testing using an alternate molecular assay should be   considered.       This test is only for use under the Food and Drug   Administration s Emergency Use Authorization (EUA).   Commercial kits are provided by [employer].   Performance characteristics of the EUA have been independently  verified by [facility] Department of  Pathology and Laboratory Medicine.   _________________________________________________________________  The ID NOW COVID-19 Letter of Authorization, along with the   authorized Fact Sheet for Healthcare Providers, the authorized Fact  Sheet for Patients, and authorized labeling are available on the FDA   website:  www.fda.gov/MedicalDevices/Safety/EmergencySituations/xis243289.htm           Sodium     138       Specific Gravity, UA >=1.030           Specimen UA Urine, Catheterized           Troponin I     0.024  Comment: The reference interval for Troponin I represents the 99th percentile   cutoff   for our facility and is consistent with 3rd generation assay   performance.         UROBILINOGEN UA Negative           WBC, UA 3           WBC     10.49            Recent imaging:  CT Abdomen Pelvis  Without Contrast  Narrative: EXAMINATION:  CT ABDOMEN PELVIS WITHOUT CONTRAST    CLINICAL HISTORY:  Abdominal pain, acute, nonlocalized;    TECHNIQUE:  Low dose axial images, sagittal and coronal reformations were obtained from the lung bases to the pubic symphysis.  Oral contrast was not administered.    COMPARISON:  10/19/2021    FINDINGS:  Bibasilar subsegmental atelectasis/scarring.  The heart is enlarged.  Calcified coronary artery disease is seen.  Leads from a pacer device are noted.  Calcified atheromatous disease affects the aorta and its major branch vessels.    The gallbladder is  hydropic.  Subtle pericholecystic inflammation.  No intrahepatic or extrahepatic biliary ductal dilatation is identified.  The spleen, pancreas, and adrenal glands are normal in size, shape and contour.  The kidneys are replaced by innumerable cystic structure suggesting polycystic kidney disease.  No hydronephrosis or hydroureter is seen.  The urinary bladder is not well distended and cannot be adequately evaluated.    The stomach is significantly distended.  There are some prominent loops of small bowel seen in the left upper quadrant with relative decompression of distal small bowel loops, with suspected transition zone at the level of the umbilicus in the mid abdomen.  Findings concerning for a developing high-grade small bowel obstruction.  There is a small amount of free fluid adjacent to the posterior margin of the stomach near the diaphragm.  No free air is seen.  No pathologically enlarged abdominal or pelvic lymph nodes are detected.    The bones are osteopenic and show age-appropriate degenerative change.  Impression: High-grade partial versus early complete small bowel obstruction.  The stomach is significantly distended.  Transition zone somewhere in the mid abdomen.    Small amount of free fluid in the left upper quadrant adjacent to the stomach and left diaphragm.    The gallbladder is distended.  There is some very subtle pericholecystic inflammation which could suggest a cholecystitis.  Consider further evaluation with a right upper quadrant ultrasound.    Polycystic kidney disease.    Additional nonemergent findings as above.    Electronically signed by: Erin Carbajal MD  Date:    02/24/2022  Time:    08:37  X-Ray Chest 1 View  Narrative: EXAMINATION:  XR CHEST 1 VIEW    CLINICAL HISTORY:  Encounter for fitting and adjustment of other gastrointestinal appliance and device    TECHNIQUE:  Single frontal view of the chest was performed.    COMPARISON:  10/19/2021    FINDINGS:  The heart is enlarged.   Left-sided pacer device is in place.  Enteric tube is noted.  The tube terminates in the region of the right lower lobe and should be removed and replaced.  Patchy opacity in the right lung base could reflect atelectasis, aspiration or pneumonia.  Small bilateral pleural effusions are suspected.  There is gaseous distention of the stomach.  Impression: As above.  Enteric tube appearing to terminate in the region of the right lower lobe.  This should be removed.    COMMUNICATION  This critical result was discovered/received at 08:00.  The critical information above was relayed directly by me by telephone to Dr. Ashton on 02/24/2022 at 08:05.    Electronically signed by: Erin Carbajal MD  Date:    02/24/2022  Time:    08:07     Airway: on 2L NC  This SmartLink retrieves the last documented value for LDA assessment data, retrieved by either LDA type or by LDA group ID.  This SmartLink should be used in a SmartText or SmartPhrase. If one is not available, please contact your .       Vent settings:     IV access: PIV  Infusions: N/A  Allergies:   Review of patient's allergies indicates:   Allergen Reactions    Flexeril [cyclobenzaprine] Other (See Comments)    Vicodin [hydrocodone-acetaminophen] Hallucinations      NPO: yes  Anticoagulation:   Anticoagulants     None           Instructions      [facility]  Admit to Hospital Medicine  Upon patient arrival to floor, please contact Hospital Medicine on call.

## 2022-02-24 NOTE — HPI
Reese Bell 89 y.o. male with for hypertension, hyperlipidemia, DJD, CAD post cardiac stenting, FRIDA- CPAP, thrombocytopenia, history of ventricular fibrillation s/p ICD and recent defibrillator battery changed on Monday by Dr. De La Cruz in [place] presents to the hospital with a chief complaint of abdominal pain worst at the bottom of his abdomen without radiation.  He reports yesterday he developed sudden onset diffuse abdominal pain described as a pressure with associated nausea and vomiting.  Presented to an outside hospital where he was admitted.  He denies any worsening or alleviating factors for pain.  He now states his abdominal pain has resolved he has had no further nausea vomiting since arrival from outside hospital.  The symptoms never occurred before.  He had an appendectomy and a child but denies other abdominal surgeries.  He denies any current complaints.    In the emergency room and seen and, creatinine 1.8, CT abdomen with high-grade partial versus complete small bowel obstruction, troponin negative, BNP negative, COVID negative, NG tube placement attempted but unsuccessful chest x-ray showed placement to right lower lobe.  NG tube was removed.  Seen by surgery who recommended transfer for higher level care.

## 2022-02-24 NOTE — ED NOTES
Called 3rd floor and spoke to Kary to inquire about rm [id number]. She doesn't know if the room is ready and nurses are currently giving shift report.

## 2022-02-24 NOTE — PLAN OF CARE
Patient admitted for SBO.    As per request from Dr. Wiseman, surgeon, transfer was initiated as patient as extensive cardiac HX, and if surgery were needed, he would be too high risk to have surgery here at [place]. Dr. Burger, [facility] medicine, and Dr. Wiseman, surgeon, agree that NG tube should be attempted again.   SYDNIE called Carolina, house supervisor, to initiate transfer.  SYDNIE notified RUFINA Mcconnell that MD would like to attempt placement of NG tube again.

## 2022-02-24 NOTE — ED NOTES
Pt currently has nose bleed at this time. MD aware. New orders given. Pressure applied to bridge of patient's nose at this time.

## 2022-02-24 NOTE — NURSING
Spoke to Treva Gonzales daughter  POA in R/T plan of care to transfer to [facility]. Call referred to Dr. Burger to answer questions and discuss plan of care.

## 2022-02-24 NOTE — SUBJECTIVE & OBJECTIVE
Past Medical History:   Diagnosis Date    ASCVD (arteriosclerotic cardiovascular disease)     DJD (degenerative joint disease)     Hyperlipidemia     Hypertension     MGUS (monoclonal gammopathy of unknown significance) 2017    FRIDA (obstructive sleep apnea)     Thrombocytopenia 2017    Ventricular fibrillation 3/29/2019       Past Surgical History:   Procedure Laterality Date    APPENDECTOMY      CARDIAC PACEMAKER PLACEMENT  10/2016    DENTAL SURGERY  2016    HERNIA REPAIR      right inguinal    LEFT HEART CATHETERIZATION Left 3/28/2019    Procedure: CATHETERIZATION, HEART, LEFT;  Surgeon: Memo Lynn MD;  Location: formerly [facility] CATH;  Service: Cardiology;  Laterality: Left;    Right side catherization Right 2019    TONSILLECTOMY         Review of patient's allergies indicates:   Allergen Reactions    Flexeril [cyclobenzaprine] Other (See Comments)    Vicodin [hydrocodone-acetaminophen] Hallucinations       No current facility-administered medications on file prior to encounter.     Current Outpatient Medications on File Prior to Encounter   Medication Sig    aspirin 81 MG Chew Take 81 mg by mouth once daily.    carvedilol (COREG) 12.5 MG tablet Take 12.5 mg by mouth 2 (two) times daily.    cholecalciferol, vitamin D3, (VITAMIN D3) 50 mcg (2,000 unit) Cap Take 1 capsule by mouth once daily.    CRESTOR 5 mg tablet Take 5 mg by mouth every Mon, Wed, Fri.     ezetimibe (ZETIA) 10 mg tablet Take 10 mg by mouth every evening.     furosemide (LASIX) 20 MG tablet Take 20 mg by mouth 2 (two) times daily. 1 tablet in the morning and 1 tablet at 3pm    ipratropium (ATROVENT) 42 mcg (0.06 %) nasal spray SMARTSI-2 Spray(s) Both Nares Twice Daily PRN    memantine (NAMENDA) 5 MG Tab TAKE 1 TABLET BY MOUTH TWICE A DAY    mirtazapine (REMERON) 15 MG tablet TAKE 1 TABLET (15 MG TOTAL) BY MOUTH EVERY EVENING.    RANEXA 1,000 mg Tb12 Take 1,000 mg by mouth 2 (two) times daily.     sacubitril-valsartan (ENTRESTO) 24-26 mg  per tablet Take 1 tablet by mouth 2 (two) times daily.    tamsulosin (FLOMAX) 0.4 mg Cap CAN TAKE ONE AT BEDTIME FOR BLADDER & PROSTATE TO EASE URINATION    clotrimazole-betamethasone 1-0.05% (LOTRISONE) cream APPLY TO AFFECTED AREA TWICE A DAY    nitroGLYCERIN (NITROSTAT) 0.4 MG SL tablet Place 0.4 mg under the tongue every 5 (five) minutes as needed for Chest pain.     spironolactone (ALDACTONE) 25 MG tablet One po q 3-4pm for abdominal fluid Mon, Wed, Fri    tobramycin-dexamethasone (TOBRADEX ST) 0.3-0.05 % DrpS 1-2 qtts to each eye tid for 7-10 days    triamcinolone acetonide 0.1% (KENALOG) 0.1 % cream Apply topically 2 (two) times daily. for 10 days     Family History       Problem Relation (Age of Onset)    Coronary artery disease Father    Stroke Mother          Tobacco Use    Smoking status: Former Smoker    Smokeless tobacco: Never Used   Substance and Sexual Activity    Alcohol use: No    Drug use: No    Sexual activity: Not on file     Review of Systems   Constitutional:  Negative for chills and fever.   HENT:  Positive for nosebleeds. Negative for congestion, ear pain, postnasal drip, rhinorrhea, sore throat and trouble swallowing.    Eyes:  Negative for redness and itching.   Respiratory:  Negative for cough, shortness of breath and wheezing.    Cardiovascular:  Negative for chest pain and palpitations.   Gastrointestinal:  Positive for abdominal pain, constipation and vomiting. Negative for diarrhea and nausea.   Genitourinary:  Negative for dysuria and frequency.   Skin:  Negative for rash.   Neurological:  Negative for weakness and headaches.   Objective:     Vital Signs (Most Recent):  Temp: 96.1 °F (35.6 °C) (02/24/22 1326)  Pulse: 78 (02/24/22 1400)  Resp: 20 (02/24/22 1339)  BP: 131/74 (02/24/22 1339)  SpO2: 99 % (02/24/22 1326) Vital Signs (24h Range):  Temp:  [96.1 °F (35.6 °C)-98.3 °F (36.8 °C)] 96.1 °F (35.6 °C)  Pulse:  [] 78  Resp:  [7-25] 20  SpO2:  [86 %-99 %] 99 %  BP:  (121-191)/(74-96) 131/74     Weight: 105.1 kg (231 lb 11.3 oz)  Body mass index is 38.56 kg/m².    Physical Exam  Vitals and nursing note reviewed.   Constitutional:       General: He is not in acute distress.     Appearance: He is well-developed. He is obese.   HENT:      Head: Normocephalic and atraumatic.   Eyes:      Conjunctiva/sclera: Conjunctivae normal.      Pupils: Pupils are equal, round, and reactive to light.   Neck:      Thyroid: No thyromegaly.   Cardiovascular:      Rate and Rhythm: Normal rate and regular rhythm.      Heart sounds: Normal heart sounds.   Pulmonary:      Effort: Pulmonary effort is normal. No respiratory distress.      Breath sounds: Normal breath sounds. No wheezing.      Comments: CPAP in place. Conversing without increased effort.  Abdominal:      General: There is distension.      Palpations: Abdomen is soft.      Tenderness: There is no abdominal tenderness.      Comments: Absent bowel sounds, mild tenderness, + gaseous distension and + tympanic abdomen   Musculoskeletal:         General: Normal range of motion.      Cervical back: Normal range of motion and neck supple.   Lymphadenopathy:      Cervical: No cervical adenopathy.   Skin:     General: Skin is warm and dry.      Findings: No rash.      Comments: Left chest wall + defibrillator, bandage in place   Neurological:      Mental Status: He is alert and oriented to person, place, and time.   Psychiatric:         Behavior: Behavior normal.         CRANIAL NERVES     CN III, IV, VI   Pupils are equal, round, and reactive to light.     Significant Labs: All pertinent labs within the past 24 hours have been reviewed.  A1C: No results for input(s): HGBA1C in the last 4320 hours.  ABGs: No results for input(s): PH, PCO2, HCO3, POCSATURATED, BE, TOTALHB, COHB, METHB, O2HB, POCFIO2, PO2 in the last 48 hours.  Bilirubin:   Recent Labs   Lab 02/23/22  2342   BILITOT 0.6     Blood Culture: No results for input(s): LABBLOO in the last  48 hours.  CBC:   Recent Labs   Lab 02/23/22 2342   WBC 10.49   HGB 13.0*   HCT 41.0   *     CMP:   Recent Labs   Lab 02/23/22 2342      K 4.8   CL 97   CO2 26   *   BUN 28*   CREATININE 1.8*   CALCIUM 9.9   PROT 7.6   ALBUMIN 3.9   BILITOT 0.6   ALKPHOS 85   AST 14   ALT 10   ANIONGAP 15   EGFRNONAA 32*     Cardiac Markers:   Recent Labs   Lab 02/23/22  2342   BNP 92     Lactic Acid: No results for input(s): LACTATE in the last 48 hours.  Lipase:   Recent Labs   Lab 02/23/22  2342   LIPASE 113*     Lipid Panel: No results for input(s): CHOL, HDL, LDLCALC, TRIG, CHOLHDL in the last 48 hours.  Magnesium: No results for input(s): MG in the last 48 hours.  POCT Glucose: No results for input(s): POCTGLUCOSE in the last 48 hours.  Troponin:   Recent Labs   Lab 02/23/22 2342   TROPONINI 0.024     TSH: No results for input(s): TSH in the last 4320 hours.  Urine Culture: No results for input(s): LABURIN in the last 48 hours.  Urine Studies:   Recent Labs   Lab 02/24/22  0806   COLORU Yellow   APPEARANCEUA Clear   PHUR 6.0   SPECGRAV >=1.030*   PROTEINUA 1+*   GLUCUA Negative   KETONESU Trace*   BILIRUBINUA Negative   OCCULTUA Negative   NITRITE Negative   UROBILINOGEN Negative   LEUKOCYTESUR Negative   RBCUA 0   WBCUA 3   BACTERIA Occasional   HYALINECASTS 1       Significant Imaging: I have reviewed and interpreted all pertinent imaging results/findings within the past 24 hours.    CT abd-   High-grade partial versus early complete small bowel obstruction.  The stomach is significantly distended.  Transition zone somewhere in the mid abdomen.     Small amount of free fluid in the left upper quadrant adjacent to the stomach and left diaphragm.     The gallbladder is distended.  There is some very subtle pericholecystic inflammation which could suggest a cholecystitis.  Consider further evaluation with a right upper quadrant ultrasound.     Polycystic kidney disease.     Additional nonemergent findings  as above.

## 2022-02-24 NOTE — ED NOTES
Two smaller fr. NG tube insertion attempted at this time. NG tubes continue to come out of patient's mouth during insertion attempts. Er MD aware. Pt continues to be NPO.

## 2022-02-24 NOTE — ASSESSMENT & PLAN NOTE
Patient is identified as having Diastolic (HFpEF) heart failure that is Chronic. CHF is currently controlled. Latest ECHO performed and demonstrates- Results for orders placed during the hospital encounter of 08/12/19    Transthoracic echo (TTE) 2D with Color Flow    Interpretation Summary  · Technically challening study. Valves not well seen.  · Normal left ventricular systolic function. The estimated ejection fraction is 60%  · Normal LV diastolic function.  · No wall motion abnormalities.  · Normal right ventricular systolic function.  . Continue Beta Blocker, Furosemide, Aldactone and Nitrate/Vasodilator and monitor clinical status closely. Monitor on telemetry. Patient is on CHF pathway.  Monitor strict Is&Os and daily weights.  Place on fluid restriction of 1 L   . Continue to stress to patient importance of self efficacy and  on diet for CHF. Last BNP reviewed- and noted below   Recent Labs   Lab 02/23/22  2342   BNP 92   · Last echo avaibale with Normal Systolic fx 2019; Normal left ventricular systolic function. The estimated ejection fraction is 60%  · Normal LV diastolic function.    Takes ranexa, Entresto, aldactone and furosemide lasix 20mg oral q 12; will order lasix 20mg IV daily

## 2022-02-24 NOTE — H&P
[facility] ([address] [place] [address] Medicine  History & Physical    Patient Name: Reese Bell  MRN: [id number]  Patient Class: IP- Inpatient  Admission Date: 2/23/2022  Attending Physician: Magda Burger MD   Primary Care Provider: Oracio Johnson MD         Patient information was obtained from patient, relative(s) and ER records.     Subjective:     Principal Problem:<principal problem not specified>    Chief Complaint:   Chief Complaint   Patient presents with    Abdominal Pain    Nausea    Vomiting     Pt reports epigastric abd pain and vomiting for 3 hours since eating dinner.         HPI: 89-year-old white male presents to the ER via EMS complaining of abdominal pain. Patient has a significant past medical history for hypertension, hyperlipidemia, DJD, CAD post cardiac stenting, FRIDA- CPAP, thrombocytopenia, history of ventricular fibrillation s/p ICD and recent defibrillator battery changed on Monday by Dr. De La Cruz in [place].     Patient presented  to the emergency room complaining of nausea with multiple episodes of vomiting and constant, sharp epigastric abdominal pain without radiation. Daughter notes he took his medications with his meal; shrimp stew around 6:30pm and was feeling fine. Then around 8:30 developed nausea and abominal pain. Later vomited. His last episode of vomiting was last night about 930 prior to coming to the emergency room.  The daughter states that he has had issues with constipation recently and needed his first enema on Monday 'ever'.  He is nonambulatory W/C bound - though he transitions from bed to wheelchair and to commode.  His CT scan performed around midnight was without oral contrast.  It did show a possible small bowel obstruction.  His only abdominal surgery was an appendectomy as a child and an inguinal hernia repair.  He does have a left inguinal hernia on a prior CT in October without complication. NG tube was attempted in the ER multiple times but  unsuccessful; had some nasal bleeding post attempted NGT insertion x 3  Per general surgery Patient not a good operative candidate for [facility].  Severe cardiac history.  He recommends transferring this patient to a higher level of care.        Past Medical History:   Diagnosis Date    ASCVD (arteriosclerotic cardiovascular disease)     DJD (degenerative joint disease)     Hyperlipidemia     Hypertension     MGUS (monoclonal gammopathy of unknown significance) 2017    FRIDA (obstructive sleep apnea)     Thrombocytopenia 2017    Ventricular fibrillation 3/29/2019       Past Surgical History:   Procedure Laterality Date    APPENDECTOMY      CARDIAC PACEMAKER PLACEMENT  10/2016    DENTAL SURGERY  2016    HERNIA REPAIR      right inguinal    LEFT HEART CATHETERIZATION Left 3/28/2019    Procedure: CATHETERIZATION, HEART, LEFT;  Surgeon: Memo Lynn MD;  Location: [facility] CATH;  Service: Cardiology;  Laterality: Left;    Right side catherization Right     TONSILLECTOMY         Review of patient's allergies indicates:   Allergen Reactions    Flexeril [cyclobenzaprine] Other (See Comments)    Vicodin [hydrocodone-acetaminophen] Hallucinations       No current facility-administered medications on file prior to encounter.     Current Outpatient Medications on File Prior to Encounter   Medication Sig    aspirin 81 MG Chew Take 81 mg by mouth once daily.    carvedilol (COREG) 12.5 MG tablet Take 12.5 mg by mouth 2 (two) times daily.    cholecalciferol, vitamin D3, (VITAMIN D3) 50 mcg (2,000 unit) Cap Take 1 capsule by mouth once daily.    CRESTOR 5 mg tablet Take 5 mg by mouth every Mon, Wed, Fri.     ezetimibe (ZETIA) 10 mg tablet Take 10 mg by mouth every evening.     furosemide (LASIX) 20 MG tablet Take 20 mg by mouth 2 (two) times daily. 1 tablet in the morning and 1 tablet at 3pm    ipratropium (ATROVENT) 42 mcg (0.06 %) nasal spray SMARTSI-2 Spray(s) Both Nares Twice Daily PRN     memantine (NAMENDA) 5 MG Tab TAKE 1 TABLET BY MOUTH TWICE A DAY    mirtazapine (REMERON) 15 MG tablet TAKE 1 TABLET (15 MG TOTAL) BY MOUTH EVERY EVENING.    RANEXA 1,000 mg Tb12 Take 1,000 mg by mouth 2 (two) times daily.     sacubitril-valsartan (ENTRESTO) 24-26 mg per tablet Take 1 tablet by mouth 2 (two) times daily.    tamsulosin (FLOMAX) 0.4 mg Cap CAN TAKE ONE AT BEDTIME FOR BLADDER & PROSTATE TO EASE URINATION    clotrimazole-betamethasone 1-0.05% (LOTRISONE) cream APPLY TO AFFECTED AREA TWICE A DAY    nitroGLYCERIN (NITROSTAT) 0.4 MG SL tablet Place 0.4 mg under the tongue every 5 (five) minutes as needed for Chest pain.     spironolactone (ALDACTONE) 25 MG tablet One po q 3-4pm for abdominal fluid Mon, Wed, Fri    tobramycin-dexamethasone (TOBRADEX ST) 0.3-0.05 % DrpS 1-2 qtts to each eye tid for 7-10 days    triamcinolone acetonide 0.1% (KENALOG) 0.1 % cream Apply topically 2 (two) times daily. for 10 days     Family History       Problem Relation (Age of Onset)    Coronary artery disease Father    Stroke Mother          Tobacco Use    Smoking status: Former Smoker    Smokeless tobacco: Never Used   Substance and Sexual Activity    Alcohol use: No    Drug use: No    Sexual activity: Not on file     Review of Systems   Constitutional:  Negative for chills and fever.   HENT:  Positive for nosebleeds. Negative for congestion, ear pain, postnasal drip, rhinorrhea, sore throat and trouble swallowing.    Eyes:  Negative for redness and itching.   Respiratory:  Negative for cough, shortness of breath and wheezing.    Cardiovascular:  Negative for chest pain and palpitations.   Gastrointestinal:  Positive for abdominal pain, constipation and vomiting. Negative for diarrhea and nausea.   Genitourinary:  Negative for dysuria and frequency.   Skin:  Negative for rash.   Neurological:  Negative for weakness and headaches.   Objective:     Vital Signs (Most Recent):  Temp: 96.1 °F (35.6 °C) (02/24/22  1326)  Pulse: 78 (02/24/22 1400)  Resp: 20 (02/24/22 1339)  BP: 131/74 (02/24/22 1339)  SpO2: 99 % (02/24/22 1326) Vital Signs (24h Range):  Temp:  [96.1 °F (35.6 °C)-98.3 °F (36.8 °C)] 96.1 °F (35.6 °C)  Pulse:  [] 78  Resp:  [7-25] 20  SpO2:  [86 %-99 %] 99 %  BP: (121-191)/(74-96) 131/74     Weight: 105.1 kg (231 lb 11.3 oz)  Body mass index is 38.56 kg/m².    Physical Exam  Vitals and nursing note reviewed.   Constitutional:       General: He is not in acute distress.     Appearance: He is well-developed. He is obese.   HENT:      Head: Normocephalic and atraumatic.   Eyes:      Conjunctiva/sclera: Conjunctivae normal.      Pupils: Pupils are equal, round, and reactive to light.   Neck:      Thyroid: No thyromegaly.   Cardiovascular:      Rate and Rhythm: Normal rate and regular rhythm.      Heart sounds: Normal heart sounds.   Pulmonary:      Effort: Pulmonary effort is normal. No respiratory distress.      Breath sounds: Normal breath sounds. No wheezing.      Comments: CPAP in place. Conversing without increased effort.  Abdominal:      General: There is distension.      Palpations: Abdomen is soft.      Tenderness: There is no abdominal tenderness.      Comments: Absent bowel sounds, mild tenderness, + gaseous distension and + tympanic abdomen   Musculoskeletal:         General: Normal range of motion.      Cervical back: Normal range of motion and neck supple.   Lymphadenopathy:      Cervical: No cervical adenopathy.   Skin:     General: Skin is warm and dry.      Findings: No rash.      Comments: Left chest wall + defibrillator, bandage in place   Neurological:      Mental Status: He is alert and oriented to person, place, and time.   Psychiatric:         Behavior: Behavior normal.         CRANIAL NERVES     CN III, IV, VI   Pupils are equal, round, and reactive to light.     Significant Labs: All pertinent labs within the past 24 hours have been reviewed.  A1C: No results for input(s): HGBA1C in the  last 4320 hours.  ABGs: No results for input(s): PH, PCO2, HCO3, POCSATURATED, BE, TOTALHB, COHB, METHB, O2HB, POCFIO2, PO2 in the last 48 hours.  Bilirubin:   Recent Labs   Lab 02/23/22 2342   BILITOT 0.6     Blood Culture: No results for input(s): LABBLOO in the last 48 hours.  CBC:   Recent Labs   Lab 02/23/22 2342   WBC 10.49   HGB 13.0*   HCT 41.0   *     CMP:   Recent Labs   Lab 02/23/22 2342      K 4.8   CL 97   CO2 26   *   BUN 28*   CREATININE 1.8*   CALCIUM 9.9   PROT 7.6   ALBUMIN 3.9   BILITOT 0.6   ALKPHOS 85   AST 14   ALT 10   ANIONGAP 15   EGFRNONAA 32*     Cardiac Markers:   Recent Labs   Lab 02/23/22 2342   BNP 92     Lactic Acid: No results for input(s): LACTATE in the last 48 hours.  Lipase:   Recent Labs   Lab 02/23/22 2342   LIPASE 113*     Lipid Panel: No results for input(s): CHOL, HDL, LDLCALC, TRIG, CHOLHDL in the last 48 hours.  Magnesium: No results for input(s): MG in the last 48 hours.  POCT Glucose: No results for input(s): POCTGLUCOSE in the last 48 hours.  Troponin:   Recent Labs   Lab 02/23/22 2342   TROPONINI 0.024     TSH: No results for input(s): TSH in the last 4320 hours.  Urine Culture: No results for input(s): LABURIN in the last 48 hours.  Urine Studies:   Recent Labs   Lab 02/24/22  0806   COLORU Yellow   APPEARANCEUA Clear   PHUR 6.0   SPECGRAV >=1.030*   PROTEINUA 1+*   GLUCUA Negative   KETONESU Trace*   BILIRUBINUA Negative   OCCULTUA Negative   NITRITE Negative   UROBILINOGEN Negative   LEUKOCYTESUR Negative   RBCUA 0   WBCUA 3   BACTERIA Occasional   HYALINECASTS 1       Significant Imaging: I have reviewed and interpreted all pertinent imaging results/findings within the past 24 hours.    CT abd-   High-grade partial versus early complete small bowel obstruction.  The stomach is significantly distended.  Transition zone somewhere in the mid abdomen.     Small amount of free fluid in the left upper quadrant adjacent to the stomach and left  diaphragm.     The gallbladder is distended.  There is some very subtle pericholecystic inflammation which could suggest a cholecystitis.  Consider further evaluation with a right upper quadrant ultrasound.     Polycystic kidney disease.     Additional nonemergent findings as above.    Assessment/Plan:     SBO (small bowel obstruction)  Needing  NGT with LIS; s/p multiple failed attempts even after ativan, would need anesthesia but at increased risk   Repeat KUB x-ray in AM.  Consult General Surgeon specialist.Patient not a good operative candidate.  Severe cardiac history.  He recommends transferring this patient to a higher level of care  Continue gentle  IVF hydration. Follow serum lytes.   Use IV anti-emetics as needed.    Transfer initiated; will transfer [place] [facility] [place]. NG potentially under anesthesia?       Coronary artery disease involving native coronary artery of native heart without angina pectoris  On asa, statin, coreg, managed per cards   Will order IV beta blocker while NPO     Paroxysmal atrial fibrillation    On coreg and asa at home     Cardiac resynchronization therapy defibrillator (CRT-D) in place  S/p Generator change per Dr. De La Cruz last week.       Essential hypertension  Coreg, will order IV BB       Hyperlipidemia  Takes crestor 5mg on M/W/f; zetia 10mg  hold while NPO         Stage 3 chronic kidney disease  Monitor BUN/SCr.  Monitor I/Os.  Monitor electrolytes.  Avoid non-steroidal anti-inflammatory medications.          Impaired mobility and activities of daily living  W/C bound at home  Consult PT       Thrombocytopenia  plts 132 stable  Some episaxis after NGT attempted. Traumatic  No active bleeding this am       GERD (gastroesophageal reflux disease)        Obstructive sleep apnea (adult) (pediatric)  Continue CPAP       Chronic CHF  Patient is identified as having Diastolic (HFpEF) heart failure that is Chronic. CHF is currently controlled. Latest ECHO performed and demonstrates- Results  for orders placed during the hospital encounter of 08/12/19    Transthoracic echo (TTE) 2D with Color Flow    Interpretation Summary  · Technically challening study. Valves not well seen.  · Normal left ventricular systolic function. The estimated ejection fraction is 60%  · Normal LV diastolic function.  · No wall motion abnormalities.  · Normal right ventricular systolic function.  . Continue Beta Blocker, Furosemide, Aldactone and Nitrate/Vasodilator and monitor clinical status closely. Monitor on telemetry. Patient is on CHF pathway.  Monitor strict Is&Os and daily weights.  Place on fluid restriction of 1 L   . Continue to stress to patient importance of self efficacy and  on diet for CHF. Last BNP reviewed- and noted below   Recent Labs   Lab 02/23/22  2342   BNP 92   · Last echo avaibale with Normal Systolic fx 2019; Normal left ventricular systolic function. The estimated ejection fraction is 60%  · Normal LV diastolic function.    Takes ranexa, Entresto, aldactone and furosemide lasix 20mg oral q 12; will order lasix 20mg IV daily     ASCVD (arteriosclerotic cardiovascular disease)  Asa, statin, resume when SBO resolved and tolerating orals           Benign prostatic hyperplasia  Takes flomax daily, resume when SBO resolved and tolerating orals   NOTE if diaz needed.      VTE Risk Mitigation (From admission, onward)         Ordered     IP VTE HIGH RISK PATIENT  Once         02/24/22 0832     Place sequential compression device  Until discontinued         02/24/22 0832                   Magda Burger MD  Department of Hospital Medicine   [facility] Surg (3rd Fl)

## 2022-02-24 NOTE — ASSESSMENT & PLAN NOTE
Needing  NGT with LIS; s/p multiple failed attempts even after ativan, would need anesthesia but at increased risk   Repeat KUB x-ray in AM.  Consult General Surgeon specialist.Patient not a good operative candidate.  Severe cardiac history.  He recommends transferring this patient to a higher level of care  Continue gentle  IVF hydration. Follow serum lytes.   Use IV anti-emetics as needed.    Transfer initiated; will transfer to [facility] [place]. NG potentially under anesthesia?

## 2022-02-24 NOTE — ED NOTES
Several more attempts by ER nurses and MD were made to place NG tube. NG tube keeps coming out of patient's mouth.    No

## 2022-02-24 NOTE — CONSULTS
[place] - [facility] Surg (3rd Fl)  General Surgery  Consult Note    Consults  Subjective:     Chief Complaint/Reason for Admission:     History of Present Illness: 89-year-old white male presents to the ER via EMS complaining of abdominal pain. Patient has a significant past medical history for hypertension, hyperlipidemia, DJD, CAD, FRIDA, thrombocytopenia, history of ventricular fibrillation.   Cardiac stents.  Patient presents to the emergency room complaining of nausea with multiple episodes of vomiting and constant, sharp epigastric abdominal pain without radiation.  Patient just seen and examined with family at the bedside.  His last episode of vomiting was last night about 930 prior to coming to the emergency room.  The daughter states that he has had issues with constipation recently.  He is nonambulatory.  He just had his defibrillator battery changed on Monday by Dr. De La Cruz in [place].  His CT scan performed around midnight was without oral contrast.  It did show a possible small bowel obstruction.  His only abdominal surgery was an appendectomy as a child and an inguinal hernia repair.  He does have a left inguinal hernia on a prior CT in October without complication.  Patient not a good operative candidate.  Severe cardiac history.  I would recommend transferring this patient to a higher level of care.    No current facility-administered medications on file prior to encounter.     Current Outpatient Medications on File Prior to Encounter   Medication Sig    aspirin 81 MG Chew Take 81 mg by mouth once daily.    carvedilol (COREG) 12.5 MG tablet Take 12.5 mg by mouth 2 (two) times daily.    cholecalciferol, vitamin D3, (VITAMIN D3) 50 mcg (2,000 unit) Cap Take 1 capsule by mouth once daily.    CRESTOR 5 mg tablet Take 5 mg by mouth every Mon, Wed, Fri.     ezetimibe (ZETIA) 10 mg tablet Take 10 mg by mouth every evening.     furosemide (LASIX) 20 MG tablet Take 20 mg by mouth 2 (two) times daily. 1 tablet in  the morning and 1 tablet at 3pm    ipratropium (ATROVENT) 42 mcg (0.06 %) nasal spray SMARTSI-2 Spray(s) Both Nares Twice Daily PRN    memantine (NAMENDA) 5 MG Tab TAKE 1 TABLET BY MOUTH TWICE A DAY    mirtazapine (REMERON) 15 MG tablet TAKE 1 TABLET (15 MG TOTAL) BY MOUTH EVERY EVENING.    RANEXA 1,000 mg Tb12 Take 1,000 mg by mouth 2 (two) times daily.     sacubitril-valsartan (ENTRESTO) 24-26 mg per tablet Take 1 tablet by mouth 2 (two) times daily.    tamsulosin (FLOMAX) 0.4 mg Cap CAN TAKE ONE AT BEDTIME FOR BLADDER & PROSTATE TO EASE URINATION    clotrimazole-betamethasone 1-0.05% (LOTRISONE) cream APPLY TO AFFECTED AREA TWICE A DAY    nitroGLYCERIN (NITROSTAT) 0.4 MG SL tablet Place 0.4 mg under the tongue every 5 (five) minutes as needed for Chest pain.     spironolactone (ALDACTONE) 25 MG tablet One po q 3-4pm for abdominal fluid Mon, Wed, Fri    tobramycin-dexamethasone (TOBRADEX ST) 0.3-0.05 % DrpS 1-2 qtts to each eye tid for 7-10 days    triamcinolone acetonide 0.1% (KENALOG) 0.1 % cream Apply topically 2 (two) times daily. for 10 days       Review of patient's allergies indicates:   Allergen Reactions    Flexeril [cyclobenzaprine] Other (See Comments)    Vicodin [hydrocodone-acetaminophen] Hallucinations       Past Medical History:   Diagnosis Date    ASCVD (arteriosclerotic cardiovascular disease)     DJD (degenerative joint disease)     Hyperlipidemia     Hypertension     MGUS (monoclonal gammopathy of unknown significance) 2017    FRIDA (obstructive sleep apnea)     Thrombocytopenia 2017    Ventricular fibrillation 3/29/2019     Past Surgical History:   Procedure Laterality Date    APPENDECTOMY      CARDIAC PACEMAKER PLACEMENT  10/2016    DENTAL SURGERY  2016    HERNIA REPAIR      right inguinal    LEFT HEART CATHETERIZATION Left 3/28/2019    Procedure: CATHETERIZATION, HEART, LEFT;  Surgeon: Memo Lynn MD;  [facility];  Service: Cardiology;   Laterality: Left;    Right side catherization Right 2019    TONSILLECTOMY       Family History     Problem Relation (Age of Onset)    Coronary artery disease Father    Stroke Mother        Tobacco Use    Smoking status: Former Smoker    Smokeless tobacco: Never Used   Substance and Sexual Activity    Alcohol use: No    Drug use: No    Sexual activity: Not on file     Review of Systems   Gastrointestinal: Positive for abdominal distention, abdominal pain and constipation.   All other systems reviewed and are negative.    Objective:     Vital Signs (Most Recent):  Temp: 96.2 °F (35.7 °C) (02/24/22 0841)  Pulse: 102 (02/24/22 0841)  Resp: 16 (02/24/22 0841)  BP: 121/81 (02/24/22 0841)  SpO2: (!) 92 % (02/24/22 0841) Vital Signs (24h Range):  Temp:  [96.2 °F (35.7 °C)-98.3 °F (36.8 °C)] 96.2 °F (35.7 °C)  Pulse:  [] 102  Resp:  [16-25] 16  SpO2:  [89 %-96 %] 92 %  BP: (121-191)/(81-96) 121/81     Weight: 105.1 kg (231 lb 11.3 oz)  Body mass index is 38.56 kg/m².      Intake/Output Summary (Last 24 hours) at 2/24/2022 0940  Last data filed at 2/24/2022 0230  Gross per 24 hour   Intake 50 ml   Output --   Net 50 ml       Physical Exam  Vitals and nursing note reviewed.   Constitutional:       Appearance: He is well-developed.   HENT:      Head: Normocephalic.   Eyes:      Pupils: Pupils are equal, round, and reactive to light.   Pulmonary:      Effort: Pulmonary effort is normal.   Abdominal:      General: There is distension.      Palpations: Abdomen is soft.      Tenderness: There is no abdominal tenderness. There is no guarding or rebound.   Musculoskeletal:         General: Normal range of motion.      Cervical back: Normal range of motion.   Skin:     General: Skin is warm and dry.   Neurological:      Mental Status: He is alert and oriented to person, place, and time.         Significant Labs:  CBC:   Recent Labs   Lab 02/23/22  2342   WBC 10.49   RBC 3.78*   HGB 13.0*   HCT 41.0   *   *    MCH 34.4*   MCHC 31.7*     CMP:   Recent Labs   Lab 02/23/22  2342   *   CALCIUM 9.9   ALBUMIN 3.9   PROT 7.6      K 4.8   CO2 26   CL 97   BUN 28*   CREATININE 1.8*   ALKPHOS 85   ALT 10   AST 14   BILITOT 0.6       Significant Diagnostics:  I have reviewed all pertinent imaging results/findings within the past 24 hours.    Assessment/Plan:     There are no hospital problems to display for this patient.  Patient with a possible small bowel obstruction although he has never had that before.  His only abdominal surgery is an appendectomy as a child and an inguinal hernia repair.  His CT scan was without oral contrast or not the best exam.  Last episode of vomiting was last night at home prior to coming to the emergency room.  NG tube was attempted in the ER multiple times but unsuccessful.  Patient has a significant cardiac history.  Just recently on Monday had his defibrillator battery changed.  Patient not a good operative candidate especially here without anesthesiology and interventional Cardiology.  Would recommend transferring to a higher level of care.    Thank you for your consult.     Compa Wiseman Jr, MD  General Surgery  [place] - Med Surg (3rd Fl)

## 2022-02-25 PROBLEM — K92.1 MELENA: Status: ACTIVE | Noted: 2022-02-25

## 2022-02-25 LAB
ALBUMIN SERPL BCP-MCNC: 3.3 G/DL (ref 3.5–5.2)
ALP SERPL-CCNC: 65 U/L (ref 55–135)
ALT SERPL W/O P-5'-P-CCNC: 9 U/L (ref 10–44)
ANION GAP SERPL CALC-SCNC: 8 MMOL/L (ref 8–16)
AST SERPL-CCNC: 18 U/L (ref 10–40)
BASOPHILS # BLD AUTO: 0.02 K/UL (ref 0–0.2)
BASOPHILS NFR BLD: 0.2 % (ref 0–1.9)
BILIRUB SERPL-MCNC: 0.5 MG/DL (ref 0.1–1)
BUN SERPL-MCNC: 45 MG/DL (ref 8–23)
CALCIUM SERPL-MCNC: 8.6 MG/DL (ref 8.7–10.5)
CHLORIDE SERPL-SCNC: 103 MMOL/L (ref 95–110)
CO2 SERPL-SCNC: 26 MMOL/L (ref 23–29)
CREAT SERPL-MCNC: 1.7 MG/DL (ref 0.5–1.4)
DIFFERENTIAL METHOD: ABNORMAL
EOSINOPHIL # BLD AUTO: 0 K/UL (ref 0–0.5)
EOSINOPHIL NFR BLD: 0.1 % (ref 0–8)
ERYTHROCYTE [DISTWIDTH] IN BLOOD BY AUTOMATED COUNT: 13.3 % (ref 11.5–14.5)
EST. GFR  (AFRICAN AMERICAN): 40 ML/MIN/1.73 M^2
EST. GFR  (NON AFRICAN AMERICAN): 35 ML/MIN/1.73 M^2
GLUCOSE SERPL-MCNC: 154 MG/DL (ref 70–110)
HCT VFR BLD AUTO: 31.6 % (ref 40–54)
HGB BLD-MCNC: 10 G/DL (ref 14–18)
IMM GRANULOCYTES # BLD AUTO: 0.11 K/UL (ref 0–0.04)
IMM GRANULOCYTES NFR BLD AUTO: 0.8 % (ref 0–0.5)
LACTATE SERPL-SCNC: 1.3 MMOL/L (ref 0.5–2.2)
LYMPHOCYTES # BLD AUTO: 0.6 K/UL (ref 1–4.8)
LYMPHOCYTES NFR BLD: 4.5 % (ref 18–48)
MAGNESIUM SERPL-MCNC: 2.1 MG/DL (ref 1.6–2.6)
MCH RBC QN AUTO: 34.1 PG (ref 27–31)
MCHC RBC AUTO-ENTMCNC: 31.6 G/DL (ref 32–36)
MCV RBC AUTO: 108 FL (ref 82–98)
MONOCYTES # BLD AUTO: 1.8 K/UL (ref 0.3–1)
MONOCYTES NFR BLD: 14.2 % (ref 4–15)
NEUTROPHILS # BLD AUTO: 10.4 K/UL (ref 1.8–7.7)
NEUTROPHILS NFR BLD: 80.2 % (ref 38–73)
NRBC BLD-RTO: 1 /100 WBC
PHOSPHATE SERPL-MCNC: 2.5 MG/DL (ref 2.7–4.5)
PLATELET # BLD AUTO: 141 K/UL (ref 150–450)
PMV BLD AUTO: 8.3 FL (ref 9.2–12.9)
POCT GLUCOSE: 150 MG/DL (ref 70–110)
POCT GLUCOSE: 151 MG/DL (ref 70–110)
POCT GLUCOSE: 160 MG/DL (ref 70–110)
POCT GLUCOSE: 172 MG/DL (ref 70–110)
POTASSIUM SERPL-SCNC: 4.9 MMOL/L (ref 3.5–5.1)
PROCALCITONIN SERPL IA-MCNC: 0.16 NG/ML
PROT SERPL-MCNC: 6.5 G/DL (ref 6–8.4)
RBC # BLD AUTO: 2.93 M/UL (ref 4.6–6.2)
SODIUM SERPL-SCNC: 137 MMOL/L (ref 136–145)
WBC # BLD AUTO: 12.98 K/UL (ref 3.9–12.7)

## 2022-02-25 PROCEDURE — 99223 1ST HOSP IP/OBS HIGH 75: CPT | Mod: ,,, | Performed by: SURGERY

## 2022-02-25 PROCEDURE — 99223 1ST HOSP IP/OBS HIGH 75: CPT | Mod: ,,, | Performed by: INTERNAL MEDICINE

## 2022-02-25 PROCEDURE — 99223 PR INITIAL HOSPITAL CARE,LEVL III: ICD-10-PCS | Mod: ,,, | Performed by: INTERNAL MEDICINE

## 2022-02-25 PROCEDURE — 36415 COLL VENOUS BLD VENIPUNCTURE: CPT | Performed by: PHYSICIAN ASSISTANT

## 2022-02-25 PROCEDURE — 36415 COLL VENOUS BLD VENIPUNCTURE: CPT | Performed by: STUDENT IN AN ORGANIZED HEALTH CARE EDUCATION/TRAINING PROGRAM

## 2022-02-25 PROCEDURE — 25000242 PHARM REV CODE 250 ALT 637 W/ HCPCS: Performed by: HOSPITALIST

## 2022-02-25 PROCEDURE — 80053 COMPREHEN METABOLIC PANEL: CPT | Performed by: PHYSICIAN ASSISTANT

## 2022-02-25 PROCEDURE — 63600175 PHARM REV CODE 636 W HCPCS: Performed by: PHYSICIAN ASSISTANT

## 2022-02-25 PROCEDURE — 11000001 HC ACUTE MED/SURG PRIVATE ROOM

## 2022-02-25 PROCEDURE — 25000003 PHARM REV CODE 250: Performed by: PHYSICIAN ASSISTANT

## 2022-02-25 PROCEDURE — 99223 PR INITIAL HOSPITAL CARE,LEVL III: ICD-10-PCS | Mod: ,,, | Performed by: SURGERY

## 2022-02-25 PROCEDURE — C9113 INJ PANTOPRAZOLE SODIUM, VIA: HCPCS | Performed by: STUDENT IN AN ORGANIZED HEALTH CARE EDUCATION/TRAINING PROGRAM

## 2022-02-25 PROCEDURE — 83605 ASSAY OF LACTIC ACID: CPT | Performed by: STUDENT IN AN ORGANIZED HEALTH CARE EDUCATION/TRAINING PROGRAM

## 2022-02-25 PROCEDURE — 94640 AIRWAY INHALATION TREATMENT: CPT

## 2022-02-25 PROCEDURE — 84145 PROCALCITONIN (PCT): CPT | Performed by: PHYSICIAN ASSISTANT

## 2022-02-25 PROCEDURE — 85025 COMPLETE CBC W/AUTO DIFF WBC: CPT | Performed by: PHYSICIAN ASSISTANT

## 2022-02-25 PROCEDURE — 27000221 HC OXYGEN, UP TO 24 HOURS

## 2022-02-25 PROCEDURE — 84100 ASSAY OF PHOSPHORUS: CPT | Performed by: PHYSICIAN ASSISTANT

## 2022-02-25 PROCEDURE — 83735 ASSAY OF MAGNESIUM: CPT | Performed by: PHYSICIAN ASSISTANT

## 2022-02-25 PROCEDURE — 63600175 PHARM REV CODE 636 W HCPCS: Performed by: STUDENT IN AN ORGANIZED HEALTH CARE EDUCATION/TRAINING PROGRAM

## 2022-02-25 RX ORDER — LEVALBUTEROL 1.25 MG/.5ML
1.25 SOLUTION, CONCENTRATE RESPIRATORY (INHALATION) EVERY 8 HOURS
Status: DISCONTINUED | OUTPATIENT
Start: 2022-02-25 | End: 2022-02-28

## 2022-02-25 RX ORDER — PANTOPRAZOLE SODIUM 40 MG/10ML
40 INJECTION, POWDER, LYOPHILIZED, FOR SOLUTION INTRAVENOUS 2 TIMES DAILY
Status: DISCONTINUED | OUTPATIENT
Start: 2022-02-25 | End: 2022-03-06 | Stop reason: HOSPADM

## 2022-02-25 RX ORDER — FUROSEMIDE 10 MG/ML
40 INJECTION INTRAMUSCULAR; INTRAVENOUS ONCE
Status: COMPLETED | OUTPATIENT
Start: 2022-02-25 | End: 2022-02-25

## 2022-02-25 RX ORDER — FUROSEMIDE 10 MG/ML
40 INJECTION INTRAMUSCULAR; INTRAVENOUS DAILY
Status: DISCONTINUED | OUTPATIENT
Start: 2022-02-26 | End: 2022-02-26

## 2022-02-25 RX ADMIN — PIPERACILLIN AND TAZOBACTAM 4.5 G: 4; .5 INJECTION, POWDER, LYOPHILIZED, FOR SOLUTION INTRAVENOUS; PARENTERAL at 12:02

## 2022-02-25 RX ADMIN — FUROSEMIDE 20 MG: 10 INJECTION, SOLUTION INTRAVENOUS at 09:02

## 2022-02-25 RX ADMIN — FUROSEMIDE 40 MG: 10 INJECTION, SOLUTION INTRAMUSCULAR; INTRAVENOUS at 04:02

## 2022-02-25 RX ADMIN — PANTOPRAZOLE SODIUM 40 MG: 40 INJECTION, POWDER, FOR SOLUTION INTRAVENOUS at 09:02

## 2022-02-25 RX ADMIN — PIPERACILLIN AND TAZOBACTAM 4.5 G: 4; .5 INJECTION, POWDER, LYOPHILIZED, FOR SOLUTION INTRAVENOUS; PARENTERAL at 06:02

## 2022-02-25 RX ADMIN — METOROPROLOL TARTRATE 5 MG: 5 INJECTION, SOLUTION INTRAVENOUS at 09:02

## 2022-02-25 RX ADMIN — LEVALBUTEROL HYDROCHLORIDE 1.25 MG: 1.25 SOLUTION, CONCENTRATE RESPIRATORY (INHALATION) at 07:02

## 2022-02-25 RX ADMIN — METHYLPREDNISOLONE SODIUM SUCCINATE 60 MG: 40 INJECTION, POWDER, FOR SOLUTION INTRAMUSCULAR; INTRAVENOUS at 04:02

## 2022-02-25 RX ADMIN — LEVALBUTEROL HYDROCHLORIDE 1.25 MG: 1.25 SOLUTION, CONCENTRATE RESPIRATORY (INHALATION) at 03:02

## 2022-02-25 RX ADMIN — PIPERACILLIN AND TAZOBACTAM 4.5 G: 4; .5 INJECTION, POWDER, LYOPHILIZED, FOR SOLUTION INTRAVENOUS; PARENTERAL at 09:02

## 2022-02-25 NOTE — H&P
[facility] Medicine  History & Physical    Patient Name: Reese Bell  MRN: [id number]  Patient Class: IP- Inpatient  Admission Date: 2/24/2022  Attending Physician: Patricia Jenkins DO   Primary Care Provider: Oracio Johnson MD         Patient information was obtained from patient, past medical records and ER records.     Subjective:     Principal Problem:SBO (small bowel obstruction)    Chief Complaint:   Chief Complaint   Patient presents with    Abdominal Pain        HPI: Reese Bell 89 y.o. male with for hypertension, hyperlipidemia, DJD, CAD post cardiac stenting, FRIDA- CPAP, thrombocytopenia, history of ventricular fibrillation s/p ICD and recent defibrillator battery changed on Monday by Dr. De La Cruz in [place] presents to the hospital with a chief complaint of abdominal pain worst at the bottom of his abdomen without radiation.  He reports yesterday he developed sudden onset diffuse abdominal pain described as a pressure with associated nausea and vomiting.  Presented to an outside hospital where he was admitted.  He denies any worsening or alleviating factors for pain.  He now states his abdominal pain has resolved he has had no further nausea vomiting since arrival from outside hospital.  The symptoms never occurred before.  He had an appendectomy and a child but denies other abdominal surgeries.  He denies any current complaints.    In the emergency room and seen and, creatinine 1.8, CT abdomen with high-grade partial versus complete small bowel obstruction, troponin negative, BNP negative, COVID negative, NG tube placement attempted but unsuccessful chest x-ray showed placement to right lower lobe.  NG tube was removed.  Seen by surgery who recommended transfer for higher level care.      Past Medical History:   Diagnosis Date    ASCVD (arteriosclerotic cardiovascular disease)     DJD (degenerative joint disease)     Hyperlipidemia     Hypertension     MGUS (monoclonal  gammopathy of unknown significance) 5/11/2017    FRIDA (obstructive sleep apnea)     Thrombocytopenia 5/11/2017    Ventricular fibrillation 3/29/2019       Past Surgical History:   Procedure Laterality Date    APPENDECTOMY      CARDIAC PACEMAKER PLACEMENT  10/2016    DENTAL SURGERY  06/02/2016    HERNIA REPAIR      right inguinal    LEFT HEART CATHETERIZATION Left 3/28/2019    Procedure: CATHETERIZATION, HEART, LEFT;  Surgeon: Memo Lynn MD;  [facility];  Service: Cardiology;  Laterality: Left;    Right side catherization Right 2019    TONSILLECTOMY         Review of patient's allergies indicates:   Allergen Reactions    Flexeril [cyclobenzaprine] Other (See Comments)    Vicodin [hydrocodone-acetaminophen] Hallucinations       Current Facility-Administered Medications on File Prior to Encounter   Medication    [COMPLETED] albuterol-ipratropium 2.5 mg-0.5 mg/3 mL nebulizer solution 3 mL    [COMPLETED] iohexoL (OMNIPAQUE 350) injection 30 mL    [COMPLETED] metoclopramide HCl injection 10 mg    [COMPLETED] phenylephrine HCL 0.5% nasal spray 2 spray    [COMPLETED] promethazine (PHENERGAN) 25 mg in dextrose 5 % 50 mL IVPB    [DISCONTINUED] 0.9%  NaCl infusion    [DISCONTINUED] acetaminophen tablet 650 mg    [DISCONTINUED] famotidine (PF) injection 20 mg    [DISCONTINUED] furosemide injection 20 mg    [DISCONTINUED] metoprolol injection 5 mg    [DISCONTINUED] ondansetron injection 4 mg    [DISCONTINUED] prochlorperazine injection Soln 5 mg    [DISCONTINUED] sodium chloride 0.9% flush 10 mL     Current Outpatient Medications on File Prior to Encounter   Medication Sig    aspirin 81 MG Chew Take 81 mg by mouth once daily.    carvedilol (COREG) 12.5 MG tablet Take 12.5 mg by mouth 2 (two) times daily.    cholecalciferol, vitamin D3, (VITAMIN D3) 50 mcg (2,000 unit) Cap Take 1 capsule by mouth once daily.    clotrimazole-betamethasone 1-0.05% (LOTRISONE) cream APPLY TO AFFECTED AREA TWICE  A DAY    CRESTOR 5 mg tablet Take 5 mg by mouth every Mon, Wed, Fri.     ezetimibe (ZETIA) 10 mg tablet Take 10 mg by mouth every evening.     furosemide (LASIX) 20 MG tablet Take 20 mg by mouth 2 (two) times daily. 1 tablet in the morning and 1 tablet at 3pm    ipratropium (ATROVENT) 42 mcg (0.06 %) nasal spray SMARTSI-2 Spray(s) Both Nares Twice Daily PRN    memantine (NAMENDA) 5 MG Tab TAKE 1 TABLET BY MOUTH TWICE A DAY    mirtazapine (REMERON) 15 MG tablet TAKE 1 TABLET (15 MG TOTAL) BY MOUTH EVERY EVENING.    RANEXA 1,000 mg Tb12 Take 1,000 mg by mouth 2 (two) times daily.     sacubitril-valsartan (ENTRESTO) 24-26 mg per tablet Take 1 tablet by mouth 2 (two) times daily.    spironolactone (ALDACTONE) 25 MG tablet One po q 3-4pm for abdominal fluid Mon, Wed, Fri    tamsulosin (FLOMAX) 0.4 mg Cap CAN TAKE ONE AT BEDTIME FOR BLADDER & PROSTATE TO EASE URINATION    nitroGLYCERIN (NITROSTAT) 0.4 MG SL tablet Place 0.4 mg under the tongue every 5 (five) minutes as needed for Chest pain.     tobramycin-dexamethasone (TOBRADEX ST) 0.3-0.05 % DrpS 1-2 qtts to each eye tid for 7-10 days    triamcinolone acetonide 0.1% (KENALOG) 0.1 % cream Apply topically 2 (two) times daily. for 10 days     Family History       Problem Relation (Age of Onset)    Coronary artery disease Father    Stroke Mother          Tobacco Use    Smoking status: Former Smoker    Smokeless tobacco: Never Used   Substance and Sexual Activity    Alcohol use: No    Drug use: No    Sexual activity: Not on file     Review of Systems   Constitutional:  Negative for chills and fever.   HENT:  Negative for nosebleeds and tinnitus.    Eyes:  Negative for photophobia and visual disturbance.   Respiratory:  Negative for shortness of breath and wheezing.    Cardiovascular:  Negative for chest pain, palpitations and leg swelling.   Gastrointestinal:  Positive for abdominal pain, nausea and vomiting. Negative for abdominal distention.    Genitourinary:  Negative for dysuria, flank pain and hematuria.   Musculoskeletal:  Negative for gait problem and joint swelling.   Skin:  Negative for rash and wound.   Neurological:  Negative for seizures and syncope.   Objective:     Vital Signs (Most Recent):  Temp: 98.7 °F (37.1 °C) (02/24/22 1600)  Pulse: 101 (02/24/22 1700)  Resp: 18 (02/24/22 1700)  BP: 135/89 (02/24/22 1700)  SpO2: 96 % (02/24/22 1600) Vital Signs (24h Range):  Temp:  [96.1 °F (35.6 °C)-98.7 °F (37.1 °C)] 98.7 °F (37.1 °C)  Pulse:  [] 101  Resp:  [16-25] 18  SpO2:  [86 %-99 %] 96 %  BP: (121-191)/(74-96) 135/89     Weight: 105.5 kg (232 lb 9.4 oz)  Body mass index is 39.92 kg/m².    Physical Exam  Vitals and nursing note reviewed.   Constitutional:       General: He is not in acute distress.     Appearance: He is well-developed.   HENT:      Head: Normocephalic and atraumatic.      Right Ear: External ear normal.      Left Ear: External ear normal.   Eyes:      General:         Right eye: No discharge.         Left eye: No discharge.      Conjunctiva/sclera: Conjunctivae normal.   Neck:      Thyroid: No thyromegaly.   Cardiovascular:      Rate and Rhythm: Normal rate and regular rhythm.      Heart sounds: No murmur heard.  Pulmonary:      Effort: Pulmonary effort is normal. No respiratory distress.      Breath sounds: Normal breath sounds.      Comments: AICD chest wall  Abdominal:      General: There is distension.      Palpations: Abdomen is soft. There is no mass.      Tenderness: There is no abdominal tenderness. There is no guarding or rebound.      Comments: Decreased bowel sounds in all 4 quadrants. Soft non-tender no rebound   Musculoskeletal:         General: No deformity.      Cervical back: Normal range of motion and neck supple.      Right lower leg: No edema.      Left lower leg: No edema.   Skin:     General: Skin is warm and dry.   Neurological:      Mental Status: He is alert and oriented to person, place, and time.       Sensory: No sensory deficit.   Psychiatric:         Mood and Affect: Mood normal.         Behavior: Behavior normal.           Significant Labs: CBC:   Recent Labs   Lab 02/23/22 2342   WBC 10.49   HGB 13.0*   HCT 41.0   *     CMP:   Recent Labs   Lab 02/23/22 2342      K 4.8   CL 97   CO2 26   *   BUN 28*   CREATININE 1.8*   CALCIUM 9.9   PROT 7.6   ALBUMIN 3.9   BILITOT 0.6   ALKPHOS 85   AST 14   ALT 10   ANIONGAP 15   EGFRNONAA 32*     Cardiac Markers:   Recent Labs   Lab 02/23/22 2342   BNP 92     Troponin:   Recent Labs   Lab 02/23/22 2342   TROPONINI 0.024     Urine Studies:   Recent Labs   Lab 02/24/22  0806   COLORU Yellow   APPEARANCEUA Clear   PHUR 6.0   SPECGRAV >=1.030*   PROTEINUA 1+*   GLUCUA Negative   KETONESU Trace*   BILIRUBINUA Negative   OCCULTUA Negative   NITRITE Negative   UROBILINOGEN Negative   LEUKOCYTESUR Negative   RBCUA 0   WBCUA 3   BACTERIA Occasional   HYALINECASTS 1           Chest x-ray  Impression:     As above.  Enteric tube appearing to terminate in the region of the right lower lobe.  This should be removed.     COMMUNICATION  This critical result was discovered/received at 08:00.  The critical information above was relayed directly by me by telephone to Dr. Ashton on 02/24/2022 at 08:05.        Electronically signed by: Erin Carbajal MD  Date:                                            02/24/2022  Time:                                           08:07    Significant Imaging:   CT abdomen     Impression:     High-grade partial versus early complete small bowel obstruction.  The stomach is significantly distended.  Transition zone somewhere in the mid abdomen.     Small amount of free fluid in the left upper quadrant adjacent to the stomach and left diaphragm.     The gallbladder is distended.  There is some very subtle pericholecystic inflammation which could suggest a cholecystitis.  Consider further evaluation with a right upper quadrant  "ultrasound.     Polycystic kidney disease.     Additional nonemergent findings as above.        Electronically signed by: Erin Carbajal MD  Date:                                            02/24/2022      Time:                                           08:37    Assessment/Plan:     * SBO (small bowel obstruction)  CT on 2/24 [facility] with "High-grade partial versus early complete small bowel obstruction.  The stomach is significantly distended.  Transition zone somewhere in the mid abdomen." evaluated by surgery there and recommended for transfer for higher level of care. NG tube unable to be placed at [place]. With no further abdominal pain or N/V since arrival  -as without N/V will hold on further NG tube placement  -on gentle IVF  -surgery consulted  -NPO  -stat repeat CMP/CBC/Lactic acid ordered  -CMP/Mg/Phos ordered    Developed epistaxis after NG tube placement from [place], but is improved with no further bleeding, and cough resolving per family    Advanced care planning/counseling discussion  Discussed code status with son at bedside and patient. Both report patient is a DNR. He has brought his living will to the hospital.   Greater than 16 minutes spent discussing code status.   DNR order placed in chart    FRIDA (obstructive sleep apnea)  Continue home CPAP    Coronary artery disease involving native coronary artery of native heart without angina pectoris  Denies any chest pain.   Home aspirin/coreg/zetia held while NPO  Monitor on telemetry  Cardiology consulted for Pre-Op evaluation should surgery be required for SBO    Permanent atrial fibrillation  Holding home coreg while NPO. Will order IV metoprolol  Currently rate controlled. Not on anticoagulation outpatient    CHF (congestive heart failure), NYHA class II, chronic, systolic  Echo in 2019 with EF of 60% and normal ventricular diastolic function   On laisx 20 BID at home. Will order 20IV daily  Daily weights/strict intake and " output/telemetry    Essential hypertension  Well controlled holding home coreg and entresto while NPO    Hyperlipidemia  Holding home zetia and rosuvastatin while NPO for SBO    Stage 3 chronic kidney disease  Cr today of 1.8. baseline of 1.5-1.9.  Renal dose medications avoid nephrotoxic drugs monitor intake and output    Dementia in Alzheimer's disease  Stable, at baseline mental status per family  Delirium precautions  Holding home namenda while NPO    Benign prostatic hyperplasia  Holding home flomax while NPO      VTE Risk Mitigation (From admission, onward)         Ordered     Place CHRISTOPHER hose  Until discontinued         02/24/22 1712     IP VTE HIGH RISK PATIENT  Once         02/24/22 1712     Place sequential compression device  Until discontinued         02/24/22 1712              VTE: CHRISTOPHER/SCD  Code:Full  Diet: NPO  Dispo: pending surgery eval  As clarification, on 2/24/2022, patient should be admitted for hospital inpatient services under my care in collaboration with Patricia Jenkins MD. Chip Hargrove PA-C    ADDENDUM 1920: notified of recurrent epistaxis after blowing nose. Suspect abrasions from NG tube placement causing bleeding. With bright red blood draining to posterior oropharynx. Direct pressure and ice applied to nose with stopping of bleeding. Will humidify NC to prevent further drying of nares. Will hold placing CPAP tonight. Hemoglobin 11.5 from 13 at outside hospital. Will continue to trend hemoglobin.     ADDENDUM 2000: Patient inadvertantly blew nose and dislodged clot from nose. Bleeding controlled with pressure and ice. Will place LETs gel to nare and consult ENT for AM.     ADDENDUM 2100 LETs gel placed to nose with control of epistaxis.     ADDENDUM: 2200 Repeat chest x-ray with a mild right basilar opacity and development of rhonochrous breath sounds. Suspect possibly aspiration from epistaxis. Will give duo-neb and give zosyn for possible aspiration. Will repeat chest x-ray and check  procal and BNP    Chip Hargrove PA-C  Department of Hospital Medicine   [facility] Surg

## 2022-02-25 NOTE — CONSULTS
[facility] Surg  Adult Nutrition  Consult Note    SUMMARY     Recommendations    1) Continue Cardiac Restrictions, Add Diabetic restrictions  2) Add IDDSI 5 restrictions - Per pt no teeth and preference for softer foods  3) Add Novasource Renal ONS to assist in meeting needs  4) Renals, Blood Glucose      Goals:   1) Patient to meet >/= 50% EEN/EPN via PO/ONS intake  2) Monitored labs to trend toward target ranges    Nutrition Goal Status: new  Communication of RD Recs:  (POC)    Assessment and Plan  Nutrition Problem  Inadequate Oral Intake    Related to (etiology):   SBO    Signs and Symptoms (as evidenced by):   PO intake 25% EEN     Interventions/Recommendations (treatment strategy):  Mineral Modified Diet  Carbohydrate Modified Diet  Commercial Beverage  Collaboration of care with other providers    Nutrition Diagnosis Status:   Continues      Malnutrition Assessment  Per medical Chart pt weight has remained stable.  Clinically Class III Morbid Obesity.    Reason for Assessment    Reason For Assessment: RD Follow Up  Diagnosis:  (SBO)  Relevant Medical History: SBO, CHF, Permanent Afib, Dementia, CKD3, CAD, GERD, STEMI, HTN, HLD    General Information Comments:    2/28 - Spoke with pt family member - reporting feelinf much better - starting to eat more - not complaining of N/V - having BM. Will continue to monitor intake. Elevated glucose - Renal function okay - monitoring - Add restrictions if needed.    2/25 - Pt presented to [facility] with c/o N/V after eating, recent hx of constipation. CT abdomen showed pt with high-grade partial versus complete small bowel obstruction. Sx consulted and declared pt to be of too high of risk and recommended transfer - Transfered to [facility]. Multiple attempts to place NGT for decompression - all failed (epistaxis, traumatic after placement of NG tube) - Likely needs to be placed under anesthesia - high risk given pt complex cardiac hx (hypertension, hyperlipidemia,  "DJD, CAD post cardiac stenting, FRIDA- CPAP, thrombocytopenia, history of ventricular fibrillation s/p ICD and recent defibrillator battery changed). Pt has since transfer had several BM, one small hard, one large black partially formed and partially loose stool  Will continue to follow.     Nutrition Discharge Planning: Pending Medical Course    Nutrition Risk Screen    Nutrition Risk Screen: no indicators present    Nutrition/Diet History    Spiritual, Cultural Beliefs, Muslim Practices, Values that Affect Care: no  Food Allergies: NKFA  Factors Affecting Nutritional Intake: None identified at this time    Anthropometrics    Temp: 98.1 °F (36.7 °C)  Height Method: Stated  Height: 5' 4" (162.6 cm)  Height (inches): 64 in  Weight Method: Bed Scale  Weight: 104.3 kg (229 lb 15 oz)  Weight (lb): 229.94 lb  Ideal Body Weight (IBW), Male: 130 lb  % Ideal Body Weight, Male (lb): 178.92 %  BMI (Calculated): 39.4  BMI Grade: greater than 40 - morbid obesity       Lab/Procedures/Meds    Pertinent Labs Reviewed: reviewed  CBC:  Recent Labs   Lab 02/28/22  0330   WBC 10.68   HGB 9.5*   HCT 30.6*   *     CMP:  Recent Labs   Lab 02/28/22 2128   CALCIUM 8.5*      K 4.1   CO2 26      BUN 50*   CREATININE 1.9*     BMP  Lab Results   Component Value Date     02/28/2022    K 4.1 02/28/2022     02/28/2022    CO2 26 02/28/2022    BUN 50 (H) 02/28/2022    CREATININE 1.9 (H) 02/28/2022    CALCIUM 8.5 (L) 02/28/2022    ANIONGAP 9 02/28/2022    ESTGFRAFRICA 35 (A) 02/28/2022    EGFRNONAA 30 (A) 02/28/2022     Glucose   Date Value Ref Range Status   02/25/2022 154 (H) 70 - 110 mg/dL Final         Pertinent Medications Reviewed: reviewed  Scheduled Meds:   albuterol-ipratropium  3 mL Nebulization Q4H    carvediloL  12.5 mg Oral BID    ezetimibe  10 mg Oral QHS    methylPREDNISolone sodium succinate injection  40 mg Intravenous Daily    pantoprazole  40 mg Intravenous BID    piperacillin-tazobactam " (ZOSYN) IVPB  4.5 g Intravenous Q8H    sacubitriL-valsartan  1 tablet Oral BID    tamsulosin  0.4 mg Oral QHS     Continuous Infusions:    PRN Meds:.dextrose 10%, dextrose 10%, glucagon (human recombinant), glucose, glucose, naloxone, sodium chloride 0.9%    Estimated/Assessed Needs    Weight Used For Calorie Calculations: 106.5 kg (234 lb 12.6 oz)  Energy Calorie Requirements (kcal): 1636  Energy Need Method: [place]-St Jeor (No AF per BMI >/= 40)  Protein Requirements: 88 - 118g (1.5- 2.0g/kg IBW)  Weight Used For Protein Calculations: 59 kg (130 lb) (IBW)     Estimated Fluid Requirement Method: RDA Method (or per MD)  RDA Method (mL): 1636  CHO Requirement: 205g      Nutrition Prescription Ordered    Current Diet Order: Cardiac Diet    Evaluation of Received Nutrient/Fluid Intake    Energy Calories Required: not meeting needs  Protein Required: not meeting needs  Fluid Required: not meeting needs  Total Fluid Intake (mL/kg): .  Comments: LBM 2/28  % Intake of Estimated Energy Needs: 25 - 50 %  % Meal Intake: 25 - 50 %      Intake/Output Summary (Last 24 hours) at 3/1/2022 0042  Last data filed at 2/28/2022 1712  Gross per 24 hour   Intake 1020 ml   Output 1 ml   Net 1019 ml         Nutrition Risk    Level of Risk/Frequency of Follow-up:  (1-2x/week)       Monitor and Evaluation    Food and Nutrient Intake: energy intake, food and beverage intake, enteral nutrition intake  Food and Nutrient Adminstration: diet order, enteral and parenteral nutrition administration  Physical Activity and Function: nutrition-related ADLs and IADLs  Anthropometric Measurements: weight change, weight, body mass index  Biochemical Data, Medical Tests and Procedures: electrolyte and renal panel, gastrointestinal profile, glucose/endocrine profile, inflammatory profile, lipid profile  Nutrition-Focused Physical Findings: overall appearance, extremities, muscles and bones, head and eyes, skin       Nutrition Follow-Up    RD Follow-up?:  Yes

## 2022-02-25 NOTE — NURSING
Report received from TAY York RN. Patient lying in bed resting comfortably. Oxygen therapy via nasal cannula. Patient's son at bedside.  No acute cardiac or respiratory distress noted. Safety measures maintained; wheels locked, bed in lowest position, bed alarm active and engaged, and call light in reach. Will continue to monitor.

## 2022-02-25 NOTE — ASSESSMENT & PLAN NOTE
Holding home coreg while NPO.   Continue IV metoprolol  Currently rate controlled.   Not on anticoagulation outpatient

## 2022-02-25 NOTE — ASSESSMENT & PLAN NOTE
Holding home coreg while NPO. Will order IV metoprolol  Currently rate controlled. Not on anticoagulation outpatient

## 2022-02-25 NOTE — NURSING
[facility] [place]-  Rapid Response Nurse Intervention/ Task    Date of Visit: 02/24/2022  Time of Visit: 2200       INTERVENTIONS/ TASK Completed:     US IV placed to R MARY ANN, RUFINA Montoya notified of successful placement and ability to initiated IV fluid therapy.

## 2022-02-25 NOTE — ASSESSMENT & PLAN NOTE
Echo in 2019 with EF of 60% and normal ventricular diastolic function   On laisx 20 BID at home. Will order 20IV daily  Daily weights/strict intake and output/telemetry

## 2022-02-25 NOTE — HOSPITAL COURSE
Patient seen by surgery who rec transfer to higher level of care. Patient accepted to [facility] and transferred there for sedation and surgical eval + NG placement.

## 2022-02-25 NOTE — NURSING
Patient has scheduled lopressor. /58, hr 98. Secure chat sent to Dr. Jenkins to inquire if it should be administered. Per Dr. Jenkins hold Lopressor. Also, informed her that ENT is unavailable to see patient until Wednesday, March 2, 2022.

## 2022-02-25 NOTE — ASSESSMENT & PLAN NOTE
Denies any chest pain.   Home aspirin/coreg/zetia held while NPO  Monitor on telemetry  Cardiology consulted for Pre-Op evaluation should surgery be required for SBO

## 2022-02-25 NOTE — CONSULTS
"[facility] Note    CC: melena    HPI 89 y.o. male who is DNR with FRIDA wears CPAP nightly, HTN, HLD, CAD s/p stents, history of ventricular fibrillation s/p ICD with recent defibrillator battery change in [place], polycystic kidney disease who was transferred from [place] to [facility] on 2/24. He presented to [facility] on 2/23 with complaints of nausea, vomiting, and abdominal pain. CT scan without contrast showed high grade partial versus complete SBO, distended stomach, distended gallbladder. NGT attempts were unsuccessful and patient developed epistaxis thereafter. Surgery at that hospital felt the patient needed higher level of care and therefore he was transferred here.    GI consulted this afternoon due to concern for melena. Patient's grandson in the room with him. The patient is currently on 3L NC with 02 sats ranging 92-94%. He had one episode of melena while I was in the room. I spoke with his DPOA (daughter Treva) on the telephone in the room. She describes that the patient was "spitting blood" yesterday after NGT was placed in his RLL and also had multiple blood clots from the nose yesterday after NGT placement attempts.    The patient has not had any vomiting today. He denies abdominal pain but does feel that his abdomen is more distended than usual. Per nursing, first bowel movement today was black and hard, subsequent stools have been black and more loose.    Chart reviewed and summarized here.    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    FRIDA (obstructive sleep apnea)     Thrombocytopenia 5/11/2017    Ventricular fibrillation 3/29/2019       Past Surgical History  Past Surgical History:   Procedure Laterality Date    APPENDECTOMY      CARDIAC PACEMAKER PLACEMENT  " "10/2016    DENTAL SURGERY  06/02/2016    HERNIA REPAIR      right inguinal    LEFT HEART CATHETERIZATION Left 3/28/2019    Procedure: CATHETERIZATION, HEART, LEFT;  Surgeon: Memo Lynn MD;  Location: [facility] CATH;  Service: Cardiology;  Laterality: Left;    Right side catherization Right 2019    TONSILLECTOMY         Social History  Social History     Tobacco Use    Smoking status: Former Smoker    Smokeless tobacco: Never Used   Substance Use Topics    Alcohol use: No    Drug use: No       Family History  Family History   Problem Relation Age of Onset    Stroke Mother     Coronary artery disease Father        Review of Systems  General ROS: negative for chills, fever or weight loss  Psychological ROS: negative for hallucination, depression or suicidal ideation  Ophthalmic ROS: negative for blurry vision, photophobia or eye pain  ENT ROS: negative for epistaxis, sore throat or rhinorrhea  Respiratory ROS: no cough, shortness of breath, or wheezing  Cardiovascular ROS: no chest pain or dyspnea on exertion  Gastrointestinal ROS: no abdominal pain, change in bowel habits, + black stools  Genito-Urinary ROS: no dysuria, trouble voiding, or hematuria  Musculoskeletal ROS: negative for gait disturbance or muscular weakness  Neurological ROS: no syncope or seizures; no ataxia  Dermatological ROS: negative for pruritis, rash and jaundice    Physical Examination  BP (!) 116/59   Pulse 91   Temp 97.3 °F (36.3 °C)   Resp (!) 24   Ht 5' 4" (1.626 m)   Wt 106.5 kg (234 lb 12.6 oz)   SpO2 (!) 92%   BMI 40.30 kg/m²   General appearance: obese man, hard of hearing, alert and oriented  HENT: Normocephalic, atraumatic, neck symmetrical, no nasal discharge. No current epistaxis.   Eyes: conjunctivae/corneas clear, PERRL, EOM's intact  Lungs: clear to auscultation bilaterally, no dullness to percussion bilaterally  Heart: regular rate and rhythm without rub; no displacement of the PMI   Abdomen: distended but soft, no " rebound or guarding.  Extremities: extremities symmetric; no clubbing, cyanosis, or edema  Integument: left chest wall with ecchymosis and sutures which are dressed  Neurologic: Alert and oriented X 3, normal strength, normal coordination and gait  Psychiatric: no pressured speech; normal affect; no evidence of impaired cognition     Labs:  Lab Results   Component Value Date    WBC 12.98 (H) 02/25/2022    HGB 10.0 (L) 02/25/2022    HCT 31.6 (L) 02/25/2022     (H) 02/25/2022     (L) 02/25/2022         CMP  Sodium   Date Value Ref Range Status   02/25/2022 137 136 - 145 mmol/L Final     Potassium   Date Value Ref Range Status   02/25/2022 4.9 3.5 - 5.1 mmol/L Final     Chloride   Date Value Ref Range Status   02/25/2022 103 95 - 110 mmol/L Final     CO2   Date Value Ref Range Status   02/25/2022 26 23 - 29 mmol/L Final     Glucose   Date Value Ref Range Status   02/25/2022 154 (H) 70 - 110 mg/dL Final   01/29/2017 118 (H) 74 - 106 MG/DL Final     BUN   Date Value Ref Range Status   02/25/2022 45 (H) 8 - 23 mg/dL Final     Creatinine   Date Value Ref Range Status   02/25/2022 1.7 (H) 0.5 - 1.4 mg/dL Final     Calcium   Date Value Ref Range Status   02/25/2022 8.6 (L) 8.7 - 10.5 mg/dL Final     Total Protein   Date Value Ref Range Status   02/25/2022 6.5 6.0 - 8.4 g/dL Final     Albumin   Date Value Ref Range Status   02/25/2022 3.3 (L) 3.5 - 5.2 g/dL Final   01/29/2017 4.5 3.5 - 5.0 G/DL Final     Total Bilirubin   Date Value Ref Range Status   02/25/2022 0.5 0.1 - 1.0 mg/dL Final     Comment:     For infants and newborns, interpretation of results should be based  on gestational age, weight and in agreement with clinical  observations.    Premature Infant recommended reference ranges:  Up to 24 hours.............<8.0 mg/dL  Up to 48 hours............<12.0 mg/dL  3-5 days..................<15.0 mg/dL  6-29 days.................<15.0 mg/dL       Alkaline Phosphatase   Date Value Ref Range Status    02/25/2022 65 55 - 135 U/L Final     AST   Date Value Ref Range Status   02/25/2022 18 10 - 40 U/L Final     ALT   Date Value Ref Range Status   02/25/2022 9 (L) 10 - 44 U/L Final     Anion Gap   Date Value Ref Range Status   02/25/2022 8 8 - 16 mmol/L Final     eGFR if    Date Value Ref Range Status   02/25/2022 40 (A) >60 mL/min/1.73 m^2 Final     eGFR if non    Date Value Ref Range Status   02/25/2022 35 (A) >60 mL/min/1.73 m^2 Final     Comment:     Calculation used to obtain the estimated glomerular filtration  rate (eGFR) is the CKD-EPI equation.          Imaging:  CT ABDOMEN PELVIS WITHOUT CONTRAST 2/24/22  High-grade partial versus early complete small bowel obstruction.  The stomach is significantly distended.  Transition zone somewhere in the mid abdomen.     Small amount of free fluid in the left upper quadrant adjacent to the stomach and left diaphragm.     The gallbladder is distended.  There is some very subtle pericholecystic inflammation which could suggest a cholecystitis.  Consider further evaluation with a right upper quadrant ultrasound.     Polycystic kidney disease.    I have personally reviewed and interpreted these images.    Assessment/plan:   89 y.o. male who is DNR with FRIDA wears CPAP nightly, HTN, HLD, CAD s/p stents, history of ventricular fibrillation s/p ICD with recent defibrillator battery change in [place], polycystic kidney disease who initially presented with nausea, vomiting, and abdominal pain. CT imaging without contrast at outside hospital with distended stomach, partial or complete SBO, and distended gallbladder. NGT placement attempts were unsuccessful and patient developed epistaxis thereafter which has resolved. GI is consulted for melena which developed today and downtrending hemoglobin.    The patient is currently on 3 L NC; CXR is showing opacification of the left lung. He is receiving IV steroids and IV diuresis.    He has a mild  leukocytosis (12.9), Hgb of 10 (was 13 2 days prior) and known thrombocytopenia. BUN is elevated at 45 but also with known CKD with creatinine of 1.7.     Differentials for the patient's melena include swallowed blood from yesterday's epistaxis, NGT trauma from multiple unsuccessful attempts at NGT placement on 2/24, peptic ulcer disease, or esophagitis.     Given the patient's current pulmonary status requiring 3 L NC and labored breathing, his overall age and co-morbidities, as well as distended stomach on imaging with concern for SBO - I believe proceeding with upper endoscopy at this time would be high risk for the patient. Recommend empiric treatment with IV PPI, NPO status and consider NGT placement. Consider checking lactic acid to r/o bowel ischemia. Await further recommendations from surgery service for SBO.     Plan was discussed with patient's SILVIA Treva ([phone]) who agrees.    Rex Robles MD

## 2022-02-25 NOTE — ASSESSMENT & PLAN NOTE
Discussed code status with son at bedside and patient.  Son Jian confirmed patient is DNR. He has brought his living will to the hospital.   Greater than 16 minutes spent discussing code status.   DNR order placed in chart

## 2022-02-25 NOTE — CONSULTS
Reese Bell  [id number]    CC/Reason for Consultation: Concern for SBO    HPI:  89 y.o. male with pmh CAD s/p stent placement, HTN, HLD, hx vfib s/p ICD placement admitted from an OSH with epigastric abdominal pain that has been present since Wednesday.  History obtained from patient and family members at bedside.  Pain started suddenly, was constant and radiated throughout the abdomen.  Associated with one episode of vomiting 2 days ago.  On admission vitals are stable, labs show a mild leukocytosis, lactic acid normal in the ED.  NG tube attempted to be place, but was unsuccessful.  Ct showed dilatation of the stomach and High-grade partial sbo vs early sbo.  At the time of examination, patient denies pain.  No n/v.  Passing flatus and having multiple bowel movements    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    FRIDA (obstructive sleep apnea)     Thrombocytopenia 5/11/2017    Ventricular fibrillation 3/29/2019       Past Surgical History:   Procedure Laterality Date    APPENDECTOMY      CARDIAC PACEMAKER PLACEMENT  10/2016    DENTAL SURGERY  06/02/2016    HERNIA REPAIR      right inguinal    LEFT HEART CATHETERIZATION Left 3/28/2019    Procedure: CATHETERIZATION, HEART, LEFT;  Surgeon: Memo Lynn MD;  Location: [facility] CATH;  Service: Cardiology;  Laterality: Left;    Right side catherization Right 2019    TONSILLECTOMY         Social History     Socioeconomic History    Marital status:    Tobacco Use    Smoking status: Former Smoker    Smokeless tobacco: Never Used   Substance and Sexual Activity    Alcohol use: No    Drug use: No     Social Determinants of Health     Financial Resource Strain: Medium Risk    Difficulty of Paying Living Expenses: Somewhat hard   Food Insecurity: No Food Insecurity     Worried About Running Out of Food in the Last Year: Never true    Ran Out of Food in the Last Year: Never true   Transportation Needs: No Transportation Needs    Lack of Transportation (Medical): No    Lack of Transportation (Non-Medical): No   Physical Activity: Inactive    Days of Exercise per Week: 0 days    Minutes of Exercise per Session: 0 min   Stress: Stress Concern Present    Feeling of Stress : To some extent   Social Connections: Unknown    Frequency of Communication with Friends and Family: More than three times a week    Frequency of Social Gatherings with Friends and Family: More than three times a week    Active Member of Clubs or Organizations: No    Attends Club or Organization Meetings: Never    Marital Status:    Housing Stability: Low Risk     Unable to Pay for Housing in the Last Year: No    Number of Places Lived in the Last Year: 1    Unstable Housing in the Last Year: No       Family History   Problem Relation Age of Onset    Stroke Mother     Coronary artery disease Father        Review of patient's allergies indicates:   Allergen Reactions    Flexeril [cyclobenzaprine] Other (See Comments)    Vicodin [hydrocodone-acetaminophen] Hallucinations       ROS - Negative except what is noted  In HPI    Objective    Vitals:    02/25/22 1643   BP: (!) 127/59   Pulse: 89   Resp: 18   Temp: 98.9 °F (37.2 °C)       CBC   Recent Labs   Lab 02/24/22  1829 02/24/22  2349 02/25/22  0428   WBC 13.61*  --  12.98*   HGB 11.5* 11.0* 10.0*   HCT 36.6*  --  31.6*   *  --  141*       CHEM   Recent Labs   Lab 02/24/22  1828 02/25/22  0428    137   K 5.3* 4.9    103   CO2 29 26   BUN 36* 45*   CREATININE 1.8* 1.7*   * 154*       GEN: Awake, resting comfortable in bed, hard of hearing but will answer questions appropriately   HEENT: NCAT  RESP: Normal WOB, no distress  CV: RR  ABD: Soft, NTND, no guarding/rebound  EXT: WWP, no edema  SKIN: warm, dry  NEURO:  alert, normal speech  PSYCH: normal mood and affect          A/P: 89 y.o. male with extensive pmh admitted with concern for an sbo.  Since presentation to this facility, patient has been passing flatus and having bowel movements.  Pain resolved and no nv.  Low concern for bowel obstruction    -No need for ng tube at this point  -okay to advance diet as tolerated starting tomorrow  -surgery will sign off, please call with any questions or concerns      Ryan Sidhu MD  [facility] Surgery PGY-V

## 2022-02-25 NOTE — PLAN OF CARE
Recommendations    1) When able - SLP to evaluate for swallow safety given age and dementia status - Texture per SLP  2) Advance diet as medically able and tolerated to clear liquids + towards Cardiac diet.   - Monitor BG - Add diabetic restrictions if needed  3) Add bowel regimen - Daily stool softeners once medically cleared for PO      Goals: 1) Pt to have means of nutrition initiated by RD Follow-up  Nutrition Goal Status: new  Communication of RD Recs:  (POC)

## 2022-02-25 NOTE — ASSESSMENT & PLAN NOTE
Cr today of 1.8. baseline of 1.5-1.9.  Renal dose medications avoid nephrotoxic drugs monitor intake and output

## 2022-02-25 NOTE — PLAN OF CARE
02/25/22 0820   Medicare Message   Important Message from Medicare regarding Discharge Appeal Rights Given to patient/caregiver;Explained to patient/caregiver;Signed/date by patient/caregiver   Date IMM was signed 02/24/22   Time IMM was signed 0922

## 2022-02-25 NOTE — ASSESSMENT & PLAN NOTE
Echo in 2019 with EF of 60% and normal ventricular diastolic function   On laisx 20 BID at home.   Will continue IV lasix 40 given CXR with pleural effusions and dyspnea  Echo ordered  Daily weights  strict intake and output  telemetry

## 2022-02-25 NOTE — ASSESSMENT & PLAN NOTE
Baseline Cr of 1.5-1.9.  Cr at baseline  Renal dose medications   avoid nephrotoxic drugs   monitor intake and output

## 2022-02-25 NOTE — PLAN OF CARE
Pt with periods of respiratory distress, strong deep prod cough and nasal drainage, large blood clots. MD at bedside for emergent cares. Pt rested for approx 4 hrs after RT tx at HS. Currently with tight insp wheezes, short of breath. MD notified for follow up. Son at bedside entire noc, supportive. Use of urinal for chucho conc void and inc x's 4 to pads.     Problem: Adult Inpatient Plan of Care  Goal: Plan of Care Review  Outcome: Ongoing, Progressing  Goal: Patient-Specific Goal (Individualized)  Outcome: Ongoing, Progressing  Goal: Absence of Hospital-Acquired Illness or Injury  Outcome: Ongoing, Progressing  Goal: Optimal Comfort and Wellbeing  Outcome: Ongoing, Progressing  Goal: Readiness for Transition of Care  Outcome: Ongoing, Progressing     Problem: Impaired Wound Healing  Goal: Optimal Wound Healing  Outcome: Ongoing, Progressing     Problem: Adjustment to Illness (Gastrointestinal Bleeding)  Goal: Optimal Coping with Acute Illness  Outcome: Ongoing, Progressing     Problem: Bleeding (Gastrointestinal Bleeding)  Goal: Hemostasis  Outcome: Ongoing, Progressing     Problem: Skin Injury Risk Increased  Goal: Skin Health and Integrity  Outcome: Ongoing, Progressing     Problem: Fall Injury Risk  Goal: Absence of Fall and Fall-Related Injury  Outcome: Ongoing, Progressing

## 2022-02-25 NOTE — SUBJECTIVE & OBJECTIVE
Interval History: Overnight, patient had recurrent epistaxis which improved with pressure and LETs gel. CXR showed opacification of left lung base, concern for possible aspiration pneumonia given hypoxia and traumatic NGT. Nose bleeds have improved. Pt not coughing up blood. Not passing gas, but nurse noted patient has had multiple dark, loose stools. Patient admits SOB.No chest pain or palpitations. Son Jian at bedside, confirmed DNR code status    Review of Systems   Constitutional:  Negative for chills and fever.   HENT:  Negative for nosebleeds (resolved) and tinnitus.    Eyes:  Negative for visual disturbance.   Respiratory:  Positive for shortness of breath. Negative for chest tightness and wheezing.    Cardiovascular:  Negative for chest pain, palpitations and leg swelling.   Gastrointestinal:  Positive for abdominal pain and constipation. Negative for abdominal distention, nausea and vomiting.   Genitourinary:  Negative for dysuria, flank pain and hematuria.   Musculoskeletal:  Negative for gait problem and joint swelling.   Skin:  Negative for rash and wound.   Neurological:  Negative for dizziness, syncope, weakness and headaches.   Psychiatric/Behavioral:  Negative for confusion and hallucinations.    Objective:     Vital Signs (Most Recent):  Temp: 97.3 °F (36.3 °C) (02/25/22 1131)  Pulse: 91 (02/25/22 1506)  Resp: (!) 24 (02/25/22 1506)  BP: (!) 116/59 (02/25/22 1131)  SpO2: (!) 92 % (02/25/22 1506)   Vital Signs (24h Range):  Temp:  [97.3 °F (36.3 °C)-98.7 °F (37.1 °C)] 97.3 °F (36.3 °C)  Pulse:  [] 91  Resp:  [18-24] 24  SpO2:  [92 %-98 %] 92 %  BP: (104-139)/(58-89) 116/59     Weight: 106.5 kg (234 lb 12.6 oz)  Body mass index is 40.3 kg/m².    Intake/Output Summary (Last 24 hours) at 2/25/2022 5658  Last data filed at 2/25/2022 0600  Gross per 24 hour   Intake 300 ml   Output 600 ml   Net -300 ml      Physical Exam  Vitals and nursing note reviewed.   Constitutional:       General: He is not  in acute distress.     Appearance: He is obese. He is not toxic-appearing.   HENT:      Head: Normocephalic and atraumatic.      Right Ear: External ear normal.      Left Ear: External ear normal.   Eyes:      General:         Right eye: No discharge.         Left eye: No discharge.      Conjunctiva/sclera: Conjunctivae normal.   Neck:      Thyroid: No thyromegaly.   Cardiovascular:      Rate and Rhythm: Normal rate and regular rhythm.      Heart sounds: No murmur heard.  Pulmonary:      Effort: Pulmonary effort is normal. No respiratory distress.      Breath sounds: Normal breath sounds.      Comments: AICD chest wall, skin overlying it appears bruised  Abdominal:      General: There is distension.      Palpations: Abdomen is soft. There is no mass.      Tenderness: There is no abdominal tenderness. There is no guarding or rebound.      Comments: Decreased bowel sounds in all 4 quadrants. Soft non-tender no rebound   Musculoskeletal:         General: No deformity.      Cervical back: Normal range of motion and neck supple.      Right lower leg: No edema.      Left lower leg: No edema.   Skin:     General: Skin is warm and dry.   Neurological:      Mental Status: He is alert and oriented to person, place, and time.      Sensory: No sensory deficit.   Psychiatric:         Mood and Affect: Mood normal.         Behavior: Behavior normal.       Significant Labs: All pertinent labs within the past 24 hours have been reviewed.    Significant Imaging: I have reviewed all pertinent imaging results/findings within the past 24 hours.

## 2022-02-25 NOTE — PLAN OF CARE
[facility] - Med Surg  Initial Discharge Assessment       Primary Care Provider: Oracio Johnson MD    Admission Diagnosis: Small bowel obstruction [K56.609]    Admission Date: 2/24/2022  Expected Discharge Date:     Discharge Barriers Identified: (P) None    Payor: [employer] / Plan: [employer] 65 / Product Type: Medicare Advantage /     Extended Emergency Contact Information  Primary Emergency Contact: Mikala Reddy   [place]  Mobile Phone: [phone]  Relation: Daughter  Secondary Emergency Contact: Treva Gonzales  Mobile Phone: [phone]  Relation: Daughter    Discharge Plan A: (P) Home  Discharge Plan B: (P) Home with family, Home Health      [facility] - [address] [address]  Phone: [phone] Fax: [phone]    [id number] - [place], [place] - [address]  [address]  [facility] [address]  Phone: [phone] Fax: [phone]    [facility] - [facility] [address]  [address] [facility] [address]  Phone: [phone] Fax: [phone]      Initial Assessment (most recent)       Adult Discharge Assessment - 02/25/22 1030          Discharge Assessment    Assessment Type Discharge Planning Assessment (P)      Confirmed/corrected address, phone number and insurance Yes (P)      Confirmed Demographics Correct on Facesheet (P)      Source of Information family (P)      If unable to respond/provide information was family/caregiver contacted? Yes (P)      Contact Name/Number Mikala Reddy (Daughter)   [phone] (P)      Communicated LOUISE with patient/caregiver Date not available/Unable to determine (P)      Reason For Admission SBO (P)      Lives With child(keesha), adult (P)      Facility Arrived From: Home (P)      Do you expect to return to your current living situation? Yes (P)      Do you have help at home or someone to help you manage your care at  home? Yes (P)      Who are your caregiver(s) and their phone number(s)? Mikala eRddy (Daughter)   [phone] (P)      Prior to hospitilization cognitive status: Alert/Oriented (P)      Current cognitive status: Alert/Oriented (P)      Equipment Currently Used at Home CPAP;wheelchair (P)      Readmission within 30 days? Yes (P)    Per daughter, patient discharged from [facility] with SBO. Daughter said Wednesday, patient started throwing up and complaining about abdominal pain.    Patient currently being followed by outpatient case management? No (P)      Do you currently have service(s) that help you manage your care at home? No (P)      Do you take prescription medications? Yes (P)      Do you have prescription coverage? Yes (P)      Coverage PHN (P)      Do you have any problems affording any of your prescribed medications? No (P)      Is the patient taking medications as prescribed? yes (P)      Who is going to help you get home at discharge? Mikala Reddy (Daughter)   [phone] (P)      How do you get to doctors appointments? family or friend will provide (P)      Are you on dialysis? No (P)      Discharge Plan A Home (P)      Discharge Plan B Home with family;Home Health (P)      DME Needed Upon Discharge  none (P)      Discharge Plan discussed with: Adult children (P)      Discharge Barriers Identified None (P)         Relationship/Environment    Name(s) of Who Lives With Patient Mikala Reddy (Daughter)   [phone] (P)

## 2022-02-25 NOTE — PROGRESS NOTES
[facility] Medicine  Progress Note    Patient Name: Reese Bell  MRN: [id number]  Patient Class: IP- Inpatient   Admission Date: 2/24/2022  Length of Stay: 1 days  Attending Physician: Patricia Jenkins DO  Primary Care Provider: Oracio Johnson MD        Subjective:     Principal Problem:SBO (small bowel obstruction)        HPI:  Reese Bell 89 y.o. male with for hypertension, hyperlipidemia, DJD, CAD post cardiac stenting, FRIDA- CPAP, thrombocytopenia, history of ventricular fibrillation s/p ICD and recent defibrillator battery changed on Monday by Dr. De La Cruz in [place] presents to the hospital with a chief complaint of abdominal pain worst at the bottom of his abdomen without radiation.  He reports yesterday he developed sudden onset diffuse abdominal pain described as a pressure with associated nausea and vomiting.  Presented to an outside hospital where he was admitted.  He denies any worsening or alleviating factors for pain.  He now states his abdominal pain has resolved he has had no further nausea vomiting since arrival from outside hospital.  The symptoms never occurred before.  He had an appendectomy and a child but denies other abdominal surgeries.  He denies any current complaints.    In the emergency room and seen and, creatinine 1.8, CT abdomen with high-grade partial versus complete small bowel obstruction, troponin negative, BNP negative, COVID negative, NG tube placement attempted but unsuccessful chest x-ray showed placement to right lower lobe.  NG tube was removed.  Seen by surgery who recommended transfer for higher level care.      Overview/Hospital Course:  89-year-old white male transferred to [facility] from [facility] to be admitted on 02/24/2022 for small bowel obstruction and needed cardiac and surgery consultants. Patient with extensive past medical history (HTN, hyperlipidemia, DJD, CAD post cardiac stenting, FRIDA- CPAP, thrombocytopenia,  history of ventricular fibrillation s/p ICD and recent defibrillator battery changed on Monday by Dr. De La Cruz in [place] ). Of note,  patient is nonambulatory, W/C bound at baseline.    CT abdomen with high-grade partial versus early complete small bowel obstruction.  The stomach is significantly distended. CXR w/ opacification at the left lung base probably a combination of atelectasis and pleural effusion.  Of note, patient failed multiple attempts of NGT placement at [place]. CXR from [place] showed Enteric tube appearing to terminate in the region of the right lower lobe, which later was removed. Holding off on NGT at this time, given patient without nausea and vomiting. Cardiology and surgery consulted. Patient has had loose, very dark bowel movement since admission.  Suspect dark stools due to recent NGT trauma and patient aspirating on the blood. GI consulted for further evaluation.      Interval History: Overnight, patient had recurrent epistaxis which improved with pressure and LETs gel. CXR showed opacification of left lung base, concern for possible aspiration pneumonia given hypoxia and traumatic NGT. Nose bleeds have improved. Pt not coughing up blood. Not passing gas, but nurse noted patient has had multiple dark, loose stools. Patient admits SOB.No chest pain or palpitations. Son Jian at bedside, confirmed DNR code status    Review of Systems   Constitutional:  Negative for chills and fever.   HENT:  Negative for nosebleeds (resolved) and tinnitus.    Eyes:  Negative for visual disturbance.   Respiratory:  Positive for shortness of breath. Negative for chest tightness and wheezing.    Cardiovascular:  Negative for chest pain, palpitations and leg swelling.   Gastrointestinal:  Positive for abdominal pain and constipation. Negative for abdominal distention, nausea and vomiting.   Genitourinary:  Negative for dysuria, flank pain and hematuria.   Musculoskeletal:  Negative for gait problem and joint  swelling.   Skin:  Negative for rash and wound.   Neurological:  Negative for dizziness, syncope, weakness and headaches.   Psychiatric/Behavioral:  Negative for confusion and hallucinations.    Objective:     Vital Signs (Most Recent):  Temp: 97.3 °F (36.3 °C) (02/25/22 1131)  Pulse: 91 (02/25/22 1506)  Resp: (!) 24 (02/25/22 1506)  BP: (!) 116/59 (02/25/22 1131)  SpO2: (!) 92 % (02/25/22 1506)   Vital Signs (24h Range):  Temp:  [97.3 °F (36.3 °C)-98.7 °F (37.1 °C)] 97.3 °F (36.3 °C)  Pulse:  [] 91  Resp:  [18-24] 24  SpO2:  [92 %-98 %] 92 %  BP: (104-139)/(58-89) 116/59     Weight: 106.5 kg (234 lb 12.6 oz)  Body mass index is 40.3 kg/m².    Intake/Output Summary (Last 24 hours) at 2/25/2022 1558  Last data filed at 2/25/2022 0600  Gross per 24 hour   Intake 300 ml   Output 600 ml   Net -300 ml      Physical Exam  Vitals and nursing note reviewed.   Constitutional:       General: He is not in acute distress.     Appearance: He is obese. He is not toxic-appearing.   HENT:      Head: Normocephalic and atraumatic.      Right Ear: External ear normal.      Left Ear: External ear normal.   Eyes:      General:         Right eye: No discharge.         Left eye: No discharge.      Conjunctiva/sclera: Conjunctivae normal.   Neck:      Thyroid: No thyromegaly.   Cardiovascular:      Rate and Rhythm: Normal rate and regular rhythm.      Heart sounds: No murmur heard.  Pulmonary:      Effort: Pulmonary effort is normal. No respiratory distress.      Breath sounds: Normal breath sounds.      Comments: AICD chest wall, skin overlying it appears bruised  Abdominal:      General: There is distension.      Palpations: Abdomen is soft. There is no mass.      Tenderness: There is no abdominal tenderness. There is no guarding or rebound.      Comments: Decreased bowel sounds in all 4 quadrants. Soft non-tender no rebound   Musculoskeletal:         General: No deformity.      Cervical back: Normal range of motion and neck  "supple.      Right lower leg: No edema.      Left lower leg: No edema.   Skin:     General: Skin is warm and dry.   Neurological:      Mental Status: He is alert and oriented to person, place, and time.      Sensory: No sensory deficit.   Psychiatric:         Mood and Affect: Mood normal.         Behavior: Behavior normal.       Significant Labs: All pertinent labs within the past 24 hours have been reviewed.    Significant Imaging: I have reviewed all pertinent imaging results/findings within the past 24 hours.      Assessment/Plan:      * SBO (small bowel obstruction)  CT on 2/24 at [facility] with "High-grade partial versus early complete small bowel obstruction.  The stomach is significantly distended.  Transition zone somewhere in the mid abdomen." evaluated by surgery there and recommended for transfer for higher level of care. NG tube unable to be placed at [place].    -as without N/V will hold on further NG tube placement  -Surgery consulted: awaiting recs  -GI consulted  -on gentle IVF  -NPO    CHF (congestive heart failure), NYHA class II, chronic, systolic  Echo in 2019 with EF of 60% and normal ventricular diastolic function   On laisx 20 BID at home.   Will continue IV lasix 40 given CXR with pleural effusions and dyspnea  Echo ordered  Daily weights  strict intake and output  telemetry    Coronary artery disease involving native coronary artery of native heart without angina pectoris  Denies any chest pain.   Home aspirin/coreg/zetia held while NPO  Monitor on telemetry  Cardiology consulted for Pre-Op evaluation should surgery be required for SBO    Stage 3 chronic kidney disease  Baseline Cr of 1.5-1.9.  Cr at baseline  Renal dose medications   avoid nephrotoxic drugs   monitor intake and output    Permanent atrial fibrillation  Holding home coreg while NPO.   Continue IV metoprolol  Currently rate controlled.   Not on anticoagulation outpatient    Essential hypertension  Well controlled   holding home " coreg and entresto while NPO    Hyperlipidemia  Holding home zetia and rosuvastatin while NPO for SBO    FRIDA (obstructive sleep apnea)  Continue home CPAP    Dementia in Alzheimer's disease  Stable, at baseline mental status per family  Delirium precautions  Holding home namenda while NPO    Benign prostatic hyperplasia  Holding home flomax while NPO      Melena  -Patient with melena today  -Suspect 2/2 to recent traumatic NGT placement given pt had epistaxis and coughing up blood prior to arrival  -Monitor H/H  -GI consulted      Advanced care planning/counseling discussion  Discussed code status with son at bedside and patient.  Son Jian confirmed patient is DNR. He has brought his living will to the hospital.   Greater than 16 minutes spent discussing code status.   DNR order placed in chart      VTE Risk Mitigation (From admission, onward)         Ordered     Place CHRISTOPHER hose  Until discontinued         02/24/22 1712     IP VTE HIGH RISK PATIENT  Once         02/24/22 1712     Place sequential compression device  Until discontinued         02/24/22 1712                Discharge Planning   LOUISE:      Code Status: DNR   Is the patient medically ready for discharge?:     Reason for patient still in hospital (select all that apply): Patient trending condition, Treatment and Consult recommendations  Discharge Plan A: Home                  Patricia Jenkins DO  Department of Hospital Medicine   [facility] - Med Surg

## 2022-02-25 NOTE — PHYSICIAN QUERY
PT Name: Reese Bell  MR #: [id number]     DOCUMENTATION CLARIFICATION       This form is a permanent document in the medical record.     Query Date: February 25, 2022    By submitting this query, we are merely seeking further clarification of documentation to reflect the severity of illness of your patient. Please utilize your independent clinical judgment when addressing the question(s) below.    The medical record reflects the following:     Indicators   Supporting Clinical Findings Location in Medical Record   X   Bowel obstruction, intestinal obstruction, LBO or SBO documented Patient presented  to the emergency room complaining of nausea with multiple episodes of vomiting and constant, sharp epigastric abdominal pain without radiation  The daughter states that he has had issues with constipation recently and needed his first enema on Monday 'ever'.    His only abdominal surgery was an appendectomy as a child and an inguinal hernia repair.  He does have a left inguinal hernia on a prior CT in October without complication    SBO (small bowel obstruction)  Consult General Surgeon specialist.Patient not a good operative candidate.  Severe cardiac history.  He recommends transferring this patient to a higher level of care   2/24 H&P   X   Radiology findings Impression:     High-grade partial versus early complete small bowel obstruction.  The stomach is significantly distended.  Transition zone somewhere in the mid abdomen.     Small amount of free fluid in the left upper quadrant adjacent to the stomach and left diaphragm.     The gallbladder is distended.  There is some very subtle pericholecystic inflammation which could suggest a cholecystitis.  Consider further evaluation with a right upper quadrant ultrasound.     Polycystic kidney disease. 2/24 CT abdomen    Treatment/Medication      Procedure/Surgery      Other       Provider, please further specify the bowel obstruction diagnosis:    [  x ]  Partial or incomplete intestinal obstruction, due to adhesions     [   ] Partial or incomplete intestinal obstruction, due to other (please specify): ____________     [   ] Partial or incomplete intestinal obstruction, unknown or unspecified etiology     [   ] Complete intestinal obstruction, due to adhesions     [   ] Complete intestinal obstruction, due to other (please specify): ____________     [   ] Complete intestinal obstruction, unknown or unspecified etiology     [   ] Other intestinal condition (please specify): _____________________     [   ]  Clinically Undetermined           Please document in your progress notes daily for the duration of treatment until resolved, and include in your discharge summary.

## 2022-02-25 NOTE — SUBJECTIVE & OBJECTIVE
Past Medical History:   Diagnosis Date    ASCVD (arteriosclerotic cardiovascular disease)     DJD (degenerative joint disease)     Hyperlipidemia     Hypertension     MGUS (monoclonal gammopathy of unknown significance) 5/11/2017    FRIDA (obstructive sleep apnea)     Thrombocytopenia 5/11/2017    Ventricular fibrillation 3/29/2019       Past Surgical History:   Procedure Laterality Date    APPENDECTOMY      CARDIAC PACEMAKER PLACEMENT  10/2016    DENTAL SURGERY  06/02/2016    HERNIA REPAIR      right inguinal    LEFT HEART CATHETERIZATION Left 3/28/2019    Procedure: CATHETERIZATION, HEART, LEFT;  Surgeon: Memo Lynn MD;  Location: [facility] CATH;  Service: Cardiology;  Laterality: Left;    Right side catherization Right 2019    TONSILLECTOMY         Review of patient's allergies indicates:   Allergen Reactions    Flexeril [cyclobenzaprine] Other (See Comments)    Vicodin [hydrocodone-acetaminophen] Hallucinations       Current Facility-Administered Medications on File Prior to Encounter   Medication    [COMPLETED] albuterol-ipratropium 2.5 mg-0.5 mg/3 mL nebulizer solution 3 mL    [COMPLETED] iohexoL (OMNIPAQUE 350) injection 30 mL    [COMPLETED] metoclopramide HCl injection 10 mg    [COMPLETED] phenylephrine HCL 0.5% nasal spray 2 spray    [COMPLETED] promethazine (PHENERGAN) 25 mg in dextrose 5 % 50 mL IVPB    [DISCONTINUED] 0.9%  NaCl infusion    [DISCONTINUED] acetaminophen tablet 650 mg    [DISCONTINUED] famotidine (PF) injection 20 mg    [DISCONTINUED] furosemide injection 20 mg    [DISCONTINUED] metoprolol injection 5 mg    [DISCONTINUED] ondansetron injection 4 mg    [DISCONTINUED] prochlorperazine injection Soln 5 mg    [DISCONTINUED] sodium chloride 0.9% flush 10 mL     Current Outpatient Medications on File Prior to Encounter   Medication Sig    aspirin 81 MG Chew Take 81 mg by mouth once daily.    carvedilol (COREG) 12.5 MG tablet Take 12.5 mg by mouth 2 (two) times daily.    cholecalciferol, vitamin D3,  (VITAMIN D3) 50 mcg (2,000 unit) Cap Take 1 capsule by mouth once daily.    clotrimazole-betamethasone 1-0.05% (LOTRISONE) cream APPLY TO AFFECTED AREA TWICE A DAY    CRESTOR 5 mg tablet Take 5 mg by mouth every Mon, Wed, Fri.     ezetimibe (ZETIA) 10 mg tablet Take 10 mg by mouth every evening.     furosemide (LASIX) 20 MG tablet Take 20 mg by mouth 2 (two) times daily. 1 tablet in the morning and 1 tablet at 3pm    ipratropium (ATROVENT) 42 mcg (0.06 %) nasal spray SMARTSI-2 Spray(s) Both Nares Twice Daily PRN    memantine (NAMENDA) 5 MG Tab TAKE 1 TABLET BY MOUTH TWICE A DAY    mirtazapine (REMERON) 15 MG tablet TAKE 1 TABLET (15 MG TOTAL) BY MOUTH EVERY EVENING.    RANEXA 1,000 mg Tb12 Take 1,000 mg by mouth 2 (two) times daily.     sacubitril-valsartan (ENTRESTO) 24-26 mg per tablet Take 1 tablet by mouth 2 (two) times daily.    spironolactone (ALDACTONE) 25 MG tablet One po q 3-4pm for abdominal fluid Mon, Wed, Fri    tamsulosin (FLOMAX) 0.4 mg Cap CAN TAKE ONE AT BEDTIME FOR BLADDER & PROSTATE TO EASE URINATION    nitroGLYCERIN (NITROSTAT) 0.4 MG SL tablet Place 0.4 mg under the tongue every 5 (five) minutes as needed for Chest pain.     tobramycin-dexamethasone (TOBRADEX ST) 0.3-0.05 % DrpS 1-2 qtts to each eye tid for 7-10 days    triamcinolone acetonide 0.1% (KENALOG) 0.1 % cream Apply topically 2 (two) times daily. for 10 days     Family History       Problem Relation (Age of Onset)    Coronary artery disease Father    Stroke Mother          Tobacco Use    Smoking status: Former Smoker    Smokeless tobacco: Never Used   Substance and Sexual Activity    Alcohol use: No    Drug use: No    Sexual activity: Not on file     Review of Systems   Constitutional:  Negative for chills and fever.   HENT:  Negative for nosebleeds and tinnitus.    Eyes:  Negative for photophobia and visual disturbance.   Respiratory:  Negative for shortness of breath and wheezing.    Cardiovascular:  Negative for chest pain,  palpitations and leg swelling.   Gastrointestinal:  Positive for abdominal pain, nausea and vomiting. Negative for abdominal distention.   Genitourinary:  Negative for dysuria, flank pain and hematuria.   Musculoskeletal:  Negative for gait problem and joint swelling.   Skin:  Negative for rash and wound.   Neurological:  Negative for seizures and syncope.   Objective:     Vital Signs (Most Recent):  Temp: 98.7 °F (37.1 °C) (02/24/22 1600)  Pulse: 101 (02/24/22 1700)  Resp: 18 (02/24/22 1700)  BP: 135/89 (02/24/22 1700)  SpO2: 96 % (02/24/22 1600) Vital Signs (24h Range):  Temp:  [96.1 °F (35.6 °C)-98.7 °F (37.1 °C)] 98.7 °F (37.1 °C)  Pulse:  [] 101  Resp:  [16-25] 18  SpO2:  [86 %-99 %] 96 %  BP: (121-191)/(74-96) 135/89     Weight: 105.5 kg (232 lb 9.4 oz)  Body mass index is 39.92 kg/m².    Physical Exam  Vitals and nursing note reviewed.   Constitutional:       General: He is not in acute distress.     Appearance: He is well-developed.   HENT:      Head: Normocephalic and atraumatic.      Right Ear: External ear normal.      Left Ear: External ear normal.   Eyes:      General:         Right eye: No discharge.         Left eye: No discharge.      Conjunctiva/sclera: Conjunctivae normal.   Neck:      Thyroid: No thyromegaly.   Cardiovascular:      Rate and Rhythm: Normal rate and regular rhythm.      Heart sounds: No murmur heard.  Pulmonary:      Effort: Pulmonary effort is normal. No respiratory distress.      Breath sounds: Normal breath sounds.      Comments: AICD chest wall  Abdominal:      General: There is distension.      Palpations: Abdomen is soft. There is no mass.      Tenderness: There is no abdominal tenderness. There is no guarding or rebound.      Comments: Decreased bowel sounds in all 4 quadrants. Soft non-tender no rebound   Musculoskeletal:         General: No deformity.      Cervical back: Normal range of motion and neck supple.      Right lower leg: No edema.      Left lower leg: No  edema.   Skin:     General: Skin is warm and dry.   Neurological:      Mental Status: He is alert and oriented to person, place, and time.      Sensory: No sensory deficit.   Psychiatric:         Mood and Affect: Mood normal.         Behavior: Behavior normal.           Significant Labs: CBC:   Recent Labs   Lab 02/23/22 2342   WBC 10.49   HGB 13.0*   HCT 41.0   *     CMP:   Recent Labs   Lab 02/23/22 2342      K 4.8   CL 97   CO2 26   *   BUN 28*   CREATININE 1.8*   CALCIUM 9.9   PROT 7.6   ALBUMIN 3.9   BILITOT 0.6   ALKPHOS 85   AST 14   ALT 10   ANIONGAP 15   EGFRNONAA 32*     Cardiac Markers:   Recent Labs   Lab 02/23/22 2342   BNP 92     Troponin:   Recent Labs   Lab 02/23/22 2342   TROPONINI 0.024     Urine Studies:   Recent Labs   Lab 02/24/22  0806   COLORU Yellow   APPEARANCEUA Clear   PHUR 6.0   SPECGRAV >=1.030*   PROTEINUA 1+*   GLUCUA Negative   KETONESU Trace*   BILIRUBINUA Negative   OCCULTUA Negative   NITRITE Negative   UROBILINOGEN Negative   LEUKOCYTESUR Negative   RBCUA 0   WBCUA 3   BACTERIA Occasional   HYALINECASTS 1           Chest x-ray  Impression:     As above.  Enteric tube appearing to terminate in the region of the right lower lobe.  This should be removed.     COMMUNICATION  This critical result was discovered/received at 08:00.  The critical information above was relayed directly by me by telephone to Dr. Ashton on 02/24/2022 at 08:05.        Electronically signed by: Erin Carbajal MD  Date:                                            02/24/2022  Time:                                           08:07    Significant Imaging:   CT abdomen     Impression:     High-grade partial versus early complete small bowel obstruction.  The stomach is significantly distended.  Transition zone somewhere in the mid abdomen.     Small amount of free fluid in the left upper quadrant adjacent to the stomach and left diaphragm.     The gallbladder is distended.  There is some  very subtle pericholecystic inflammation which could suggest a cholecystitis.  Consider further evaluation with a right upper quadrant ultrasound.     Polycystic kidney disease.     Additional nonemergent findings as above.        Electronically signed by: Erin Carbajal MD  Date:                                            02/24/2022      Time:                                           08:37

## 2022-02-25 NOTE — NURSING
Tried to stopped patient from frequent blowing nose without success. Large dark red clot coughed up. Nose bleed with bright red blood present

## 2022-02-25 NOTE — ASSESSMENT & PLAN NOTE
"CT on 2/24 at [facility] with "High-grade partial versus early complete small bowel obstruction.  The stomach is significantly distended.  Transition zone somewhere in the mid abdomen." evaluated by surgery there and recommended for transfer for higher level of care. NG tube unable to be placed at [place].    -as without N/V will hold on further NG tube placement  -Surgery consulted: awaiting recs  -GI consulted  -on gentle IVF  -NPO  "

## 2022-02-25 NOTE — NURSING
Patient had a small hard, formed bowel movement and passed flatus. No acute distress noted; will continue to monitor.

## 2022-02-25 NOTE — ASSESSMENT & PLAN NOTE
Stable, at baseline mental status per family  Delirium precautions  Holding home namenda while NPO

## 2022-02-25 NOTE — NURSING
Patient had a large black bowel movement on bedside commode (Part of it formed the other part loose). Patient complaining of not being able to breathe and requesting his CPAP, currently sating 97% on 3L nasal cannula. Patient back in bed. Encouraged slow deep breathes.  Informed Dr. Jenkins. No new orders place.

## 2022-02-25 NOTE — ASSESSMENT & PLAN NOTE
Needing  NGT with LIS; s/p multiple failed attempts even after ativan, would need anesthesia but at increased risk   Repeat KUB x-ray in AM.  Consult General Surgeon specialist.Patient not a good operative candidate.  Severe cardiac history.  He recommends transferring this patient to a higher level of care  Continue gentle  IVF hydration. Follow serum lytes.   Use IV anti-emetics as needed.    Transfer initiated; will [facility]. NG potentially under anesthesia?

## 2022-02-25 NOTE — ASSESSMENT & PLAN NOTE
"CT on 2/24 at [facility] with "High-grade partial versus early complete small bowel obstruction.  The stomach is significantly distended.  Transition zone somewhere in the mid abdomen." evaluated by surgery there and recommended for transfer for higher level of care. NG tube unable to be placed at [place]. With no further abdominal pain or N/V since arrival  -as without N/V will hold on further NG tube placement  -on gentle IVF  -surgery consulted  -NPO  -stat repeat CMP/CBC/Lactic acid ordered  -CMP/Mg/Phos ordered    Developed epistaxis after NG tube placement from [place], but is improved with no further bleeding, and cough resolving per family  "

## 2022-02-25 NOTE — DISCHARGE SUMMARY
Merged with [facility] Surg ([facility])  [facility] Medicine  Discharge Summary      Patient Name: Reese Bell  MRN: [id number]  Patient Class: IP- Inpatient  Admission Date: 2/23/2022  Hospital Length of Stay: 1 days  Discharge Date and Time: 2/24/2022  2:50 PM  Attending Physician: No att. providers found   Discharging Provider: Magda Burger MD  Primary Care Provider: Oracio Johnson MD      HPI:   89-year-old white male presents to the ER via EMS complaining of abdominal pain. Patient has a significant past medical history for hypertension, hyperlipidemia, DJD, CAD post cardiac stenting, FRIDA- CPAP, thrombocytopenia, history of ventricular fibrillation s/p ICD and recent defibrillator battery changed on Monday by Dr. De La Cruz in [place].     Patient presented  to the emergency room complaining of nausea with multiple episodes of vomiting and constant, sharp epigastric abdominal pain without radiation. Daughter notes he took his medications with his meal; shrimp stew around 6:30pm and was feeling fine. Then around 8:30 developed nausea and abominal pain. Later vomited. His last episode of vomiting was last night about 930 prior to coming to the emergency room.  The daughter states that he has had issues with constipation recently and needed his first enema on Monday 'ever'.  He is nonambulatory W/C bound - though he transitions from bed to wheelchair and to commode.  His CT scan performed around midnight was without oral contrast.  It did show a possible small bowel obstruction.  His only abdominal surgery was an appendectomy as a child and an inguinal hernia repair.  He does have a left inguinal hernia on a prior CT in October without complication. NG tube was attempted in the ER multiple times but unsuccessful; had some nasal bleeding post attempted NGT insertion x 3  Per general surgery Patient not a good operative candidate for [place].  Severe cardiac history.  He recommends transferring this patient to a higher  level of care.        * No surgery found *      Hospital Course:   No notes on file     Goals of Care Treatment Preferences:  Code Status: Full Code      Consults:   Consults (From admission, onward)        Status Ordering Provider     Inpatient consult to General Surgery  Once        Provider:  Giovani Gomes MD    Completed VY GANT III          * SBO (small bowel obstruction)  Needing  NGT with LIS; s/p multiple failed attempts even after ativan, would need anesthesia but at increased risk   Repeat KUB x-ray in AM.  Consult General Surgeon specialist.Patient not a good operative candidate.  Severe cardiac history.  He recommends transferring this patient to a higher level of care  Continue gentle  IVF hydration. Follow serum lytes.   Use IV anti-emetics as needed.    Transfer initiated; will transfer to [facility]. NG potentially under anesthesia?       Coronary artery disease involving native coronary artery of native heart without angina pectoris  On asa, statin, coreg, managed per cards   Will order IV beta blocker while NPO     Paroxysmal atrial fibrillation    On coreg and asa at home     Cardiac resynchronization therapy defibrillator (CRT-D) in place  S/p Generator change per Dr. De La Cruz last week.       Essential hypertension  Coreg, will order IV BB       Hyperlipidemia  Takes crestor 5mg on M/W/f; zetia 10mg  hold while NPO         Stage 3 chronic kidney disease  Monitor BUN/SCr.  Monitor I/Os.  Monitor electrolytes.  Avoid non-steroidal anti-inflammatory medications.          Impaired mobility and activities of daily living  W/C bound at home  Consult PT       Thrombocytopenia  plts 132 stable  Some episaxis after NGT attempted. Traumatic  No active bleeding this am       GERD (gastroesophageal reflux disease)        Obstructive sleep apnea (adult) (pediatric)  Continue CPAP if needed betsy at night.  Until ng placed, will rely on NC      Chronic CHF  Patient is identified as having Diastolic  (HFpEF) heart failure that is Chronic. CHF is currently controlled. Latest ECHO performed and demonstrates- Results for orders placed during the hospital encounter of 08/12/19    Transthoracic echo (TTE) 2D with Color Flow    Interpretation Summary  · Technically challening study. Valves not well seen.  · Normal left ventricular systolic function. The estimated ejection fraction is 60%  · Normal LV diastolic function.  · No wall motion abnormalities.  · Normal right ventricular systolic function.  . Continue Beta Blocker, Furosemide, Aldactone and Nitrate/Vasodilator and monitor clinical status closely. Monitor on telemetry. Patient is on CHF pathway.  Monitor strict Is&Os and daily weights.  Place on fluid restriction of 1 L   . Continue to stress to patient importance of self efficacy and  on diet for CHF. Last BNP reviewed- and noted below   Recent Labs   Lab 02/23/22  2342   BNP 92   · Last echo avaibale with Normal Systolic fx 2019; Normal left ventricular systolic function. The estimated ejection fraction is 60%  · Normal LV diastolic function.    Takes ranexa, Entresto, aldactone and furosemide lasix 20mg oral q 12; will order lasix 20mg IV daily     ASCVD (arteriosclerotic cardiovascular disease)  Asa, statin, resume when SBO resolved and tolerating orals           Benign prostatic hyperplasia  Takes flomax daily, resume when SBO resolved and tolerating orals   NOTE if diaz needed.      Final Active Diagnoses:    Diagnosis Date Noted POA    PRINCIPAL PROBLEM:  SBO (small bowel obstruction) [K56.609]  Yes    Paroxysmal atrial fibrillation [I48.0] 08/15/2019 Yes    Coronary artery disease involving native coronary artery of native heart without angina pectoris [I25.10] 08/15/2019 Yes    Essential hypertension [I10] 03/28/2019 Yes    Hyperlipidemia [E78.5] 03/28/2019 Yes    Cardiac resynchronization therapy defibrillator (CRT-D) in place [Z95.810] 03/28/2019 Yes    Stage 3 chronic kidney disease  [N18.30] 08/09/2018 Yes    Impaired mobility and activities of daily living [Z74.09, Z78.9] 08/17/2017 Yes    Thrombocytopenia [D69.6] 05/11/2017 Yes     Chronic    GERD (gastroesophageal reflux disease) [K21.9] 01/02/2014 Yes    Obstructive sleep apnea (adult) (pediatric) [G47.33] 11/01/2013 Yes    ASCVD (arteriosclerotic cardiovascular disease) [I25.10] 05/09/2013 Yes    Chronic CHF [I50.9] 05/09/2013 Yes    Benign prostatic hyperplasia [N40.0] 05/09/2013 Yes      Problems Resolved During this Admission:       Discharged Condition: fair    Disposition: Short Term Hospital with*    Follow Up:    Patient Instructions:   No discharge procedures on file.    Significant Diagnostic Studies: Radiology: CT scan: CT ABDOMEN PELVIS WITHOUT CONTRAST:   Results for orders placed or performed during the hospital encounter of 02/23/22   CT Abdomen Pelvis  Without Contrast    Narrative    EXAMINATION:  CT ABDOMEN PELVIS WITHOUT CONTRAST    CLINICAL HISTORY:  Abdominal pain, acute, nonlocalized;    TECHNIQUE:  Low dose axial images, sagittal and coronal reformations were obtained from the lung bases to the pubic symphysis.  Oral contrast was not administered.    COMPARISON:  10/19/2021    FINDINGS:  Bibasilar subsegmental atelectasis/scarring.  The heart is enlarged.  Calcified coronary artery disease is seen.  Leads from a pacer device are noted.  Calcified atheromatous disease affects the aorta and its major branch vessels.    The gallbladder is hydropic.  Subtle pericholecystic inflammation.  No intrahepatic or extrahepatic biliary ductal dilatation is identified.  The spleen, pancreas, and adrenal glands are normal in size, shape and contour.  The kidneys are replaced by innumerable cystic structure suggesting polycystic kidney disease.  No hydronephrosis or hydroureter is seen.  The urinary bladder is not well distended and cannot be adequately evaluated.    The stomach is significantly distended.  There are some  prominent loops of small bowel seen in the left upper quadrant with relative decompression of distal small bowel loops, with suspected transition zone at the level of the umbilicus in the mid abdomen.  Findings concerning for a developing high-grade small bowel obstruction.  There is a small amount of free fluid adjacent to the posterior margin of the stomach near the diaphragm.  No free air is seen.  No pathologically enlarged abdominal or pelvic lymph nodes are detected.    The bones are osteopenic and show age-appropriate degenerative change.      Impression    High-grade partial versus early complete small bowel obstruction.  The stomach is significantly distended.  Transition zone somewhere in the mid abdomen.    Small amount of free fluid in the left upper quadrant adjacent to the stomach and left diaphragm.    The gallbladder is distended.  There is some very subtle pericholecystic inflammation which could suggest a cholecystitis.  Consider further evaluation with a right upper quadrant ultrasound.    Polycystic kidney disease.    Additional nonemergent findings as above.      Electronically signed by: Erin Carbajal MD  Date:    02/24/2022  Time:    08:37       Pending Diagnostic Studies:     None         Medications:  Reconciled Home Medications:      Medication List      ASK your doctor about these medications    aspirin 81 MG Chew  Take 81 mg by mouth once daily.     carvediloL 12.5 MG tablet  Commonly known as: COREG  Take 12.5 mg by mouth 2 (two) times daily.     cholecalciferol (vitamin D3) 50 mcg (2,000 unit) Cap  Commonly known as: VITAMIN D3  Take 1 capsule by mouth once daily.     clotrimazole-betamethasone 1-0.05% cream  Commonly known as: LOTRISONE  APPLY TO AFFECTED AREA TWICE A DAY     CRESTOR 5 MG tablet  Generic drug: rosuvastatin  Take 5 mg by mouth every Mon, Wed, Fri.     ezetimibe 10 mg tablet  Commonly known as: ZETIA  Take 10 mg by mouth every evening.     furosemide 20 MG  tablet  Commonly known as: LASIX  Take 20 mg by mouth 2 (two) times daily. 1 tablet in the morning and 1 tablet at 3pm     ipratropium 42 mcg (0.06 %) nasal spray  Commonly known as: ATROVENT  SMARTSI-2 Spray(s) Both Nares Twice Daily PRN     memantine 5 MG Tab  Commonly known as: NAMENDA  TAKE 1 TABLET BY MOUTH TWICE A DAY     mirtazapine 15 MG tablet  Commonly known as: REMERON  TAKE 1 TABLET (15 MG TOTAL) BY MOUTH EVERY EVENING.     nitroGLYCERIN 0.4 MG SL tablet  Commonly known as: NITROSTAT  Place 0.4 mg under the tongue every 5 (five) minutes as needed for Chest pain.     RANEXA 1,000 mg Tb12  Generic drug: ranolazine  Take 1,000 mg by mouth 2 (two) times daily.     sacubitriL-valsartan 24-26 mg per tablet  Commonly known as: ENTRESTO  Take 1 tablet by mouth 2 (two) times daily.     spironolactone 25 MG tablet  Commonly known as: ALDACTONE  One po q 3-4pm for abdominal fluid Mon, Wed, Fri     tamsulosin 0.4 mg Cap  Commonly known as: FLOMAX  CAN TAKE ONE AT BEDTIME FOR BLADDER & PROSTATE TO EASE URINATION     tobramycin-dexamethasone 0.3-0.05 % Drps  Commonly known as: TOBRADEX ST  1-2 qtts to each eye tid for 7-10 days     triamcinolone acetonide 0.1% 0.1 % cream  Commonly known as: KENALOG  Apply topically 2 (two) times daily. for 10 days            Indwelling Lines/Drains at time of discharge:   Lines/Drains/Airways     None                 Time spent on the discharge of patient: 25 minutes         Magda Burger MD  Department of Hospital Medicine  [place] - [facility] Surg (3rd Fl)

## 2022-02-25 NOTE — HOSPITAL COURSE
89-year-old white male transferred to [facility] from [facility] to be admitted on 02/24/2022 for small bowel obstruction and needed cardiac and surgery consultants. Patient with extensive past medical history (HTN, hyperlipidemia, DJD, CAD post cardiac stenting, FRIDA- CPAP, thrombocytopenia, history of ventricular fibrillation s/p ICD and recent defibrillator battery changed on Monday by Dr. De La Cruz in [place] ). Of note,  patient is nonambulatory, W/C bound at baseline. Lives at home with his daughter M-F, and siblings alternate watching him on the weekend. Plenty of family support    CT abdomen with high-grade partial versus early complete small bowel obstruction.  The stomach is significantly distended. CXR w/ opacification at the left lung base probably a combination of atelectasis and pleural effusion.  Of note, patient failed multiple attempts of NGT placement at [place]. CXR from [place] showed enteric tube appearing to terminate in the region of the right lower lobe, which later was removed. Cardiology and surgery consulted. Holding off on NGT per surgery at this time, given patient without nausea and vomiting. Cardiology recommend no further ischemic workup needed at this time. Patient has had loose, very dark bowel movement since admission.  Suspect dark stools due to recent NGT trauma and patient aspirating on the blood. Hemoglobin down trended but then stabilized. GI consulted for further evaluation and recommends monitoring and empiric treatment with PPI. Patient continues on supplemental O2, will wean as tolerated. CT chest WO showed likely atelectasis. Patient tolerating home CPAP. Patient with flatus and bowel movements. Advanced diet as tolerated. Continue abx for possible aspiration pneumonia. PT/OT were consulted on 3/1/22 and recommended H/H with commode.  Patient was discharged to home on 3/6/22. Activity as tolerated. Diet- low Na. Follow up with PCP in one week

## 2022-02-25 NOTE — ASSESSMENT & PLAN NOTE
-Patient with melena today  -Suspect 2/2 to recent traumatic NGT placement given pt had epistaxis and coughing up blood prior to arrival  -Monitor H/H  -GI consulted

## 2022-02-26 LAB
ANION GAP SERPL CALC-SCNC: 9 MMOL/L (ref 8–16)
AORTIC ROOT ANNULUS: 4.07 CM
AORTIC VALVE CUSP SEPERATION: 1.95 CM
BASOPHILS # BLD AUTO: 0.01 K/UL (ref 0–0.2)
BASOPHILS NFR BLD: 0.1 % (ref 0–1.9)
BSA FOR ECHO PROCEDURE: 2.18 M2
BUN SERPL-MCNC: 58 MG/DL (ref 8–23)
CALCIUM SERPL-MCNC: 8.3 MG/DL (ref 8.7–10.5)
CHLORIDE SERPL-SCNC: 105 MMOL/L (ref 95–110)
CO2 SERPL-SCNC: 27 MMOL/L (ref 23–29)
CREAT SERPL-MCNC: 1.9 MG/DL (ref 0.5–1.4)
CV ECHO LV RWT: 0.74 CM
DIFFERENTIAL METHOD: ABNORMAL
DOP CALC LVOT AREA: 4 CM2
DOP CALC LVOT DIAMETER: 2.25 CM
ECHO LV POSTERIOR WALL: 1.5 CM (ref 0.6–1.1)
EJECTION FRACTION: 65 %
EOSINOPHIL # BLD AUTO: 0 K/UL (ref 0–0.5)
EOSINOPHIL NFR BLD: 0 % (ref 0–8)
ERYTHROCYTE [DISTWIDTH] IN BLOOD BY AUTOMATED COUNT: 13.6 % (ref 11.5–14.5)
EST. GFR  (AFRICAN AMERICAN): 35 ML/MIN/1.73 M^2
EST. GFR  (NON AFRICAN AMERICAN): 30 ML/MIN/1.73 M^2
FRACTIONAL SHORTENING: 34 % (ref 28–44)
GLUCOSE SERPL-MCNC: 151 MG/DL (ref 70–110)
HCT VFR BLD AUTO: 28.8 % (ref 40–54)
HGB BLD-MCNC: 8.8 G/DL (ref 14–18)
IMM GRANULOCYTES # BLD AUTO: 0.12 K/UL (ref 0–0.04)
IMM GRANULOCYTES NFR BLD AUTO: 1.2 % (ref 0–0.5)
INTERVENTRICULAR SEPTUM: 1.56 CM (ref 0.6–1.1)
LEFT ATRIUM SIZE: 4.09 CM
LEFT INTERNAL DIMENSION IN SYSTOLE: 2.69 CM (ref 2.1–4)
LEFT VENTRICLE DIASTOLIC VOLUME INDEX: 35.1 ML/M2
LEFT VENTRICLE DIASTOLIC VOLUME: 73.35 ML
LEFT VENTRICLE MASS INDEX: 118 G/M2
LEFT VENTRICLE SYSTOLIC VOLUME INDEX: 12.8 ML/M2
LEFT VENTRICLE SYSTOLIC VOLUME: 26.79 ML
LEFT VENTRICULAR INTERNAL DIMENSION IN DIASTOLE: 4.08 CM (ref 3.5–6)
LEFT VENTRICULAR MASS: 246.94 G
LYMPHOCYTES # BLD AUTO: 0.4 K/UL (ref 1–4.8)
LYMPHOCYTES NFR BLD: 4.2 % (ref 18–48)
MCH RBC QN AUTO: 33.2 PG (ref 27–31)
MCHC RBC AUTO-ENTMCNC: 30.6 G/DL (ref 32–36)
MCV RBC AUTO: 109 FL (ref 82–98)
MONOCYTES # BLD AUTO: 0.5 K/UL (ref 0.3–1)
MONOCYTES NFR BLD: 5.3 % (ref 4–15)
NEUTROPHILS # BLD AUTO: 8.7 K/UL (ref 1.8–7.7)
NEUTROPHILS NFR BLD: 89.2 % (ref 38–73)
NRBC BLD-RTO: 0 /100 WBC
PISA TR MAX VEL: 2.46 M/S
PLATELET # BLD AUTO: 114 K/UL (ref 150–450)
PMV BLD AUTO: 8.8 FL (ref 9.2–12.9)
POCT GLUCOSE: 159 MG/DL (ref 70–110)
POCT GLUCOSE: 194 MG/DL (ref 70–110)
POCT GLUCOSE: 197 MG/DL (ref 70–110)
POCT GLUCOSE: 202 MG/DL (ref 70–110)
POTASSIUM SERPL-SCNC: 4.4 MMOL/L (ref 3.5–5.1)
PV PEAK VELOCITY: 1.17 CM/S
RA PRESSURE: 3 MMHG
RBC # BLD AUTO: 2.65 M/UL (ref 4.6–6.2)
SINUS: 3.8 CM
SODIUM SERPL-SCNC: 141 MMOL/L (ref 136–145)
STJ: 2.98 CM
TR MAX PG: 24 MMHG
TV REST PULMONARY ARTERY PRESSURE: 27 MMHG
WBC # BLD AUTO: 9.76 K/UL (ref 3.9–12.7)

## 2022-02-26 PROCEDURE — 99233 SBSQ HOSP IP/OBS HIGH 50: CPT | Mod: 25,,, | Performed by: INTERNAL MEDICINE

## 2022-02-26 PROCEDURE — 94761 N-INVAS EAR/PLS OXIMETRY MLT: CPT

## 2022-02-26 PROCEDURE — 99233 PR SUBSEQUENT HOSPITAL CARE,LEVL III: ICD-10-PCS | Mod: ,,, | Performed by: INTERNAL MEDICINE

## 2022-02-26 PROCEDURE — 99233 SBSQ HOSP IP/OBS HIGH 50: CPT | Mod: ,,, | Performed by: INTERNAL MEDICINE

## 2022-02-26 PROCEDURE — 36415 COLL VENOUS BLD VENIPUNCTURE: CPT | Performed by: STUDENT IN AN ORGANIZED HEALTH CARE EDUCATION/TRAINING PROGRAM

## 2022-02-26 PROCEDURE — 99900035 HC TECH TIME PER 15 MIN (STAT)

## 2022-02-26 PROCEDURE — 99233 PR SUBSEQUENT HOSPITAL CARE,LEVL III: ICD-10-PCS | Mod: 25,,, | Performed by: INTERNAL MEDICINE

## 2022-02-26 PROCEDURE — 11000001 HC ACUTE MED/SURG PRIVATE ROOM

## 2022-02-26 PROCEDURE — C9113 INJ PANTOPRAZOLE SODIUM, VIA: HCPCS | Performed by: STUDENT IN AN ORGANIZED HEALTH CARE EDUCATION/TRAINING PROGRAM

## 2022-02-26 PROCEDURE — 63600175 PHARM REV CODE 636 W HCPCS: Performed by: PHYSICIAN ASSISTANT

## 2022-02-26 PROCEDURE — 25000242 PHARM REV CODE 250 ALT 637 W/ HCPCS: Performed by: HOSPITALIST

## 2022-02-26 PROCEDURE — 80048 BASIC METABOLIC PNL TOTAL CA: CPT | Performed by: STUDENT IN AN ORGANIZED HEALTH CARE EDUCATION/TRAINING PROGRAM

## 2022-02-26 PROCEDURE — 85025 COMPLETE CBC W/AUTO DIFF WBC: CPT | Performed by: STUDENT IN AN ORGANIZED HEALTH CARE EDUCATION/TRAINING PROGRAM

## 2022-02-26 PROCEDURE — 25000003 PHARM REV CODE 250: Performed by: PHYSICIAN ASSISTANT

## 2022-02-26 PROCEDURE — 94760 N-INVAS EAR/PLS OXIMETRY 1: CPT

## 2022-02-26 PROCEDURE — 27000221 HC OXYGEN, UP TO 24 HOURS

## 2022-02-26 PROCEDURE — 63600175 PHARM REV CODE 636 W HCPCS: Performed by: STUDENT IN AN ORGANIZED HEALTH CARE EDUCATION/TRAINING PROGRAM

## 2022-02-26 PROCEDURE — 94640 AIRWAY INHALATION TREATMENT: CPT

## 2022-02-26 RX ORDER — FUROSEMIDE 10 MG/ML
40 INJECTION INTRAMUSCULAR; INTRAVENOUS
Status: CANCELLED | OUTPATIENT
Start: 2022-02-26

## 2022-02-26 RX ADMIN — FUROSEMIDE 40 MG: 10 INJECTION, SOLUTION INTRAMUSCULAR; INTRAVENOUS at 10:02

## 2022-02-26 RX ADMIN — METOROPROLOL TARTRATE 5 MG: 5 INJECTION, SOLUTION INTRAVENOUS at 09:02

## 2022-02-26 RX ADMIN — PIPERACILLIN AND TAZOBACTAM 4.5 G: 4; .5 INJECTION, POWDER, LYOPHILIZED, FOR SOLUTION INTRAVENOUS; PARENTERAL at 12:02

## 2022-02-26 RX ADMIN — LEVALBUTEROL HYDROCHLORIDE 1.25 MG: 1.25 SOLUTION, CONCENTRATE RESPIRATORY (INHALATION) at 01:02

## 2022-02-26 RX ADMIN — LEVALBUTEROL HYDROCHLORIDE 1.25 MG: 1.25 SOLUTION, CONCENTRATE RESPIRATORY (INHALATION) at 02:02

## 2022-02-26 RX ADMIN — PIPERACILLIN AND TAZOBACTAM 4.5 G: 4; .5 INJECTION, POWDER, LYOPHILIZED, FOR SOLUTION INTRAVENOUS; PARENTERAL at 05:02

## 2022-02-26 RX ADMIN — METHYLPREDNISOLONE SODIUM SUCCINATE 60 MG: 40 INJECTION, POWDER, FOR SOLUTION INTRAMUSCULAR; INTRAVENOUS at 05:02

## 2022-02-26 RX ADMIN — METOROPROLOL TARTRATE 5 MG: 5 INJECTION, SOLUTION INTRAVENOUS at 10:02

## 2022-02-26 RX ADMIN — LEVALBUTEROL HYDROCHLORIDE 1.25 MG: 1.25 SOLUTION, CONCENTRATE RESPIRATORY (INHALATION) at 08:02

## 2022-02-26 RX ADMIN — PIPERACILLIN AND TAZOBACTAM 4.5 G: 4; .5 INJECTION, POWDER, LYOPHILIZED, FOR SOLUTION INTRAVENOUS; PARENTERAL at 08:02

## 2022-02-26 RX ADMIN — LEVALBUTEROL HYDROCHLORIDE 1.25 MG: 1.25 SOLUTION, CONCENTRATE RESPIRATORY (INHALATION) at 11:02

## 2022-02-26 RX ADMIN — METHYLPREDNISOLONE SODIUM SUCCINATE 60 MG: 40 INJECTION, POWDER, FOR SOLUTION INTRAMUSCULAR; INTRAVENOUS at 06:02

## 2022-02-26 RX ADMIN — PANTOPRAZOLE SODIUM 40 MG: 40 INJECTION, POWDER, FOR SOLUTION INTRAVENOUS at 09:02

## 2022-02-26 RX ADMIN — PANTOPRAZOLE SODIUM 40 MG: 40 INJECTION, POWDER, FOR SOLUTION INTRAVENOUS at 10:02

## 2022-02-26 NOTE — SUBJECTIVE & OBJECTIVE
Interval History:  Doing fine.  No chest pains.    Review of Systems   Constitutional: Positive for malaise/fatigue.   HENT:  Positive for hearing loss.    Eyes:  Negative for discharge.   Cardiovascular:  Negative for chest pain.   Objective:     Vital Signs (Most Recent):  Temp: 98.4 °F (36.9 °C) (02/26/22 1130)  Pulse: 84 (02/26/22 1130)  Resp: 18 (02/26/22 1130)  BP: 137/66 (02/26/22 1130)  SpO2: (!) 94 % (02/26/22 1130)   Vital Signs (24h Range):  Temp:  [97.5 °F (36.4 °C)-98.9 °F (37.2 °C)] 98.4 °F (36.9 °C)  Pulse:  [] 84  Resp:  [18-24] 18  SpO2:  [92 %-96 %] 94 %  BP: (118-143)/(58-85) 137/66     Weight: 106.5 kg (234 lb 12.6 oz)  Body mass index is 40.3 kg/m².     SpO2: (!) 94 %  O2 Device (Oxygen Therapy): nasal cannula      Intake/Output Summary (Last 24 hours) at 2/26/2022 1449  Last data filed at 2/26/2022 0830  Gross per 24 hour   Intake 120 ml   Output --   Net 120 ml       Lines/Drains/Airways       Peripheral Intravenous Line  Duration                  Peripheral IV - Single Lumen 02/24/22 2200 20 G;1 3/4 in Anterior;Right Upper Arm 1 day                    Physical Exam  Vitals reviewed.   Constitutional:       Appearance: He is well-developed.   HENT:      Head: Normocephalic.   Eyes:      Conjunctiva/sclera: Conjunctivae normal.      Pupils: Pupils are equal, round, and reactive to light.   Cardiovascular:      Rate and Rhythm: Normal rate.      Heart sounds: Normal heart sounds.   Pulmonary:      Effort: Pulmonary effort is normal.      Breath sounds: Normal breath sounds.   Abdominal:      Palpations: Abdomen is soft.   Musculoskeletal:      Cervical back: Normal range of motion and neck supple.   Skin:     General: Skin is warm.   Neurological:      Mental Status: He is alert and oriented to person, place, and time.       Significant Labs: BMP:   Recent Labs   Lab 02/24/22  1828 02/25/22  0428 02/26/22  0717   * 154* 151*    137 141   K 5.3* 4.9 4.4    103 105   CO2  29 26 27   BUN 36* 45* 58*   CREATININE 1.8* 1.7* 1.9*   CALCIUM 9.0 8.6* 8.3*   MG  --  2.1  --    , CMP   Recent Labs   Lab 02/24/22 1828 02/25/22 0428 02/26/22  0717    137 141   K 5.3* 4.9 4.4    103 105   CO2 29 26 27   * 154* 151*   BUN 36* 45* 58*   CREATININE 1.8* 1.7* 1.9*   CALCIUM 9.0 8.6* 8.3*   PROT 7.1 6.5  --    ALBUMIN 3.6 3.3*  --    BILITOT 0.5 0.5  --    ALKPHOS 70 65  --    AST 16 18  --    ALT 9* 9*  --    ANIONGAP 8 8 9   ESTGFRAFRICA 37* 40* 35*   EGFRNONAA 32* 35* 30*   , CBC   Recent Labs   Lab 02/24/22 1829 02/24/22 2349 02/25/22 0428 02/26/22  0531   WBC 13.61*  --  12.98* 9.76   HGB 11.5* 11.0* 10.0* 8.8*   HCT 36.6*  --  31.6* 28.8*   *  --  141* 114*   , INR No results for input(s): INR, PROTIME in the last 48 hours., Lipid Panel No results for input(s): CHOL, HDL, LDLCALC, TRIG, CHOLHDL in the last 48 hours., Troponin No results for input(s): TROPONINI in the last 48 hours., and All pertinent lab results from the last 24 hours have been reviewed.    Significant Imaging: Echocardiogram: Transthoracic echo (TTE) complete (Cupid Only):   Results for orders placed or performed during the hospital encounter of 02/24/22   Echo   Result Value Ref Range    BSA 2.18 m2    AORTIC VALVE CUSP SEPERATION 1.95 cm    PV PEAK VELOCITY 1.17 cm/s    LVIDd 4.08 3.5 - 6.0 cm    IVS 1.56 (A) 0.6 - 1.1 cm    Posterior Wall 1.50 (A) 0.6 - 1.1 cm    Ao root annulus 4.07 cm    LVIDs 2.69 2.1 - 4.0 cm    FS 34 28 - 44 %    Sinus 3.80 cm    STJ 2.98 cm    LV mass 246.94 g    LA size 4.09 cm    Left Ventricle Relative Wall Thickness 0.74 cm    LVOT diameter 2.25 cm    LVOT area 4.0 cm2    TR Max Griffin 2.46 m/s    LV Systolic Volume 26.79 mL    LV Systolic Volume Index 12.8 mL/m2    LV Diastolic Volume 73.35 mL    LV Diastolic Volume Index 35.10 mL/m2    LV Mass Index 118 g/m2    Triscuspid Valve Regurgitation Peak Gradient 24 mmHg    Right Atrial Pressure (from IVC) 3 mmHg    EF 65 %     TV rest pulmonary artery pressure 27 mmHg    Narrative    · The estimated ejection fraction is 65%.  · The left ventricle is normal in size with concentric hypertrophy and   normal systolic function.  · Normal left ventricular diastolic function.  · Normal right ventricular size with normal right ventricular systolic   function.  · Mild left atrial enlargement.  · Normal central venous pressure (3 mmHg).  · The estimated PA systolic pressure is 27 mmHg.

## 2022-02-26 NOTE — PLAN OF CARE
Problem: Adult Inpatient Plan of Care  Goal: Plan of Care Review  Outcome: Ongoing, Progressing  Flowsheets (Taken 2/25/2022 1821)  Plan of Care Reviewed With: patient   Patient remained free from falls, trauma, and injuries throughout shift. No complaints of pain, nausea, or vomiting. IV antibiotics and medications administered as prescribed. Patient had several black bowel movements during shift. Oxygen therapy via nasal cannula. GI and general surgery consulted. Family remained at bedside throughout shift. No acute distress noted.    Progress Note - Podiatry  Nikko Sims 67 y o  male MRN: [id number]  Unit/Bed#: -01 Encounter: [id number]    Assessment/Plan:  Cellulitis right leg  Skin ulceration right leg fat layer exposed  PVD/Atherosclerosis of native artery right leg with ulceration              -S/P repair umbilical hernia 2 days ago   -repeat arterial duplex showed improved blood flow to the right lower extremity   -To OR for debridement tomorrow at 1:00 p m  Consent reviewed in detail and signed    -patient is scheduled for blood transfusion this afternoon  -NPO 12 midnight    Lactic acidosis, necrotic umbilical wound, chronic kidney disease stage 3, anemia, and AFib              -managed per Internal Medicine, general surgery, and Nephrology    Subjective/Objective   Chief Complaint:   Chief Complaint   Patient presents with    Wound Infection - Complicated     Pt with leg wounds x 2 months, here for dyspnea, weakness and feeling ill   +n/+v  Subjective:  44-year-old white male seen resting at bedside in recliner  Continues to the have pain from right leg wound which improves with dependency  Denies any fever or shortness of breath  Blood pressure 100/60, pulse 80, temperature (!) 97 4 °F (36 3 °C), temperature source Oral, resp  rate 18, height 5' 10" (1 778 m), weight 63 5 kg (139 lb 15 9 oz), SpO2 100 %  ,Body mass index is 20 09 kg/m²  Invasive Devices     Peripheral Intravenous Line            Peripheral IV 07/22/20 Dorsal (posterior); Right Wrist less than 1 day                Physical Exam:   General appearance: alert, cooperative and with pain right leg  Neuro/Vascular: Normal strength  Sensation and reflexes:  Grossly intact  Pulses: Right: DP 0/4, PT 0/4, Left: DP 0/4, PT 0/4  Capillary Filling:  3 Sec, Edema:  +1 right leg, none left  Vascular calcifications noted on tib-fib x-ray RLE  Arteriogram 7/20/2020:  · RLE: CFA: Patent, Profunda femoris: Patent   SFA: Proximal portion is patent, normal caliber  High-grade (greater than 80%) 1 cm focal stenosis mid SFA  Moderate grade tandem distal stenoses and in particular 60% stenosis at the abductor canal    Popliteal artery: Patent, noting 60% focal stenosis in the above-the-knee segment  Runoff: Peroneal runoff to the foot with distally reconstituted posterior tibial artery filling of the plantar arch  Chronically occluded anterior tibial artery  Proximal posterior tibial artery is patent and then occludes  · LLE:    CFA: Patent Profunda femoris: Proximal portion is patent   SFA: Proximal portion is patent  · POST INTERVENTION FINDINGS:   1  Significant luminal gain of multifocal right SFA and popliteal artery stenoses following a combination of 6 mm     and 7 mm POBA and DCB  2   Refractory proximal R SFA (30%)  stenosis  Considered stent placement; however, patient was quite     uncooperative with movement and asked multiple times for the procedure to be terminated  3   No change in tibial runoff  4   Left common femoral artery access, successfully closed with a Vascade  · IMPRESSION:  Technically successful multifocal balloon angioplasty of multiple stenoses throughout the right superficial femoral and popliteal arteries  Patient has significant right tibial disease with in-line flow into the peroneal artery and distal reconstituted posterior tibial artery  Could consider tibial intervention in the future  Would recommend anesthesia support given patient inability to lay still  Arterial duplex 7/21/2020:  · RIGHT LOWER LIMB   Ankle/Brachial Index: 1 49 which is in the unreliable range   PPG/PVR Tracings are slightly dampened  Biphasic waveforms at the ankle  Metatarsal Pressure 147mmHg   Great Toe Pressure: unable to obtain due to patient's foot movements  · LEFT LOWER LIMB   Ankle/Brachial Index: 1 27 which is in the normal range   PPG/PVR Tracings are slightly dampened  Triphasic waveforms at the ankle     Metatarsal Pressure 73mmHg   Great Toe Pressure: 51mmHg, within the healing range        Extremity: Several large wounds present on the medial and lateral aspect of the right lower leg with fat layer exposed  Necrotic nature and significant slough, wounds are 80-90% yellow/white, localized erythema and calor appear to be resolving satisfactory  Labs and other studies:   CBC w/diff  Results from last 7 days   Lab Units 07/23/20  1030 07/23/20  0624  07/21/20  0838   WBC Thousand/uL  --  4 98   < > 6 30   HEMOGLOBIN g/dL 7 6* 7 2*   < > 8 7*   HEMATOCRIT % 23 8* 22 1*   < > 26 1*   PLATELETS Thousands/uL  --  43*   < > 52*   NEUTROS PCT %  --   --   --  85*   LYMPHS PCT %  --   --   --  9*   LYMPHO PCT %  --  7*   < >  --    MONOS PCT %  --   --   --  4   MONO PCT %  --  1*   < >  --    EOS PCT %  --  0   < > 0    < > = values in this interval not displayed  BMP  Results from last 7 days   Lab Units 07/23/20  0552   POTASSIUM mmol/L 4 6   CHLORIDE mmol/L 104   CO2 mmol/L 21   BUN mg/dL 39*   CREATININE mg/dL 1 80*   CALCIUM mg/dL 8 2*     CMP  Results from last 7 days   Lab Units 07/23/20  0552   POTASSIUM mmol/L 4 6   CHLORIDE mmol/L 104   CO2 mmol/L 21   BUN mg/dL 39*   CREATININE mg/dL 1 80*   CALCIUM mg/dL 8 2*   ALK PHOS U/L 81   ALT U/L 26   AST U/L 24       @Culture@  Lab Results   Component Value Date    BLOODCX No Growth After 5 Days  07/16/2020    BLOODCX No Growth After 5 Days  07/16/2020    BLOODCX No Growth After 5 Days  05/02/2017    BLOODCX No Growth After 5 Days   05/02/2017    URINECX >100,000 cfu/ml Klebsiella oxytoca (A) 02/13/2019    URINECX >100,000 cfu/ml Klebsiella oxytoca (A) 01/07/2019         Current Facility-Administered Medications:     acetaminophen (TYLENOL) tablet 650 mg, 650 mg, Oral, Q6H PRN, Belkis Bolanos PA-C, 650 mg at 07/19/20 1312    aspirin chewable tablet 81 mg, 81 mg, Oral, Daily, Brittani Bolanos PA-C, 81 mg at 07/23/20 1004    atorvastatin (LIPITOR) tablet 40 mg, 40 mg, Oral, Daily With Lorin Drone Teel-Jose, PA-C, 40 mg at 07/22/20 1722    calcium carbonate (TUMS) chewable tablet 500 mg, 500 mg, Oral, Daily PRN, Dewitte Peaks Astl-Jose, PA-C    cefepime (MAXIPIME) IVPB (premix) 1,000 mg 50 mL, 1,000 mg, Intravenous, Q24H, Brittani Tracey-Jose PA-C, Last Rate: 100 mL/hr at 07/23/20 0022, 1,000 mg at 07/23/20 0022    docusate sodium (COLACE) capsule 100 mg, 100 mg, Oral, BID, BJ Biswas, 100 mg at 07/23/20 1004    fentaNYL (SUBLIMAZE) injection 25 mcg, 25 mcg, Intravenous, Q5 Min PRN, Zack Mejia MD, 25 mcg at 89/87/08 6721    folic acid (FOLVITE) tablet 1 mg, 1 mg, Oral, Daily, Brittani Tracey-Jose, PA-C, 1 mg at 07/23/20 1004    HYDROcodone-acetaminophen (NORCO) 5-325 mg per tablet 1 tablet, 1 tablet, Oral, Q4H PRN, Dewitte Peaks Kyung-Jose PA-C    HYDROcodone-acetaminophen (NORCO) 5-325 mg per tablet 2 tablet, 2 tablet, Oral, Q4H PRN, Dewitte Peaks Astl-Jose, PA-C, 2 tablet at 07/23/20 0557    HYDROmorphone (DILAUDID) injection 0 5 mg, 0 5 mg, Intravenous, Q10 Min PRN, Zack Mejia MD    HYDROmorphone (DILAUDID) injection 0 5 mg, 0 5 mg, Intravenous, Q2H PRN, Dewitte Peaks Astanurag-Jose PA-C    LORazepam (ATIVAN) tablet 1 mg, 1 mg, Oral, Q8H PRN, Dewitte Peaks Astl-Jose, PA-C, 1 mg at 07/19/20 2227    pantoprazole (PROTONIX) EC tablet 40 mg, 40 mg, Oral, BID AC, Maria Isabel Jessica, CRNP    polyethylene glycol (MIRALAX) packet 17 g, 17 g, Oral, BID, BJ Biswas    [START ON 7/24/2020] sodium bicarbonate 75 mEq in sodium chloride 0 45 % 1,000 mL infusion, 50 mL/hr, Intravenous, Continuous, BJ Jones    tamsulosin (FLOMAX) capsule 0 4 mg, 0 4 mg, Oral, Daily With Lorin Bolanos PA-C, 0 4 mg at 07/22/20 1722    [START ON 7/24/2020] vancomycin (VANCOCIN) 1,500 mg in sodium chloride 0 9 % 250 mL IVPB, 1,500 mg, Intravenous, Q24H, Dora Hair, SRINIVASANNP    Imaging: I have personally reviewed pertinent films in PACS  EKG, Pathology, and Other Studies: I have personally reviewed pertinent reports    VTE Pharmacologic Prophylaxis: Heparin  VTE Mechanical Prophylaxis: reason for no mechanical VTE prophylaxis Wounds right lower extremity    Clay Ordaz DPM

## 2022-02-26 NOTE — PROGRESS NOTES
GI Progress Note - 2/26/2022   See GI consult note for full H&P      Subjective:   Patient seen and examined at bedside.   Son at bedside this am - feels that the melena volume has decreased compared to yesterday, now very small amount of black stool.  Hgb has downtrended from 10 to 8.8.    Repeat CXR with left sided pleural effusion - appears somewhat improved.    Evaluated by surgery - did not feel NGT warranted. Recommended supportive management, low concern for bowel obstruction.    Objective:    Vital signs in last 24 hours:  Temp:  [97.3 °F (36.3 °C)-98.9 °F (37.2 °C)] 98.1 °F (36.7 °C)  Pulse:  [] 96  Resp:  [18-24] 18  SpO2:  [92 %-96 %] 94 %  BP: (116-143)/(58-85) 143/85    Intake/Output last 3 shifts:  I/O last 3 completed shifts:  In: 300 [P.O.:200; IV Piggyback:100]  Out: 600 [Urine:600]  Intake/Output this shift:  No intake/output data recorded.    Gen: laying in bed, on 3 L NC, obese man  Heart: RRR, no murmurs, rub or gallops appreciated  Lung: No w/r/c. CTA bilaterally   Abdomen: NTND, BS present, soft   Ext: 2+ pulses, no lower ext. edema  Neuro: A&O X 3       Recent Labs   Lab 02/24/22 1829 02/24/22 2349 02/25/22 0428 02/26/22  0531   WBC 13.61*  --  12.98* 9.76   HGB 11.5* 11.0* 10.0* 8.8*   HCT 36.6*  --  31.6* 28.8*   *  --  141* 114*   *  --  108* 109*      Recent Labs   Lab 02/24/22 1828 02/25/22 0428 02/26/22  0717    137 141   K 5.3* 4.9 4.4    103 105   CO2 29 26 27   BUN 36* 45* 58*   CALCIUM 9.0 8.6* 8.3*   PHOS  --  2.5*  --      Recent Labs   Lab 02/23/22 2342 02/24/22 1828 02/25/22  0428   AST 14 16 18   ALT 10 9* 9*   ALKPHOS 85 70 65   BILITOT 0.6 0.5 0.5       Scheduled Meds:   furosemide (LASIX) injection  40 mg Intravenous Daily    levalbuterol  1.25 mg Nebulization Q8H    methylPREDNISolone sodium succinate injection  60 mg Intravenous Q12H    metoprolol  5 mg Intravenous Q12H    pantoprazole  40 mg Intravenous BID     piperacillin-tazobactam (ZOSYN) IVPB  4.5 g Intravenous Q8H     Continuous Infusions:    Imaging:  CT ABDOMEN PELVIS WITHOUT CONTRAST 2/24/22  High-grade partial versus early complete small bowel obstruction.  The stomach is significantly distended.  Transition zone somewhere in the mid abdomen.     Small amount of free fluid in the left upper quadrant adjacent to the stomach and left diaphragm.     The gallbladder is distended.  There is some very subtle pericholecystic inflammation which could suggest a cholecystitis.  Consider further evaluation with a right upper quadrant ultrasound.     Polycystic kidney disease.     ASSESSMENT/PLAN:  89 y.o. male who is DNR with [facility]IDA wears CPAP nightly, HTN, HLD, CAD s/p stents, history of ventricular fibrillation s/p ICD with recent defibrillator battery change in [place], polycystic kidney disease who initially presented with nausea, vomiting, and abdominal pain. CT imaging without contrast at outside hospital with distended stomach, partial or complete SBO, and distended gallbladder. NGT placement attempts were unsuccessful and patient developed epistaxis thereafter which has resolved. GI is consulted for melena which developed on 2/25 and downtrending hemoglobin.     The patient is currently on 3 L NC; CXR is showing left sided pleural effusion. He is receiving IV steroids and IV diuresis along with nebulizer treatments.     He had a mild leukocytosis (12.9), now resolved, downtrending hgb (currently 8.8 from 10 and was 13 2 days prior) and known thrombocytopenia. BUN is elevated but patient also with HERMELINDA on CKD.     Differentials for the patient's melena include swallowed blood from recent epistaxis, NGT trauma from multiple unsuccessful attempts at NGT placement on 2/24, peptic ulcer disease, or esophagitis.      Given the patient's pulmonary status requiring 3 L NC and labored breathing, his overall age and co-morbidities, as well as distended stomach on imaging with  concern for SBO on initial imaging - I believe proceeding with upper endoscopy at this time would be high risk for the patient. Recommend empiric treatment with IV PPI and consider NGT placement if any vomiting. Would not advance past clear liquids at this time while patient's clinical status is closely monitored. NPO after midnight in case patient's clinical status changes. I have discussed his case with his DPOA Treva and also son Jian who was at bedside this morning. Due to risks of anesthesia and endoscopy, the family would like to defer endoscopic evaluation of melena and treat supportively with IV PPI unless patient develops a life-threatening GI bleed. Continue to trend CBC, monitor bowel movements and treat supportively for now.      Rex Robles  Gastroenterology   [facility]

## 2022-02-26 NOTE — ASSESSMENT & PLAN NOTE
Echo in 2019 with EF of 60% and normal ventricular diastolic function   On laisx 20 BID at home.   Will continue IV lasix 40 given CXR with pleural effusions and dyspnea  Continue supplemental O2, wean as tolerated.   CT chest ordered   Consider pulm consult if dyspnea not improving despite diuresis   Echo ordered  Daily weights  strict intake and output  telemetry

## 2022-02-26 NOTE — PROGRESS NOTES
[facility] Surg  Cardiology  Progress Note    Patient Name: Reese Bell  MRN: [id number]  Admission Date: 2/24/2022  Hospital Length of Stay: 2 days  Code Status: DNR   Attending Physician: Patricia Jenkins DO   Primary Care Physician: Oracio Johnson MD  Expected Discharge Date:   Principal Problem:SBO (small bowel obstruction)    Subjective:       Interval History:  Doing fine.  No chest pains.    Review of Systems   Constitutional: Positive for malaise/fatigue.   HENT:  Positive for hearing loss.    Eyes:  Negative for discharge.   Cardiovascular:  Negative for chest pain.   Objective:     Vital Signs (Most Recent):  Temp: 98.4 °F (36.9 °C) (02/26/22 1130)  Pulse: 84 (02/26/22 1130)  Resp: 18 (02/26/22 1130)  BP: 137/66 (02/26/22 1130)  SpO2: (!) 94 % (02/26/22 1130)   Vital Signs (24h Range):  Temp:  [97.5 °F (36.4 °C)-98.9 °F (37.2 °C)] 98.4 °F (36.9 °C)  Pulse:  [] 84  Resp:  [18-24] 18  SpO2:  [92 %-96 %] 94 %  BP: (118-143)/(58-85) 137/66     Weight: 106.5 kg (234 lb 12.6 oz)  Body mass index is 40.3 kg/m².     SpO2: (!) 94 %  O2 Device (Oxygen Therapy): nasal cannula      Intake/Output Summary (Last 24 hours) at 2/26/2022 1449  Last data filed at 2/26/2022 0830  Gross per 24 hour   Intake 120 ml   Output --   Net 120 ml       Lines/Drains/Airways       Peripheral Intravenous Line  Duration                  Peripheral IV - Single Lumen 02/24/22 2200 20 G;1 3/4 in Anterior;Right Upper Arm 1 day                    Physical Exam  Vitals reviewed.   Constitutional:       Appearance: He is well-developed.   HENT:      Head: Normocephalic.   Eyes:      Conjunctiva/sclera: Conjunctivae normal.      Pupils: Pupils are equal, round, and reactive to light.   Cardiovascular:      Rate and Rhythm: Normal rate.      Heart sounds: Normal heart sounds.   Pulmonary:      Effort: Pulmonary effort is normal.      Breath sounds: Normal breath sounds.   Abdominal:      Palpations: Abdomen is soft.    Musculoskeletal:      Cervical back: Normal range of motion and neck supple.   Skin:     General: Skin is warm.   Neurological:      Mental Status: He is alert and oriented to person, place, and time.       Significant Labs: BMP:   Recent Labs   Lab 02/24/22 1828 02/25/22 0428 02/26/22  0717   * 154* 151*    137 141   K 5.3* 4.9 4.4    103 105   CO2 29 26 27   BUN 36* 45* 58*   CREATININE 1.8* 1.7* 1.9*   CALCIUM 9.0 8.6* 8.3*   MG  --  2.1  --    , CMP   Recent Labs   Lab 02/24/22 1828 02/25/22 0428 02/26/22  0717    137 141   K 5.3* 4.9 4.4    103 105   CO2 29 26 27   * 154* 151*   BUN 36* 45* 58*   CREATININE 1.8* 1.7* 1.9*   CALCIUM 9.0 8.6* 8.3*   PROT 7.1 6.5  --    ALBUMIN 3.6 3.3*  --    BILITOT 0.5 0.5  --    ALKPHOS 70 65  --    AST 16 18  --    ALT 9* 9*  --    ANIONGAP 8 8 9   ESTGFRAFRICA 37* 40* 35*   EGFRNONAA 32* 35* 30*   , CBC   Recent Labs   Lab 02/24/22 1829 02/24/22  2349 02/25/22 0428 02/26/22  0531   WBC 13.61*  --  12.98* 9.76   HGB 11.5* 11.0* 10.0* 8.8*   HCT 36.6*  --  31.6* 28.8*   *  --  141* 114*   , INR No results for input(s): INR, PROTIME in the last 48 hours., Lipid Panel No results for input(s): CHOL, HDL, LDLCALC, TRIG, CHOLHDL in the last 48 hours., Troponin No results for input(s): TROPONINI in the last 48 hours., and All pertinent lab results from the last 24 hours have been reviewed.    Significant Imaging: Echocardiogram: Transthoracic echo (TTE) complete (Cupid Only):   Results for orders placed or performed during the hospital encounter of 02/24/22   Echo   Result Value Ref Range    BSA 2.18 m2    AORTIC VALVE CUSP SEPERATION 1.95 cm    PV PEAK VELOCITY 1.17 cm/s    LVIDd 4.08 3.5 - 6.0 cm    IVS 1.56 (A) 0.6 - 1.1 cm    Posterior Wall 1.50 (A) 0.6 - 1.1 cm    Ao root annulus 4.07 cm    LVIDs 2.69 2.1 - 4.0 cm    FS 34 28 - 44 %    Sinus 3.80 cm    STJ 2.98 cm    LV mass 246.94 g    LA size 4.09 cm    Left Ventricle  Relative Wall Thickness 0.74 cm    LVOT diameter 2.25 cm    LVOT area 4.0 cm2    TR Max Griffin 2.46 m/s    LV Systolic Volume 26.79 mL    LV Systolic Volume Index 12.8 mL/m2    LV Diastolic Volume 73.35 mL    LV Diastolic Volume Index 35.10 mL/m2    LV Mass Index 118 g/m2    Triscuspid Valve Regurgitation Peak Gradient 24 mmHg    Right Atrial Pressure (from IVC) 3 mmHg    EF 65 %    TV rest pulmonary artery pressure 27 mmHg    Narrative    · The estimated ejection fraction is 65%.  · The left ventricle is normal in size with concentric hypertrophy and   normal systolic function.  · Normal left ventricular diastolic function.  · Normal right ventricular size with normal right ventricular systolic   function.  · Mild left atrial enlargement.  · Normal central venous pressure (3 mmHg).  · The estimated PA systolic pressure is 27 mmHg.        Assessment and Plan:     Brief HPI:     * SBO (small bowel obstruction)  Cardiology was consulted for preoperative risk assessment for tentative surgery.  However patient has started passing gas and hence surgeries not planning any surgery.  Patient has been chest pain-free.  No further ischemic workup indicated at the current time.  Patient to follow-up with his regular cardiologist for any chronic issues.    Coronary artery disease involving native coronary artery of native heart without angina pectoris  Angina free.  Continue medical management.  Echo showed normal left ventricle systolic function.  Will sign off.  No further cardiac workup indicated during this hospitalization Follow-up with his regular cardiologist.    Permanent atrial fibrillation        Hyperlipidemia        Stage 3 chronic kidney disease        Dementia in Alzheimer's disease        Benign prostatic hyperplasia            VTE Risk Mitigation (From admission, onward)         Ordered     Place CHRISTOPHER hose  Until discontinued         02/24/22 1712     IP VTE HIGH RISK PATIENT  Once         02/24/22 1712     Place  sequential compression device  Until discontinued         02/24/22 7341                Dahlia Barraza MD  Cardiology  [facility]

## 2022-02-26 NOTE — HPI
89 y.o. male with pmh CAD s/p stent placement, HTN, HLD, hx vfib s/p ICD placement admitted from an OSH with epigastric abdominal pain that has been present since Wednesday.  History obtained from patient and family members at bedside.  Pain started suddenly, was constant and radiated throughout the abdomen.  Associated with one episode of vomiting 2 days ago.  On admission vitals are stable, labs show a mild leukocytosis, lactic acid normal in the ED.  NG tube attempted to be place, but was unsuccessful.  Ct showed dilatation of the stomach and High-grade partial sbo vs early sbo.  At the time of examination, patient denies pain.  No n/v.  Passing flatus and having multiple bowel movements.    History obtained from patient's daughters, patient as well as patient's  granddaughter.  Patient had multiple PCIs in the past.  His last angiogram was in 2019 as per the daughter's before COVID and he had PCI performed at that time.  He has not had any further chest pain since then.  He follows with Dr. Verma at [facility].  Last week he had his defibrillator bed replaced.  Patient denies any chest pains at rest on exertion, orthopnea, PND, swelling of feet.  He was admitted with small-bowel obstruction cardiology was consulted for preoperative risk assessment, but he has started passing flatusand stool and surgery is not planning to do any surgery anymore.

## 2022-02-26 NOTE — ASSESSMENT & PLAN NOTE
"CT on 2/24 at [facility] with "High-grade partial versus early complete small bowel obstruction.  The stomach is significantly distended.  Transition zone somewhere in the mid abdomen." evaluated by surgery there and recommended for transfer for higher level of care. NG tube unable to be placed at [place].    -Surgery consulted: as without N/V, hold on further NG tube placement. Advanced to CLD. No plans for repeat imaging  -GI consulted: empiric tx with IV PPI and consider NGT placement if any vomiting.  -Pt now with flatus and bowel movements  -Clear liquid diet  "

## 2022-02-26 NOTE — ASSESSMENT & PLAN NOTE
Angina free.  Continue medical management.  Echo showed normal left ventricle systolic function.  Will sign off.  No further cardiac workup indicated during this hospitalization Follow-up with his regular cardiologist.

## 2022-02-26 NOTE — PROGRESS NOTES
[facility] Medicine  Progress Note    Patient Name: Reese Bell  MRN: [id number]  Patient Class: IP- Inpatient   Admission Date: 2/24/2022  Length of Stay: 2 days  Attending Physician: Patricia Jenkins DO  Primary Care Provider: Oracio Johnson MD        Subjective:     Principal Problem:SBO (small bowel obstruction)        HPI:  Reese Bell 89 y.o. male with for hypertension, hyperlipidemia, DJD, CAD post cardiac stenting, FRIDA- CPAP, thrombocytopenia, history of ventricular fibrillation s/p ICD and recent defibrillator battery changed on Monday by Dr. De La Cruz in [place] presents to the hospital with a chief complaint of abdominal pain worst at the bottom of his abdomen without radiation.  He reports yesterday he developed sudden onset diffuse abdominal pain described as a pressure with associated nausea and vomiting.  Presented to an outside hospital where he was admitted.  He denies any worsening or alleviating factors for pain.  He now states his abdominal pain has resolved he has had no further nausea vomiting since arrival from outside hospital.  The symptoms never occurred before.  He had an appendectomy and a child but denies other abdominal surgeries.  He denies any current complaints.    In the emergency room and seen and, creatinine 1.8, CT abdomen with high-grade partial versus complete small bowel obstruction, troponin negative, BNP negative, COVID negative, NG tube placement attempted but unsuccessful chest x-ray showed placement to right lower lobe.  NG tube was removed.  Seen by surgery who recommended transfer for higher level care.      Overview/Hospital Course:  89-year-old white male transferred to [facility] from [facility] to be admitted on 02/24/2022 for small bowel obstruction and needed cardiac and surgery consultants. Patient with extensive past medical history (HTN, hyperlipidemia, DJD, CAD post cardiac stenting, FRIDA- CPAP, thrombocytopenia,  history of ventricular fibrillation s/p ICD and recent defibrillator battery changed on Monday by Dr. De La Cruz in [place] ). Of note,  patient is nonambulatory, W/C bound at baseline.    CT abdomen with high-grade partial versus early complete small bowel obstruction.  The stomach is significantly distended. CXR w/ opacification at the left lung base probably a combination of atelectasis and pleural effusion.  Of note, patient failed multiple attempts of NGT placement at [place]. CXR from [place] showed enteric tube appearing to terminate in the region of the right lower lobe, which later was removed. Cardiology and surgery consulted. Holding off on NGT per surgery at this time, given patient without nausea and vomiting. Cardiology recommend no further ischemic workup needed at this time. Patient has had loose, very dark bowel movement since admission.  Suspect dark stools due to recent NGT trauma and patient aspirating on the blood. Hemoglobin trending down. GI consulted for further evaluation. Patient continues with increased work of breathing and dyspnea, plan for CT chest WO contrast (given renal function) to further evaluate. Continue diuresis and abx for possible aspiration pneumonia. Consider pulmonology consult if not improvement in respiratory status      Interval History: Son Jian at bedside. States patient continues to have dark black stools, but it is slowing down. No longer coughing up blood. Pt complains of feeling short of breath. Dyspnea worse with lying flat and with lying on the left side. Dyspnea slightly improved with lying on the right side and after breathing treatments. Admits wheezing. No chest pain. No more nausea or vomiting.     Review of Systems   Constitutional:  Negative for chills and fever.   HENT:  Negative for nosebleeds (resolved) and tinnitus.    Eyes:  Negative for visual disturbance.   Respiratory:  Positive for shortness of breath. Negative for chest tightness and wheezing.     Cardiovascular:  Negative for chest pain, palpitations and leg swelling.   Gastrointestinal:  Positive for abdominal distention. Negative for abdominal pain, blood in stool (no obvious blood in stool but says its very black), constipation, nausea and vomiting.   Genitourinary:  Negative for dysuria, flank pain and hematuria.   Musculoskeletal:  Negative for joint swelling.   Skin:  Negative for rash and wound.   Neurological:  Negative for dizziness, syncope, weakness and headaches.   Psychiatric/Behavioral:  Negative for confusion and hallucinations.    Objective:     Vital Signs (Most Recent):  Temp: 98.1 °F (36.7 °C) (02/26/22 0801)  Pulse: 96 (02/26/22 0856)  Resp: 18 (02/26/22 0856)  BP: (!) 143/85 (02/26/22 0801)  SpO2: (!) 94 % (02/26/22 0856)   Vital Signs (24h Range):  Temp:  [97.3 °F (36.3 °C)-98.9 °F (37.2 °C)] 98.1 °F (36.7 °C)  Pulse:  [] 96  Resp:  [18-24] 18  SpO2:  [92 %-96 %] 94 %  BP: (116-143)/(58-85) 143/85     Weight: 106.5 kg (234 lb 12.6 oz)  Body mass index is 40.3 kg/m².  No intake or output data in the 24 hours ending 02/26/22 1012     Physical Exam  Vitals and nursing note reviewed.   Constitutional:       General: He is in acute distress.      Appearance: He is obese. He is ill-appearing. He is not toxic-appearing.   HENT:      Head: Normocephalic and atraumatic.      Right Ear: External ear normal.      Left Ear: External ear normal.   Eyes:      General:         Right eye: No discharge.         Left eye: No discharge.      Conjunctiva/sclera: Conjunctivae normal.   Neck:      Thyroid: No thyromegaly.   Cardiovascular:      Rate and Rhythm: Normal rate and regular rhythm.      Heart sounds: No murmur heard.     Comments: AICD chest wall, skin overlying it appears bruised.   Pulmonary:      Breath sounds: Decreased breath sounds, wheezing and rales present.      Comments: Labored breathing. Patient currently on 3L NC with sats at 92-94%. Lungs with diminished breath sounds  "throughout, but left more than right  Abdominal:      General: There is distension.      Palpations: Abdomen is soft. There is no mass.      Tenderness: There is no abdominal tenderness. There is no guarding or rebound.      Comments: Bowel sounds present. Soft, non-tender no rebound   Musculoskeletal:         General: No deformity.      Cervical back: Normal range of motion and neck supple.      Right lower leg: No edema.      Left lower leg: No edema.   Skin:     General: Skin is warm and dry.   Neurological:      Mental Status: He is alert and oriented to person, place, and time.      Sensory: No sensory deficit.   Psychiatric:         Mood and Affect: Mood normal.         Behavior: Behavior normal.       Significant Labs: All pertinent labs within the past 24 hours have been reviewed.    Significant Imaging: I have reviewed all pertinent imaging results/findings within the past 24 hours.      Assessment/Plan:      * SBO (small bowel obstruction)  CT on 2/24 at [facility] with "High-grade partial versus early complete small bowel obstruction.  The stomach is significantly distended.  Transition zone somewhere in the mid abdomen." evaluated by surgery there and recommended for transfer for higher level of care. NG tube unable to be placed at [place].    -Surgery consulted: as without N/V, hold on further NG tube placement. Advanced to CLD. No plans for repeat imaging  -GI consulted: empiric tx with IV PPI and consider NGT placement if any vomiting.  -Pt now with flatus and bowel movements  -Clear liquid diet    CHF (congestive heart failure), NYHA class II, chronic, systolic  Echo in 2019 with EF of 60% and normal ventricular diastolic function   On laisx 20 BID at home.   Will continue IV lasix 40 given CXR with pleural effusions and dyspnea  Continue supplemental O2, wean as tolerated.   CT chest ordered   Consider pulm consult if dyspnea not improving despite diuresis   Echo ordered  Daily weights  strict intake " and output  telemetry    Coronary artery disease involving native coronary artery of native heart without angina pectoris  Denies any chest pain.   Home aspirin/coreg/zetia held while NPO  Will restart once able to tolerate PO  Monitor on telemetry  Cardiology consulted for Pre-Op evaluation should surgery be required for SBO    Stage 3 chronic kidney disease  Baseline Cr of 1.5-1.9.  Cr at baseline  Renal dose medications   avoid nephrotoxic drugs   Caution with diuresis  monitor intake and output    Permanent atrial fibrillation  Holding home coreg while NPO.   Continue IV metoprolol  Currently rate controlled.   Not on anticoagulation outpatient    Essential hypertension  Well controlled   holding home coreg and entresto while NPO    Hyperlipidemia  Holding home zetia and rosuvastatin while NPO for SBO  Will restart once patient able to tolerate diet    FRIDA (obstructive sleep apnea)  Continue home CPAP if able    Dementia in Alzheimer's disease  Stable, at baseline mental status per family  Delirium precautions  Holding home namenda while NPO    Benign prostatic hyperplasia  Holding home flomax while NPO      Melena  -Patient with melena   -Suspect 2/2 to recent traumatic NGT placement given pt had epistaxis and coughing up blood prior to arrival  -Monitor H/H; down trending  -GI consulted      Advanced care planning/counseling discussion  Discussed code status with son at bedside and patient.  Son Jian confirmed patient is DNR. He has brought his living will to the hospital.   Greater than 16 minutes spent discussing code status.   DNR order placed in chart      VTE Risk Mitigation (From admission, onward)         Ordered     Place CHRISTOPHER hose  Until discontinued         02/24/22 1712     IP VTE HIGH RISK PATIENT  Once         02/24/22 1712     Place sequential compression device  Until discontinued         02/24/22 1712                Discharge Planning   LOUISE:      Code Status: DNR   Is the patient medically ready  for discharge?:     Reason for patient still in hospital (select all that apply): Patient trending condition, Treatment, Imaging and Consult recommendations  Discharge Plan A: Home                  Patricia Jenkins DO  Department of Hospital Medicine   [facility]

## 2022-02-26 NOTE — ASSESSMENT & PLAN NOTE
Holding home zetia and rosuvastatin while NPO for SBO  Will restart once patient able to tolerate diet

## 2022-02-26 NOTE — ASSESSMENT & PLAN NOTE
Denies any chest pain.   Home aspirin/coreg/zetia held while NPO  Will restart once able to tolerate PO  Monitor on telemetry  Cardiology consulted for Pre-Op evaluation should surgery be required for SBO

## 2022-02-26 NOTE — ASSESSMENT & PLAN NOTE
-Patient with melena   -Suspect 2/2 to recent traumatic NGT placement given pt had epistaxis and coughing up blood prior to arrival  -Monitor H/H; down trending  -GI consulted

## 2022-02-26 NOTE — ASSESSMENT & PLAN NOTE
Baseline Cr of 1.5-1.9.  Cr at baseline  Renal dose medications   avoid nephrotoxic drugs   Caution with diuresis  monitor intake and output

## 2022-02-26 NOTE — SUBJECTIVE & OBJECTIVE
Interval History: Overnight, patient had recurrent epistaxis which improved with pressure and LETs gel. CXR showed opacification of left lung base, concern for possible aspiration pneumonia given hypoxia and traumatic NGT. Nose bleeds have improved. Pt not coughing up blood. Not passing gas, but nurse noted patient has had multiple dark, loose stools. Patient admits SOB.No chest pain or palpitations. Son Jian at bedside, confirmed DNR code status    Review of Systems   Constitutional:  Negative for chills and fever.   HENT:  Negative for nosebleeds (resolved) and tinnitus.    Eyes:  Negative for visual disturbance.   Respiratory:  Negative for chest tightness and wheezing.    Cardiovascular:  Negative for chest pain, palpitations and leg swelling.   Gastrointestinal:  Positive for abdominal pain and constipation. Negative for abdominal distention, nausea and vomiting.   Genitourinary:  Negative for dysuria, flank pain and hematuria.   Musculoskeletal:  Negative for gait problem and joint swelling.   Skin:  Negative for rash and wound.   Neurological:  Negative for dizziness, syncope, weakness and headaches.   Psychiatric/Behavioral:  Negative for confusion and hallucinations.    Objective:     Vital Signs (Most Recent):  Temp: 97.6 °F (36.4 °C) (02/25/22 2009)  Pulse: 97 (02/25/22 2009)  Resp: 19 (02/25/22 2009)  BP: (!) 118/58 (02/25/22 2009)  SpO2: (!) 94 % (02/25/22 2009)   Vital Signs (24h Range):  Temp:  [97.3 °F (36.3 °C)-98.9 °F (37.2 °C)] 97.6 °F (36.4 °C)  Pulse:  [] 97  Resp:  [18-24] 19  SpO2:  [92 %-98 %] 94 %  BP: (104-127)/(58-76) 118/58     Weight: 106.5 kg (234 lb 12.6 oz)  Body mass index is 40.3 kg/m².    Intake/Output Summary (Last 24 hours) at 2/25/2022 2209  Last data filed at 2/25/2022 0600  Gross per 24 hour   Intake 300 ml   Output 600 ml   Net -300 ml        Physical Exam  Vitals and nursing note reviewed.   Constitutional:       General: He is not in acute distress.     Appearance: He  is obese. He is not toxic-appearing.   HENT:      Head: Normocephalic and atraumatic.      Right Ear: External ear normal.      Left Ear: External ear normal.   Eyes:      General:         Right eye: No discharge.         Left eye: No discharge.      Conjunctiva/sclera: Conjunctivae normal.   Neck:      Thyroid: No thyromegaly.   Cardiovascular:      Rate and Rhythm: Normal rate and regular rhythm.      Heart sounds: No murmur heard.  Pulmonary:      Effort: Pulmonary effort is normal. No respiratory distress.      Breath sounds: Normal breath sounds.      Comments: AICD chest wall, skin overlying it appears bruised  Abdominal:      General: There is distension.      Palpations: Abdomen is soft. There is no mass.      Tenderness: There is no abdominal tenderness. There is no guarding or rebound.      Comments: Decreased bowel sounds in all 4 quadrants. Soft non-tender no rebound   Musculoskeletal:         General: No deformity.      Cervical back: Normal range of motion and neck supple.      Right lower leg: No edema.      Left lower leg: No edema.   Skin:     General: Skin is warm and dry.   Neurological:      Mental Status: He is alert and oriented to person, place, and time.      Sensory: No sensory deficit.   Psychiatric:         Mood and Affect: Mood normal.         Behavior: Behavior normal.       Significant Labs: All pertinent labs within the past 24 hours have been reviewed.    Significant Imaging: I have reviewed all pertinent imaging results/findings within the past 24 hours.

## 2022-02-26 NOTE — CONSULTS
[facility] Surg  Cardiology  Consult Note    Patient Name: Reese Bell  MRN: [id number]  Admission Date: 2/24/2022  Hospital Length of Stay: 1 days  Code Status: DNR   Attending Provider: Patricia Jenkins DO   Consulting Provider: Dahlia Barraza MD  Primary Care Physician: Oracio Johnson MD  Principal Problem:SBO (small bowel obstruction)    Patient information was obtained from patient and ER records.     Inpatient consult to Cardiology  Consult performed by: Dahlia Barraza MD  Consult ordered by: Chip Hargrove PA-C        Subjective:     Chief Complaint:  Pre op     HPI:    89 y.o. male with pmh CAD s/p stent placement, HTN, HLD, hx vfib s/p ICD placement admitted from an OSH with epigastric abdominal pain that has been present since Wednesday.  History obtained from patient and family members at bedside.  Pain started suddenly, was constant and radiated throughout the abdomen.  Associated with one episode of vomiting 2 days ago.  On admission vitals are stable, labs show a mild leukocytosis, lactic acid normal in the ED.  NG tube attempted to be place, but was unsuccessful.  Ct showed dilatation of the stomach and High-grade partial sbo vs early sbo.  At the time of examination, patient denies pain.  No n/v.  Passing flatus and having multiple bowel movements.    History obtained from patient's daughters, patient as well as patient's  granddaughter.  Patient had multiple PCIs in the past.  His last angiogram was in 2019 as per the daughter's before COVID and he had PCI performed at that time.  He has not had any further chest pain since then.  He follows with Dr. Verma at [facility].  Last week he had his defibrillator bed replaced.  Patient denies any chest pains at rest on exertion, orthopnea, PND, swelling of feet.  He was admitted with small-bowel obstruction cardiology was consulted for preoperative risk assessment, but he has started passing flatusand stool and surgery is not planning to do any  surgery anymore.              Interval History: Overnight, patient had recurrent epistaxis which improved with pressure and LETs gel. CXR showed opacification of left lung base, concern for possible aspiration pneumonia given hypoxia and traumatic NGT. Nose bleeds have improved. Pt not coughing up blood. Not passing gas, but nurse noted patient has had multiple dark, loose stools. Patient admits SOB.No chest pain or palpitations. Son Jian at bedside, confirmed DNR code status    Review of Systems   Constitutional:  Negative for chills and fever.   HENT:  Negative for nosebleeds (resolved) and tinnitus.    Eyes:  Negative for visual disturbance.   Respiratory:  Negative for chest tightness and wheezing.    Cardiovascular:  Negative for chest pain, palpitations and leg swelling.   Gastrointestinal:  Positive for abdominal pain and constipation. Negative for abdominal distention, nausea and vomiting.   Genitourinary:  Negative for dysuria, flank pain and hematuria.   Musculoskeletal:  Negative for gait problem and joint swelling.   Skin:  Negative for rash and wound.   Neurological:  Negative for dizziness, syncope, weakness and headaches.   Psychiatric/Behavioral:  Negative for confusion and hallucinations.    Objective:     Vital Signs (Most Recent):  Temp: 97.6 °F (36.4 °C) (02/25/22 2009)  Pulse: 97 (02/25/22 2009)  Resp: 19 (02/25/22 2009)  BP: (!) 118/58 (02/25/22 2009)  SpO2: (!) 94 % (02/25/22 2009)   Vital Signs (24h Range):  Temp:  [97.3 °F (36.3 °C)-98.9 °F (37.2 °C)] 97.6 °F (36.4 °C)  Pulse:  [] 97  Resp:  [18-24] 19  SpO2:  [92 %-98 %] 94 %  BP: (104-127)/(58-76) 118/58     Weight: 106.5 kg (234 lb 12.6 oz)  Body mass index is 40.3 kg/m².    Intake/Output Summary (Last 24 hours) at 2/25/2022 2209  Last data filed at 2/25/2022 0600  Gross per 24 hour   Intake 300 ml   Output 600 ml   Net -300 ml        Physical Exam  Vitals and nursing note reviewed.   Constitutional:       General: He is not in  acute distress.     Appearance: He is obese. He is not toxic-appearing.   HENT:      Head: Normocephalic and atraumatic.      Right Ear: External ear normal.      Left Ear: External ear normal.   Eyes:      General:         Right eye: No discharge.         Left eye: No discharge.      Conjunctiva/sclera: Conjunctivae normal.   Neck:      Thyroid: No thyromegaly.   Cardiovascular:      Rate and Rhythm: Normal rate and regular rhythm.      Heart sounds: No murmur heard.  Pulmonary:      Effort: Pulmonary effort is normal. No respiratory distress.      Breath sounds: Normal breath sounds.      Comments: AICD chest wall, skin overlying it appears bruised  Abdominal:      General: There is distension.      Palpations: Abdomen is soft. There is no mass.      Tenderness: There is no abdominal tenderness. There is no guarding or rebound.      Comments: Decreased bowel sounds in all 4 quadrants. Soft non-tender no rebound   Musculoskeletal:         General: No deformity.      Cervical back: Normal range of motion and neck supple.      Right lower leg: No edema.      Left lower leg: No edema.   Skin:     General: Skin is warm and dry.   Neurological:      Mental Status: He is alert and oriented to person, place, and time.      Sensory: No sensory deficit.   Psychiatric:         Mood and Affect: Mood normal.         Behavior: Behavior normal.       Significant Labs: All pertinent labs within the past 24 hours have been reviewed.    Significant Imaging: I have reviewed all pertinent imaging results/findings within the past 24 hours.    Assessment and Plan:     * SBO (small bowel obstruction)  Cardiology was consulted for preoperative risk assessment for tentative surgery.  However patient has started passing gas and hence surgeries not planning any surgery.  Patient has been chest pain-free.  No further ischemic workup indicated at the current time.  Patient to follow-up with his regular cardiologist for any chronic  issues.    Coronary artery disease involving native coronary artery of native heart without angina pectoris  Angina free.  Continue medical management.    Permanent atrial fibrillation        Hyperlipidemia        Stage 3 chronic kidney disease        Dementia in Alzheimer's disease        Benign prostatic hyperplasia            VTE Risk Mitigation (From admission, onward)         Ordered     Place CHRISTOPHER hose  Until discontinued         02/24/22 1712     IP VTE HIGH RISK PATIENT  Once         02/24/22 1712     Place sequential compression device  Until discontinued         02/24/22 1712                Thank you for your consult. I will follow-up with patient. Please contact us if you have any additional questions.    Dahlia Barraza MD  Cardiology   [facility] [place] - Med Surg

## 2022-02-26 NOTE — ASSESSMENT & PLAN NOTE
Cardiology was consulted for preoperative risk assessment for tentative surgery.  However patient has started passing gas and hence surgeries not planning any surgery.  Patient has been chest pain-free.  No further ischemic workup indicated at the current time.  Patient to follow-up with his regular cardiologist for any chronic issues.

## 2022-02-27 PROBLEM — J69.0 ASPIRATION PNEUMONIA: Status: ACTIVE | Noted: 2022-02-27

## 2022-02-27 LAB
ANION GAP SERPL CALC-SCNC: 13 MMOL/L (ref 8–16)
BASOPHILS # BLD AUTO: 0.03 K/UL (ref 0–0.2)
BASOPHILS NFR BLD: 0.3 % (ref 0–1.9)
BUN SERPL-MCNC: 57 MG/DL (ref 8–23)
CALCIUM SERPL-MCNC: 8.4 MG/DL (ref 8.7–10.5)
CHLORIDE SERPL-SCNC: 106 MMOL/L (ref 95–110)
CO2 SERPL-SCNC: 22 MMOL/L (ref 23–29)
CREAT SERPL-MCNC: 1.9 MG/DL (ref 0.5–1.4)
DIFFERENTIAL METHOD: ABNORMAL
EOSINOPHIL # BLD AUTO: 0 K/UL (ref 0–0.5)
EOSINOPHIL NFR BLD: 0 % (ref 0–8)
ERYTHROCYTE [DISTWIDTH] IN BLOOD BY AUTOMATED COUNT: 14 % (ref 11.5–14.5)
EST. GFR  (AFRICAN AMERICAN): 35 ML/MIN/1.73 M^2
EST. GFR  (NON AFRICAN AMERICAN): 30 ML/MIN/1.73 M^2
GLUCOSE SERPL-MCNC: 163 MG/DL (ref 70–110)
HCT VFR BLD AUTO: 30.6 % (ref 40–54)
HGB BLD-MCNC: 9.3 G/DL (ref 14–18)
IMM GRANULOCYTES # BLD AUTO: 0.21 K/UL (ref 0–0.04)
IMM GRANULOCYTES NFR BLD AUTO: 1.8 % (ref 0–0.5)
LYMPHOCYTES # BLD AUTO: 0.6 K/UL (ref 1–4.8)
LYMPHOCYTES NFR BLD: 5.2 % (ref 18–48)
MCH RBC QN AUTO: 33.7 PG (ref 27–31)
MCHC RBC AUTO-ENTMCNC: 30.4 G/DL (ref 32–36)
MCV RBC AUTO: 111 FL (ref 82–98)
MONOCYTES # BLD AUTO: 0.7 K/UL (ref 0.3–1)
MONOCYTES NFR BLD: 6.5 % (ref 4–15)
NEUTROPHILS # BLD AUTO: 9.9 K/UL (ref 1.8–7.7)
NEUTROPHILS NFR BLD: 86.2 % (ref 38–73)
NRBC BLD-RTO: 2 /100 WBC
PLATELET # BLD AUTO: 131 K/UL (ref 150–450)
PMV BLD AUTO: 9.1 FL (ref 9.2–12.9)
POCT GLUCOSE: 184 MG/DL (ref 70–110)
POCT GLUCOSE: 196 MG/DL (ref 70–110)
POCT GLUCOSE: 199 MG/DL (ref 70–110)
POCT GLUCOSE: 200 MG/DL (ref 70–110)
POTASSIUM SERPL-SCNC: 4.4 MMOL/L (ref 3.5–5.1)
RBC # BLD AUTO: 2.76 M/UL (ref 4.6–6.2)
SODIUM SERPL-SCNC: 141 MMOL/L (ref 136–145)
WBC # BLD AUTO: 11.44 K/UL (ref 3.9–12.7)

## 2022-02-27 PROCEDURE — 80048 BASIC METABOLIC PNL TOTAL CA: CPT | Performed by: STUDENT IN AN ORGANIZED HEALTH CARE EDUCATION/TRAINING PROGRAM

## 2022-02-27 PROCEDURE — 94640 AIRWAY INHALATION TREATMENT: CPT

## 2022-02-27 PROCEDURE — 99233 SBSQ HOSP IP/OBS HIGH 50: CPT | Mod: ,,, | Performed by: INTERNAL MEDICINE

## 2022-02-27 PROCEDURE — 63600175 PHARM REV CODE 636 W HCPCS: Performed by: STUDENT IN AN ORGANIZED HEALTH CARE EDUCATION/TRAINING PROGRAM

## 2022-02-27 PROCEDURE — 27000221 HC OXYGEN, UP TO 24 HOURS

## 2022-02-27 PROCEDURE — 85025 COMPLETE CBC W/AUTO DIFF WBC: CPT | Performed by: STUDENT IN AN ORGANIZED HEALTH CARE EDUCATION/TRAINING PROGRAM

## 2022-02-27 PROCEDURE — 11000001 HC ACUTE MED/SURG PRIVATE ROOM

## 2022-02-27 PROCEDURE — 36415 COLL VENOUS BLD VENIPUNCTURE: CPT | Performed by: STUDENT IN AN ORGANIZED HEALTH CARE EDUCATION/TRAINING PROGRAM

## 2022-02-27 PROCEDURE — 25000242 PHARM REV CODE 250 ALT 637 W/ HCPCS: Performed by: HOSPITALIST

## 2022-02-27 PROCEDURE — 25000003 PHARM REV CODE 250: Performed by: PHYSICIAN ASSISTANT

## 2022-02-27 PROCEDURE — 63600175 PHARM REV CODE 636 W HCPCS: Performed by: PHYSICIAN ASSISTANT

## 2022-02-27 PROCEDURE — C9113 INJ PANTOPRAZOLE SODIUM, VIA: HCPCS | Performed by: STUDENT IN AN ORGANIZED HEALTH CARE EDUCATION/TRAINING PROGRAM

## 2022-02-27 PROCEDURE — 99233 PR SUBSEQUENT HOSPITAL CARE,LEVL III: ICD-10-PCS | Mod: ,,, | Performed by: INTERNAL MEDICINE

## 2022-02-27 PROCEDURE — 94760 N-INVAS EAR/PLS OXIMETRY 1: CPT

## 2022-02-27 PROCEDURE — 25000003 PHARM REV CODE 250: Performed by: STUDENT IN AN ORGANIZED HEALTH CARE EDUCATION/TRAINING PROGRAM

## 2022-02-27 RX ORDER — CARVEDILOL 12.5 MG/1
12.5 TABLET ORAL 2 TIMES DAILY
Status: DISCONTINUED | OUTPATIENT
Start: 2022-02-27 | End: 2022-03-06 | Stop reason: HOSPADM

## 2022-02-27 RX ORDER — EZETIMIBE 10 MG/1
10 TABLET ORAL NIGHTLY
Status: DISCONTINUED | OUTPATIENT
Start: 2022-02-27 | End: 2022-03-06 | Stop reason: HOSPADM

## 2022-02-27 RX ORDER — TAMSULOSIN HYDROCHLORIDE 0.4 MG/1
0.4 CAPSULE ORAL NIGHTLY
Status: DISCONTINUED | OUTPATIENT
Start: 2022-02-27 | End: 2022-03-06 | Stop reason: HOSPADM

## 2022-02-27 RX ADMIN — LEVALBUTEROL HYDROCHLORIDE 1.25 MG: 1.25 SOLUTION, CONCENTRATE RESPIRATORY (INHALATION) at 08:02

## 2022-02-27 RX ADMIN — TAMSULOSIN HYDROCHLORIDE 0.4 MG: 0.4 CAPSULE ORAL at 09:02

## 2022-02-27 RX ADMIN — METOROPROLOL TARTRATE 5 MG: 5 INJECTION, SOLUTION INTRAVENOUS at 09:02

## 2022-02-27 RX ADMIN — LEVALBUTEROL HYDROCHLORIDE 1.25 MG: 1.25 SOLUTION, CONCENTRATE RESPIRATORY (INHALATION) at 03:02

## 2022-02-27 RX ADMIN — PIPERACILLIN AND TAZOBACTAM 4.5 G: 4; .5 INJECTION, POWDER, LYOPHILIZED, FOR SOLUTION INTRAVENOUS; PARENTERAL at 09:02

## 2022-02-27 RX ADMIN — METHYLPREDNISOLONE SODIUM SUCCINATE 60 MG: 40 INJECTION, POWDER, FOR SOLUTION INTRAMUSCULAR; INTRAVENOUS at 03:02

## 2022-02-27 RX ADMIN — PANTOPRAZOLE SODIUM 40 MG: 40 INJECTION, POWDER, FOR SOLUTION INTRAVENOUS at 09:02

## 2022-02-27 RX ADMIN — PIPERACILLIN AND TAZOBACTAM 4.5 G: 4; .5 INJECTION, POWDER, LYOPHILIZED, FOR SOLUTION INTRAVENOUS; PARENTERAL at 12:02

## 2022-02-27 RX ADMIN — PIPERACILLIN AND TAZOBACTAM 4.5 G: 4; .5 INJECTION, POWDER, LYOPHILIZED, FOR SOLUTION INTRAVENOUS; PARENTERAL at 04:02

## 2022-02-27 RX ADMIN — CARVEDILOL 12.5 MG: 12.5 TABLET, FILM COATED ORAL at 09:02

## 2022-02-27 RX ADMIN — SACUBITRIL AND VALSARTAN 1 TABLET: 24; 26 TABLET, FILM COATED ORAL at 09:02

## 2022-02-27 RX ADMIN — EZETIMIBE 10 MG: 10 TABLET ORAL at 09:02

## 2022-02-27 RX ADMIN — LEVALBUTEROL HYDROCHLORIDE 1.25 MG: 1.25 SOLUTION, CONCENTRATE RESPIRATORY (INHALATION) at 11:02

## 2022-02-27 NOTE — PROGRESS NOTES
GI Progress Note - 2/27/2022   See GI consult note for full H&P      Subjective:   Patient seen and examined at bedside.   Son at bedside. No melena overnight. Hgb stabilized this morning.   Still on 3 L NC.  Passing gas, had one dark BM yesterday afternoon.    Objective:    Vital signs in last 24 hours:  Temp:  [98.1 °F (36.7 °C)-98.7 °F (37.1 °C)] 98.2 °F (36.8 °C)  Pulse:  [] 82  Resp:  [17-20] 20  SpO2:  [91 %-97 %] 92 %  BP: (130-168)/(58-85) 140/64    Intake/Output last 3 shifts:  I/O last 3 completed shifts:  In: 560 [P.O.:560]  Out: 4 [Urine:4]  Intake/Output this shift:  No intake/output data recorded.    Gen: obese man, laying in bed  Heart: RRR, no murmurs, rub or gallops appreciated  Lung: on 3 L NC  Abdomen: NTND, BS present, soft   Ext: 2+ pulses, no lower ext. edema  Neuro: A&O X 3       Recent Labs   Lab 02/25/22 0428 02/26/22  0531 02/27/22  0410   WBC 12.98* 9.76 11.44   HGB 10.0* 8.8* 9.3*   HCT 31.6* 28.8* 30.6*   * 114* 131*   * 109* 111*      Recent Labs   Lab 02/25/22  0428 02/26/22  0717 02/27/22  0410    141 141   K 4.9 4.4 4.4    105 106   CO2 26 27 22*   BUN 45* 58* 57*   CALCIUM 8.6* 8.3* 8.4*   PHOS 2.5*  --   --      Recent Labs   Lab 02/23/22  2342 02/24/22  1828 02/25/22  0428   AST 14 16 18   ALT 10 9* 9*   ALKPHOS 85 70 65   BILITOT 0.6 0.5 0.5       Scheduled Meds:   levalbuterol  1.25 mg Nebulization Q8H    methylPREDNISolone sodium succinate injection  60 mg Intravenous Q12H    metoprolol  5 mg Intravenous Q12H    pantoprazole  40 mg Intravenous BID    piperacillin-tazobactam (ZOSYN) IVPB  4.5 g Intravenous Q8H     Continuous Infusions:      :Imaging:  CT ABDOMEN PELVIS WITHOUT CONTRAST 2/24/22  High-grade partial versus early complete small bowel obstruction.  The stomach is significantly distended.  Transition zone somewhere in the mid abdomen.     Small amount of free fluid in the left upper quadrant adjacent to the stomach and left  diaphragm.     The gallbladder is distended.  There is some very subtle pericholecystic inflammation which could suggest a cholecystitis.  Consider further evaluation with a right upper quadrant ultrasound.     Polycystic kidney disease.     ASSESSMENT/PLAN:  89 y.o. male who is DNR with FRIDA wears CPAP nightly, HTN, HLD, CAD s/p stents, history of ventricular fibrillation s/p ICD with recent defibrillator battery change in [place], polycystic kidney disease who initially presented with nausea, vomiting, and abdominal pain. CT imaging without contrast at outside hospital with distended stomach, partial or complete SBO, and distended gallbladder. NGT placement attempts were unsuccessful and patient developed epistaxis thereafter which has resolved. GI is consulted for melena which developed on 2/25 and downtrending hemoglobin.     The patient is currently on 3 L NC; CXR is showing left sided pleural effusion. He is receiving IV steroids and IV diuresis along with nebulizer treatments.     He had a mild leukocytosis (12.9), now resolved, downtrending hgb which has now stabilized (currently 9.3 from 8.8 from 10 and was 13 2 days prior) and known thrombocytopenia. BUN is elevated but patient also with HERMELINDA on CKD.     Differentials for the patient's melena include swallowed blood from recent epistaxis, NGT trauma from multiple unsuccessful attempts at NGT placement on 2/24, peptic ulcer disease, or esophagitis.     Given the patient's pulmonary status requiring 3 L NC and labored breathing, his overall age and co-morbidities, as well as distended stomach on imaging with concern for SBO on initial imaging - I believe proceeding with upper endoscopy at this time would be high risk for the patient. I have discussed his case with his DPOA Treva and also son Jian. Due to risks of anesthesia and endoscopy, the family would like to defer endoscopic evaluation of melena and treat supportively with PPI unless patient develops a  life-threatening GI bleed.    His hemoglobin has stabilized, melena is resolving.  Recommend empiric treatment with IV PPI while inpatient and consider NGT placement if any vomiting. Advance diet slowly as tolerated. Recommend protonix 40 BID on discharge for 6-8 weeks.    GI will sign off.  Please page with questions.      Rex Robles  Gastroenterology   [facility]

## 2022-02-27 NOTE — ASSESSMENT & PLAN NOTE
-Patient with melena   -Suspect 2/2 to recent traumatic NGT placement given pt had epistaxis and coughing up blood prior to arrival  -Monitor H/H: stable at this time  -GI consulted: empiric treatment with IV PPI while inpatient. protonix 40 BID on discharge for 6-8 weeks.

## 2022-02-27 NOTE — ASSESSMENT & PLAN NOTE
Will resume home coreg, now tolerating PO intake  Currently rate controlled.   Not on anticoagulation outpatient

## 2022-02-27 NOTE — ASSESSMENT & PLAN NOTE
"CT on 2/24 at [facility] with "High-grade partial versus early complete small bowel obstruction.  The stomach is significantly distended.  Transition zone somewhere in the mid abdomen." evaluated by surgery there and recommended for transfer for higher level of care. NG tube unable to be placed at [place].    -Surgery consulted: as without N/V, hold on further NG tube placement. Advance diet as tolerated. No plans for sx  -GI consulted: empiric tx with IV PPI and consider NGT placement if any vomiting.  -Pt now with flatus and bowel movements  -Ordered XR abdomen   -Will advance diet as tolerated  "

## 2022-02-27 NOTE — PROGRESS NOTES
[facility] Medicine  Progress Note    Patient Name: Reese Bell  MRN: [id number]  Patient Class: IP- Inpatient   Admission Date: 2/24/2022  Length of Stay: 3 days  Attending Physician: Patricia Jenkins DO  Primary Care Provider: Oracio Johnson MD        Subjective:     Principal Problem:SBO (small bowel obstruction)        HPI:  Reese Bell 89 y.o. male with for hypertension, hyperlipidemia, DJD, CAD post cardiac stenting, FRIDA- CPAP, thrombocytopenia, history of ventricular fibrillation s/p ICD and recent defibrillator battery changed on Monday by Dr. De La Cruz in [place] presents to the hospital with a chief complaint of abdominal pain worst at the bottom of his abdomen without radiation.  He reports yesterday he developed sudden onset diffuse abdominal pain described as a pressure with associated nausea and vomiting.  Presented to an outside hospital where he was admitted.  He denies any worsening or alleviating factors for pain.  He now states his abdominal pain has resolved he has had no further nausea vomiting since arrival from outside hospital.  The symptoms never occurred before.  He had an appendectomy and a child but denies other abdominal surgeries.  He denies any current complaints.    In the emergency room and seen and, creatinine 1.8, CT abdomen with high-grade partial versus complete small bowel obstruction, troponin negative, BNP negative, COVID negative, NG tube placement attempted but unsuccessful chest x-ray showed placement to right lower lobe.  NG tube was removed.  Seen by surgery who recommended transfer for higher level care.      Overview/Hospital Course:  89-year-old white male transferred to [facility] from [facility] to be admitted on 02/24/2022 for small bowel obstruction and needed cardiac and surgery consultants. Patient with extensive past medical history (HTN, hyperlipidemia, DJD, CAD post cardiac stenting, FRIDA- CPAP, thrombocytopenia,  history of ventricular fibrillation s/p ICD and recent defibrillator battery changed on Monday by Dr. De La Cruz in [place] ). Of note,  patient is nonambulatory, W/C bound at baseline. Lives at home with his daughter MINNIEF, and siblings alternate watching him on the weekend. Plenty of family support    CT abdomen with high-grade partial versus early complete small bowel obstruction.  The stomach is significantly distended. CXR w/ opacification at the left lung base probably a combination of atelectasis and pleural effusion.  Of note, patient failed multiple attempts of NGT placement at [place]. CXR from [place] showed enteric tube appearing to terminate in the region of the right lower lobe, which later was removed. Cardiology and surgery consulted. Holding off on NGT per surgery at this time, given patient without nausea and vomiting. Cardiology recommend no further ischemic workup needed at this time. Patient has had loose, very dark bowel movement since admission.  Suspect dark stools due to recent NGT trauma and patient aspirating on the blood. Hemoglobin down trended but then stabilized. GI consulted for further evaluation and recommends monitoring and empiric treatment with PPI. Patient continues on 3LNC, will wean as tolerated. CT chest WO showed likely atelectasis. Patient tolerating home CPAP. Patient with flatus and bowel movements. Advanced diet as tolerated. Continue abx for possible aspiration pneumonia.       Interval History: Son Jian at bedside. Dark black stools are slowing down. No longer coughing up blood. Admits shortness of breath is improving. Tolerated CPAP last night.  Admits wheezing. No chest pain. No more nausea or vomiting.     Review of Systems   Constitutional:  Negative for chills and fever.   HENT:  Negative for nosebleeds (resolved) and tinnitus.    Eyes:  Negative for visual disturbance.   Respiratory:  Positive for shortness of breath (improving). Negative for chest tightness and wheezing.     Cardiovascular:  Negative for chest pain, palpitations and leg swelling.   Gastrointestinal:  Positive for abdominal distention (improving per patient and family). Negative for abdominal pain, blood in stool (no obvious blood in stool but says its dark. improving), constipation, nausea and vomiting.   Genitourinary:  Negative for dysuria, flank pain and hematuria.   Musculoskeletal:  Negative for joint swelling.   Skin:  Negative for rash and wound.   Neurological:  Negative for dizziness, syncope, weakness and headaches.   Psychiatric/Behavioral:  Negative for confusion and hallucinations.    Objective:     Vital Signs (Most Recent):  Temp: 98.2 °F (36.8 °C) (02/27/22 1154)  Pulse: 80 (02/27/22 1154)  Resp: 20 (02/27/22 1154)  BP: (!) 173/77 (02/27/22 1154)  SpO2: (!) 94 % (02/27/22 1154)   Vital Signs (24h Range):  Temp:  [98.1 °F (36.7 °C)-98.7 °F (37.1 °C)] 98.2 °F (36.8 °C)  Pulse:  [] 80  Resp:  [17-20] 20  SpO2:  [91 %-97 %] 94 %  BP: (130-173)/(58-78) 173/77     Weight: 106.5 kg (234 lb 12.6 oz)  Body mass index is 40.3 kg/m².    Intake/Output Summary (Last 24 hours) at 2/27/2022 1244  Last data filed at 2/27/2022 0600  Gross per 24 hour   Intake 320 ml   Output 4 ml   Net 316 ml        Physical Exam  Vitals and nursing note reviewed.   Constitutional:       General: He is not in acute distress.     Appearance: He is obese. He is not toxic-appearing.      Comments: Appears clinically better today. No distress. No conversational dyspnea    HENT:      Head: Normocephalic and atraumatic.      Right Ear: External ear normal.      Left Ear: External ear normal.      Ears:      Comments: Slightly hard of hearing  Eyes:      General:         Right eye: No discharge.         Left eye: No discharge.      Conjunctiva/sclera: Conjunctivae normal.   Neck:      Thyroid: No thyromegaly.   Cardiovascular:      Rate and Rhythm: Normal rate and regular rhythm.      Heart sounds: No murmur heard.     Comments: AICD  "chest wall, skin overlying it appears bruised.   Pulmonary:      Effort: No respiratory distress.      Breath sounds: Decreased breath sounds and wheezing (expiratory wheezesz in all lung fields) present. No rales.      Comments: Breathing improved today. Patient currently on 3L NC with sats at 92-94%. Lungs with diminished breath sounds throughout, but left more than right. No conversational dyspnea today  Abdominal:      General: There is distension.      Palpations: Abdomen is soft. There is no mass.      Tenderness: There is no abdominal tenderness. There is no guarding or rebound.      Comments: Bowel sounds present. Soft, non-tender no rebound   Musculoskeletal:         General: No deformity.      Cervical back: Normal range of motion and neck supple.      Right lower leg: No edema.      Left lower leg: No edema.   Skin:     General: Skin is warm and dry.   Neurological:      Mental Status: He is alert and oriented to person, place, and time.      Sensory: No sensory deficit.   Psychiatric:         Mood and Affect: Mood normal.         Behavior: Behavior normal.         Cognition and Memory: Cognition and memory normal.      Comments: Very sharp memory       Significant Labs: All pertinent labs within the past 24 hours have been reviewed.    Significant Imaging: I have reviewed all pertinent imaging results/findings within the past 24 hours.      Assessment/Plan:      * SBO (small bowel obstruction)  CT on 2/24 at [facility] with "High-grade partial versus early complete small bowel obstruction.  The stomach is significantly distended.  Transition zone somewhere in the mid abdomen." evaluated by surgery there and recommended for transfer for higher level of care. NG tube unable to be placed at [place].    -Surgery consulted: as without N/V, hold on further NG tube placement. Advance diet as tolerated. No plans for sx  -GI consulted: empiric tx with IV PPI and consider NGT placement if any vomiting.  -Pt now with " flatus and bowel movements  -Ordered XR abdomen   -Will advance diet as tolerated    CHF (congestive heart failure), NYHA class II, chronic, systolic  Echo in 2019 with EF of 60% and normal ventricular diastolic function   On laisx 20 BID at home.   Will hold lasix at this time  CT chest with atelectasis.  Continue supplemental O2, wean as tolerated.    Echo: EF 65%, normal systolic and diastolic function    Resume home entresto  Daily weights  strict intake and output  telemetry    Coronary artery disease involving native coronary artery of native heart without angina pectoris  Denies any chest pain.   Will hold aspirin in setting of melena  Resume home coreg/zetia   Monitor on telemetry  Cardiology consulted for Pre-Op evaluation should surgery be required for SBO    Stage 3 chronic kidney disease  Baseline Cr of 1.5-1.9.  Cr at baseline  Renal dose medications   avoid nephrotoxic drugs   Caution with diuresis  monitor intake and output    Permanent atrial fibrillation  Will resume home coreg, now tolerating PO intake  Currently rate controlled.   Not on anticoagulation outpatient    Essential hypertension  Fair controlled   Resume home coreg and entresto    Hyperlipidemia  Resume home zetia   Pt takes Crestor 10mg MWF    FRIDA (obstructive sleep apnea)  Continue home CPAP if able    Dementia in Alzheimer's disease  Stable, at baseline mental status per family  Delirium precautions  Holding home namenda at this time    Benign prostatic hyperplasia  Resume home flomax      Aspiration pneumonia  -He had a mild leukocytosis on admission  -Recent traumatic NGT at [place] (CXR showed NGT in the region of the RLL)  -Presented with epistaxis and coughing up blood  -Repeat CXR w/mild superimposed right perihilar and infrahilar infiltrate, with some mild atelectasis at the right base as well.  There is continued opacity at the left lung base  -Started on Zosyn for possible aspiration pna from the blood  -Day 3 of  [place]    Melena  -Patient with melena   -Suspect 2/2 to recent traumatic NGT placement given pt had epistaxis and coughing up blood prior to arrival  -Monitor H/H: stable at this time  -GI consulted: empiric treatment with IV PPI while inpatient. protonix 40 BID on discharge for 6-8 weeks.    Advanced care planning/counseling discussion  Discussed code status with son at bedside and patient.  Son Jian confirmed patient is DNR. He has brought his living will to the hospital.   Greater than 16 minutes spent discussing code status.   DNR order placed in chart      VTE Risk Mitigation (From admission, onward)         Ordered     Place CHRISTOPHER hose  Until discontinued         02/24/22 1712     IP VTE HIGH RISK PATIENT  Once         02/24/22 1712     Place sequential compression device  Until discontinued         02/24/22 1712                Discharge Planning   LOUISE:      Code Status: DNR   Is the patient medically ready for discharge?:     Reason for patient still in hospital (select all that apply): Patient trending condition, Treatment, Imaging and Consult recommendations  Discharge Plan A: Home                  Patricia Jenkins DO  Department of Hospital Medicine   [facility] Surg

## 2022-02-27 NOTE — ASSESSMENT & PLAN NOTE
Stable, at baseline mental status per family  Delirium precautions  Holding home namenda at this time

## 2022-02-27 NOTE — SUBJECTIVE & OBJECTIVE
Interval History: Son Jian at bedside. Dark black stools are slowing down. No longer coughing up blood. Admits shortness of breath is improving. Tolerated CPAP last night.  Admits wheezing. No chest pain. No more nausea or vomiting.     Review of Systems   Constitutional:  Negative for chills and fever.   HENT:  Negative for nosebleeds (resolved) and tinnitus.    Eyes:  Negative for visual disturbance.   Respiratory:  Positive for shortness of breath (improving). Negative for chest tightness and wheezing.    Cardiovascular:  Negative for chest pain, palpitations and leg swelling.   Gastrointestinal:  Positive for abdominal distention (improving per patient and family). Negative for abdominal pain, blood in stool (no obvious blood in stool but says its dark. improving), constipation, nausea and vomiting.   Genitourinary:  Negative for dysuria, flank pain and hematuria.   Musculoskeletal:  Negative for joint swelling.   Skin:  Negative for rash and wound.   Neurological:  Negative for dizziness, syncope, weakness and headaches.   Psychiatric/Behavioral:  Negative for confusion and hallucinations.    Objective:     Vital Signs (Most Recent):  Temp: 98.2 °F (36.8 °C) (02/27/22 1154)  Pulse: 80 (02/27/22 1154)  Resp: 20 (02/27/22 1154)  BP: (!) 173/77 (02/27/22 1154)  SpO2: (!) 94 % (02/27/22 1154)   Vital Signs (24h Range):  Temp:  [98.1 °F (36.7 °C)-98.7 °F (37.1 °C)] 98.2 °F (36.8 °C)  Pulse:  [] 80  Resp:  [17-20] 20  SpO2:  [91 %-97 %] 94 %  BP: (130-173)/(58-78) 173/77     Weight: 106.5 kg (234 lb 12.6 oz)  Body mass index is 40.3 kg/m².    Intake/Output Summary (Last 24 hours) at 2/27/2022 1244  Last data filed at 2/27/2022 0600  Gross per 24 hour   Intake 320 ml   Output 4 ml   Net 316 ml        Physical Exam  Vitals and nursing note reviewed.   Constitutional:       General: He is not in acute distress.     Appearance: He is obese. He is not toxic-appearing.      Comments: Appears clinically better today.  No distress. No conversational dyspnea    HENT:      Head: Normocephalic and atraumatic.      Right Ear: External ear normal.      Left Ear: External ear normal.      Ears:      Comments: Slightly hard of hearing  Eyes:      General:         Right eye: No discharge.         Left eye: No discharge.      Conjunctiva/sclera: Conjunctivae normal.   Neck:      Thyroid: No thyromegaly.   Cardiovascular:      Rate and Rhythm: Normal rate and regular rhythm.      Heart sounds: No murmur heard.     Comments: AICD chest wall, skin overlying it appears bruised.   Pulmonary:      Effort: No respiratory distress.      Breath sounds: Decreased breath sounds and wheezing (expiratory wheezesz in all lung fields) present. No rales.      Comments: Breathing improved today. Patient currently on 3L NC with sats at 92-94%. Lungs with diminished breath sounds throughout, but left more than right. No conversational dyspnea today  Abdominal:      General: There is distension.      Palpations: Abdomen is soft. There is no mass.      Tenderness: There is no abdominal tenderness. There is no guarding or rebound.      Comments: Bowel sounds present. Soft, non-tender no rebound   Musculoskeletal:         General: No deformity.      Cervical back: Normal range of motion and neck supple.      Right lower leg: No edema.      Left lower leg: No edema.   Skin:     General: Skin is warm and dry.   Neurological:      Mental Status: He is alert and oriented to person, place, and time.      Sensory: No sensory deficit.   Psychiatric:         Mood and Affect: Mood normal.         Behavior: Behavior normal.         Cognition and Memory: Cognition and memory normal.      Comments: Very sharp memory       Significant Labs: All pertinent labs within the past 24 hours have been reviewed.    Significant Imaging: I have reviewed all pertinent imaging results/findings within the past 24 hours.

## 2022-02-27 NOTE — ASSESSMENT & PLAN NOTE
Echo in 2019 with EF of 60% and normal ventricular diastolic function   On laisx 20 BID at home.   Will hold lasix at this time  CT chest with atelectasis.  Continue supplemental O2, wean as tolerated.    Echo: EF 65%, normal systolic and diastolic function    Resume home entresto  Daily weights  strict intake and output  telemetry

## 2022-02-27 NOTE — ASSESSMENT & PLAN NOTE
Denies any chest pain.   Will hold aspirin in setting of melena  Resume home coreg/zetia   Monitor on telemetry  Cardiology consulted for Pre-Op evaluation should surgery be required for SBO

## 2022-02-27 NOTE — ASSESSMENT & PLAN NOTE
-He had a mild leukocytosis on admission  -Recent traumatic NGT at [place] (CXR showed NGT in the region of the RLL)  -Presented with epistaxis and coughing up blood  -Repeat CXR w/mild superimposed right perihilar and infrahilar infiltrate, with some mild atelectasis at the right base as well.  There is continued opacity at the left lung base  -Started on Zosyn for possible aspiration pna from the blood  -Day 3 of Zosyn

## 2022-02-28 LAB
ALLENS TEST: ABNORMAL
ANION GAP SERPL CALC-SCNC: 12 MMOL/L (ref 8–16)
ANION GAP SERPL CALC-SCNC: 9 MMOL/L (ref 8–16)
BASOPHILS # BLD AUTO: 0.03 K/UL (ref 0–0.2)
BASOPHILS NFR BLD: 0.3 % (ref 0–1.9)
BNP SERPL-MCNC: 492 PG/ML (ref 0–99)
BUN SERPL-MCNC: 50 MG/DL (ref 8–23)
BUN SERPL-MCNC: 54 MG/DL (ref 8–23)
CALCIUM SERPL-MCNC: 8.2 MG/DL (ref 8.7–10.5)
CALCIUM SERPL-MCNC: 8.5 MG/DL (ref 8.7–10.5)
CHLORIDE SERPL-SCNC: 106 MMOL/L (ref 95–110)
CHLORIDE SERPL-SCNC: 107 MMOL/L (ref 95–110)
CO2 SERPL-SCNC: 24 MMOL/L (ref 23–29)
CO2 SERPL-SCNC: 26 MMOL/L (ref 23–29)
CREAT SERPL-MCNC: 1.8 MG/DL (ref 0.5–1.4)
CREAT SERPL-MCNC: 1.9 MG/DL (ref 0.5–1.4)
DELSYS: ABNORMAL
DIFFERENTIAL METHOD: ABNORMAL
EOSINOPHIL # BLD AUTO: 0 K/UL (ref 0–0.5)
EOSINOPHIL NFR BLD: 0 % (ref 0–8)
ERYTHROCYTE [DISTWIDTH] IN BLOOD BY AUTOMATED COUNT: 14 % (ref 11.5–14.5)
EST. GFR  (AFRICAN AMERICAN): 35 ML/MIN/1.73 M^2
EST. GFR  (AFRICAN AMERICAN): 37 ML/MIN/1.73 M^2
EST. GFR  (NON AFRICAN AMERICAN): 30 ML/MIN/1.73 M^2
EST. GFR  (NON AFRICAN AMERICAN): 32 ML/MIN/1.73 M^2
FLOW: 10
GLUCOSE SERPL-MCNC: 145 MG/DL (ref 70–110)
GLUCOSE SERPL-MCNC: 207 MG/DL (ref 70–110)
HCO3 UR-SCNC: 29.8 MMOL/L (ref 24–28)
HCT VFR BLD AUTO: 30.6 % (ref 40–54)
HGB BLD-MCNC: 9.5 G/DL (ref 14–18)
IMM GRANULOCYTES # BLD AUTO: 0.24 K/UL (ref 0–0.04)
IMM GRANULOCYTES NFR BLD AUTO: 2.2 % (ref 0–0.5)
LYMPHOCYTES # BLD AUTO: 0.6 K/UL (ref 1–4.8)
LYMPHOCYTES NFR BLD: 5.1 % (ref 18–48)
MCH RBC QN AUTO: 33.8 PG (ref 27–31)
MCHC RBC AUTO-ENTMCNC: 31 G/DL (ref 32–36)
MCV RBC AUTO: 109 FL (ref 82–98)
MODE: ABNORMAL
MONOCYTES # BLD AUTO: 1.1 K/UL (ref 0.3–1)
MONOCYTES NFR BLD: 10.5 % (ref 4–15)
NEUTROPHILS # BLD AUTO: 8.7 K/UL (ref 1.8–7.7)
NEUTROPHILS NFR BLD: 81.9 % (ref 38–73)
NRBC BLD-RTO: 2 /100 WBC
PCO2 BLDA: 68.5 MMHG (ref 35–45)
PH SMN: 7.25 [PH] (ref 7.35–7.45)
PLATELET # BLD AUTO: 123 K/UL (ref 150–450)
PMV BLD AUTO: 8.8 FL (ref 9.2–12.9)
PO2 BLDA: 266 MMHG (ref 80–100)
POC BE: 1 MMOL/L
POC SATURATED O2: 100 % (ref 95–100)
POC TCO2: 32 MMOL/L (ref 23–27)
POCT GLUCOSE: 143 MG/DL (ref 70–110)
POCT GLUCOSE: 159 MG/DL (ref 70–110)
POCT GLUCOSE: 182 MG/DL (ref 70–110)
POCT GLUCOSE: 223 MG/DL (ref 70–110)
POTASSIUM SERPL-SCNC: 4 MMOL/L (ref 3.5–5.1)
POTASSIUM SERPL-SCNC: 4.1 MMOL/L (ref 3.5–5.1)
RBC # BLD AUTO: 2.81 M/UL (ref 4.6–6.2)
SAMPLE: ABNORMAL
SITE: ABNORMAL
SODIUM SERPL-SCNC: 142 MMOL/L (ref 136–145)
SODIUM SERPL-SCNC: 142 MMOL/L (ref 136–145)
SP02: 99
WBC # BLD AUTO: 10.68 K/UL (ref 3.9–12.7)

## 2022-02-28 PROCEDURE — 27000190 HC CPAP FULL FACE MASK W/VALVE

## 2022-02-28 PROCEDURE — 25000003 PHARM REV CODE 250: Performed by: PHYSICIAN ASSISTANT

## 2022-02-28 PROCEDURE — 63600175 PHARM REV CODE 636 W HCPCS: Performed by: STUDENT IN AN ORGANIZED HEALTH CARE EDUCATION/TRAINING PROGRAM

## 2022-02-28 PROCEDURE — C9113 INJ PANTOPRAZOLE SODIUM, VIA: HCPCS | Performed by: STUDENT IN AN ORGANIZED HEALTH CARE EDUCATION/TRAINING PROGRAM

## 2022-02-28 PROCEDURE — 83880 ASSAY OF NATRIURETIC PEPTIDE: CPT | Performed by: PHYSICIAN ASSISTANT

## 2022-02-28 PROCEDURE — 25000003 PHARM REV CODE 250: Performed by: STUDENT IN AN ORGANIZED HEALTH CARE EDUCATION/TRAINING PROGRAM

## 2022-02-28 PROCEDURE — 36415 COLL VENOUS BLD VENIPUNCTURE: CPT | Performed by: PHYSICIAN ASSISTANT

## 2022-02-28 PROCEDURE — 94760 N-INVAS EAR/PLS OXIMETRY 1: CPT

## 2022-02-28 PROCEDURE — 80048 BASIC METABOLIC PNL TOTAL CA: CPT | Performed by: STUDENT IN AN ORGANIZED HEALTH CARE EDUCATION/TRAINING PROGRAM

## 2022-02-28 PROCEDURE — 25000242 PHARM REV CODE 250 ALT 637 W/ HCPCS: Performed by: PHYSICIAN ASSISTANT

## 2022-02-28 PROCEDURE — 85025 COMPLETE CBC W/AUTO DIFF WBC: CPT | Performed by: STUDENT IN AN ORGANIZED HEALTH CARE EDUCATION/TRAINING PROGRAM

## 2022-02-28 PROCEDURE — 11000001 HC ACUTE MED/SURG PRIVATE ROOM

## 2022-02-28 PROCEDURE — 94640 AIRWAY INHALATION TREATMENT: CPT

## 2022-02-28 PROCEDURE — 94644 CONT INHLJ TX 1ST HOUR: CPT

## 2022-02-28 PROCEDURE — 82803 BLOOD GASES ANY COMBINATION: CPT

## 2022-02-28 PROCEDURE — 99900035 HC TECH TIME PER 15 MIN (STAT)

## 2022-02-28 PROCEDURE — 25000242 PHARM REV CODE 250 ALT 637 W/ HCPCS: Performed by: HOSPITALIST

## 2022-02-28 PROCEDURE — 63600175 PHARM REV CODE 636 W HCPCS: Performed by: PHYSICIAN ASSISTANT

## 2022-02-28 PROCEDURE — 27000221 HC OXYGEN, UP TO 24 HOURS

## 2022-02-28 PROCEDURE — 25000242 PHARM REV CODE 250 ALT 637 W/ HCPCS: Performed by: STUDENT IN AN ORGANIZED HEALTH CARE EDUCATION/TRAINING PROGRAM

## 2022-02-28 PROCEDURE — 80048 BASIC METABOLIC PNL TOTAL CA: CPT | Mod: 91 | Performed by: PHYSICIAN ASSISTANT

## 2022-02-28 PROCEDURE — 36415 COLL VENOUS BLD VENIPUNCTURE: CPT | Performed by: STUDENT IN AN ORGANIZED HEALTH CARE EDUCATION/TRAINING PROGRAM

## 2022-02-28 PROCEDURE — 36600 WITHDRAWAL OF ARTERIAL BLOOD: CPT

## 2022-02-28 RX ORDER — ALBUTEROL SULFATE 2.5 MG/.5ML
10 SOLUTION RESPIRATORY (INHALATION) ONCE
Status: COMPLETED | OUTPATIENT
Start: 2022-02-28 | End: 2022-02-28

## 2022-02-28 RX ORDER — IPRATROPIUM BROMIDE 0.5 MG/2.5ML
0.5 SOLUTION RESPIRATORY (INHALATION) ONCE
Status: COMPLETED | OUTPATIENT
Start: 2022-02-28 | End: 2022-02-28

## 2022-02-28 RX ORDER — IPRATROPIUM BROMIDE AND ALBUTEROL SULFATE 2.5; .5 MG/3ML; MG/3ML
3 SOLUTION RESPIRATORY (INHALATION) ONCE
Status: COMPLETED | OUTPATIENT
Start: 2022-02-28 | End: 2022-02-28

## 2022-02-28 RX ORDER — IPRATROPIUM BROMIDE AND ALBUTEROL SULFATE 2.5; .5 MG/3ML; MG/3ML
3 SOLUTION RESPIRATORY (INHALATION)
Status: DISCONTINUED | OUTPATIENT
Start: 2022-02-28 | End: 2022-02-28

## 2022-02-28 RX ORDER — FUROSEMIDE 10 MG/ML
60 INJECTION INTRAMUSCULAR; INTRAVENOUS ONCE
Status: COMPLETED | OUTPATIENT
Start: 2022-02-28 | End: 2022-02-28

## 2022-02-28 RX ORDER — METHYLPREDNISOLONE SOD SUCC 125 MG
80 VIAL (EA) INJECTION ONCE
Status: COMPLETED | OUTPATIENT
Start: 2022-02-28 | End: 2022-02-28

## 2022-02-28 RX ORDER — IPRATROPIUM BROMIDE AND ALBUTEROL SULFATE 2.5; .5 MG/3ML; MG/3ML
3 SOLUTION RESPIRATORY (INHALATION) EVERY 4 HOURS
Status: DISCONTINUED | OUTPATIENT
Start: 2022-02-28 | End: 2022-03-06 | Stop reason: HOSPADM

## 2022-02-28 RX ADMIN — LEVALBUTEROL HYDROCHLORIDE 1.25 MG: 1.25 SOLUTION, CONCENTRATE RESPIRATORY (INHALATION) at 07:02

## 2022-02-28 RX ADMIN — PIPERACILLIN AND TAZOBACTAM 4.5 G: 4; .5 INJECTION, POWDER, LYOPHILIZED, FOR SOLUTION INTRAVENOUS; PARENTERAL at 12:02

## 2022-02-28 RX ADMIN — CARVEDILOL 12.5 MG: 12.5 TABLET, FILM COATED ORAL at 08:02

## 2022-02-28 RX ADMIN — IPRATROPIUM BROMIDE AND ALBUTEROL SULFATE 3 ML: 2.5; .5 SOLUTION RESPIRATORY (INHALATION) at 11:02

## 2022-02-28 RX ADMIN — PIPERACILLIN AND TAZOBACTAM 4.5 G: 4; .5 INJECTION, POWDER, LYOPHILIZED, FOR SOLUTION INTRAVENOUS; PARENTERAL at 04:02

## 2022-02-28 RX ADMIN — PANTOPRAZOLE SODIUM 40 MG: 40 INJECTION, POWDER, FOR SOLUTION INTRAVENOUS at 08:02

## 2022-02-28 RX ADMIN — IPRATROPIUM BROMIDE AND ALBUTEROL SULFATE 3 ML: 2.5; .5 SOLUTION RESPIRATORY (INHALATION) at 08:02

## 2022-02-28 RX ADMIN — METHYLPREDNISOLONE SODIUM SUCCINATE 80 MG: 125 INJECTION, POWDER, FOR SOLUTION INTRAMUSCULAR; INTRAVENOUS at 09:02

## 2022-02-28 RX ADMIN — IPRATROPIUM BROMIDE 1 MG: 0.5 SOLUTION RESPIRATORY (INHALATION) at 09:02

## 2022-02-28 RX ADMIN — ALBUTEROL SULFATE 10 MG: 2.5 SOLUTION RESPIRATORY (INHALATION) at 09:02

## 2022-02-28 RX ADMIN — TAMSULOSIN HYDROCHLORIDE 0.4 MG: 0.4 CAPSULE ORAL at 10:02

## 2022-02-28 RX ADMIN — CARVEDILOL 12.5 MG: 12.5 TABLET, FILM COATED ORAL at 10:02

## 2022-02-28 RX ADMIN — METHYLPREDNISOLONE SODIUM SUCCINATE 40 MG: 40 INJECTION, POWDER, FOR SOLUTION INTRAMUSCULAR; INTRAVENOUS at 08:02

## 2022-02-28 RX ADMIN — FUROSEMIDE 60 MG: 10 INJECTION, SOLUTION INTRAVENOUS at 10:02

## 2022-02-28 RX ADMIN — EZETIMIBE 10 MG: 10 TABLET ORAL at 10:02

## 2022-02-28 RX ADMIN — PANTOPRAZOLE SODIUM 40 MG: 40 INJECTION, POWDER, FOR SOLUTION INTRAVENOUS at 10:02

## 2022-02-28 RX ADMIN — IPRATROPIUM BROMIDE AND ALBUTEROL SULFATE 3 ML: 2.5; .5 SOLUTION RESPIRATORY (INHALATION) at 03:02

## 2022-02-28 RX ADMIN — SACUBITRIL AND VALSARTAN 1 TABLET: 24; 26 TABLET, FILM COATED ORAL at 08:02

## 2022-02-28 RX ADMIN — PIPERACILLIN AND TAZOBACTAM 4.5 G: 4; .5 INJECTION, POWDER, LYOPHILIZED, FOR SOLUTION INTRAVENOUS; PARENTERAL at 08:02

## 2022-02-28 NOTE — ASSESSMENT & PLAN NOTE
"CT on 2/24 at [facility] with "High-grade partial versus early complete small bowel obstruction.  The stomach is significantly distended.  Transition zone somewhere in the mid abdomen." evaluated by surgery there and recommended for transfer for higher level of care. NG tube unable to be placed at [facility].    -Surgery consulted: hold on further NG tube placement. Advance diet as tolerated. No plans for sx  -GI consulted: empiric tx with IV PPI and consider NGT placement if any vomiting.  -Pt now with flatus and bowel movements  -XR abdomen (2/27) w/air distention, no air fluid level  -Pt tolerating regular/cardiac diet  "

## 2022-02-28 NOTE — ASSESSMENT & PLAN NOTE
Continue home coreg, now tolerating PO intake  Currently rate controlled.   Not on anticoagulation outpatient

## 2022-02-28 NOTE — SUBJECTIVE & OBJECTIVE
Interval History: Son Roque at bedside. Dark black stools are slowing down. No longer coughing up blood. Admits shortness of breath is improving. Currently on 2L NC. Did not wear his CPAP last night.  Admits wheezing. No chest pain. No more nausea or vomiting.     Review of Systems   Constitutional:  Negative for chills and fever.   HENT:  Negative for nosebleeds (resolved) and tinnitus.    Eyes:  Negative for visual disturbance.   Respiratory:  Positive for shortness of breath (improving) and wheezing. Negative for chest tightness.    Cardiovascular:  Negative for chest pain, palpitations and leg swelling.   Gastrointestinal:  Positive for abdominal distention (improving per patient and family). Negative for abdominal pain, blood in stool (no obvious blood in stool but says its dark. improving), constipation, nausea and vomiting.   Genitourinary:  Negative for dysuria, flank pain and hematuria.   Musculoskeletal:  Negative for joint swelling.   Skin:  Negative for rash and wound.   Neurological:  Negative for dizziness, syncope, weakness and headaches.   Psychiatric/Behavioral:  Negative for confusion and hallucinations.    Objective:     Vital Signs (Most Recent):  Temp: 97.7 °F (36.5 °C) (02/28/22 1058)  Pulse: 102 (02/28/22 1120)  Resp: 18 (02/28/22 1120)  BP: (!) 142/99 (02/28/22 1058)  SpO2: (!) 92 % (02/28/22 1120)   Vital Signs (24h Range):  Temp:  [97.7 °F (36.5 °C)-98.7 °F (37.1 °C)] 97.7 °F (36.5 °C)  Pulse:  [] 102  Resp:  [18-20] 18  SpO2:  [90 %-97 %] 92 %  BP: (128-160)/(60-99) 142/99     Weight: 104.3 kg (229 lb 15 oz)  Body mass index is 39.47 kg/m².    Intake/Output Summary (Last 24 hours) at 2/28/2022 2377  Last data filed at 2/28/2022 1224  Gross per 24 hour   Intake 900 ml   Output 1 ml   Net 899 ml        Physical Exam  Vitals and nursing note reviewed.   Constitutional:       General: He is not in acute distress.     Appearance: He is obese. He is not toxic-appearing.      Comments:  Appears clinically better today. No distress. No conversational dyspnea    HENT:      Head: Normocephalic and atraumatic.      Right Ear: External ear normal.      Left Ear: External ear normal.      Ears:      Comments: Slightly hard of hearing  Eyes:      General:         Right eye: No discharge.         Left eye: No discharge.      Conjunctiva/sclera: Conjunctivae normal.   Neck:      Thyroid: No thyromegaly.   Cardiovascular:      Rate and Rhythm: Normal rate and regular rhythm.      Heart sounds: No murmur heard.     Comments: AICD chest wall, skin overlying it appears bruised.   Pulmonary:      Effort: No respiratory distress.      Breath sounds: Decreased breath sounds and wheezing (expiratory wheezesz in all lung fields) present. No rales.      Comments: Breathing improved today. Patient currently on 2L NC with sats at 92-94%. Lungs with diminished breath sounds throughout, but left more than right. No conversational dyspnea today  Abdominal:      General: There is distension.      Palpations: Abdomen is soft. There is no mass.      Tenderness: There is no abdominal tenderness. There is no guarding or rebound.      Comments: Bowel sounds present. Soft, non-tender no rebound   Musculoskeletal:         General: No deformity.      Cervical back: Normal range of motion and neck supple.      Right lower leg: No edema.      Left lower leg: No edema.   Skin:     General: Skin is warm and dry.   Neurological:      Mental Status: He is alert and oriented to person, place, and time.      Sensory: No sensory deficit.   Psychiatric:         Mood and Affect: Mood normal.         Behavior: Behavior normal.         Cognition and Memory: Cognition and memory normal.      Comments: Very sharp memory       Significant Labs: All pertinent labs within the past 24 hours have been reviewed.    Significant Imaging: I have reviewed all pertinent imaging results/findings within the past 24 hours.

## 2022-02-28 NOTE — ASSESSMENT & PLAN NOTE
-He had a mild leukocytosis on admission  -Recent traumatic NGT at [place] (CXR showed NGT in the region of the RLL)  -Presented with epistaxis and coughing up blood  -Repeat CXR w/mild superimposed right perihilar and infrahilar infiltrate, with some mild atelectasis at the right base as well.  There is continued opacity at the left lung base  -Started on Zosyn for possible aspiration pna from the blood  -Day 4 of Zosyn

## 2022-02-28 NOTE — ASSESSMENT & PLAN NOTE
Echo in 2019 with EF of 60% and normal ventricular diastolic function   On laisx 20 BID at home.   Will hold lasix at this time  CT chest with atelectasis.  Continue supplemental O2, wean as tolerated.    Echo: EF 65%, normal systolic and diastolic function    Continue home entresto  Daily weights  strict intake and output  telemetry

## 2022-02-28 NOTE — PLAN OF CARE
Problem: Adult Inpatient Plan of Care  Goal: Plan of Care Review  Outcome: Ongoing, Progressing  Goal: Patient-Specific Goal (Individualized)  Outcome: Ongoing, Progressing  Goal: Absence of Hospital-Acquired Illness or Injury  Outcome: Ongoing, Progressing  Goal: Optimal Comfort and Wellbeing  Outcome: Ongoing, Progressing  Goal: Readiness for Transition of Care  Outcome: Ongoing, Progressing     Problem: Impaired Wound Healing  Goal: Optimal Wound Healing  Outcome: Ongoing, Progressing     Problem: Adjustment to Illness (Gastrointestinal Bleeding)  Goal: Optimal Coping with Acute Illness  Outcome: Ongoing, Progressing     Problem: Bleeding (Gastrointestinal Bleeding)  Goal: Hemostasis  Outcome: Ongoing, Progressing     Problem: Skin Injury Risk Increased  Goal: Skin Health and Integrity  Outcome: Ongoing, Progressing     Problem: Fall Injury Risk  Goal: Absence of Fall and Fall-Related Injury  Outcome: Ongoing, Progressing     Problem: Bariatric Environmental Safety  Goal: Safety Maintained with Care  Outcome: Ongoing, Progressing

## 2022-02-28 NOTE — PLAN OF CARE
Pt improving overall in the last 48h. 1 blk inc stool. No further bleeding from cough/nares. Deep sleep for most of noc. No wheezing this noc, lung fields dim/clear. Mild short of breath with repositioning. Saturated linens from urine x's 3. Denies pain. Son at bedside, supportive.     Problem: Adult Inpatient Plan of Care  Goal: Plan of Care Review  Outcome: Ongoing, Progressing  Goal: Patient-Specific Goal (Individualized)  Outcome: Ongoing, Progressing  Goal: Absence of Hospital-Acquired Illness or Injury  Outcome: Ongoing, Progressing  Goal: Optimal Comfort and Wellbeing  Outcome: Ongoing, Progressing  Goal: Readiness for Transition of Care  Outcome: Ongoing, Progressing     Problem: Impaired Wound Healing  Goal: Optimal Wound Healing  Outcome: Ongoing, Progressing     Problem: Adjustment to Illness (Gastrointestinal Bleeding)  Goal: Optimal Coping with Acute Illness  Outcome: Ongoing, Progressing     Problem: Bleeding (Gastrointestinal Bleeding)  Goal: Hemostasis  Outcome: Ongoing, Progressing     Problem: Skin Injury Risk Increased  Goal: Skin Health and Integrity  Outcome: Ongoing, Progressing     Problem: Fall Injury Risk  Goal: Absence of Fall and Fall-Related Injury  Outcome: Ongoing, Progressing     Problem: Bariatric Environmental Safety  Goal: Safety Maintained with Care  Outcome: Ongoing, Progressing

## 2022-02-28 NOTE — PLAN OF CARE
Problem: Adult Inpatient Plan of Care  Goal: Plan of Care Review  2/27/2022 1806 by Naomi Rodas RN  Outcome: Ongoing, Progressing  2/27/2022 1805 by Naomi Rodas RN  Outcome: Ongoing, Progressing  Goal: Patient-Specific Goal (Individualized)  2/27/2022 1806 by Naomi Rodas RN  Outcome: Ongoing, Progressing  2/27/2022 1805 by Naomi Rodas RN  Outcome: Ongoing, Progressing  Goal: Absence of Hospital-Acquired Illness or Injury  2/27/2022 1806 by Naomi Rodas RN  Outcome: Ongoing, Progressing  2/27/2022 1805 by Naomi Rodas RN  Outcome: Ongoing, Progressing  Goal: Optimal Comfort and Wellbeing  2/27/2022 1806 by Naomi Rodas RN  Outcome: Ongoing, Progressing  2/27/2022 1805 by Naomi Rodas RN  Outcome: Ongoing, Progressing  Goal: Readiness for Transition of Care  2/27/2022 1806 by Naomi Rodas RN  Outcome: Ongoing, Progressing  2/27/2022 1805 by Naomi Rodas RN  Outcome: Ongoing, Progressing     Problem: Impaired Wound Healing  Goal: Optimal Wound Healing  2/27/2022 1806 by Naomi Rodas RN  Outcome: Ongoing, Progressing  2/27/2022 1805 by Naomi Rodas RN  Outcome: Ongoing, Progressing     Problem: Adjustment to Illness (Gastrointestinal Bleeding)  Goal: Optimal Coping with Acute Illness  2/27/2022 1806 by Naomi Rodas RN  Outcome: Ongoing, Progressing  2/27/2022 1805 by Naomi Rodas RN  Outcome: Ongoing, Progressing     Problem: Bleeding (Gastrointestinal Bleeding)  Goal: Hemostasis  2/27/2022 1806 by Naomi Rodas RN  Outcome: Ongoing, Progressing  2/27/2022 1805 by Naomi Rodas RN  Outcome: Ongoing, Progressing     Problem: Skin Injury Risk Increased  Goal: Skin Health and Integrity  2/27/2022 1806 by Naomi Rodas RN  Outcome: Ongoing, Progressing  2/27/2022 1805 by Naomi Rodas RN  Outcome: Ongoing, Progressing     Problem: Fall Injury Risk  Goal: Absence of Fall and Fall-Related Injury  2/27/2022 1806 by Naomi Rodas RN  Outcome: Ongoing,  Progressing  2/27/2022 1805 by Naomi Rodas RN  Outcome: Ongoing, Progressing     Problem: Bariatric Environmental Safety  Goal: Safety Maintained with Care  2/27/2022 1806 by Naomi Rodas RN  Outcome: Ongoing, Progressing  2/27/2022 1805 by Naomi Rodas RN  Outcome: Ongoing, Progressing

## 2022-02-28 NOTE — PROGRESS NOTES
[facility]  Progress Note    Patient Name: Reese Bell  MRN: [id number]  Patient Class: IP- Inpatient   Admission Date: 2/24/2022  Length of Stay: 4 days  Attending Physician: Patricia Jenkins DO  Primary Care Provider: Oracio Johnson MD        Subjective:     Principal Problem:SBO (small bowel obstruction)        HPI:  Reese Bell 89 y.o. male with for hypertension, hyperlipidemia, DJD, CAD post cardiac stenting, FRIDA- CPAP, thrombocytopenia, history of ventricular fibrillation s/p ICD and recent defibrillator battery changed on Monday by Dr. De La Cruz in [place] presents to the hospital with a chief complaint of abdominal pain worst at the bottom of his abdomen without radiation.  He reports yesterday he developed sudden onset diffuse abdominal pain described as a pressure with associated nausea and vomiting.  Presented to an outside hospital where he was admitted.  He denies any worsening or alleviating factors for pain.  He now states his abdominal pain has resolved he has had no further nausea vomiting since arrival from outside hospital.  The symptoms never occurred before.  He had an appendectomy and a child but denies other abdominal surgeries.  He denies any current complaints.    In the emergency room and seen and, creatinine 1.8, CT abdomen with high-grade partial versus complete small bowel obstruction, troponin negative, BNP negative, COVID negative, NG tube placement attempted but unsuccessful chest x-ray showed placement to right lower lobe.  NG tube was removed.  Seen by surgery who recommended transfer for higher level care.      Overview/Hospital Course:  89-year-old white male transferred to [facility] from [facility] to be admitted on 02/24/2022 for small bowel obstruction and needed cardiac and surgery consultants. Patient with extensive past medical history (HTN, hyperlipidemia, DJD, CAD post cardiac stenting, FRIDA- CPAP, thrombocytopenia,  history of ventricular fibrillation s/p ICD and recent defibrillator battery changed on Monday by Dr. De La Cruz in [place] ). Of note,  patient is nonambulatory, W/C bound at baseline. Lives at home with his daughter M-VALERIE, and siblings alternate watching him on the weekend. Plenty of family support    CT abdomen with high-grade partial versus early complete small bowel obstruction.  The stomach is significantly distended. CXR w/ opacification at the left lung base probably a combination of atelectasis and pleural effusion.  Of note, patient failed multiple attempts of NGT placement at [place]. CXR from [place] showed enteric tube appearing to terminate in the region of the right lower lobe, which later was removed. Cardiology and surgery consulted. Holding off on NGT per surgery at this time, given patient without nausea and vomiting. Cardiology recommend no further ischemic workup needed at this time. Patient has had loose, very dark bowel movement since admission.  Suspect dark stools due to recent NGT trauma and patient aspirating on the blood. Hemoglobin down trended but then stabilized. GI consulted for further evaluation and recommends monitoring and empiric treatment with PPI. Patient continues on supplemental O2, will wean as tolerated. CT chest WO showed likely atelectasis. Patient tolerating home CPAP. Patient with flatus and bowel movements. Advanced diet as tolerated. Continue abx for possible aspiration pneumonia.       Interval History: Son Roque at bedside. Dark black stools are slowing down. No longer coughing up blood. Admits shortness of breath is improving. Currently on 2L NC. Did not wear his CPAP last night.  Admits wheezing. No chest pain. No more nausea or vomiting.     Review of Systems   Constitutional:  Negative for chills and fever.   HENT:  Negative for nosebleeds (resolved) and tinnitus.    Eyes:  Negative for visual disturbance.   Respiratory:  Positive for shortness of breath (improving) and  wheezing. Negative for chest tightness.    Cardiovascular:  Negative for chest pain, palpitations and leg swelling.   Gastrointestinal:  Positive for abdominal distention (improving per patient and family). Negative for abdominal pain, blood in stool (no obvious blood in stool but says its dark. improving), constipation, nausea and vomiting.   Genitourinary:  Negative for dysuria, flank pain and hematuria.   Musculoskeletal:  Negative for joint swelling.   Skin:  Negative for rash and wound.   Neurological:  Negative for dizziness, syncope, weakness and headaches.   Psychiatric/Behavioral:  Negative for confusion and hallucinations.    Objective:     Vital Signs (Most Recent):  Temp: 97.7 °F (36.5 °C) (02/28/22 1058)  Pulse: 102 (02/28/22 1120)  Resp: 18 (02/28/22 1120)  BP: (!) 142/99 (02/28/22 1058)  SpO2: (!) 92 % (02/28/22 1120)   Vital Signs (24h Range):  Temp:  [97.7 °F (36.5 °C)-98.7 °F (37.1 °C)] 97.7 °F (36.5 °C)  Pulse:  [] 102  Resp:  [18-20] 18  SpO2:  [90 %-97 %] 92 %  BP: (128-160)/(60-99) 142/99     Weight: 104.3 kg (229 lb 15 oz)  Body mass index is 39.47 kg/m².    Intake/Output Summary (Last 24 hours) at 2/28/2022 1457  Last data filed at 2/28/2022 1224  Gross per 24 hour   Intake 900 ml   Output 1 ml   Net 899 ml        Physical Exam  Vitals and nursing note reviewed.   Constitutional:       General: He is not in acute distress.     Appearance: He is obese. He is not toxic-appearing.      Comments: Appears clinically better today. No distress. No conversational dyspnea    HENT:      Head: Normocephalic and atraumatic.      Right Ear: External ear normal.      Left Ear: External ear normal.      Ears:      Comments: Slightly hard of hearing  Eyes:      General:         Right eye: No discharge.         Left eye: No discharge.      Conjunctiva/sclera: Conjunctivae normal.   Neck:      Thyroid: No thyromegaly.   Cardiovascular:      Rate and Rhythm: Normal rate and regular rhythm.      Heart  "sounds: No murmur heard.     Comments: AICD chest wall, skin overlying it appears bruised.   Pulmonary:      Effort: No respiratory distress.      Breath sounds: Decreased breath sounds and wheezing (expiratory wheezesz in all lung fields) present. No rales.      Comments: Breathing improved today. Patient currently on 2L NC with sats at 92-94%. Lungs with diminished breath sounds throughout, but left more than right. No conversational dyspnea today  Abdominal:      General: There is distension.      Palpations: Abdomen is soft. There is no mass.      Tenderness: There is no abdominal tenderness. There is no guarding or rebound.      Comments: Bowel sounds present. Soft, non-tender no rebound   Musculoskeletal:         General: No deformity.      Cervical back: Normal range of motion and neck supple.      Right lower leg: No edema.      Left lower leg: No edema.   Skin:     General: Skin is warm and dry.   Neurological:      Mental Status: He is alert and oriented to person, place, and time.      Sensory: No sensory deficit.   Psychiatric:         Mood and Affect: Mood normal.         Behavior: Behavior normal.         Cognition and Memory: Cognition and memory normal.      Comments: Very sharp memory       Significant Labs: All pertinent labs within the past 24 hours have been reviewed.    Significant Imaging: I have reviewed all pertinent imaging results/findings within the past 24 hours.      Assessment/Plan:      * SBO (small bowel obstruction)  CT on 2/24 at [facility] with "High-grade partial versus early complete small bowel obstruction.  The stomach is significantly distended.  Transition zone somewhere in the mid abdomen." evaluated by surgery there and recommended for transfer for higher level of care. NG tube unable to be placed at [place].    -Surgery consulted: hold on further NG tube placement. Advance diet as tolerated. No plans for sx  -GI consulted: empiric tx with IV PPI and consider NGT placement if " any vomiting.  -Pt now with flatus and bowel movements  -XR abdomen (2/27) w/air distention, no air fluid level  -Pt tolerating regular/cardiac diet    CHF (congestive heart failure), NYHA class II, chronic, systolic  Echo in 2019 with EF of 60% and normal ventricular diastolic function   On laisx 20 BID at home.   Will hold lasix at this time  CT chest with atelectasis.  Continue supplemental O2, wean as tolerated.    Echo: EF 65%, normal systolic and diastolic function    Continue home entresto  Daily weights  strict intake and output  telemetry    Coronary artery disease involving native coronary artery of native heart without angina pectoris  Denies any chest pain.   Will hold aspirin in setting of melena  Continue home coreg/zetia   Monitor on telemetry  Cardiology consulted for Pre-Op evaluation should surgery be required for SBO    Stage 3 chronic kidney disease  Baseline Cr of 1.5-1.9.  Cr at baseline  Renal dose medications   avoid nephrotoxic drugs   Caution with diuresis  monitor intake and output    Permanent atrial fibrillation  Continue home coreg, now tolerating PO intake  Currently rate controlled.   Not on anticoagulation outpatient    Essential hypertension  Fair controlled   Continue home coreg and entresto    Hyperlipidemia  Continue home zetia   Pt takes Crestor 10mg MWF    FRIDA (obstructive sleep apnea)  Continue home CPAP if able    Dementia in Alzheimer's disease  Stable, at baseline mental status per family  Delirium precautions  Holding home namenda at this time    Benign prostatic hyperplasia  Continue home flomax      Aspiration pneumonia  -He had a mild leukocytosis on admission  -Recent traumatic NGT at [place] (CXR showed NGT in the region of the RLL)  -Presented with epistaxis and coughing up blood  -Repeat CXR w/mild superimposed right perihilar and infrahilar infiltrate, with some mild atelectasis at the right base as well.  There is continued opacity at the left lung base  -Started  on Zosyn for possible aspiration pna from the blood  -Day 4 of Zosyn    Melena  -Patient with melena   -Suspect 2/2 to recent traumatic NGT placement given pt had epistaxis and coughing up blood prior to arrival  -Monitor H/H: stable at this time  -GI consulted: empiric treatment with IV PPI while inpatient. protonix 40 BID on discharge for 6-8 weeks.    Advanced care planning/counseling discussion  Discussed code status with son at bedside and patient.  Son Jian confirmed patient is DNR. He has brought his living will to the hospital.   Greater than 16 minutes spent discussing code status.   DNR order placed in chart      VTE Risk Mitigation (From admission, onward)         Ordered     Place CHRISTOPHER hose  Until discontinued         02/24/22 1712     IP VTE HIGH RISK PATIENT  Once         02/24/22 1712     Place sequential compression device  Until discontinued         02/24/22 1712                Discharge Planning   LOUISE:      Code Status: DNR   Is the patient medically ready for discharge?:     Reason for patient still in hospital (select all that apply): Patient trending condition and Treatment  Discharge Plan A: Jerry Jenkins DO  Department of Hospital Medicine   [facility] - [place], [place] - Med Surg

## 2022-02-28 NOTE — ASSESSMENT & PLAN NOTE
Denies any chest pain.   Will hold aspirin in setting of melena  Continue home coreg/zetia   Monitor on telemetry  Cardiology consulted for Pre-Op evaluation should surgery be required for SBO

## 2022-02-28 NOTE — PLAN OF CARE
02/28/22 1552   Medicare Message   Important Message from Medicare regarding Discharge Appeal Rights Given to patient/caregiver;Explained to patient/caregiver;Other (comments)  (Explained IMM to daughter. Daughter expressed understanding. Copy emailed to [email or web address])   Date IMM was signed 02/28/22   Time IMM was signed [id number]

## 2022-03-01 LAB
ALLENS TEST: ABNORMAL
ANION GAP SERPL CALC-SCNC: 13 MMOL/L (ref 8–16)
BUN SERPL-MCNC: 56 MG/DL (ref 8–23)
CALCIUM SERPL-MCNC: 8.1 MG/DL (ref 8.7–10.5)
CHLORIDE SERPL-SCNC: 107 MMOL/L (ref 95–110)
CO2 SERPL-SCNC: 22 MMOL/L (ref 23–29)
CREAT SERPL-MCNC: 1.9 MG/DL (ref 0.5–1.4)
DELSYS: ABNORMAL
EP: 5
ERYTHROCYTE [SEDIMENTATION RATE] IN BLOOD BY WESTERGREN METHOD: 10 MM/H
EST. GFR  (AFRICAN AMERICAN): 35 ML/MIN/1.73 M^2
EST. GFR  (NON AFRICAN AMERICAN): 30 ML/MIN/1.73 M^2
FIO2: 95
GLUCOSE SERPL-MCNC: 179 MG/DL (ref 70–110)
HCO3 UR-SCNC: 27.1 MMOL/L (ref 24–28)
IP: 10
MIN VOL: 14
MODE: ABNORMAL
PCO2 BLDA: 50.1 MMHG (ref 35–45)
PH SMN: 7.34 [PH] (ref 7.35–7.45)
PO2 BLDA: 69 MMHG (ref 80–100)
POC BE: 1 MMOL/L
POC SATURATED O2: 92 % (ref 95–100)
POC TCO2: 29 MMOL/L (ref 23–27)
POCT GLUCOSE: 163 MG/DL (ref 70–110)
POCT GLUCOSE: 176 MG/DL (ref 70–110)
POCT GLUCOSE: 201 MG/DL (ref 70–110)
POTASSIUM SERPL-SCNC: 5 MMOL/L (ref 3.5–5.1)
SAMPLE: ABNORMAL
SITE: ABNORMAL
SODIUM SERPL-SCNC: 142 MMOL/L (ref 136–145)
SP02: 30
SPONT RATE: 18

## 2022-03-01 PROCEDURE — 25000003 PHARM REV CODE 250: Performed by: PHYSICIAN ASSISTANT

## 2022-03-01 PROCEDURE — 80048 BASIC METABOLIC PNL TOTAL CA: CPT | Performed by: STUDENT IN AN ORGANIZED HEALTH CARE EDUCATION/TRAINING PROGRAM

## 2022-03-01 PROCEDURE — 63600175 PHARM REV CODE 636 W HCPCS: Performed by: STUDENT IN AN ORGANIZED HEALTH CARE EDUCATION/TRAINING PROGRAM

## 2022-03-01 PROCEDURE — 99900035 HC TECH TIME PER 15 MIN (STAT)

## 2022-03-01 PROCEDURE — 36600 WITHDRAWAL OF ARTERIAL BLOOD: CPT

## 2022-03-01 PROCEDURE — 25000242 PHARM REV CODE 250 ALT 637 W/ HCPCS: Performed by: STUDENT IN AN ORGANIZED HEALTH CARE EDUCATION/TRAINING PROGRAM

## 2022-03-01 PROCEDURE — 63600175 PHARM REV CODE 636 W HCPCS: Performed by: PHYSICIAN ASSISTANT

## 2022-03-01 PROCEDURE — 36415 COLL VENOUS BLD VENIPUNCTURE: CPT | Performed by: STUDENT IN AN ORGANIZED HEALTH CARE EDUCATION/TRAINING PROGRAM

## 2022-03-01 PROCEDURE — C9113 INJ PANTOPRAZOLE SODIUM, VIA: HCPCS | Performed by: STUDENT IN AN ORGANIZED HEALTH CARE EDUCATION/TRAINING PROGRAM

## 2022-03-01 PROCEDURE — 94640 AIRWAY INHALATION TREATMENT: CPT

## 2022-03-01 PROCEDURE — 94760 N-INVAS EAR/PLS OXIMETRY 1: CPT

## 2022-03-01 PROCEDURE — 25000003 PHARM REV CODE 250: Performed by: STUDENT IN AN ORGANIZED HEALTH CARE EDUCATION/TRAINING PROGRAM

## 2022-03-01 PROCEDURE — 82803 BLOOD GASES ANY COMBINATION: CPT

## 2022-03-01 PROCEDURE — 11000001 HC ACUTE MED/SURG PRIVATE ROOM

## 2022-03-01 PROCEDURE — 94660 CPAP INITIATION&MGMT: CPT

## 2022-03-01 RX ADMIN — TAMSULOSIN HYDROCHLORIDE 0.4 MG: 0.4 CAPSULE ORAL at 10:03

## 2022-03-01 RX ADMIN — SACUBITRIL AND VALSARTAN 1 TABLET: 24; 26 TABLET, FILM COATED ORAL at 10:03

## 2022-03-01 RX ADMIN — EZETIMIBE 10 MG: 10 TABLET ORAL at 10:03

## 2022-03-01 RX ADMIN — IPRATROPIUM BROMIDE AND ALBUTEROL SULFATE 3 ML: 2.5; .5 SOLUTION RESPIRATORY (INHALATION) at 07:03

## 2022-03-01 RX ADMIN — PIPERACILLIN AND TAZOBACTAM 4.5 G: 4; .5 INJECTION, POWDER, LYOPHILIZED, FOR SOLUTION INTRAVENOUS; PARENTERAL at 08:03

## 2022-03-01 RX ADMIN — PANTOPRAZOLE SODIUM 40 MG: 40 INJECTION, POWDER, FOR SOLUTION INTRAVENOUS at 10:03

## 2022-03-01 RX ADMIN — PANTOPRAZOLE SODIUM 40 MG: 40 INJECTION, POWDER, FOR SOLUTION INTRAVENOUS at 09:03

## 2022-03-01 RX ADMIN — IPRATROPIUM BROMIDE AND ALBUTEROL SULFATE 3 ML: 2.5; .5 SOLUTION RESPIRATORY (INHALATION) at 12:03

## 2022-03-01 RX ADMIN — METHYLPREDNISOLONE SODIUM SUCCINATE 40 MG: 40 INJECTION, POWDER, FOR SOLUTION INTRAMUSCULAR; INTRAVENOUS at 09:03

## 2022-03-01 RX ADMIN — PIPERACILLIN AND TAZOBACTAM 4.5 G: 4; .5 INJECTION, POWDER, LYOPHILIZED, FOR SOLUTION INTRAVENOUS; PARENTERAL at 01:03

## 2022-03-01 RX ADMIN — PIPERACILLIN AND TAZOBACTAM 4.5 G: 4; .5 INJECTION, POWDER, LYOPHILIZED, FOR SOLUTION INTRAVENOUS; PARENTERAL at 04:03

## 2022-03-01 RX ADMIN — IPRATROPIUM BROMIDE AND ALBUTEROL SULFATE 3 ML: 2.5; .5 SOLUTION RESPIRATORY (INHALATION) at 05:03

## 2022-03-01 RX ADMIN — SACUBITRIL AND VALSARTAN 1 TABLET: 24; 26 TABLET, FILM COATED ORAL at 08:03

## 2022-03-01 RX ADMIN — IPRATROPIUM BROMIDE AND ALBUTEROL SULFATE 3 ML: 2.5; .5 SOLUTION RESPIRATORY (INHALATION) at 08:03

## 2022-03-01 RX ADMIN — CARVEDILOL 12.5 MG: 12.5 TABLET, FILM COATED ORAL at 10:03

## 2022-03-01 RX ADMIN — CARVEDILOL 12.5 MG: 12.5 TABLET, FILM COATED ORAL at 08:03

## 2022-03-01 NOTE — ASSESSMENT & PLAN NOTE
"CT on 2/24 at [facility] with "High-grade partial versus early complete small bowel obstruction.  The stomach is significantly distended.  Transition zone somewhere in the mid abdomen." evaluated by surgery there and recommended for transfer for higher level of care. NG tube unable to be placed at [place].    -Surgery consulted: hold on further NG tube placement. Advance diet as tolerated. No plans for sx  -GI consulted: empiric tx with IV PPI and consider NGT placement if any vomiting.  -Pt now with flatus and bowel movements  -XR abdomen (2/27) w/air distention, no air fluid level  -Pt tolerating regular/cardiac diet  "

## 2022-03-01 NOTE — PLAN OF CARE
Pt free from falls/injury. AAOx4, NAD, no complaints of pain. Intermittently on 4L O2 via NC and BiPAP. Pt reports feeling very tired during shift, resting much of shift. Tolerating diet. Call light in reach. Will continue to monitor.      Problem: Adult Inpatient Plan of Care  Goal: Plan of Care Review  Outcome: Ongoing, Progressing  Goal: Patient-Specific Goal (Individualized)  Outcome: Ongoing, Progressing  Goal: Absence of Hospital-Acquired Illness or Injury  Outcome: Ongoing, Progressing  Goal: Optimal Comfort and Wellbeing  Outcome: Ongoing, Progressing  Goal: Readiness for Transition of Care  Outcome: Ongoing, Progressing     Problem: Impaired Wound Healing  Goal: Optimal Wound Healing  Outcome: Ongoing, Progressing     Problem: Adjustment to Illness (Gastrointestinal Bleeding)  Goal: Optimal Coping with Acute Illness  Outcome: Ongoing, Progressing     Problem: Bleeding (Gastrointestinal Bleeding)  Goal: Hemostasis  Outcome: Ongoing, Progressing     Problem: Skin Injury Risk Increased  Goal: Skin Health and Integrity  Outcome: Ongoing, Progressing     Problem: Fall Injury Risk  Goal: Absence of Fall and Fall-Related Injury  Outcome: Ongoing, Progressing     Problem: Bariatric Environmental Safety  Goal: Safety Maintained with Care  Outcome: Ongoing, Progressing

## 2022-03-01 NOTE — NURSING
Pt was turned then started c/o difficultly breathing, RRT and NP showers were notified. Stat CXR completed, new orders given.

## 2022-03-01 NOTE — PLAN OF CARE
Recommendations    1) Continue Cardiac Restrictions, Add Diabetic restrictions  2) Add IDDSI 5 restrictions - Per pt no teeth and preference for softer foods  3) Add Novasource Renal ONS to assist in meeting needs  4) Renals, Blood Glucose      Goals:   1) Patient to meet >/= 50% EEN/EPN via PO/ONS intake  2) Monitored labs to trend toward target ranges    Nutrition Goal Status: new  Communication of RD Recs:  (POC)

## 2022-03-01 NOTE — PROGRESS NOTES
[facility] Medicine  Progress Note    Patient Name: Reese Bell  MRN: [id number]  Patient Class: IP- Inpatient   Admission Date: 2/24/2022  Length of Stay: 5 days  Attending Physician: Stef Pichardo MD  Primary Care Provider: Oracio Johnson MD        Subjective:     Principal Problem:SBO (small bowel obstruction)        HPI:  Reese Bell 89 y.o. male with for hypertension, hyperlipidemia, DJD, CAD post cardiac stenting, FRIDA- CPAP, thrombocytopenia, history of ventricular fibrillation s/p ICD and recent defibrillator battery changed on Monday by Dr. De La Cruz in [place] presents to the hospital with a chief complaint of abdominal pain worst at the bottom of his abdomen without radiation.  He reports yesterday he developed sudden onset diffuse abdominal pain described as a pressure with associated nausea and vomiting.  Presented to an outside hospital where he was admitted.  He denies any worsening or alleviating factors for pain.  He now states his abdominal pain has resolved he has had no further nausea vomiting since arrival from outside hospital.  The symptoms never occurred before.  He had an appendectomy and a child but denies other abdominal surgeries.  He denies any current complaints.    In the emergency room and seen and, creatinine 1.8, CT abdomen with high-grade partial versus complete small bowel obstruction, troponin negative, BNP negative, COVID negative, NG tube placement attempted but unsuccessful chest x-ray showed placement to right lower lobe.  NG tube was removed.  Seen by surgery who recommended transfer for higher level care.      Overview/Hospital Course:  89-year-old white male transferred to [facility] from [facility] to be admitted on 02/24/2022 for small bowel obstruction and needed cardiac and surgery consultants. Patient with extensive past medical history (HTN, hyperlipidemia, DJD, CAD post cardiac stenting, FRIDA- CPAP, thrombocytopenia,  history of ventricular fibrillation s/p ICD and recent defibrillator battery changed on Monday by Dr. De La Cruz in [place] ). Of note,  patient is nonambulatory, W/C bound at baseline. Lives at home with his daughter M-F, and siblings alternate watching him on the weekend. Plenty of family support    CT abdomen with high-grade partial versus early complete small bowel obstruction.  The stomach is significantly distended. CXR w/ opacification at the left lung base probably a combination of atelectasis and pleural effusion.  Of note, patient failed multiple attempts of NGT placement at [place]. CXR from [place] showed enteric tube appearing to terminate in the region of the right lower lobe, which later was removed. Cardiology and surgery consulted. Holding off on NGT per surgery at this time, given patient without nausea and vomiting. Cardiology recommend no further ischemic workup needed at this time. Patient has had loose, very dark bowel movement since admission.  Suspect dark stools due to recent NGT trauma and patient aspirating on the blood. Hemoglobin down trended but then stabilized. GI consulted for further evaluation and recommends monitoring and empiric treatment with PPI. Patient continues on supplemental O2, will wean as tolerated. CT chest WO showed likely atelectasis. Patient tolerating home CPAP. Patient with flatus and bowel movements. Advanced diet as tolerated. Continue abx for possible aspiration pneumonia. PT/OT were consulted on 3/1/22.      Interval History:  No new issues     Review of Systems   Constitutional:  Negative for chills and fever.   HENT:  Negative for nosebleeds (resolved) and tinnitus.    Eyes:  Negative for visual disturbance.   Respiratory:  Negative for chest tightness, shortness of breath (improving) and wheezing.    Cardiovascular:  Negative for chest pain, palpitations and leg swelling.   Gastrointestinal:  Negative for abdominal distention (improving per patient and family),  abdominal pain, blood in stool (no obvious blood in stool but says its dark. improving), constipation, nausea and vomiting.   Genitourinary:  Negative for dysuria, flank pain and hematuria.   Musculoskeletal:  Negative for joint swelling.   Skin:  Negative for rash and wound.   Neurological:  Negative for dizziness, syncope, weakness and headaches.   Psychiatric/Behavioral:  Negative for confusion and hallucinations.    Objective:     Vital Signs (Most Recent):  Temp: 98 °F (36.7 °C) (03/01/22 0827)  Pulse: 88 (03/01/22 0847)  Resp: 18 (03/01/22 0847)  BP: 110/68 (03/01/22 0827)  SpO2: (!) 94 % (03/01/22 0847)   Vital Signs (24h Range):  Temp:  [97.5 °F (36.4 °C)-98.1 °F (36.7 °C)] 98 °F (36.7 °C)  Pulse:  [] 88  Resp:  [14-30] 18  SpO2:  [92 %-99 %] 94 %  BP: (101-142)/(58-99) 110/68     Weight: 104.3 kg (229 lb 15 oz)  Body mass index is 39.47 kg/m².    Intake/Output Summary (Last 24 hours) at 3/1/2022 1036  Last data filed at 3/1/2022 0830  Gross per 24 hour   Intake 600 ml   Output --   Net 600 ml      Physical Exam  Vitals and nursing note reviewed.   Constitutional:       General: He is not in acute distress.     Appearance: He is obese. He is not ill-appearing, toxic-appearing or diaphoretic.      Comments: Appears clinically better today. No distress. No conversational dyspnea    HENT:      Head: Normocephalic and atraumatic.      Ears:      Comments: Slightly hard of hearing  Neck:      Thyroid: No thyromegaly.   Cardiovascular:      Rate and Rhythm: Normal rate and regular rhythm.      Heart sounds: No murmur heard.     Comments: AICD chest wall, skin overlying it appears bruised.   Pulmonary:      Effort: Pulmonary effort is normal. No respiratory distress.      Breath sounds: Decreased breath sounds present. Wheezes: expiratory wheezesz in all lung fields.     Comments: Breathing improved today. Patient currently on 2L NC with sats at 92-94%. Lungs with diminished breath sounds throughout, but left  "more than right. No conversational dyspnea today  Abdominal:      Palpations: Abdomen is soft.      Comments: Bowel sounds present. Soft, non-tender no rebound   Musculoskeletal:         General: No deformity.      Right lower leg: No edema.      Left lower leg: No edema.   Skin:     General: Skin is warm and dry.   Neurological:      Mental Status: He is alert and oriented to person, place, and time.      Sensory: No sensory deficit.   Psychiatric:         Mood and Affect: Mood normal.         Behavior: Behavior normal.         Thought Content: Thought content normal.         Cognition and Memory: Cognition and memory normal.      Comments: Very sharp memory       Significant Labs: All pertinent labs within the past 24 hours have been reviewed.  BMP:   Recent Labs   Lab 03/01/22  0511   *      K 5.0      CO2 22*   BUN 56*   CREATININE 1.9*   CALCIUM 8.1*     CBC:   Recent Labs   Lab 02/28/22  0330   WBC 10.68   HGB 9.5*   HCT 30.6*   *       Significant Imaging:       Assessment/Plan:      * SBO (small bowel obstruction)  CT on 2/24 at [facility] with "High-grade partial versus early complete small bowel obstruction.  The stomach is significantly distended.  Transition zone somewhere in the mid abdomen." evaluated by surgery there and recommended for transfer for higher level of care. NG tube unable to be placed at [facility].    -Surgery consulted: hold on further NG tube placement. Advance diet as tolerated. No plans for sx  -GI consulted: empiric tx with IV PPI and consider NGT placement if any vomiting.  -Pt now with flatus and bowel movements  -XR abdomen (2/27) w/air distention, no air fluid level  -Pt tolerating regular/cardiac diet    Aspiration pneumonia  -He had a mild leukocytosis on admission  -Recent traumatic NGT at [facility] (CXR showed NGT in the region of the RLL)  -Presented with epistaxis and coughing up blood  -Repeat CXR w/mild superimposed right perihilar and infrahilar " infiltrate, with some mild atelectasis at the right base as well.  There is continued opacity at the left lung base  -Started on Zosyn for possible aspiration pna from the blood  -Day 4 of Zosyn    Melena  -Patient with melena   -Suspect 2/2 to recent traumatic NGT placement given pt had epistaxis and coughing up blood prior to arrival  -Monitor H/H: stable at this time  -GI consulted: empiric treatment with IV PPI while inpatient. protonix 40 BID on discharge for 6-8 weeks.    Advanced care planning/counseling discussion  Discussed code status with son at bedside and patient.  Son Jian confirmed patient is DNR. He has brought his living will to the hospital.   Greater than 16 minutes spent discussing code status.   DNR order placed in chart    FRIDA (obstructive sleep apnea)  Continue home CPAP if able    Coronary artery disease involving native coronary artery of native heart without angina pectoris  Denies any chest pain.   Will hold aspirin in setting of melena  Continue home coreg/zetia   Monitor on telemetry  Cardiology consulted for Pre-Op evaluation should surgery be required for SBO    Permanent atrial fibrillation  Continue home coreg, now tolerating PO intake  Currently rate controlled.   Not on anticoagulation outpatient    CHF (congestive heart failure), NYHA class II, chronic, systolic  Echo in 2019 with EF of 60% and normal ventricular diastolic function   On laisx 20 BID at home.   Will hold lasix at this time  CT chest with atelectasis.  Continue supplemental O2, wean as tolerated.    Echo: EF 65%, normal systolic and diastolic function    Continue home entresto  Daily weights  strict intake and output  telemetry    Essential hypertension  Fair controlled   Continue home coreg and entresto    Hyperlipidemia  Continue home zetia   Pt takes Crestor 10mg MWF    Stage 3 chronic kidney disease  Baseline Cr of 1.5-1.9.  Cr at baseline  Renal dose medications   avoid nephrotoxic drugs   Caution with  diuresis  monitor intake and output    Dementia in Alzheimer's disease  Stable, at baseline mental status per family  Delirium precautions  Holding home namenda at this time    Benign prostatic hyperplasia  Continue home flomax      Debility- PT/OT consulted.     Obesity Body mass index is 39.47 kg/m².  Weight loss as out patient     VTE Risk Mitigation (From admission, onward)         Ordered     Place CHRISTOPHER hose  Until discontinued         02/24/22 1712     IP VTE HIGH RISK PATIENT  Once         02/24/22 1712     Place sequential compression device  Until discontinued         02/24/22 1712                Discharge Planning   LOUISE:      Code Status: DNR   Is the patient medically ready for discharge?:     Reason for patient still in hospital (select all that apply): Patient unstable  Discharge Plan A: Home        PT/OT today. Home in 2 days             Stef Machado MD  Department of Hospital Medicine   [facility]

## 2022-03-01 NOTE — PROGRESS NOTES
Cross Cover Progress Note    Admitted on 2/24 for small bowel obstruction.    Subjective: requested to bedside for SOB. Per nursing at 8pm when laid flat to clean the patient after a bowel movement he began to complain of SOB.   The patient reports he is continuing to feel SOB. He denies any pain. There has been no nausea and vomiting. He has not had an epistaxis since night of admission.      Physical Exam  Vitals: HR: 88, Temp: 98.1, /72, RR: 24 O2 saturations 98%  General: appears stated age, obese  Abdomen: Soft, non-tender  Cardiac: RRR  Pulmonary: initially with wheezing to right side and decreased breath sounds to the left. Given duo-neb with beginning of wheezing to left side. Speak in short sentences  Neuro: moving all extremities against gravity, oriented to person and place. Baseline orientation    Plan:  1. Shortness of breath:  Suspect related to pulmonary edema as began acutely with laying flat, and wheezing associated with cardiac edema. Chest x-ray with hypo inflated lungs accentuating pulmonary interstitial markings, possible pulmonary edema  Will check BNP, BMP, and ABG  Will give lasix as last received lasix on 2/26  Will give 1 time dose of solumederol for wheezing  Continue 1 hour continuous breathing treatment as improving breath sounds after initial dose  Encouraged incentive spirometer, per family did not use IS today while sleeping    ADDNENDUM: 2200  Acute on Chronic Hypercapnic respiratory failure. Suspect related to pulmonary edema and atelectasis.   ABG returned with 7.25 / 68 / 266 / 30 / 1 / 100%  Will start BIPAP 10/5 and repeat ABG in 1 hour after has been on BIPAP    ADDENDUM: after steroids, duo-nebs, and lasix improvement in wheezing and and breath sounds equal bilaterally. Resting comfortably on bipap. Will repeat ABG at midnight      Chip Hargrove PA-C  Department of Hospital Medicine   [facility]    Critical care time spent on the evaluation and  treatment of severe organ dysfunction, review of pertinent labs and imaging studies, discussions with consulting providers and discussions with patient/family: 20 minutes.

## 2022-03-01 NOTE — SUBJECTIVE & OBJECTIVE
Interval History:  No new issues     Review of Systems   Constitutional:  Negative for chills and fever.   HENT:  Negative for nosebleeds (resolved) and tinnitus.    Eyes:  Negative for visual disturbance.   Respiratory:  Negative for chest tightness, shortness of breath (improving) and wheezing.    Cardiovascular:  Negative for chest pain, palpitations and leg swelling.   Gastrointestinal:  Negative for abdominal distention (improving per patient and family), abdominal pain, blood in stool (no obvious blood in stool but says its dark. improving), constipation, nausea and vomiting.   Genitourinary:  Negative for dysuria, flank pain and hematuria.   Musculoskeletal:  Negative for joint swelling.   Skin:  Negative for rash and wound.   Neurological:  Negative for dizziness, syncope, weakness and headaches.   Psychiatric/Behavioral:  Negative for confusion and hallucinations.    Objective:     Vital Signs (Most Recent):  Temp: 98 °F (36.7 °C) (03/01/22 0827)  Pulse: 88 (03/01/22 0847)  Resp: 18 (03/01/22 0847)  BP: 110/68 (03/01/22 0827)  SpO2: (!) 94 % (03/01/22 0847)   Vital Signs (24h Range):  Temp:  [97.5 °F (36.4 °C)-98.1 °F (36.7 °C)] 98 °F (36.7 °C)  Pulse:  [] 88  Resp:  [14-30] 18  SpO2:  [92 %-99 %] 94 %  BP: (101-142)/(58-99) 110/68     Weight: 104.3 kg (229 lb 15 oz)  Body mass index is 39.47 kg/m².    Intake/Output Summary (Last 24 hours) at 3/1/2022 1036  Last data filed at 3/1/2022 0830  Gross per 24 hour   Intake 600 ml   Output --   Net 600 ml      Physical Exam  Vitals and nursing note reviewed.   Constitutional:       General: He is not in acute distress.     Appearance: He is obese. He is not ill-appearing, toxic-appearing or diaphoretic.      Comments: Appears clinically better today. No distress. No conversational dyspnea    HENT:      Head: Normocephalic and atraumatic.      Ears:      Comments: Slightly hard of hearing  Neck:      Thyroid: No thyromegaly.   Cardiovascular:      Rate and  Rhythm: Normal rate and regular rhythm.      Heart sounds: No murmur heard.     Comments: AICD chest wall, skin overlying it appears bruised.   Pulmonary:      Effort: Pulmonary effort is normal. No respiratory distress.      Breath sounds: Decreased breath sounds present. Wheezes: expiratory wheezesz in all lung fields.     Comments: Breathing improved today. Patient currently on 2L NC with sats at 92-94%. Lungs with diminished breath sounds throughout, but left more than right. No conversational dyspnea today  Abdominal:      Palpations: Abdomen is soft.      Comments: Bowel sounds present. Soft, non-tender no rebound   Musculoskeletal:         General: No deformity.      Right lower leg: No edema.      Left lower leg: No edema.   Skin:     General: Skin is warm and dry.   Neurological:      Mental Status: He is alert and oriented to person, place, and time.      Sensory: No sensory deficit.   Psychiatric:         Mood and Affect: Mood normal.         Behavior: Behavior normal.         Thought Content: Thought content normal.         Cognition and Memory: Cognition and memory normal.      Comments: Very sharp memory       Significant Labs: All pertinent labs within the past 24 hours have been reviewed.  BMP:   Recent Labs   Lab 03/01/22  0511   *      K 5.0      CO2 22*   BUN 56*   CREATININE 1.9*   CALCIUM 8.1*     CBC:   Recent Labs   Lab 02/28/22  0330   WBC 10.68   HGB 9.5*   HCT 30.6*   *       Significant Imaging:

## 2022-03-02 LAB
ANION GAP SERPL CALC-SCNC: 11 MMOL/L (ref 8–16)
BNP SERPL-MCNC: 305 PG/ML (ref 0–99)
BUN SERPL-MCNC: 48 MG/DL (ref 8–23)
CALCIUM SERPL-MCNC: 8 MG/DL (ref 8.7–10.5)
CHLORIDE SERPL-SCNC: 107 MMOL/L (ref 95–110)
CO2 SERPL-SCNC: 24 MMOL/L (ref 23–29)
CREAT SERPL-MCNC: 1.7 MG/DL (ref 0.5–1.4)
EST. GFR  (AFRICAN AMERICAN): 40 ML/MIN/1.73 M^2
EST. GFR  (NON AFRICAN AMERICAN): 35 ML/MIN/1.73 M^2
GLUCOSE SERPL-MCNC: 168 MG/DL (ref 70–110)
MAGNESIUM SERPL-MCNC: 2.5 MG/DL (ref 1.6–2.6)
PHOSPHATE SERPL-MCNC: 3.8 MG/DL (ref 2.7–4.5)
POCT GLUCOSE: 153 MG/DL (ref 70–110)
POCT GLUCOSE: 163 MG/DL (ref 70–110)
POCT GLUCOSE: 169 MG/DL (ref 70–110)
POTASSIUM SERPL-SCNC: 4.9 MMOL/L (ref 3.5–5.1)
SODIUM SERPL-SCNC: 142 MMOL/L (ref 136–145)

## 2022-03-02 PROCEDURE — C9113 INJ PANTOPRAZOLE SODIUM, VIA: HCPCS | Performed by: STUDENT IN AN ORGANIZED HEALTH CARE EDUCATION/TRAINING PROGRAM

## 2022-03-02 PROCEDURE — 25000003 PHARM REV CODE 250: Performed by: STUDENT IN AN ORGANIZED HEALTH CARE EDUCATION/TRAINING PROGRAM

## 2022-03-02 PROCEDURE — 84100 ASSAY OF PHOSPHORUS: CPT | Performed by: INTERNAL MEDICINE

## 2022-03-02 PROCEDURE — 63600175 PHARM REV CODE 636 W HCPCS: Performed by: STUDENT IN AN ORGANIZED HEALTH CARE EDUCATION/TRAINING PROGRAM

## 2022-03-02 PROCEDURE — 11000001 HC ACUTE MED/SURG PRIVATE ROOM

## 2022-03-02 PROCEDURE — 25000003 PHARM REV CODE 250: Performed by: PHYSICIAN ASSISTANT

## 2022-03-02 PROCEDURE — 97530 THERAPEUTIC ACTIVITIES: CPT

## 2022-03-02 PROCEDURE — 94660 CPAP INITIATION&MGMT: CPT

## 2022-03-02 PROCEDURE — 36415 COLL VENOUS BLD VENIPUNCTURE: CPT | Performed by: INTERNAL MEDICINE

## 2022-03-02 PROCEDURE — 94761 N-INVAS EAR/PLS OXIMETRY MLT: CPT

## 2022-03-02 PROCEDURE — 99900035 HC TECH TIME PER 15 MIN (STAT)

## 2022-03-02 PROCEDURE — 97162 PT EVAL MOD COMPLEX 30 MIN: CPT

## 2022-03-02 PROCEDURE — 94640 AIRWAY INHALATION TREATMENT: CPT

## 2022-03-02 PROCEDURE — 80048 BASIC METABOLIC PNL TOTAL CA: CPT | Performed by: INTERNAL MEDICINE

## 2022-03-02 PROCEDURE — 94760 N-INVAS EAR/PLS OXIMETRY 1: CPT

## 2022-03-02 PROCEDURE — 97166 OT EVAL MOD COMPLEX 45 MIN: CPT

## 2022-03-02 PROCEDURE — 83880 ASSAY OF NATRIURETIC PEPTIDE: CPT | Performed by: INTERNAL MEDICINE

## 2022-03-02 PROCEDURE — 83735 ASSAY OF MAGNESIUM: CPT | Performed by: INTERNAL MEDICINE

## 2022-03-02 PROCEDURE — 63600175 PHARM REV CODE 636 W HCPCS: Performed by: PHYSICIAN ASSISTANT

## 2022-03-02 PROCEDURE — 63600175 PHARM REV CODE 636 W HCPCS: Performed by: INTERNAL MEDICINE

## 2022-03-02 PROCEDURE — 25000242 PHARM REV CODE 250 ALT 637 W/ HCPCS: Performed by: STUDENT IN AN ORGANIZED HEALTH CARE EDUCATION/TRAINING PROGRAM

## 2022-03-02 RX ORDER — FUROSEMIDE 10 MG/ML
60 INJECTION INTRAMUSCULAR; INTRAVENOUS ONCE
Status: COMPLETED | OUTPATIENT
Start: 2022-03-02 | End: 2022-03-02

## 2022-03-02 RX ADMIN — METHYLPREDNISOLONE SODIUM SUCCINATE 40 MG: 40 INJECTION, POWDER, FOR SOLUTION INTRAMUSCULAR; INTRAVENOUS at 08:03

## 2022-03-02 RX ADMIN — CARVEDILOL 12.5 MG: 12.5 TABLET, FILM COATED ORAL at 08:03

## 2022-03-02 RX ADMIN — PIPERACILLIN AND TAZOBACTAM 4.5 G: 4; .5 INJECTION, POWDER, LYOPHILIZED, FOR SOLUTION INTRAVENOUS; PARENTERAL at 08:03

## 2022-03-02 RX ADMIN — SACUBITRIL AND VALSARTAN 1 TABLET: 24; 26 TABLET, FILM COATED ORAL at 08:03

## 2022-03-02 RX ADMIN — IPRATROPIUM BROMIDE AND ALBUTEROL SULFATE 3 ML: 2.5; .5 SOLUTION RESPIRATORY (INHALATION) at 03:03

## 2022-03-02 RX ADMIN — IPRATROPIUM BROMIDE AND ALBUTEROL SULFATE 3 ML: 2.5; .5 SOLUTION RESPIRATORY (INHALATION) at 07:03

## 2022-03-02 RX ADMIN — PANTOPRAZOLE SODIUM 40 MG: 40 INJECTION, POWDER, FOR SOLUTION INTRAVENOUS at 08:03

## 2022-03-02 RX ADMIN — FUROSEMIDE 60 MG: 10 INJECTION, SOLUTION INTRAMUSCULAR; INTRAVENOUS at 10:03

## 2022-03-02 RX ADMIN — PIPERACILLIN AND TAZOBACTAM 4.5 G: 4; .5 INJECTION, POWDER, LYOPHILIZED, FOR SOLUTION INTRAVENOUS; PARENTERAL at 12:03

## 2022-03-02 RX ADMIN — IPRATROPIUM BROMIDE AND ALBUTEROL SULFATE 3 ML: 2.5; .5 SOLUTION RESPIRATORY (INHALATION) at 12:03

## 2022-03-02 RX ADMIN — EZETIMIBE 10 MG: 10 TABLET ORAL at 08:03

## 2022-03-02 RX ADMIN — IPRATROPIUM BROMIDE AND ALBUTEROL SULFATE 3 ML: 2.5; .5 SOLUTION RESPIRATORY (INHALATION) at 11:03

## 2022-03-02 RX ADMIN — TAMSULOSIN HYDROCHLORIDE 0.4 MG: 0.4 CAPSULE ORAL at 08:03

## 2022-03-02 RX ADMIN — IPRATROPIUM BROMIDE AND ALBUTEROL SULFATE 3 ML: 2.5; .5 SOLUTION RESPIRATORY (INHALATION) at 04:03

## 2022-03-02 RX ADMIN — PIPERACILLIN AND TAZOBACTAM 4.5 G: 4; .5 INJECTION, POWDER, LYOPHILIZED, FOR SOLUTION INTRAVENOUS; PARENTERAL at 05:03

## 2022-03-02 NOTE — PLAN OF CARE
[facility] - Med Surg  Discharge Reassessment    Primary Care Provider: Oracio Johnson MD    Expected Discharge Date:     SW called patient's daughter and caregiver,  Mikala to discuss discharge planning. There was no answer.     Patient still in the hospital with shortness of breath and acute respiratory failure. Patient is on 4L of O2 and uses a Bipap. PT/OT recommended home health upon discharge.     Reassessment (most recent)       Discharge Reassessment - 03/02/22 1506          Discharge Reassessment    Assessment Type Discharge Planning Reassessment (P)      Did the patient's condition or plan change since previous assessment? Yes (P)      Discharge Plan discussed with: Adult children (P)      Communicated LOUISE with patient/caregiver Date not available/Unable to determine (P)      Discharge Plan A Home Health (P)      Discharge Plan B Home with family (P)      DME Needed Upon Discharge  none (P)      Discharge Barriers Identified None (P)      Why the patient remains in the hospital Requires continued medical care (P)         Post-Acute Status    Post-Acute Authorization Home Health (P)      Home Health Status Pending medical clearance/testing (P)      Coverage PHN (P)      Discharge Delays Post-Acute Set-up (P)

## 2022-03-02 NOTE — PT/OT/SLP EVAL
"Occupational Therapy   Evaluation    Name: Reese Bell  MRN: [id number]  Admitting Diagnosis:  SBO (small bowel obstruction)  Recent Surgery: * No surgery found *      Recommendations:     Discharge Recommendations: home health OT  Discharge Equipment Recommendations:  bedside commode  Barriers to discharge:   (Decreased task initation and routine t/f's following extended inpatient / immobility.)    Assessment:     Reese Bell is a 89 y.o. male with a medical diagnosis of SBO (small bowel obstruction). Performance deficits affecting function: weakness, impaired endurance, decreased coordination, decreased upper extremity function, decreased lower extremity function, impaired functional mobilty, gait instability, impaired balance, pain, impaired self care skills, impaired cardiopulmonary response to activity.      Rehab Prognosis: Good; patient would benefit from acute skilled OT services to address these deficits and reach maximum level of function.       Plan:     Patient to be seen 3 x/week, 5 x/week to address the above listed problems via self-care/home management, therapeutic activities, therapeutic exercises  · Plan of Care Expires: 04/02/22  · Plan of Care Reviewed with: patient    Subjective     Chief Complaint: max deconditioning 2* extended hosp and no OOB x6 days. Light headedness in EOB sitting, UB/LB limb weakness.   Patient/Family Comments/goals: Return to home with Daughter and SBA ADL t/f's bed <> w/c <> toilet.     Occupational Profile:  Living Environment: Patient resides with his daughter. Per chart Patient's adult offspring attend in person M-F, Patient siblings assist Patient on weekends. Patient has "24/7" per nephew present and reporting during eval. Nephew is aa RN). Patient resides in [facility] with no steps to enter.  Patient t/f'd to [facility] from [facility].    Patient's prior to admission  level of function was mod I (ffot propulsion in w/c about " "home) and mod (I) w/c <> ADL transfers.  Per nephew this am. "Patient is able to take a few steps using RW" for transfers.    Equipment at home: walker, rolling, rollator, wheelchair, CPAP.    Upon discharge, patient will have assistance from extended and supportive Family.      Pain/Comfort:       Patients cultural, spiritual, Anabaptism conflicts given the current situation:  No    Objective:     Communicated with: RN prior to session.  Patient found HOB elevated 35 degrees with bed alarm, telemetry, pulse ox (continuous), peripheral IV, oxygen upon OT entry to room.    General Precautions: Standard, fall, aspiration, respiratory   Orthopedic Precautions:N/A   Braces:    Respiratory Status: Nasal cannula, flow 4 L/min    Occupational Performance:    Bed Mobility:    Supine to Sit: moderate assistance and of 2 persons with HOB elevated   Scooting: contact guard assistance for anterior scooting   Bridging: dependence and of 2 persons to reposition HOB   Sit to Supine: moderate assistance    Functional Mobility/Transfers:  Initial visit hold for nursing care 10:40 am. Upon PCT exiting room and following max "rolling" for bed and linen change /87 o2 sats 95% on 4 liters.     · Sit to Stand: minimum assistance with rolling walker  · 4-5 left lateral sidesteps bedside with min A and RW guidance, 4L O2 NC.  Noted SOB,difficult spO2 82% 4 liters in bed side and standing mobility trials, HR 90 BPM per nursing monitor. Restorative breathing education introduced and reinforced x at least 2 during seated rests this dam. Balance: Pt with fair static sit/stand balance.  Pt sat EOB ~15-20 min with SBA.    · Functional Mobility: Fair dyn stand, FAIR (-) to FAIR general task tolerance levels, continual supportive feedback beneficial.     Activities of Daily Living:  · Upper Body Dressing: maximal assistance    · Lower Body Dressing: maximal assistance non skid socks, seated EOB, no undergarments  · Toileting: maximal " assistance in bed.     Cognitive/Visual Perceptual:  Cognitive/Psychosocial Skills:     -       Oriented to: Person, Place and Situation   -       Follows Commands/attention:Easily distracted and Follows multistep  commands  -       Communication: clear/strong regional dialect  -       Memory: No Deficits noted  -       Safety awareness/insight to disability: intact   -       Mood/Affect/Coping skills/emotional control: Cooperative, Anxious and Pleasant    Physical Exam:  · Fine/Gross Motor Coordination:  WFL  · BUE ROM:  LUE WFL, RUE shoulder limited to </= 55 degrees flx/ex, abd, remainder RUE WNL  · BUE Strength: LUE 3+/5, RUE shoulder </= 2+/5 flx/ex, abd, remainder RUE 3+/5  · Postural Exam: Rounded shoulders, Forward head, marked reliance upon oral inhalation.   · Skin Integrity/Edema:      · -       Skin integrity: Visible skin intact  · -       Edema: None noted BUE        AMPAC 6 Click ADL:  AMPAC Total Score:      Treatment & Education:  Orienting interventions provided. Patient response Fair (+)  *Patient and Nephew provided with education re: role of OT, and OT Treatment rationales / protocols. Both parties verbalized understanding.  *Patient agreeable to therapy session. Lights on / shade open for session.    SC:   *Basic self-care tasks and rudimentary functional mobility undertaken -- assist levels noted above.  *Safe swallow supported, with bed positioned on mid > high fowlers provided/recommended to Patient and attending caregiver per standard aspiration precautions.   TE:   *Education provided regarding the importance of early/consistent mobility to maximize recovery in conjunction w/ edu related to importance of performing daily     UB/LB AROM exercises.  *Education underway for intro deep relaxed/restorative breathing tech as needed for non Rx pain management/MM relaxation, and to support internal self reguation.     Fair (-) < FAIR return demonstration, f/u recommended.   *Questions/concerns  within OT scope addressed.  Patient left HOB [id number] degrees with all lines intact, call button in reach, RN notified and Nephew, trained RN present    GOALS:   Multidisciplinary Problems     Occupational Therapy Goals        Problem: Occupational Therapy Goal    Goal Priority Disciplines Outcome Interventions   Occupational Therapy Goal     OT, PT/OT Ongoing, Progressing    Description: Goals to be met by: 4/2/2022      Patient will increase functional independence with ADLs by performing:    Feeding with Modified [place].  UE Dressing with Stand-by Assistance.  LE Dressing with Moderate Assistance.  Grooming while seated with Set-up Assistance.  Toileting from bedside commode with Stand-by Assistance for hygiene and clothing management.   Toilet transfer to bedside commode with Contact Guard Assistance.                     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    FRIDA (obstructive sleep apnea)     Thrombocytopenia 5/11/2017    Ventricular fibrillation 3/29/2019         Past Surgical History:   Procedure Laterality Date    APPENDECTOMY      CARDIAC PACEMAKER PLACEMENT  10/2016    DENTAL SURGERY  06/02/2016    HERNIA REPAIR      right inguinal    LEFT HEART CATHETERIZATION Left 3/28/2019    Procedure: CATHETERIZATION, HEART, LEFT;  Surgeon: Memo Lynn MD;  [facility] CATH;  Service: Cardiology;  Laterality: Left;    Right side catherization Right 2019    TONSILLECTOMY         Time Tracking:     OT Date of Treatment: 03/02/22  OT Start Time: 1132  OT Stop Time: 1154  OT Total Time (min):12 of  22 min 2* Evaluation completed with Physical Therapist on this date to best establish plan of care for acute setting. Co-tx performed for this visit due to patient need for two skilled therapists to  ensure patient and staff safety and to accommodate for patient activity tolerance.  Billable Minutes:Evaluation 12  3/3/2022

## 2022-03-02 NOTE — PROGRESS NOTES
[facility] Medicine  Progress Note    Patient Name: Reese Bell  MRN: [id number]  Patient Class: IP- Inpatient   Admission Date: 2/24/2022  Length of Stay: 6 days  Attending Physician: Stef Pichardo MD  Primary Care Provider: Oracio Johnson MD        Subjective:     Principal Problem:SBO (small bowel obstruction)        HPI:  Reese Bell 89 y.o. male with for hypertension, hyperlipidemia, DJD, CAD post cardiac stenting, FRIDA- CPAP, thrombocytopenia, history of ventricular fibrillation s/p ICD and recent defibrillator battery changed on Monday by Dr. De La Cruz in [place] presents to the hospital with a chief complaint of abdominal pain worst at the bottom of his abdomen without radiation.  He reports yesterday he developed sudden onset diffuse abdominal pain described as a pressure with associated nausea and vomiting.  Presented to an outside hospital where he was admitted.  He denies any worsening or alleviating factors for pain.  He now states his abdominal pain has resolved he has had no further nausea vomiting since arrival from outside hospital.  The symptoms never occurred before.  He had an appendectomy and a child but denies other abdominal surgeries.  He denies any current complaints.    In the emergency room and seen and, creatinine 1.8, CT abdomen with high-grade partial versus complete small bowel obstruction, troponin negative, BNP negative, COVID negative, NG tube placement attempted but unsuccessful chest x-ray showed placement to right lower lobe.  NG tube was removed.  Seen by surgery who recommended transfer for higher level care.      Overview/Hospital Course:  89-year-old white male transferred to [facility] from [facility] to be admitted on 02/24/2022 for small bowel obstruction and needed cardiac and surgery consultants. Patient with extensive past medical history (HTN, hyperlipidemia, DJD, CAD post cardiac stenting, FRIDA- CPAP, thrombocytopenia,  history of ventricular fibrillation s/p ICD and recent defibrillator battery changed on Monday by Dr. De La Cruz in [place] ). Of note,  patient is nonambulatory, W/C bound at baseline. Lives at home with his daughter M-F, and siblings alternate watching him on the weekend. Plenty of family support    CT abdomen with high-grade partial versus early complete small bowel obstruction.  The stomach is significantly distended. CXR w/ opacification at the left lung base probably a combination of atelectasis and pleural effusion.  Of note, patient failed multiple attempts of NGT placement at [place]. CXR from [place] showed enteric tube appearing to terminate in the region of the right lower lobe, which later was removed. Cardiology and surgery consulted. Holding off on NGT per surgery at this time, given patient without nausea and vomiting. Cardiology recommend no further ischemic workup needed at this time. Patient has had loose, very dark bowel movement since admission.  Suspect dark stools due to recent NGT trauma and patient aspirating on the blood. Hemoglobin down trended but then stabilized. GI consulted for further evaluation and recommends monitoring and empiric treatment with PPI. Patient continues on supplemental O2, will wean as tolerated. CT chest WO showed likely atelectasis. Patient tolerating home CPAP. Patient with flatus and bowel movements. Advanced diet as tolerated. Continue abx for possible aspiration pneumonia. PT/OT were consulted on 3/1/22.      Interval History: no new issues     Review of Systems   Constitutional:  Negative for chills and fever.   HENT:  Negative for nosebleeds (resolved) and tinnitus.    Eyes:  Negative for visual disturbance.   Respiratory:  Negative for chest tightness, shortness of breath (improving) and wheezing.    Cardiovascular:  Negative for chest pain, palpitations and leg swelling.   Gastrointestinal:  Negative for abdominal distention (improving per patient and family),  abdominal pain, blood in stool (no obvious blood in stool but says its dark. improving), constipation, nausea and vomiting.   Genitourinary:  Negative for dysuria, flank pain and hematuria.   Musculoskeletal:  Negative for joint swelling.   Skin:  Negative for rash and wound.   Neurological:  Negative for dizziness, syncope, weakness and headaches.   Psychiatric/Behavioral:  Negative for confusion and hallucinations.    Objective:     Vital Signs (Most Recent):  Temp: 98.5 °F (36.9 °C) (03/02/22 0807)  Pulse: 96 (03/02/22 0807)  Resp: 17 (03/02/22 0807)  BP: 122/75 (03/02/22 0807)  SpO2: 99 % (03/02/22 0807) Vital Signs (24h Range):  Temp:  [97.8 °F (36.6 °C)-98.5 °F (36.9 °C)] 98.5 °F (36.9 °C)  Pulse:  [69-96] 96  Resp:  [16-20] 17  SpO2:  [94 %-99 %] 99 %  BP: (110-145)/(63-92) 122/75     Weight: 104.3 kg (229 lb 15 oz)  Body mass index is 39.47 kg/m².    Intake/Output Summary (Last 24 hours) at 3/2/2022 0845  Last data filed at 3/2/2022 0720  Gross per 24 hour   Intake 839.2 ml   Output --   Net 839.2 ml      Physical Exam  Vitals and nursing note reviewed.   Constitutional:       General: He is not in acute distress.     Appearance: He is obese. He is not ill-appearing, toxic-appearing or diaphoretic.      Comments: Appears clinically better today. No distress. No conversational dyspnea    HENT:      Head: Normocephalic and atraumatic.      Ears:      Comments: Slightly hard of hearing  Neck:      Thyroid: No thyromegaly.   Cardiovascular:      Rate and Rhythm: Normal rate and regular rhythm.      Heart sounds: No murmur heard.     Comments: AICD chest wall, skin overlying it appears bruised.   Pulmonary:      Effort: Pulmonary effort is normal. No respiratory distress.      Breath sounds: Decreased breath sounds present. Wheezes: expiratory wheezesz in all lung fields.     Comments: Breathing improved today. Patient currently on 2L NC with sats at 92-94%. Lungs with diminished breath sounds throughout, but  "left more than right. No conversational dyspnea today  Abdominal:      Palpations: Abdomen is soft.      Comments: Bowel sounds present. Soft, non-tender no rebound   Musculoskeletal:         General: No deformity.      Right lower leg: No edema.      Left lower leg: No edema.   Skin:     General: Skin is warm and dry.   Neurological:      Mental Status: He is alert and oriented to person, place, and time.      Sensory: No sensory deficit.   Psychiatric:         Mood and Affect: Mood normal.         Behavior: Behavior normal.         Thought Content: Thought content normal.         Cognition and Memory: Cognition and memory normal.      Comments: Very sharp memory       Significant Labs: All pertinent labs within the past 24 hours have been reviewed.  BMP:   Recent Labs   Lab 03/01/22  0511   *      K 5.0      CO2 22*   BUN 56*   CREATININE 1.9*   CALCIUM 8.1*     CBC: No results for input(s): WBC, HGB, HCT, PLT in the last 48 hours.    Significant Imaging:       Assessment/Plan:      * SBO (small bowel obstruction)  CT on 2/24 at [facility] with "High-grade partial versus early complete small bowel obstruction.  The stomach is significantly distended.  Transition zone somewhere in the mid abdomen." evaluated by surgery there and recommended for transfer for higher level of care. NG tube unable to be placed at [place].    -Surgery consulted: hold on further NG tube placement. Advance diet as tolerated. No plans for sx  -GI consulted: empiric tx with IV PPI and consider NGT placement if any vomiting.  -Pt now with flatus and bowel movements  -XR abdomen (2/27) w/air distention, no air fluid level  -Pt tolerating regular/cardiac diet    Aspiration pneumonia  -He had a mild leukocytosis on admission  -Recent traumatic NGT at [place] (CXR showed NGT in the region of the RLL)  -Presented with epistaxis and coughing up blood  -Repeat CXR w/mild superimposed right perihilar and infrahilar infiltrate, with " some mild atelectasis at the right base as well.  There is continued opacity at the left lung base  -Started on Zosyn for possible aspiration pna from the blood  -Day 4 of Zosyn    Melena  -Patient with melena   -Suspect 2/2 to recent traumatic NGT placement given pt had epistaxis and coughing up blood prior to arrival  -Monitor H/H: stable at this time  -GI consulted: empiric treatment with IV PPI while inpatient. protonix 40 BID on discharge for 6-8 weeks.    Advanced care planning/counseling discussion  Discussed code status with son at bedside and patient.  Son Jian confirmed patient is DNR. He has brought his living will to the hospital.   Greater than 16 minutes spent discussing code status.   DNR order placed in chart    FRIDA (obstructive sleep apnea)  Continue home CPAP if able    Coronary artery disease involving native coronary artery of native heart without angina pectoris  Denies any chest pain.   Will hold aspirin in setting of melena  Continue home coreg/zetia   Monitor on telemetry  Cardiology consulted for Pre-Op evaluation should surgery be required for SBO    Permanent atrial fibrillation  Continue home coreg, now tolerating PO intake  Currently rate controlled.   Not on anticoagulation outpatient    CHF (congestive heart failure), NYHA class II, chronic, systolic  Echo in 2019 with EF of 60% and normal ventricular diastolic function   On laisx 20 BID at home.   Will hold lasix at this time  CT chest with atelectasis.  Continue supplemental O2, wean as tolerated.    Echo: EF 65%, normal systolic and diastolic function    Continue home entresto  Daily weights  strict intake and output  telemetry    Essential hypertension  Fair controlled   Continue home coreg and entresto    Hyperlipidemia  Continue home zetia   Pt takes Crestor 10mg MWF    Stage 3 chronic kidney disease  Baseline Cr of 1.5-1.9.  Cr at baseline  Renal dose medications   avoid nephrotoxic drugs   Caution with diuresis  monitor intake  and output    Dementia in Alzheimer's disease  Stable, at baseline mental status per family  Delirium precautions  Holding home namenda at this time    Benign prostatic hyperplasia  Continue home flomax      Debility- PT/OT consult today    Obesity Body mass index is 39.47 kg/m².  Weight loss as out patient      VTE Risk Mitigation (From admission, onward)         Ordered     Place CHRISTOPHER hose  Until discontinued         02/24/22 1712     IP VTE HIGH RISK PATIENT  Once         02/24/22 1712     Place sequential compression device  Until discontinued         02/24/22 1712                Discharge Planning   LOUISE:      Code Status: DNR   Is the patient medically ready for discharge?:     Reason for patient still in hospital (select all that apply): Patient unstable  Discharge Plan A: Home        Lasix today. PT/OT eval. Home in 1-2 days           Stef Machado MD  Department of Hospital Medicine   [facility] Surg

## 2022-03-02 NOTE — PLAN OF CARE
Pt free from falls, injury or any further trauma throughout shift. Pt AAO x4 . Continued medications as ordered. Pt turned throughout shift. Multiple bowel and bladder care performed. Pt on BIPAP. Denies SOB. Blood glucose monitored. IV Abx administered as ordered. Pt in no distress. Family at bedside assisting with care. Call light in reach, bed locked in lowest position. Will cont to monitor.      Problem: Adult Inpatient Plan of Care  Goal: Plan of Care Review  Outcome: Ongoing, Progressing     Problem: Impaired Wound Healing  Goal: Optimal Wound Healing  Outcome: Ongoing, Progressing     Problem: Adjustment to Illness (Gastrointestinal Bleeding)  Goal: Optimal Coping with Acute Illness  Outcome: Ongoing, Progressing     Problem: Bleeding (Gastrointestinal Bleeding)  Goal: Hemostasis  Outcome: Ongoing, Progressing     Problem: Skin Injury Risk Increased  Goal: Skin Health and Integrity  Outcome: Ongoing, Progressing     Problem: Fall Injury Risk  Goal: Absence of Fall and Fall-Related Injury  Outcome: Ongoing, Progressing     Problem: Bariatric Environmental Safety  Goal: Safety Maintained with Care  Outcome: Ongoing, Progressing

## 2022-03-02 NOTE — PT/OT/SLP EVAL
Physical Therapy Evaluation    Patient Name:  Reese Bell   MRN:  [id number]    Recommendations:     Discharge Recommendations:  home health PT (with family supervision/assistance); Pt already has 24 hr care at home.   Discharge Equipment Recommendations: bedside commode   Barriers to discharge: None    Assessment:     Reese Bell is a 89 y.o. male admitted with a medical diagnosis of SBO (small bowel obstruction).  He presents with the following impairments/functional limitations:  weakness, impaired endurance, impaired functional mobilty, gait instability, impaired balance, decreased upper extremity function, decreased lower extremity function, decreased safety awareness, pain, decreased ROM, impaired cardiopulmonary response to activity.    Rehab Prognosis: Fair; patient would benefit from acute skilled PT services to address these deficits and reach maximum level of function.    Recent Surgery: * No surgery found *      Plan:     During this hospitalization, patient to be seen 3 x/week to address the identified rehab impairments via gait training, therapeutic activities, therapeutic exercises, wheelchair management/training and progress toward the following goals:    · Plan of Care Expires:  03/16/22    Subjective     Chief Complaint: weakness  Patient/Family Comments/goals: Pt agreeable to therapy with encouragement.   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  Pt is from [place].   Prior to admission, patients level of function was mod I with w/c and BSC transfers.  Pt able to take a few steps using RW for transfers.  Equipment at home: walker, rolling, rollator, wheelchair, CPAP.  Upon discharge, patient will have assistance from family.    Objective:     Communicated with  nurse Levy prior to session.  Patient found HOB elevated with bed alarm, oxygen, peripheral IV, pulse ox (continuous), telemetry  upon PT entry to room.    General Precautions: Standard, fall, respiratory   Orthopedic Precautions:N/A   Braces: N/A  Respiratory Status: Nasal cannula, flow 4 L/min    Exams:  · Cognitive Exam:  Patient was able to follow 2 step commands.   · Gross Motor Coordination:  WFL  · Postural Exam:  Patient presented with the following abnormalities:    · -       Rounded shoulders  · -       Forward head  · Skin Integrity/Edema:      · -       Skin integrity: Visible skin intact  · -       Edema: None noted BLE  · BLE ROM: WFL  · BLE Strength: WFL    Functional Mobility:  · Bed Mobility:     · Scooting: contact guard assistance for anterior scooting   · Bridging: dependence and of 2 persons to reposition HOB   · Supine to Sit: moderate assistance and of 2 persons with HOB elevated   · Sit to Supine: moderate assistance  · Transfers:     · Sit to Stand: minimum assistance with rolling walker  · Gait: Pt ambulated ~4-6 sidesteps along bedside with min A using RW and 4L O2 NC.  Pt with narrow ARI, decreased foot clearance, decreased step length, and decreased panfilo.  Pt c/o minimal SOB, unable to accurately obtain spO2.  Pt without labored breathing, educated on pursed lip breathing, however had some difficulty with performance.    · Balance: Pt with fair static sit/stand balance.  Pt sat EOB ~15-20 min with SBA.      Therapeutic Activities and Exercises:  BLE seated therex as tolerated: AP, LAQ, and hip flex    Pt/nephew (who is a nurse) educated on acute skilled PT services and goals.  Pt encouraged to sit EOB with family and nursing assistance/supervision while in the hospital.  Pt verbalized understanding, but will need encouragement.     AM-PAC 6 CLICK MOBILITY  Total Score:14     Patient left with bed in chair position and BUE/BLE elevated on pillows with all lines intact, call button in  reach, bed alarm on and nephew present notified.  Tray table close by.     GOALS:   Multidisciplinary Problems     Physical Therapy Goals        Problem: Physical Therapy Goal    Goal Priority Disciplines Outcome Goal Variances Interventions   Physical Therapy Goal     PT, PT/OT Ongoing, Progressing     Description: Goals to be met by: 3/16/22     Patient will increase functional independence with mobility by performin. Supine to sit with Stand-by Assistance  2. Rolling to Left and Right with Stand-by Assistance  3. Sit to stand transfer with Stand-by Assistance using RW  4. Bed to chair transfer with Stand-by Assistance using Rolling Walker  5. Gait x5-10 feet with Stand-by Assistance using Rolling Walker  6. Wheelchair propulsion >50 feet with Stand-by Assistance using bilateral upper/lower extremities  7. Lower extremity exercise program 2 sets x10 reps per handout, with supervision                     History:     Past Medical History:   Diagnosis Date    ASCVD (arteriosclerotic cardiovascular disease)     DJD (degenerative joint disease)     Hyperlipidemia     Hypertension     MGUS (monoclonal gammopathy of unknown significance) 2017    FRIDA (obstructive sleep apnea)     Thrombocytopenia 2017    Ventricular fibrillation 3/29/2019       Past Surgical History:   Procedure Laterality Date    APPENDECTOMY      CARDIAC PACEMAKER PLACEMENT  10/2016    DENTAL SURGERY  2016    HERNIA REPAIR      right inguinal    LEFT HEART CATHETERIZATION Left 3/28/2019    Procedure: CATHETERIZATION, HEART, LEFT;  Surgeon: Memo Lynn MD;  Location: [facility] CATH;  Service: Cardiology;  Laterality: Left;    Right side catherization Right     TONSILLECTOMY         Time Tracking:     PT Received On: 22  PT Start Time: 1130     PT Stop Time: 1154  PT Total Time (min): 24 min     Billable Minutes: Evaluation 14 min co-eval with OT and Therapeutic Activity 10 min      2022

## 2022-03-02 NOTE — NURSING
Pt running at least 6 beats of vtach. Monitor tech having trouble getting exact count due to some artifact between some beats. Pt was having breif changed. Asymptomatic. Dr. Pichardo notified.

## 2022-03-02 NOTE — PHYSICIAN QUERY
PT Name: Reese Bell  MR #: [id number]     DOCUMENTATION CLARIFICATION     CDS/: Terrie Neal RN, CDI            Contact information:[email or web address]  This form is a permanent document in the medical record.     Query Date: March 2, 2022    By submitting this query, we are merely seeking further clarification of documentation.  Please utilize your independent clinical judgment when addressing the question(s) below.  The Medical Record contains the following   Indicators   Supporting Clinical Findings Location in Medical Record   x Documentation of Respiratory Failure, ARDS Acute on Chronic Hypercapnic respiratory failure. Suspect related to pulmonary edema and atelectasis.     PN 2/28   x SOB, JESSICA, Wheezing, Productive Cough, Use of Accessory Muscles, etc. Pulmonary: initially with wheezing to right side and decreased breath sounds to the left. Given duo-neb with beginning of wheezing to left side. Speak in short sentences  PN 2/28   x RR     ABGs     O2 sat ABG returned with 7.25 / 68 / 266 / 30 / 1 / 100%    PN 2/28    Hypoxia/Hypercapnia     x BiPAP/Intubation/Mechanical Ventilation Will start BIPAP 10/5 and repeat ABG in 1 hour after has been on BIPAP      PN 2/28    Supplemental O2      Home O2, Oxygen Dependence      Respiratory distress      x Radiology findings Chest x-ray with hypo inflated lungs accentuating pulmonary interstitial markings, possible pulmonary edema  PN 2/28   x Acute/Chronic Illness hypertension, hyperlipidemia, DJD, CAD post cardiac stenting, FRIDA- CPAP, thrombocytopenia, history of ventricular fibrillation s/p ICD and recent defibrillator battery changed  HPI 2/24   x Treatment Will check BNP, BMP, and ABG  Will give lasix as last received lasix on 2/26  Will give 1 time dose of solumederol for wheezing  Continue 1 hour continuous breathing treatment as improving breath sounds after initial dose  Encouraged incentive spirometer, per family did not use IS today  while sleeping HM PN 2/28    Other         The noted clinical guidelines following a diagnosis are only system guidelines and do not replace the providers clinical judgment.    Provider, please clarify the diagnosis of ___respiratory failure_____:    [  x  ] Acute Respiratory Failure with Hypercapnia (pCO2 > 50 mmHg with pH < 7.35 and respiratory symptoms documented) diagnosis is confirmed and additional clinical support/decision-making indicators for the diagnosis include (please specify): _______________________________________________   [    ] Acute and (on) Chronic Respiratory Failure with Hypercapnia (pCO2 >10 mmHg over baseline and pH < 7.35 and respiratory symptoms documented) diagnosis is confirmed and additional clinical support/decision-making indicators for the diagnosis include (please specify): _______________________________________________   [    ] Above stated diagnosis is not confirmed and/or it has been ruled out   [    ] Above stated diagnosis is not confirmed and/or it has been ruled out, other diagnosis ruled in (please specify):_______________   [    ] Other clarification (please specify): ___________________   [   ] Clinically Undetermined       Please document in your progress notes daily for the duration of treatment until resolved and include in your discharge summary.     Reference:    LEATHA Dill MD. (2020, March 13). Acute respiratory distress syndrome: Clinical features, diagnosis, and complications in adults ([id number] TAY Johnson MD & [id number] TIM Murillo MD, Eds.). Retrieved November 13, 2020, from [email or web address]&source=search_[email or web address]=1~[email or web address]&usage_type=default&display_rank=1  Form No. [id number]

## 2022-03-02 NOTE — SUBJECTIVE & OBJECTIVE
Interval History: no new issues     Review of Systems   Constitutional:  Negative for chills and fever.   HENT:  Negative for nosebleeds (resolved) and tinnitus.    Eyes:  Negative for visual disturbance.   Respiratory:  Negative for chest tightness, shortness of breath (improving) and wheezing.    Cardiovascular:  Negative for chest pain, palpitations and leg swelling.   Gastrointestinal:  Negative for abdominal distention (improving per patient and family), abdominal pain, blood in stool (no obvious blood in stool but says its dark. improving), constipation, nausea and vomiting.   Genitourinary:  Negative for dysuria, flank pain and hematuria.   Musculoskeletal:  Negative for joint swelling.   Skin:  Negative for rash and wound.   Neurological:  Negative for dizziness, syncope, weakness and headaches.   Psychiatric/Behavioral:  Negative for confusion and hallucinations.    Objective:     Vital Signs (Most Recent):  Temp: 98.5 °F (36.9 °C) (03/02/22 0807)  Pulse: 96 (03/02/22 0807)  Resp: 17 (03/02/22 0807)  BP: 122/75 (03/02/22 0807)  SpO2: 99 % (03/02/22 0807) Vital Signs (24h Range):  Temp:  [97.8 °F (36.6 °C)-98.5 °F (36.9 °C)] 98.5 °F (36.9 °C)  Pulse:  [69-96] 96  Resp:  [16-20] 17  SpO2:  [94 %-99 %] 99 %  BP: (110-145)/(63-92) 122/75     Weight: 104.3 kg (229 lb 15 oz)  Body mass index is 39.47 kg/m².    Intake/Output Summary (Last 24 hours) at 3/2/2022 0845  Last data filed at 3/2/2022 0720  Gross per 24 hour   Intake 839.2 ml   Output --   Net 839.2 ml      Physical Exam  Vitals and nursing note reviewed.   Constitutional:       General: He is not in acute distress.     Appearance: He is obese. He is not ill-appearing, toxic-appearing or diaphoretic.      Comments: Appears clinically better today. No distress. No conversational dyspnea    HENT:      Head: Normocephalic and atraumatic.      Ears:      Comments: Slightly hard of hearing  Neck:      Thyroid: No thyromegaly.   Cardiovascular:      Rate and  Rhythm: Normal rate and regular rhythm.      Heart sounds: No murmur heard.     Comments: AICD chest wall, skin overlying it appears bruised.   Pulmonary:      Effort: Pulmonary effort is normal. No respiratory distress.      Breath sounds: Decreased breath sounds present. Wheezes: expiratory wheezesz in all lung fields.     Comments: Breathing improved today. Patient currently on 2L NC with sats at 92-94%. Lungs with diminished breath sounds throughout, but left more than right. No conversational dyspnea today  Abdominal:      Palpations: Abdomen is soft.      Comments: Bowel sounds present. Soft, non-tender no rebound   Musculoskeletal:         General: No deformity.      Right lower leg: No edema.      Left lower leg: No edema.   Skin:     General: Skin is warm and dry.   Neurological:      Mental Status: He is alert and oriented to person, place, and time.      Sensory: No sensory deficit.   Psychiatric:         Mood and Affect: Mood normal.         Behavior: Behavior normal.         Thought Content: Thought content normal.         Cognition and Memory: Cognition and memory normal.      Comments: Very sharp memory       Significant Labs: All pertinent labs within the past 24 hours have been reviewed.  BMP:   Recent Labs   Lab 03/01/22  0511   *      K 5.0      CO2 22*   BUN 56*   CREATININE 1.9*   CALCIUM 8.1*     CBC: No results for input(s): WBC, HGB, HCT, PLT in the last 48 hours.    Significant Imaging:

## 2022-03-02 NOTE — PLAN OF CARE
Problem: Physical Therapy Goal  Goal: Physical Therapy Goal  Description: Goals to be met by: 3/16/22     Patient will increase functional independence with mobility by performin. Supine to sit with Stand-by Assistance  2. Rolling to Left and Right with Stand-by Assistance  3. Sit to stand transfer with Stand-by Assistance using RW  4. Bed to chair transfer with Stand-by Assistance using Rolling Walker  5. Gait x5-10 feet with Stand-by Assistance using Rolling Walker  6. Wheelchair propulsion >50 feet with Stand-by Assistance using bilateral upper/lower extremities  7. Lower extremity exercise program 2 sets x10 reps per handout, with supervision    Outcome: Ongoing, Progressing     Pt ambulated ~4-6 sidesteps along bedside with min A using RW and 4L O2 NC.

## 2022-03-02 NOTE — PLAN OF CARE
Problem: Occupational Therapy Goal  Goal: Occupational Therapy Goal  Description: Goals to be met by: 4/2/2022      Patient will increase functional independence with ADLs by performing:    Feeding with Modified [place].  UE Dressing with Stand-by Assistance.  LE Dressing with Moderate Assistance.  Grooming while seated with Set-up Assistance.  Toileting from bedside commode with Stand-by Assistance for hygiene and clothing management.   Toilet transfer to bedside commode with Contact Guard Assistance.    Outcome: Ongoing, Progressing

## 2022-03-03 LAB
ANION GAP SERPL CALC-SCNC: 10 MMOL/L (ref 8–16)
BUN SERPL-MCNC: 45 MG/DL (ref 8–23)
CALCIUM SERPL-MCNC: 8.2 MG/DL (ref 8.7–10.5)
CHLORIDE SERPL-SCNC: 105 MMOL/L (ref 95–110)
CO2 SERPL-SCNC: 28 MMOL/L (ref 23–29)
CREAT SERPL-MCNC: 1.7 MG/DL (ref 0.5–1.4)
EST. GFR  (AFRICAN AMERICAN): 40 ML/MIN/1.73 M^2
EST. GFR  (NON AFRICAN AMERICAN): 35 ML/MIN/1.73 M^2
GLUCOSE SERPL-MCNC: 116 MG/DL (ref 70–110)
POCT GLUCOSE: 129 MG/DL (ref 70–110)
POCT GLUCOSE: 133 MG/DL (ref 70–110)
POCT GLUCOSE: 134 MG/DL (ref 70–110)
POCT GLUCOSE: 162 MG/DL (ref 70–110)
POCT GLUCOSE: 215 MG/DL (ref 70–110)
POTASSIUM SERPL-SCNC: 3.7 MMOL/L (ref 3.5–5.1)
SODIUM SERPL-SCNC: 143 MMOL/L (ref 136–145)

## 2022-03-03 PROCEDURE — 97112 NEUROMUSCULAR REEDUCATION: CPT

## 2022-03-03 PROCEDURE — 36415 COLL VENOUS BLD VENIPUNCTURE: CPT | Performed by: INTERNAL MEDICINE

## 2022-03-03 PROCEDURE — 25000242 PHARM REV CODE 250 ALT 637 W/ HCPCS: Performed by: STUDENT IN AN ORGANIZED HEALTH CARE EDUCATION/TRAINING PROGRAM

## 2022-03-03 PROCEDURE — 25000003 PHARM REV CODE 250: Performed by: PHYSICIAN ASSISTANT

## 2022-03-03 PROCEDURE — 97535 SELF CARE MNGMENT TRAINING: CPT

## 2022-03-03 PROCEDURE — 97110 THERAPEUTIC EXERCISES: CPT

## 2022-03-03 PROCEDURE — 25000003 PHARM REV CODE 250: Performed by: STUDENT IN AN ORGANIZED HEALTH CARE EDUCATION/TRAINING PROGRAM

## 2022-03-03 PROCEDURE — 11000001 HC ACUTE MED/SURG PRIVATE ROOM

## 2022-03-03 PROCEDURE — 94640 AIRWAY INHALATION TREATMENT: CPT

## 2022-03-03 PROCEDURE — 63600175 PHARM REV CODE 636 W HCPCS: Performed by: PHYSICIAN ASSISTANT

## 2022-03-03 PROCEDURE — 80048 BASIC METABOLIC PNL TOTAL CA: CPT | Performed by: INTERNAL MEDICINE

## 2022-03-03 PROCEDURE — 94761 N-INVAS EAR/PLS OXIMETRY MLT: CPT

## 2022-03-03 PROCEDURE — 94760 N-INVAS EAR/PLS OXIMETRY 1: CPT

## 2022-03-03 PROCEDURE — 63600175 PHARM REV CODE 636 W HCPCS: Performed by: STUDENT IN AN ORGANIZED HEALTH CARE EDUCATION/TRAINING PROGRAM

## 2022-03-03 PROCEDURE — 97530 THERAPEUTIC ACTIVITIES: CPT | Mod: CQ

## 2022-03-03 PROCEDURE — C9113 INJ PANTOPRAZOLE SODIUM, VIA: HCPCS | Performed by: STUDENT IN AN ORGANIZED HEALTH CARE EDUCATION/TRAINING PROGRAM

## 2022-03-03 PROCEDURE — 63600175 PHARM REV CODE 636 W HCPCS: Performed by: INTERNAL MEDICINE

## 2022-03-03 RX ORDER — FUROSEMIDE 10 MG/ML
60 INJECTION INTRAMUSCULAR; INTRAVENOUS
Status: DISCONTINUED | OUTPATIENT
Start: 2022-03-03 | End: 2022-03-04

## 2022-03-03 RX ADMIN — IPRATROPIUM BROMIDE AND ALBUTEROL SULFATE 3 ML: 2.5; .5 SOLUTION RESPIRATORY (INHALATION) at 12:03

## 2022-03-03 RX ADMIN — EZETIMIBE 10 MG: 10 TABLET ORAL at 09:03

## 2022-03-03 RX ADMIN — PIPERACILLIN AND TAZOBACTAM 4.5 G: 4; .5 INJECTION, POWDER, LYOPHILIZED, FOR SOLUTION INTRAVENOUS; PARENTERAL at 09:03

## 2022-03-03 RX ADMIN — TAMSULOSIN HYDROCHLORIDE 0.4 MG: 0.4 CAPSULE ORAL at 09:03

## 2022-03-03 RX ADMIN — PIPERACILLIN AND TAZOBACTAM 4.5 G: 4; .5 INJECTION, POWDER, LYOPHILIZED, FOR SOLUTION INTRAVENOUS; PARENTERAL at 10:03

## 2022-03-03 RX ADMIN — IPRATROPIUM BROMIDE AND ALBUTEROL SULFATE 3 ML: 2.5; .5 SOLUTION RESPIRATORY (INHALATION) at 03:03

## 2022-03-03 RX ADMIN — PANTOPRAZOLE SODIUM 40 MG: 40 INJECTION, POWDER, FOR SOLUTION INTRAVENOUS at 09:03

## 2022-03-03 RX ADMIN — IPRATROPIUM BROMIDE AND ALBUTEROL SULFATE 3 ML: 2.5; .5 SOLUTION RESPIRATORY (INHALATION) at 04:03

## 2022-03-03 RX ADMIN — FUROSEMIDE 60 MG: 10 INJECTION, SOLUTION INTRAMUSCULAR; INTRAVENOUS at 10:03

## 2022-03-03 RX ADMIN — IPRATROPIUM BROMIDE AND ALBUTEROL SULFATE 3 ML: 2.5; .5 SOLUTION RESPIRATORY (INHALATION) at 08:03

## 2022-03-03 RX ADMIN — CARVEDILOL 12.5 MG: 12.5 TABLET, FILM COATED ORAL at 10:03

## 2022-03-03 RX ADMIN — PANTOPRAZOLE SODIUM 40 MG: 40 INJECTION, POWDER, FOR SOLUTION INTRAVENOUS at 10:03

## 2022-03-03 RX ADMIN — SACUBITRIL AND VALSARTAN 1 TABLET: 24; 26 TABLET, FILM COATED ORAL at 10:03

## 2022-03-03 RX ADMIN — IPRATROPIUM BROMIDE AND ALBUTEROL SULFATE 3 ML: 2.5; .5 SOLUTION RESPIRATORY (INHALATION) at 07:03

## 2022-03-03 RX ADMIN — IPRATROPIUM BROMIDE AND ALBUTEROL SULFATE 3 ML: 2.5; .5 SOLUTION RESPIRATORY (INHALATION) at 11:03

## 2022-03-03 NOTE — NURSING
Family requesting to have CPAP placed.   BiPAP was placed by RRT. Pt stated he could not breathe. Switched over to 4L NC. RRT attempted to placed CPAP. Once again pt stated he could not breathe with mask on. Pt is currently resting with 4L NC, O2 sat 95%.

## 2022-03-03 NOTE — PLAN OF CARE
Problem: Physical Therapy Goal  Goal: Physical Therapy Goal  Description: Goals to be met by: 3/16/22     Patient will increase functional independence with mobility by performin. Supine to sit with Stand-by Assistance  2. Rolling to Left and Right with Stand-by Assistance  3. Sit to stand transfer with Stand-by Assistance using RW  4. Bed to chair transfer with Stand-by Assistance using Rolling Walker  5. Gait x5-10 feet with Stand-by Assistance using Rolling Walker  6. Wheelchair propulsion >50 feet with Stand-by Assistance using bilateral upper/lower extremities  7. Lower extremity exercise program 2 sets x10 reps per handout, with supervision    Outcome: Ongoing, Progressing

## 2022-03-03 NOTE — PT/OT/SLP PROGRESS
Physical Therapy Treatment    Patient Name:  Reese Bell   MRN:  [id number]    Recommendations:     Discharge Recommendations:  home health PT (with family supervision/assistance)   Discharge Equipment Recommendations: bedside commode   Barriers to discharge: None    Assessment:     Reese Bell is a 89 y.o. male admitted with a medical diagnosis of SBO (small bowel obstruction).  He presents with the following impairments/functional limitations:  weakness, impaired endurance, impaired self care skills, gait instability, impaired balance, decreased upper extremity function, decreased lower extremity function, decreased ROM, decreased safety awareness, pain, decreased coordination, impaired cardiopulmonary response to activity, impaired skin, impaired functional mobilty .    Rehab Prognosis: Fair; patient would benefit from acute skilled PT services to address these deficits and reach maximum level of function.    Recent Surgery: * No surgery found *      Plan:     During this hospitalization, patient to be seen 3 x/week to address the identified rehab impairments via gait training, therapeutic activities, therapeutic exercises, wheelchair management/training and progress toward the following goals:    · Plan of Care Expires:  03/16/22    Subjective     Chief Complaint: pain overall   Patient/Family Comments/goals: to sit up in chair   Pain/Comfort:  · Location - Orientation 1: generalized  · Pain Addressed 1: Pre-medicate for activity, Reposition, Cessation of Activity, Nurse notified      Objective:     Communicated with nurse Barboza prior to session.  Patient found sitting up in bedside commode  with bed alarm, telemetry, pulse ox (continuous), peripheral IV, oxygen upon PT entry to room.     General Precautions: Standard, fall, aspiration, respiratory   Orthopedic Precautions:N/A   Braces: N/A  Respiratory Status: Nasal cannula, flow 2 L/min   SpO2: 97%-99% on 2L , HR: 87-90 bpm.  Functional  Mobility:  · Transfers:     · Sit to Stand: from bedside commode moderate assistance and of 2 persons with rolling walker. V/T cues for safety , proper technique and walker management.   · Bedside commode to chair: moderate assistance with  rolling walker  using  Step Transfer. V/T cues for safety, proper technique and walker management.   · Gait:  Pt able to ambulated 6 steps from BSC to chair with RW, MOD A (2L O2NC ) .   · Balance:  Good in sitting, fair- in standing.       AM-PAC 6 CLICK MOBILITY  Turning over in bed (including adjusting bedclothes, sheets and blankets)?: 3  Sitting down on and standing up from a chair with arms (e.g., wheelchair, bedside commode, etc.): 2  Moving from lying on back to sitting on the side of the bed?: 2  Moving to and from a bed to a chair (including a wheelchair)?: 2  Need to walk in hospital room?: 2  Climbing 3-5 steps with a railing?: 1  Basic Mobility Total Score: 12       Therapeutic Activities and Exercises:   pt performed transfer as above.    Patient left up in reclined chair on green air cushion, heels offloading  with all lines intact, call button in reach, nurse notified and daughter present..    GOALS:   Multidisciplinary Problems     Physical Therapy Goals        Problem: Physical Therapy Goal    Goal Priority Disciplines Outcome Goal Variances Interventions   Physical Therapy Goal     PT, PT/OT Ongoing, Progressing     Description: Goals to be met by: 3/16/22     Patient will increase functional independence with mobility by performin. Supine to sit with Stand-by Assistance  2. Rolling to Left and Right with Stand-by Assistance  3. Sit to stand transfer with Stand-by Assistance using RW  4. Bed to chair transfer with Stand-by Assistance using Rolling Walker  5. Gait x5-10 feet with Stand-by Assistance using Rolling Walker  6. Wheelchair propulsion >50 feet with Stand-by Assistance using bilateral upper/lower extremities  7. Lower extremity exercise program  2 sets x10 reps per handout, with supervision                     Time Tracking:     PT Received On: 03/03/22  PT Start Time: 1105     PT Stop Time: 1123  PT Total Time (min): 18 min     Billable Minutes: Therapeutic Activity 9 total 18 min with OT   [id number] : 10 MIN ,1 TA   Pt sitting in bedside chair, ready to return to bed . Sit >stand from chair with MAX A x 2 persons. Chair to bed : pt ambulated 4-5 steps with RW, MOD A . Sit to supine : mod A x 2 . Pt left HOB elevated, heels offloading, all lines intact, call button in reach, nurse notified and daughter presents .       Treatment Type: Treatment  PT/PTA: PTA     PTA Visit Number: 1 03/03/2022

## 2022-03-03 NOTE — NURSING
Pt lying with breathing treatment on, PCT changing pt, noted scrotum and rectum redden, applied barrier cream. Foam dressing noted to left back, abrasions noted. Will continue to monitor pt.

## 2022-03-03 NOTE — PT/OT/SLP PROGRESS
Occupational Therapy   Treatment    Name: Reese Bell  MRN: [id number]  Admitting Diagnosis:  SBO (small bowel obstruction)       Recommendations:     Discharge Recommendations: home health OT  Discharge Equipment Recommendations:  bedside commode  Barriers to discharge:   (Increased caregiver assist)    Assessment:     Reese Bell is a 89 y.o. male with a medical diagnosis of SBO (small bowel obstruction).  3 visits this date. Daughter Mikala present and supportive all visits. Performance deficits affecting function are weakness, gait instability, decreased upper extremity function, decreased lower extremity function, impaired balance, impaired endurance, decreased safety awareness, pain, impaired cognition, impaired self care skills, impaired functional mobilty, impaired skin, impaired cardiopulmonary response to activity, decreased ROM, impaired joint extensibility.     Rehab Prognosis:  Fair; patient would benefit from acute skilled OT services to address these deficits and reach maximum level of function.       Plan:     Patient to be seen 3 x/week, 5 x/week to address the above listed problems via self-care/home management, therapeutic activities, therapeutic exercises  · Plan of Care Expires: 04/02/22  · Plan of Care Reviewed with: patient, daughter    Subjective     Pain/Comfort:  · Pain Rating 1: 8/10  · Location - Orientation 1:  (zoya anal)  · Pain Addressed 1: Pre-medicate for activity, Reposition, Distraction, Cessation of Activity, Nurse notified (multiple visits this date for VALENTIN roger and RN assist in skin care.)  · Pain Rating Post-Intervention 1:  (no pain at rest.)    Objective:     Communicated with: RUFINA Barboza prior to session.  Patient found right sidelying with bed alarm, telemetry, pulse ox (continuous), oxygen, peripheral IV upon OT entry to room.    General Precautions: Standard, aspiration, fall, hearing impaired, respiratory   Orthopedic Precautions:N/A   Braces:  N/A  Respiratory Status: Nasal cannula, flow 2 L/min, frequent monitoring O2 sats and HR 2* free floating anxiety, oral inhalation tendencies, and breath holding during UB engagement with r/w.      Occupational Performance:     Bed Mobility:    Patient completed Rolling/Turning to Left with  stand by assistance  Patient completed Scooting/Bridging with stand by assistance  Patient completed Supine to Sit with stand by assistance   Later visit return to bed OT/PT: Sit >stand from chair with MAX A x 2 persons. Chair to bed: Patient amb, r/w assist required for >/=4  Steps and mod a.  Sit to supine : mod A x 2 < max a .     Functional Mobility/Transfers:  Sit >stand from recliner chair with MAX A x 2  · Functional Mobility: Poor (+) task tolerance as sessions progressed.     Activities of Daily Living:  · Feeding:  supervision set up  · Grooming: minimum assistance hair   · Upper Body Dressing: maximal assistance gown and ties  · Lower Body Dressing: total assistance    · Toileting: maximal assistance and of 3 persons for standing hygiene at [facility] 6 Click ADL: 11    Treatment & Education:  *Daughter Mikala provided with education re: role of OT, and OT Treatment rationales and current POC. Mikala verbalized understanding and is VERY supportive of Patient and acute level POC.   *Patient agreeable to therapy sessions. Lights on / shade open during interventions.   SC:   *Basic self-care tasks and rudimentary functional mobility undertaken -- assist levels noted above.  TE:   *Re-education provided to Patient regarding the importance of early/consistent mobility to maximize recovery in conjunction w/ edu related to importance of performing daily UB/LB AROM exercises.  *Education ongoing for deep relaxed/restorative breathing tech as needed for non Rx pain management/MM relaxation, and to support internal self reguation. Fair (+) return demonstration, f/u recommended.     SESSION'S END:  *Questions/concerns within  OT scope addressed.  Patient left HOB elevated 70 degrees, BUE resting neutral pillow support, cervical support and B heels floating provided,  with all lines intact, call button in hand, RN notified and Daughter  presentEducation:      GOALS:   Multidisciplinary Problems     Occupational Therapy Goals        Problem: Occupational Therapy Goal    Goal Priority Disciplines Outcome Interventions   Occupational Therapy Goal     OT, PT/OT Ongoing, Progressing    Description: Goals to be met by: 4/2/2022      Patient will increase functional independence with ADLs by performing:    Feeding with Modified [place].  UE Dressing with Stand-by Assistance.  LE Dressing with Moderate Assistance.  Grooming while seated with Set-up Assistance.  Toileting from bedside commode with Stand-by Assistance for hygiene and clothing management.   Toilet transfer to bedside commode with Contact Guard Assistance.                     Time Tracking:     OT Date of Treatment: 03/03/22  OT Start Time: 1020  OT Stop Time: 1046  OT Total Time (min): 26 min     OT Start Time: 1105     OT Stop Time: 1123  OT Total Time (min): 9 OF 18 min CO TREAT  Co-tx performed for this visit due to patient need for two skilled therapists to ensure patient and staff safety and to accommodate for patient activity tolerance.    Pt continues to benefit from a collaborative therapeutic program to improve quality of life and sustain focus on impairment resolution.  Patient progressing steadily towards goals       Billable Minutes:Self Care/Home Management 26  Therapeutic Exercise 9  3RD visit, OT assisting PT with Patient return to bed, max Patient fatigue 12:58-1:06 No charge input.                 3/3/2022

## 2022-03-03 NOTE — PLAN OF CARE
03/03/22 0954   Medicare Message   Important Message from Medicare regarding Discharge Appeal Rights Given to patient/caregiver;Explained to patient/caregiver;Other (comments)  (Explained IMM to anita's daughter. Daughter expressed understanding. Copy emailed to [email or web address])   Date IMM was signed 03/03/22   Time IMM was signed 0900

## 2022-03-03 NOTE — PLAN OF CARE
Problem: Occupational Therapy Goal  Goal: Occupational Therapy Goal  Description: Goals to be met by: 4/2/2022      Patient will increase functional independence with ADLs by performing:    Feeding with Modified [place].  UE Dressing with Stand-by Assistance.  LE Dressing with Moderate Assistance.  Grooming while seated with Set-up Assistance.  Toileting from bedside commode with Stand-by Assistance for hygiene and clothing management.   Toilet transfer to bedside commode with Contact Guard Assistance.    Outcome: Ongoing, Progressing

## 2022-03-03 NOTE — SUBJECTIVE & OBJECTIVE
Interval History  No new issues. Doing well. Less 02 requirements     Review of Systems   Constitutional:  Negative for chills and fever.   HENT:  Negative for nosebleeds (resolved) and tinnitus.    Eyes:  Negative for visual disturbance.   Respiratory:  Negative for chest tightness, shortness of breath (improving) and wheezing.    Cardiovascular:  Negative for chest pain, palpitations and leg swelling.   Gastrointestinal:  Negative for abdominal distention (improving per patient and family), abdominal pain, blood in stool (no obvious blood in stool but says its dark. improving), constipation, nausea and vomiting.   Genitourinary:  Negative for dysuria, flank pain and hematuria.   Musculoskeletal:  Negative for joint swelling.   Skin:  Negative for rash and wound.   Neurological:  Negative for dizziness, syncope, weakness and headaches.   Psychiatric/Behavioral:  Negative for confusion and hallucinations.    Objective:     Vital Signs (Most Recent):  Temp: 97.8 °F (36.6 °C) (03/03/22 0744)  Pulse: 78 (03/03/22 0750)  Resp: 19 (03/03/22 0750)  BP: 122/71 (03/03/22 0744)  SpO2: 97 % (03/03/22 0750) Vital Signs (24h Range):  Temp:  [97.7 °F (36.5 °C)-98.7 °F (37.1 °C)] 97.8 °F (36.6 °C)  Pulse:  [75-89] 78  Resp:  [18-20] 19  SpO2:  [94 %-99 %] 97 %  BP: (107-147)/(61-87) 122/71     Weight: 104.3 kg (229 lb 15 oz)  Body mass index is 39.47 kg/m².    Intake/Output Summary (Last 24 hours) at 3/3/2022 0815  Last data filed at 3/2/2022 1730  Gross per 24 hour   Intake 240 ml   Output --   Net 240 ml      Physical Exam  Vitals and nursing note reviewed.   Constitutional:       General: He is not in acute distress.     Appearance: He is obese. He is not ill-appearing, toxic-appearing or diaphoretic.      Comments: Appears clinically better today. No distress. No conversational dyspnea    HENT:      Head: Normocephalic and atraumatic.      Ears:      Comments: Slightly hard of hearing  Neck:      Thyroid: No thyromegaly.    Cardiovascular:      Rate and Rhythm: Normal rate and regular rhythm.      Heart sounds: No murmur heard.     Comments: AICD chest wall, skin overlying it appears bruised.   Pulmonary:      Effort: Pulmonary effort is normal. No respiratory distress.      Breath sounds: Decreased breath sounds present. Wheezes: expiratory wheezesz in all lung fields.     Comments: Breathing improved today. Patient currently on 2L NC with sats at 92-94%. Lungs with diminished breath sounds throughout, but left more than right. No conversational dyspnea today  Abdominal:      Palpations: Abdomen is soft.      Comments: Bowel sounds present. Soft, non-tender no rebound   Musculoskeletal:         General: No deformity.      Right lower leg: No edema.      Left lower leg: No edema.   Skin:     General: Skin is warm and dry.   Neurological:      Mental Status: He is alert and oriented to person, place, and time.      Sensory: No sensory deficit.   Psychiatric:         Mood and Affect: Mood normal.         Behavior: Behavior normal.         Thought Content: Thought content normal.         Cognition and Memory: Cognition and memory normal.      Comments: Very sharp memory       Significant Labs: All pertinent labs within the past 24 hours have been reviewed.  BMP:   Recent Labs   Lab 03/02/22  1459 03/03/22  0424   * 116*    143   K 4.9 3.7    105   CO2 24 28   BUN 48* 45*   CREATININE 1.7* 1.7*   CALCIUM 8.0* 8.2*   MG 2.5  --      CBC: No results for input(s): WBC, HGB, HCT, PLT in the last 48 hours.    Significant Imaging:

## 2022-03-03 NOTE — PROGRESS NOTES
[facility] Medicine  Progress Note    Patient Name: Reese Bell  MRN: [id number]  Patient Class: IP- Inpatient   Admission Date: 2/24/2022  Length of Stay: 7 days  Attending Physician: Stef Pichardo MD  Primary Care Provider: Oracio Johnson MD        Subjective:     Principal Problem:SBO (small bowel obstruction)        HPI:  Reese Bell 89 y.o. male with for hypertension, hyperlipidemia, DJD, CAD post cardiac stenting, FRIDA- CPAP, thrombocytopenia, history of ventricular fibrillation s/p ICD and recent defibrillator battery changed on Monday by Dr. De La Cruz in [place] presents to the hospital with a chief complaint of abdominal pain worst at the bottom of his abdomen without radiation.  He reports yesterday he developed sudden onset diffuse abdominal pain described as a pressure with associated nausea and vomiting.  Presented to an outside hospital where he was admitted.  He denies any worsening or alleviating factors for pain.  He now states his abdominal pain has resolved he has had no further nausea vomiting since arrival from outside hospital.  The symptoms never occurred before.  He had an appendectomy and a child but denies other abdominal surgeries.  He denies any current complaints.    In the emergency room and seen and, creatinine 1.8, CT abdomen with high-grade partial versus complete small bowel obstruction, troponin negative, BNP negative, COVID negative, NG tube placement attempted but unsuccessful chest x-ray showed placement to right lower lobe.  NG tube was removed.  Seen by surgery who recommended transfer for higher level care.      Overview/Hospital Course:  89-year-old white male transferred to [facility] from [facility] to be admitted on 02/24/2022 for small bowel obstruction and needed cardiac and surgery consultants. Patient with extensive past medical history (HTN, hyperlipidemia, DJD, CAD post cardiac stenting, FRIDA- CPAP, thrombocytopenia,  history of ventricular fibrillation s/p ICD and recent defibrillator battery changed on Monday by Dr. De La Cruz in [place] ). Of note,  patient is nonambulatory, W/C bound at baseline. Lives at home with his daughter M-F, and siblings alternate watching him on the weekend. Plenty of family support    CT abdomen with high-grade partial versus early complete small bowel obstruction.  The stomach is significantly distended. CXR w/ opacification at the left lung base probably a combination of atelectasis and pleural effusion.  Of note, patient failed multiple attempts of NGT placement at [place]. CXR from [place] showed enteric tube appearing to terminate in the region of the right lower lobe, which later was removed. Cardiology and surgery consulted. Holding off on NGT per surgery at this time, given patient without nausea and vomiting. Cardiology recommend no further ischemic workup needed at this time. Patient has had loose, very dark bowel movement since admission.  Suspect dark stools due to recent NGT trauma and patient aspirating on the blood. Hemoglobin down trended but then stabilized. GI consulted for further evaluation and recommends monitoring and empiric treatment with PPI. Patient continues on supplemental O2, will wean as tolerated. CT chest WO showed likely atelectasis. Patient tolerating home CPAP. Patient with flatus and bowel movements. Advanced diet as tolerated. Continue abx for possible aspiration pneumonia. PT/OT were consulted on 3/1/22 and recommended H/H with commode.        Interval History  No new issues. Doing well. Less 02 requirements     Review of Systems   Constitutional:  Negative for chills and fever.   HENT:  Negative for nosebleeds (resolved) and tinnitus.    Eyes:  Negative for visual disturbance.   Respiratory:  Negative for chest tightness, shortness of breath (improving) and wheezing.    Cardiovascular:  Negative for chest pain, palpitations and leg swelling.   Gastrointestinal:   Negative for abdominal distention (improving per patient and family), abdominal pain, blood in stool (no obvious blood in stool but says its dark. improving), constipation, nausea and vomiting.   Genitourinary:  Negative for dysuria, flank pain and hematuria.   Musculoskeletal:  Negative for joint swelling.   Skin:  Negative for rash and wound.   Neurological:  Negative for dizziness, syncope, weakness and headaches.   Psychiatric/Behavioral:  Negative for confusion and hallucinations.    Objective:     Vital Signs (Most Recent):  Temp: 97.8 °F (36.6 °C) (03/03/22 0744)  Pulse: 78 (03/03/22 0750)  Resp: 19 (03/03/22 0750)  BP: 122/71 (03/03/22 0744)  SpO2: 97 % (03/03/22 0750) Vital Signs (24h Range):  Temp:  [97.7 °F (36.5 °C)-98.7 °F (37.1 °C)] 97.8 °F (36.6 °C)  Pulse:  [75-89] 78  Resp:  [18-20] 19  SpO2:  [94 %-99 %] 97 %  BP: (107-147)/(61-87) 122/71     Weight: 104.3 kg (229 lb 15 oz)  Body mass index is 39.47 kg/m².    Intake/Output Summary (Last 24 hours) at 3/3/2022 0854  Last data filed at 3/2/2022 1730  Gross per 24 hour   Intake 240 ml   Output --   Net 240 ml      Physical Exam  Vitals and nursing note reviewed.   Constitutional:       General: He is not in acute distress.     Appearance: He is obese. He is not ill-appearing, toxic-appearing or diaphoretic.      Comments: Appears clinically better today. No distress. No conversational dyspnea    HENT:      Head: Normocephalic and atraumatic.      Ears:      Comments: Slightly hard of hearing  Neck:      Thyroid: No thyromegaly.   Cardiovascular:      Rate and Rhythm: Normal rate and regular rhythm.      Heart sounds: No murmur heard.     Comments: AICD chest wall, skin overlying it appears bruised.   Pulmonary:      Effort: Pulmonary effort is normal. No respiratory distress.      Breath sounds: Decreased breath sounds present. Wheezes: expiratory wheezesz in all lung fields.     Comments: Breathing improved today. Patient currently on 2L NC with sats  "at 92-94%. Lungs with diminished breath sounds throughout, but left more than right. No conversational dyspnea today  Abdominal:      Palpations: Abdomen is soft.      Comments: Bowel sounds present. Soft, non-tender no rebound   Musculoskeletal:         General: No deformity.      Right lower leg: No edema.      Left lower leg: No edema.   Skin:     General: Skin is warm and dry.   Neurological:      Mental Status: He is alert and oriented to person, place, and time.      Sensory: No sensory deficit.   Psychiatric:         Mood and Affect: Mood normal.         Behavior: Behavior normal.         Thought Content: Thought content normal.         Cognition and Memory: Cognition and memory normal.      Comments: Very sharp memory       Significant Labs: All pertinent labs within the past 24 hours have been reviewed.  BMP:   Recent Labs   Lab 03/02/22  1459 03/03/22  0424   * 116*    143   K 4.9 3.7    105   CO2 24 28   BUN 48* 45*   CREATININE 1.7* 1.7*   CALCIUM 8.0* 8.2*   MG 2.5  --      CBC: No results for input(s): WBC, HGB, HCT, PLT in the last 48 hours.    Significant Imaging:       Assessment/Plan:      * SBO (small bowel obstruction)  CT on 2/24 [facility] with "High-grade partial versus early complete small bowel obstruction.  The stomach is significantly distended.  Transition zone somewhere in the mid abdomen." evaluated by surgery there and recommended for transfer for higher level of care. NG tube unable to be placed at [place].    -Surgery consulted: hold on further NG tube placement. Advance diet as tolerated. No plans for sx  -GI consulted: empiric tx with IV PPI and consider NGT placement if any vomiting.  -Pt now with flatus and bowel movements  -XR abdomen (2/27) w/air distention, no air fluid level  -Pt tolerating regular/cardiac diet    Aspiration pneumonia  -He had a mild leukocytosis on admission  -Recent traumatic NGT at [place] (CXR showed NGT in the region of the " RLL)  -Presented with epistaxis and coughing up blood  -Repeat CXR w/mild superimposed right perihilar and infrahilar infiltrate, with some mild atelectasis at the right base as well.  There is continued opacity at the left lung base  -Started on Zosyn for possible aspiration pna from the blood  -Day 4 of Zosyn    Melena  -Patient with melena   -Suspect 2/2 to recent traumatic NGT placement given pt had epistaxis and coughing up blood prior to arrival  -Monitor H/H: stable at this time  -GI consulted: empiric treatment with IV PPI while inpatient. protonix 40 BID on discharge for 6-8 weeks.    Advanced care planning/counseling discussion  Discussed code status with son at bedside and patient.  Son Jian confirmed patient is DNR. He has brought his living will to the hospital.   Greater than 16 minutes spent discussing code status.   DNR order placed in chart    FRIDA (obstructive sleep apnea)  Continue home CPAP if able    Coronary artery disease involving native coronary artery of native heart without angina pectoris  Denies any chest pain.   Will hold aspirin in setting of melena  Continue home coreg/zetia   Monitor on telemetry  Cardiology consulted for Pre-Op evaluation should surgery be required for SBO    Permanent atrial fibrillation  Continue home coreg, now tolerating PO intake  Currently rate controlled.   Not on anticoagulation outpatient    CHF (congestive heart failure), NYHA class II, chronic, systolic  Echo in 2019 with EF of 60% and normal ventricular diastolic function   On laisx 20 BID at home.   Will hold lasix at this time  CT chest with atelectasis.  Continue supplemental O2, wean as tolerated.    Echo: EF 65%, normal systolic and diastolic function    Continue home entresto  Daily weights  strict intake and output  telemetry    Essential hypertension  Fair controlled   Continue home coreg and entresto    Hyperlipidemia  Continue home zetia   Pt takes Crestor 10mg MWF    Stage 3 chronic kidney  disease  Baseline Cr of 1.5-1.9.  Cr at baseline  Renal dose medications   avoid nephrotoxic drugs   Caution with diuresis  monitor intake and output    Dementia in Alzheimer's disease  Stable, at baseline mental status per family  Delirium precautions  Holding home namenda at this time    Benign prostatic hyperplasia  Continue home flomax      Debility- H/H on discharge with commode     Obesity Body mass index is 39.47 kg/m².        VTE Risk Mitigation (From admission, onward)         Ordered     Place CHRISTOPHER hose  Until discontinued         02/24/22 1712     IP VTE HIGH RISK PATIENT  Once         02/24/22 1712     Place sequential compression device  Until discontinued         02/24/22 1712                Discharge Planning   LOUISE:      Code Status: DNR   Is the patient medically ready for discharge?:     Reason for patient still in hospital (select all that apply): Patient unstable  Discharge Plan A: Home Health   Discharge Delays: (!) Post-Acute Set-up    Lasix. PT/OT. Home tomorrow           Stef Machado MD  Department of Hospital Medicine   [facility]

## 2022-03-03 NOTE — PLAN OF CARE
Pt free from falls, injury or any further trauma throughout shift. Pt AAO x 4. Currently on 2L NC, O2 sat 97%. Family at bedside. Unsuccessful attempts to place BiPAP/CPAP last night. Pt is bladder and bowel incontinence. Continued medications as ordered. Complaints of pain, controlled with medications. Pt in no distress. Bed at lowest position, bed alarm on, call light in reach, instruct pt to call for assistance. Will cont to monitor.      Problem: Adult Inpatient Plan of Care  Goal: Plan of Care Review  Outcome: Ongoing, Progressing     Problem: Skin Injury Risk Increased  Goal: Skin Health and Integrity  Outcome: Ongoing, Progressing     Problem: Fall Injury Risk  Goal: Absence of Fall and Fall-Related Injury  Outcome: Ongoing, Progressing     Problem: Bariatric Environmental Safety  Goal: Safety Maintained with Care  Outcome: Ongoing, Progressing     Problem: Gas Exchange Impaired  Goal: Optimal Gas Exchange  Outcome: Ongoing, Progressing

## 2022-03-03 NOTE — PROGRESS NOTES
[facility] [place] [facility]  Adult Nutrition  Consult Note    SUMMARY     Recommendations    1) Continue Cardiac Diet  2) Add IDDSI 5 restrictions - Per pt no teeth and preference for softer foods  3) Add Novasource Renal ONS to assist in meeting needs  4) Renals, Blood Glucose      Goals:   1) Patient to meet >/= 50% EEN/EPN via PO/ONS intake  2) Monitored labs to trend toward target ranges    Nutrition Goal Status: progressing towards goal  Communication of RD Recs:  (POC)    Assessment and Plan  Nutrition Problem  Inadequate Oral Intake    Related to (etiology):   SBO, preference for softer foods    Signs and Symptoms (as evidenced by):   PO intake 25 - 50% EEN     Interventions/Recommendations (treatment strategy):  Mineral Modified Diet  Commercial Beverage  Collaboration of care with other providers    Nutrition Diagnosis Status:   Continues      Malnutrition Assessment  Per medical Chart pt weight has remained stable.  Clinically Class III Morbid Obesity.    Reason for Assessment    Reason For Assessment: RD Follow Up  Diagnosis:  (SBO)  Relevant Medical History: SBO, CHF, Permanent Afib, Dementia, CKD3, CAD, GERD, STEMI, HTN, HLD    General Information Comments:    3/3 - No new developments - pt tolerating diet advancement, PO increasing - ~ 25 - 50%    2/28 - Spoke with pt family member - reporting feelinf much better - starting to eat more - not complaining of N/V - having BM. Will continue to monitor intake. Elevated glucose - Renal function okay - monitoring - Add restrictions if needed.    2/25 - Pt presented to [facility] with c/o N/V after eating, recent hx of constipation. CT abdomen showed pt with high-grade partial versus complete small bowel obstruction. Sx consulted and declared pt to be of too high of risk and recommended transfer - Transfered to [facility]. Multiple attempts to place NGT for decompression - all failed (epistaxis, traumatic after placement of NG tube) - Likely needs to be placed under  "anesthesia - high risk given pt complex cardiac hx (hypertension, hyperlipidemia, DJD, CAD post cardiac stenting, FRIDA- CPAP, thrombocytopenia, history of ventricular fibrillation s/p ICD and recent defibrillator battery changed). Pt has since transfer had several BM, one small hard, one large black partially formed and partially loose stool  Will continue to follow.     Nutrition Discharge Planning: Pending Medical Course    Nutrition Risk Screen    Nutrition Risk Screen: no indicators present    Nutrition/Diet History    Spiritual, Cultural Beliefs, Yarsani Practices, Values that Affect Care: no  Food Allergies: NKFA  Factors Affecting Nutritional Intake: None identified at this time    Anthropometrics    Temp: 98.3 °F (36.8 °C)  Height Method: Stated  Height: 5' 4" (162.6 cm)  Height (inches): 64 in  Weight Method: Bed Scale  Weight: 104.3 kg (229 lb 15 oz)  Weight (lb): 229.94 lb  Ideal Body Weight (IBW), Male: 130 lb  % Ideal Body Weight, Male (lb): 178.92 %  BMI (Calculated): 39.4  BMI Grade: 35 - 39.9 - obesity - grade II  Weight Loss Since Admission: 3 lb       Lab/Procedures/Meds    Pertinent Labs Reviewed: reviewed  CBC:  No results for input(s): WBC, HGB, HCT, PLT in the last 24 hours.  CMP:  Recent Labs   Lab 03/03/22  0424   CALCIUM 8.2*      K 3.7   CO2 28      BUN 45*   CREATININE 1.7*     BMP  Lab Results   Component Value Date     03/03/2022    K 3.7 03/03/2022     03/03/2022    CO2 28 03/03/2022    BUN 45 (H) 03/03/2022    CREATININE 1.7 (H) 03/03/2022    CALCIUM 8.2 (L) 03/03/2022    ANIONGAP 10 03/03/2022    ESTGFRAFRICA 40 (A) 03/03/2022    EGFRNONAA 35 (A) 03/03/2022     Glucose   Date Value Ref Range Status   03/03/2022 116 (H) 70 - 110 mg/dL Final   01/29/2017 118 (H) 74 - 106 MG/DL Final         Pertinent Medications Reviewed: reviewed  Scheduled Meds:   albuterol-ipratropium  3 mL Nebulization Q4H    carvediloL  12.5 mg Oral BID    ezetimibe  10 mg Oral QHS    " furosemide (LASIX) injection  60 mg Intravenous Q12H    pantoprazole  40 mg Intravenous BID    piperacillin-tazobactam (ZOSYN) IVPB  4.5 g Intravenous Q8H    sacubitriL-valsartan  1 tablet Oral BID    tamsulosin  0.4 mg Oral QHS     Continuous Infusions:    PRN Meds:.dextrose 10%, dextrose 10%, glucagon (human recombinant), glucose, glucose, naloxone, sodium chloride 0.9%    Estimated/Assessed Needs    Weight Used For Calorie Calculations: 106.5 kg (234 lb 12.6 oz)  Energy Calorie Requirements (kcal): 1636  Energy Need Method: [place]-St Jeor (No AF per BMI >/= 40)  Protein Requirements: 88 - 118g (1.5- 2.0g/kg IBW)  Weight Used For Protein Calculations: 59 kg (130 lb) (IBW)     Estimated Fluid Requirement Method: RDA Method (or per MD)  RDA Method (mL): 1636  CHO Requirement: 205g      Nutrition Prescription Ordered    Current Diet Order: Cardiac Diet    Evaluation of Received Nutrient/Fluid Intake    Energy Calories Required: not meeting needs  Protein Required: not meeting needs  Fluid Required: not meeting needs  Total Fluid Intake (mL/kg): .  Comments: LBM 3/2  % Intake of Estimated Energy Needs: 25 - 50 %  % Meal Intake: 25 - 50 %      Intake/Output Summary (Last 24 hours) at 3/3/2022 5184  Last data filed at 3/3/2022 1230  Gross per 24 hour   Intake 240 ml   Output --   Net 240 ml       Nutrition Risk    Level of Risk/Frequency of Follow-up:  (1-2x/week)       Monitor and Evaluation    Food and Nutrient Intake: energy intake, food and beverage intake, enteral nutrition intake  Food and Nutrient Adminstration: diet order, enteral and parenteral nutrition administration  Physical Activity and Function: nutrition-related ADLs and IADLs  Anthropometric Measurements: weight change, weight, body mass index  Biochemical Data, Medical Tests and Procedures: electrolyte and renal panel, gastrointestinal profile, glucose/endocrine profile, inflammatory profile, lipid profile  Nutrition-Focused Physical Findings:  overall appearance, extremities, muscles and bones, head and eyes, skin       Nutrition Follow-Up    RD Follow-up?: Yes

## 2022-03-04 LAB
ALLENS TEST: ABNORMAL
BNP SERPL-MCNC: 135 PG/ML (ref 0–99)
BNP SERPL-MCNC: 135 PG/ML (ref 0–99)
DELSYS: ABNORMAL
FIO2: 28
FLOW: 2
HCO3 UR-SCNC: 29.2 MMOL/L (ref 24–28)
MODE: ABNORMAL
PCO2 BLDA: 52.4 MMHG (ref 35–45)
PH SMN: 7.36 [PH] (ref 7.35–7.45)
PO2 BLDA: 80 MMHG (ref 80–100)
POC BE: 3 MMOL/L
POC SATURATED O2: 95 % (ref 95–100)
POC TCO2: 31 MMOL/L (ref 23–27)
POCT GLUCOSE: 132 MG/DL (ref 70–110)
POCT GLUCOSE: 141 MG/DL (ref 70–110)
POCT GLUCOSE: 143 MG/DL (ref 70–110)
POCT GLUCOSE: 146 MG/DL (ref 70–110)
POCT GLUCOSE: 148 MG/DL (ref 70–110)
POCT GLUCOSE: 168 MG/DL (ref 70–110)
SAMPLE: ABNORMAL
SITE: ABNORMAL
SP02: 98

## 2022-03-04 PROCEDURE — 94760 N-INVAS EAR/PLS OXIMETRY 1: CPT

## 2022-03-04 PROCEDURE — 83880 ASSAY OF NATRIURETIC PEPTIDE: CPT | Performed by: INTERNAL MEDICINE

## 2022-03-04 PROCEDURE — 25000242 PHARM REV CODE 250 ALT 637 W/ HCPCS: Performed by: STUDENT IN AN ORGANIZED HEALTH CARE EDUCATION/TRAINING PROGRAM

## 2022-03-04 PROCEDURE — 36415 COLL VENOUS BLD VENIPUNCTURE: CPT | Performed by: INTERNAL MEDICINE

## 2022-03-04 PROCEDURE — 99900035 HC TECH TIME PER 15 MIN (STAT)

## 2022-03-04 PROCEDURE — 63600175 PHARM REV CODE 636 W HCPCS: Performed by: STUDENT IN AN ORGANIZED HEALTH CARE EDUCATION/TRAINING PROGRAM

## 2022-03-04 PROCEDURE — 36600 WITHDRAWAL OF ARTERIAL BLOOD: CPT

## 2022-03-04 PROCEDURE — 82803 BLOOD GASES ANY COMBINATION: CPT

## 2022-03-04 PROCEDURE — 25000003 PHARM REV CODE 250: Performed by: STUDENT IN AN ORGANIZED HEALTH CARE EDUCATION/TRAINING PROGRAM

## 2022-03-04 PROCEDURE — 63600175 PHARM REV CODE 636 W HCPCS: Performed by: INTERNAL MEDICINE

## 2022-03-04 PROCEDURE — 94640 AIRWAY INHALATION TREATMENT: CPT

## 2022-03-04 PROCEDURE — C9113 INJ PANTOPRAZOLE SODIUM, VIA: HCPCS | Performed by: STUDENT IN AN ORGANIZED HEALTH CARE EDUCATION/TRAINING PROGRAM

## 2022-03-04 PROCEDURE — 25000003 PHARM REV CODE 250: Performed by: PHYSICIAN ASSISTANT

## 2022-03-04 PROCEDURE — 11000001 HC ACUTE MED/SURG PRIVATE ROOM

## 2022-03-04 PROCEDURE — 63600175 PHARM REV CODE 636 W HCPCS: Performed by: PHYSICIAN ASSISTANT

## 2022-03-04 RX ORDER — FUROSEMIDE 10 MG/ML
80 INJECTION INTRAMUSCULAR; INTRAVENOUS
Status: DISCONTINUED | OUTPATIENT
Start: 2022-03-04 | End: 2022-03-04

## 2022-03-04 RX ORDER — FUROSEMIDE 10 MG/ML
20 INJECTION INTRAMUSCULAR; INTRAVENOUS
Status: DISCONTINUED | OUTPATIENT
Start: 2022-03-04 | End: 2022-03-06 | Stop reason: HOSPADM

## 2022-03-04 RX ADMIN — PANTOPRAZOLE SODIUM 40 MG: 40 INJECTION, POWDER, FOR SOLUTION INTRAVENOUS at 10:03

## 2022-03-04 RX ADMIN — CARVEDILOL 12.5 MG: 12.5 TABLET, FILM COATED ORAL at 09:03

## 2022-03-04 RX ADMIN — PIPERACILLIN AND TAZOBACTAM 4.5 G: 4; .5 INJECTION, POWDER, LYOPHILIZED, FOR SOLUTION INTRAVENOUS; PARENTERAL at 10:03

## 2022-03-04 RX ADMIN — IPRATROPIUM BROMIDE AND ALBUTEROL SULFATE 3 ML: 2.5; .5 SOLUTION RESPIRATORY (INHALATION) at 01:03

## 2022-03-04 RX ADMIN — PIPERACILLIN AND TAZOBACTAM 4.5 G: 4; .5 INJECTION, POWDER, LYOPHILIZED, FOR SOLUTION INTRAVENOUS; PARENTERAL at 05:03

## 2022-03-04 RX ADMIN — TAMSULOSIN HYDROCHLORIDE 0.4 MG: 0.4 CAPSULE ORAL at 10:03

## 2022-03-04 RX ADMIN — PANTOPRAZOLE SODIUM 40 MG: 40 INJECTION, POWDER, FOR SOLUTION INTRAVENOUS at 09:03

## 2022-03-04 RX ADMIN — SACUBITRIL AND VALSARTAN 1 TABLET: 24; 26 TABLET, FILM COATED ORAL at 09:03

## 2022-03-04 RX ADMIN — PIPERACILLIN AND TAZOBACTAM 4.5 G: 4; .5 INJECTION, POWDER, LYOPHILIZED, FOR SOLUTION INTRAVENOUS; PARENTERAL at 01:03

## 2022-03-04 RX ADMIN — IPRATROPIUM BROMIDE AND ALBUTEROL SULFATE 3 ML: 2.5; .5 SOLUTION RESPIRATORY (INHALATION) at 07:03

## 2022-03-04 RX ADMIN — IPRATROPIUM BROMIDE AND ALBUTEROL SULFATE 3 ML: 2.5; .5 SOLUTION RESPIRATORY (INHALATION) at 03:03

## 2022-03-04 RX ADMIN — FUROSEMIDE 60 MG: 10 INJECTION, SOLUTION INTRAMUSCULAR; INTRAVENOUS at 11:03

## 2022-03-04 RX ADMIN — EZETIMIBE 10 MG: 10 TABLET ORAL at 10:03

## 2022-03-04 RX ADMIN — IPRATROPIUM BROMIDE AND ALBUTEROL SULFATE 3 ML: 2.5; .5 SOLUTION RESPIRATORY (INHALATION) at 08:03

## 2022-03-04 RX ADMIN — IPRATROPIUM BROMIDE AND ALBUTEROL SULFATE 3 ML: 2.5; .5 SOLUTION RESPIRATORY (INHALATION) at 04:03

## 2022-03-04 NOTE — PT/OT/SLP PROGRESS
Physical Therapy      Patient Name:  Reese Bell   MRN:  [id number]    Patient not seen today x 2 attempts , pt receiving nursing care in preperation to D/C home ,  secondary to Other (pt is somnolent ) . Will follow-up as able later hour/day .

## 2022-03-04 NOTE — NURSING
Home Oxygen Evaluation    Date Performed: 3/4/2022    1) Patient's Home O2 Sat on room air, while at rest: 86%      2) Patient's O2 Sat on room air while exercisin%            3) Patient's O2 Sat while exercising on O2: 93% at 1 LPM

## 2022-03-04 NOTE — NURSING
Manual BP 82/54, notified MD Townsend and Rapid nurse. Rapid Nurse at bedside. Repositioned pt. Current /66. Family at bedside.

## 2022-03-04 NOTE — PLAN OF CARE
[facility]      HOME HEALTH ORDERS  FACE TO FACE ENCOUNTER    Patient Name: Reese Bell  YOB: 1931    PCP: Oracio Johnson MD   PCP Address: [address]  / [place] [id number]  PCP Phone Number: [phone]  PCP Fax: [phone]    Encounter Date: 3/6/22  Admit to Home Health    Diagnoses: Weakness     Active Hospital Problems    Diagnosis  POA    Aspiration pneumonia [J69.0]  Yes    Melena [K92.1]  No    Advanced care planning/counseling discussion [Z71.89]  Not Applicable    FRIDA (obstructive sleep apnea) [G47.33]  Yes    Coronary artery disease involving native coronary artery of native heart without angina pectoris [I25.10]  Yes    CHF (congestive heart failure), NYHA class II, chronic, systolic [I50.22]  Yes    Essential hypertension [I10]  Yes    Hyperlipidemia [E78.5]  Yes    Permanent atrial fibrillation [I48.21]  Yes    Stage 3 chronic kidney disease [N18.30]  Yes    Dementia in Alzheimer's disease [G30.9, F02.80]  Yes    Benign prostatic hyperplasia [N40.0]  Yes      Resolved Hospital Problems    Diagnosis Date Resolved POA    *SBO (small bowel obstruction) [K56.609] 03/04/2022 Yes     Priority: 1 - High       Follow Up Appointments:  Future Appointments   Date Time Provider Department Center   3/10/2022  1:00 PM Oracio Johnson MD [facility]       Allergies:  Review of patient's allergies indicates:   Allergen Reactions    Flexeril [cyclobenzaprine] Other (See Comments)    Vicodin [hydrocodone-acetaminophen] Hallucinations       Medications: Review discharge medications with patient and family and provide education.    Current Facility-Administered Medications   Medication Dose Route Frequency Provider Last Rate Last Admin    albuterol-ipratropium 2.5 mg-0.5 mg/3 mL nebulizer solution 3 mL  3 mL Nebulization Q4H Patricia BARRERA Jenkins, DO   3 mL at 03/04/22 0803    carvediloL tablet 12.5 mg  12.5 mg Oral BID Patricia BARRERA Jenkins, DO   12.5 mg  at 03/03/22 1040    dextrose 10% bolus 125 mL  12.5 g Intravenous PRN [facility]-Jocelyn T. Jenkins, DO        dextrose 10% bolus 250 mL  25 g Intravenous PRN [facility]-Jocelyn T. Jenkins, DO        ezetimibe tablet 10 mg  10 mg Oral QHS Tricia-My T. Jenkins, DO   10 mg at 03/03/22 2142    furosemide injection 60 mg  60 mg Intravenous Q12H Stef Pichardo MD   60 mg at 03/03/22 1039    glucagon (human recombinant) injection 1 mg  1 mg Intramuscular PRN Chip Showers, PA-C        glucose chewable tablet 16 g  16 g Oral PRN Chip Showers, PA-C        glucose chewable tablet 24 g  24 g Oral PRN Chip Showers, PA-C        naloxone 0.4 mg/mL injection 0.02 mg  0.02 mg Intravenous PRN Chip Showers, PA-C        pantoprazole injection 40 mg  40 mg Intravenous BID Tricia-Jocelyn T. Jenkins, DO   40 mg at 03/03/22 2142    piperacillin-tazobactam 4.5 g in dextrose 5 % 100 mL IVPB (ready to mix system)  4.5 g Intravenous Q8H Chip Showers, PA-C 25 mL/hr at 03/04/22 0539 4.5 g at 03/04/22 0539    sacubitriL-valsartan 24-26 mg per tablet 1 tablet  1 tablet Oral BID Tricia-My T. Jenkins, DO   1 tablet at 03/03/22 1040    sodium chloride 0.9% flush 10 mL  10 mL Intravenous PRN Chip Showers, PA-C        tamsulosin 24 hr capsule 0.4 mg  0.4 mg Oral QHS [facility]-Jocelyn T. Jenkins, DO   0.4 mg at 03/03/22 2142     Current Discharge Medication List      CONTINUE these medications which have NOT CHANGED    Details   aspirin 81 MG Chew Take 81 mg by mouth once daily.      carvedilol (COREG) 12.5 MG tablet Take 12.5 mg by mouth 2 (two) times daily.  Refills: 3      cholecalciferol, vitamin D3, (VITAMIN D3) 50 mcg (2,000 unit) Cap Take 1 capsule by mouth once daily.      clotrimazole-betamethasone 1-0.05% (LOTRISONE) cream APPLY TO AFFECTED AREA TWICE A DAY  Qty: 45 g, Refills: 3    Comments: REFILLS NEEDED. THANK YOU      CRESTOR 5 mg tablet Take 5 mg by mouth every Mon, Wed, Fri.       ezetimibe (ZETIA) 10 mg tablet Take 10 mg by mouth every evening.       furosemide  (LASIX) 20 MG tablet Take 20 mg by mouth 2 (two) times daily. 1 tablet in the morning and 1 tablet at 3pm      ipratropium (ATROVENT) 42 mcg (0.06 %) nasal spray SMARTSI-2 Spray(s) Both Nares Twice Daily PRN      memantine (NAMENDA) 5 MG Tab TAKE 1 TABLET BY MOUTH TWICE A DAY  Qty: 180 tablet, Refills: 3    Associated Diagnoses: Dementia in Alzheimer's disease      mirtazapine (REMERON) 15 MG tablet TAKE 1 TABLET (15 MG TOTAL) BY MOUTH EVERY EVENING.  Qty: 90 tablet, Refills: 3      RANEXA 1,000 mg Tb12 Take 1,000 mg by mouth 2 (two) times daily.       sacubitril-valsartan (ENTRESTO) 24-26 mg per tablet Take 1 tablet by mouth 2 (two) times daily.      spironolactone (ALDACTONE) 25 MG tablet One po q 3-4pm for abdominal fluid Mon, Wed, Fri  Qty: 30 tablet, Refills: 5    Associated Diagnoses: Other ascites      tamsulosin (FLOMAX) 0.4 mg Cap CAN TAKE ONE AT BEDTIME FOR BLADDER & PROSTATE TO EASE URINATION  Qty: 90 capsule, Refills: 3    Associated Diagnoses: Benign nodular prostatic hyperplasia without lower urinary tract symptoms      nitroGLYCERIN (NITROSTAT) 0.4 MG SL tablet Place 0.4 mg under the tongue every 5 (five) minutes as needed for Chest pain.   Refills: 2      tobramycin-dexamethasone (TOBRADEX ST) 0.3-0.05 % DrpS 1-2 qtts to each eye tid for 7-10 days  Qty: 5 mL, Refills: 1      triamcinolone acetonide 0.1% (KENALOG) 0.1 % cream Apply topically 2 (two) times daily. for 10 days  Qty: 80 g, Refills: 5               I have seen and examined this patient within the last 30 days. My clinical findings that support the need for the home health skilled services and home bound status are the following:no   Weakness/numbness causing balance and gait disturbance due to Weakness/Debility making it taxing to leave home.     Diet:   2 gram sodium diet        Referrals/ Consults  Physical Therapy to evaluate and treat. Evaluate for home safety and equipment needs; Establish/upgrade home exercise program. Perform /  instruct on therapeutic exercises, gait training, transfer training, and Range of Motion.  Occupational Therapy to evaluate and treat. Evaluate home environment for safety and equipment needs. Perform/Instruct on transfers, ADL training, ROM, and therapeutic exercises.  Aide to provide assistance with personal care, ADLs, and vital signs.    Activities:   activity as tolerated    Nursing:   Agency to admit patient within 24 hours of hospital discharge unless specified on physician order or at patient request    SN to complete comprehensive assessment including routine vital signs. Instruct on disease process and s/s of complications to report to MD. Review/verify medication list sent home with the patient at time of discharge  and instruct patient/caregiver as needed. Frequency may be adjusted depending on start of care date.     Skilled nurse to perform up to 3 visits PRN for symptoms related to diagnosis    Notify MD if SBP > 160 or < 90; DBP > 90 or < 50; HR > 120 or < 50; Temp > 101; O2 < 88%; Other:       Ok to schedule additional visits based on staff availability and patient request on consecutive days within the home health episode.    When multiple disciplines ordered:    Start of Care occurs on Sunday - Wednesday schedule remaining discipline evaluations as ordered on separate consecutive days following the start of care.    Thursday SOC -schedule subsequent evaluations Friday and Monday the following week.     Friday - Saturday SOC - schedule subsequent discipline evaluations on consecutive days starting Monday of the following week.    For all post-discharge communication and subsequent orders please contact patient's primary care physician.   I certify that this patient is confined to his home and needs intermittent skilled nursing care, physical therapy and occupational therapy.

## 2022-03-04 NOTE — DISCHARGE SUMMARY
[facility] Medicine  Discharge Summary      Patient Name: Reese Bell  MRN: [id number]  Patient Class: IP- Inpatient  Admission Date: 2/24/2022  Hospital Length of Stay: 8 days  Discharge Date and Time:  03/04/2022 9:05 AM  Attending Physician: Stef Pichardo MD   Discharging Provider: Stef Pichardo MD  Primary Care Provider: Oracio Johnson MD      HPI:   Reese Bell 89 y.o. male with for hypertension, hyperlipidemia, DJD, CAD post cardiac stenting, FRIDA- CPAP, thrombocytopenia, history of ventricular fibrillation s/p ICD and recent defibrillator battery changed on Monday by Dr. De La Cruz in [place] presents to the hospital with a chief complaint of abdominal pain worst at the bottom of his abdomen without radiation.  He reports yesterday he developed sudden onset diffuse abdominal pain described as a pressure with associated nausea and vomiting.  Presented to an outside hospital where he was admitted.  He denies any worsening or alleviating factors for pain.  He now states his abdominal pain has resolved he has had no further nausea vomiting since arrival from outside hospital.  The symptoms never occurred before.  He had an appendectomy and a child but denies other abdominal surgeries.  He denies any current complaints.    In the emergency room and seen and, creatinine 1.8, CT abdomen with high-grade partial versus complete small bowel obstruction, troponin negative, BNP negative, COVID negative, NG tube placement attempted but unsuccessful chest x-ray showed placement to right lower lobe.  NG tube was removed.  Seen by surgery who recommended transfer for higher level care.      * No surgery found *      Hospital Course:   89-year-old white male transferred to [facility] from [facility] to be admitted on 02/24/2022 for small bowel obstruction and needed cardiac and surgery consultants. Patient with extensive past medical history (HTN, hyperlipidemia, DJD, CAD  post cardiac stenting, FRIDA- CPAP, thrombocytopenia, history of ventricular fibrillation s/p ICD and recent defibrillator battery changed on Monday by Dr. De La Cruz in [place] ). Of note,  patient is nonambulatory, W/C bound at baseline. Lives at home with his daughter M-F, and siblings alternate watching him on the weekend. Plenty of family support    CT abdomen with high-grade partial versus early complete small bowel obstruction.  The stomach is significantly distended. CXR w/ opacification at the left lung base probably a combination of atelectasis and pleural effusion.  Of note, patient failed multiple attempts of NGT placement at [place]. CXR from [place] showed enteric tube appearing to terminate in the region of the right lower lobe, which later was removed. Cardiology and surgery consulted. Holding off on NGT per surgery at this time, given patient without nausea and vomiting. Cardiology recommend no further ischemic workup needed at this time. Patient has had loose, very dark bowel movement since admission.  Suspect dark stools due to recent NGT trauma and patient aspirating on the blood. Hemoglobin down trended but then stabilized. GI consulted for further evaluation and recommends monitoring and empiric treatment with PPI. Patient continues on supplemental O2, will wean as tolerated. CT chest WO showed likely atelectasis. Patient tolerating home CPAP. Patient with flatus and bowel movements. Advanced diet as tolerated. Continue abx for possible aspiration pneumonia. PT/OT were consulted on 3/1/22 and recommended H/H with commode.  Patient was discharged to home on 3/4/22. Activity as tolerated. Diet- low Na. Follow up with PCP in one week        Goals of Care Treatment Preferences:  Code Status: DNR    Living Will: Yes              Consults:   Consults (From admission, onward)        Status Ordering Provider     Inpatient consult to Social Work  Once        Provider:  (Not yet assigned)    Ordered RHIANNON  GERMAN BABB     Inpatient consult to Gastroenterology  Once        Provider:  Gail Velasco MD    Completed HAYDEN DYER     Case Management/  Once        Provider:  (Not yet assigned)    Completed GAMA MCLAIN     Inpatient consult to Registered Dietitian/Nutritionist  Once        Provider:  (Not yet assigned)    Completed HAYDEN DYER     Inpatient consult to Cardiology  Once        Provider:  Home Mcneil MD    Completed GAMA MCLAIN     Inpatient consult to General Surgery  Once        Provider:  Compa Wiseman Jr., MD    Completed GAMA MCLAIN          No new Assessment & Plan notes have been filed under this hospital service since the last note was generated.  Service: Hospital Medicine    Final Active Diagnoses:    Diagnosis Date Noted POA    Aspiration pneumonia [J69.0] 02/27/2022 Yes    Melena [K92.1] 02/25/2022 No    Advanced care planning/counseling discussion [Z71.89] 02/24/2022 Not Applicable    FRIDA (obstructive sleep apnea) [G47.33] 07/13/2021 Yes    Coronary artery disease involving native coronary artery of native heart without angina pectoris [I25.10] 08/15/2019 Yes    CHF (congestive heart failure), NYHA class II, chronic, systolic [I50.22] 03/28/2019 Yes    Essential hypertension [I10] 03/28/2019 Yes    Hyperlipidemia [E78.5] 03/28/2019 Yes    Permanent atrial fibrillation [I48.21] 03/28/2019 Yes    Stage 3 chronic kidney disease [N18.30] 08/09/2018 Yes    Dementia in Alzheimer's disease [G30.9, F02.80] 08/17/2017 Yes    Benign prostatic hyperplasia [N40.0] 05/09/2013 Yes      Problems Resolved During this Admission:    Diagnosis Date Noted Date Resolved POA    PRINCIPAL PROBLEM:  SBO (small bowel obstruction) [K56.609]  03/04/2022 Yes       Discharged Condition: good    Disposition: Home-Health Care [facility]    Follow Up:   Follow-up Information     Oracio Johnson MD Follow up.    Specialty: Family Medicine  Contact information:  [address]  [place] [id number]  [phone]                       Patient Instructions:   No discharge procedures on file.    Significant Diagnostic Studies:     Pending Diagnostic Studies:     None         Medications:  Reconciled Home Medications:      Medication List      CONTINUE taking these medications    aspirin 81 MG Chew  Take 81 mg by mouth once daily.     carvediloL 12.5 MG tablet  Commonly known as: COREG  Take 12.5 mg by mouth 2 (two) times daily.     cholecalciferol (vitamin D3) 50 mcg (2,000 unit) Cap  Commonly known as: VITAMIN D3  Take 1 capsule by mouth once daily.     clotrimazole-betamethasone 1-0.05% cream  Commonly known as: LOTRISONE  APPLY TO AFFECTED AREA TWICE A DAY     CRESTOR 5 MG tablet  Generic drug: rosuvastatin  Take 5 mg by mouth every Mon, Wed, Fri.     ezetimibe 10 mg tablet  Commonly known as: ZETIA  Take 10 mg by mouth every evening.     furosemide 20 MG tablet  Commonly known as: LASIX  Take 20 mg by mouth 2 (two) times daily. 1 tablet in the morning and 1 tablet at 3pm     ipratropium 42 mcg (0.06 %) nasal spray  Commonly known as: ATROVENT  SMARTSI-2 Spray(s) Both Nares Twice Daily PRN     memantine 5 MG Tab  Commonly known as: NAMENDA  TAKE 1 TABLET BY MOUTH TWICE A DAY     mirtazapine 15 MG tablet  Commonly known as: REMERON  TAKE 1 TABLET (15 MG TOTAL) BY MOUTH EVERY EVENING.     nitroGLYCERIN 0.4 MG SL tablet  Commonly known as: NITROSTAT  Place 0.4 mg under the tongue every 5 (five) minutes as needed for Chest pain.     RANEXA 1,000 mg Tb12  Generic drug: ranolazine  Take 1,000 mg by mouth 2 (two) times daily.     sacubitriL-valsartan 24-26 mg per tablet  Commonly known as: ENTRESTO  Take 1 tablet by mouth 2 (two) times daily.     spironolactone 25 MG tablet  Commonly known as: ALDACTONE  One po q 3-4pm for abdominal fluid Mon, Wed, Fri     tamsulosin 0.4 mg Cap  Commonly known as: FLOMAX  CAN TAKE ONE AT BEDTIME FOR BLADDER & PROSTATE TO EASE URINATION      tobramycin-dexamethasone 0.3-0.05 % Drps  Commonly known as: TOBRADEX ST  1-2 qtts to each eye tid for 7-10 days     triamcinolone acetonide 0.1% 0.1 % cream  Commonly known as: KENALOG  Apply topically 2 (two) times daily. for 10 days            Indwelling Lines/Drains at time of discharge:   Lines/Drains/Airways     None                 Time spent on the discharge of patient:  > 35  minutes         Stef Machado MD  Department of Hospital Medicine  [facility]

## 2022-03-04 NOTE — PLAN OF CARE
Recommendations    1) Continue Cardiac Diet  2) Add IDDSI 5 restrictions - Per pt no teeth and preference for softer foods  3) Add Novasource Renal ONS to assist in meeting needs  4) Renals, Blood Glucose      Goals:   1) Patient to meet >/= 50% EEN/EPN via PO/ONS intake  2) Monitored labs to trend toward target ranges    Nutrition Goal Status: progressing towards goal  Communication of RD Recs:  (POC)

## 2022-03-04 NOTE — PLAN OF CARE
DEYA faxed referral for home health to [facility]. DEYA noted on fax that family prefers to use [facility]. DEYA also noted that if the agency could not accept, N could choose an agency.     Rosalba russell [facility] contacted  to inform that patient has been accepted by [facility].        03/04/22 0919   Post-Acute Status   Post-Acute Authorization Home Health   Home Health Status Set-up Complete/Auth obtained   Coverage PHN   Discharge Delays None known at this time   Discharge Plan   Discharge Plan A Home Health   Discharge Plan B Home with family

## 2022-03-04 NOTE — SUBJECTIVE & OBJECTIVE
Interval History:  No new issues     Review of Systems   Constitutional:  Negative for chills and fever.   HENT:  Negative for nosebleeds (resolved) and tinnitus.    Eyes:  Negative for visual disturbance.   Respiratory:  Negative for chest tightness, shortness of breath (improving) and wheezing.    Cardiovascular:  Negative for chest pain, palpitations and leg swelling.   Gastrointestinal:  Negative for abdominal distention (improving per patient and family), abdominal pain, blood in stool (no obvious blood in stool but says its dark. improving), constipation, nausea and vomiting.   Genitourinary:  Negative for dysuria, flank pain and hematuria.   Musculoskeletal:  Negative for joint swelling.   Skin:  Negative for rash and wound.   Neurological:  Negative for dizziness, syncope, weakness and headaches.   Psychiatric/Behavioral:  Negative for confusion and hallucinations.     Review of Systems   Constitutional:  Negative for chills and fever.   HENT:  Negative for nosebleeds (resolved) and tinnitus.    Eyes:  Negative for visual disturbance.   Respiratory:  Negative for chest tightness, shortness of breath (improving) and wheezing.    Cardiovascular:  Negative for chest pain, palpitations and leg swelling.   Gastrointestinal:  Negative for abdominal distention (improving per patient and family), abdominal pain, blood in stool (no obvious blood in stool but says its dark. improving), constipation, nausea and vomiting.   Genitourinary:  Negative for dysuria, flank pain and hematuria.   Musculoskeletal:  Negative for joint swelling.   Skin:  Negative for rash and wound.   Neurological:  Negative for dizziness, syncope, weakness and headaches.   Psychiatric/Behavioral:  Negative for confusion and hallucinations.    Objective:     Vital Signs (Most Recent):  Temp: 97.7 °F (36.5 °C) (03/04/22 0804)  Pulse: 82 (03/04/22 0804)  Resp: 18 (03/04/22 0804)  BP: 130/60 (03/04/22 0804)  SpO2: (!) 92 % (03/04/22 0804)   Vital  Signs (24h Range):  Temp:  [97.4 °F (36.3 °C)-98.3 °F (36.8 °C)] 97.7 °F (36.5 °C)  Pulse:  [70-88] 82  Resp:  [17-20] 18  SpO2:  [92 %-98 %] 92 %  BP: ()/(50-66) 130/60     Weight: 104.3 kg (229 lb 15 oz)  Body mass index is 39.47 kg/m².    Intake/Output Summary (Last 24 hours) at 3/4/2022 0858  Last data filed at 3/3/2022 1230  Gross per 24 hour   Intake 120 ml   Output --   Net 120 ml      Physical Exam  Vitals and nursing note reviewed.   Constitutional:       General: He is not in acute distress.     Appearance: He is obese. He is not ill-appearing, toxic-appearing or diaphoretic.      Comments: Appears clinically better today. No distress. No conversational dyspnea    HENT:      Head: Normocephalic and atraumatic.      Ears:      Comments: Slightly hard of hearing  Neck:      Thyroid: No thyromegaly.   Cardiovascular:      Rate and Rhythm: Normal rate and regular rhythm.      Heart sounds: No murmur heard.     Comments: AICD chest wall, skin overlying it appears bruised.   Pulmonary:      Effort: Pulmonary effort is normal. No respiratory distress.      Breath sounds: Decreased breath sounds present. Wheezes: expiratory wheezesz in all lung fields.     Comments: Breathing improved today. Patient currently on 2L NC with sats at 92-94%. Lungs with diminished breath sounds throughout, but left more than right. No conversational dyspnea today  Abdominal:      Palpations: Abdomen is soft.      Comments: Bowel sounds present. Soft, non-tender no rebound   Musculoskeletal:         General: No deformity.      Right lower leg: No edema.      Left lower leg: No edema.   Skin:     General: Skin is warm and dry.   Neurological:      Mental Status: He is alert and oriented to person, place, and time.      Sensory: No sensory deficit.   Psychiatric:         Mood and Affect: Mood normal.         Behavior: Behavior normal.         Thought Content: Thought content normal.         Cognition and Memory: Cognition and  memory normal.      Comments: Very sharp memory       Significant Labs: All pertinent labs within the past 24 hours have been reviewed.  BMP:   Recent Labs   Lab 03/02/22  1459 03/03/22  0424   * 116*    143   K 4.9 3.7    105   CO2 24 28   BUN 48* 45*   CREATININE 1.7* 1.7*   CALCIUM 8.0* 8.2*   MG 2.5  --      CBC: No results for input(s): WBC, HGB, HCT, PLT in the last 48 hours.    Significant Imaging:

## 2022-03-04 NOTE — PLAN OF CARE
DEYA notified Venkatesh Sae in [facility] that patient is not going home and to cancel transportation.      DEYA notified patient's daughter, Mikala that patient will not be discharging today.     DEYA notified nurse Tamra as well.

## 2022-03-04 NOTE — PLAN OF CARE
[facility] - Med Surg  Discharge Final Note    Primary Care Provider: Oracio Johnson MD    Expected Discharge Date: 3/4/2022    All needs met. SW reviewed follow up appointments with patient's daughter, Mikala. DEYA notified Mikala that [facility] will provide home health services. DEYA notified nurse Tamra via secure chat that patient is ready for discharge from case management standpoint.     ADT 30 order placed for Van Transportation.  Requested  time: 11:30 [facility]  If transportation does not arrive at ETA time nurse will be instructed to follow protocol for transportation below:  How can I get in touch directly with dispatch, if needed?                 Non-emergent dispatch: [phone]      +++NURSING:  If Van does not arrive at requested time please call the above Non Emergent Dispatcher.  If issue not resolved please escalate to your charge nurse for further instructions.    Final Discharge Note (most recent)       Final Note - 03/04/22 1045          Final Note    Assessment Type Final Discharge Note (P)      Anticipated Discharge Disposition Home-Health Care Svc (P)      What phone number can be called within the next 1-3 days to see how you are doing after discharge? [phone] (P)      Hospital Resources/Appts/Education Provided Appointments scheduled and added to AVS;Appointments scheduled by Navigator/Coordinator (P)         Post-Acute Status    Post-Acute Authorization Home Health;HME (P)      HME Status Patient declined/refused (P)      Home Health Status Set-up Complete/Auth obtained (P)      Coverage PHN (P)      Discharge Delays None known at this time (P)                      Important Message from Medicare  Important Message from Medicare regarding Discharge Appeal Rights: Given to patient/caregiver, Explained to patient/caregiver, Other (comments) (Explained IMM to anita's daughter. Daughter expressed understanding. Copy emailed to [email or web address])     Date IMM was  signed: 03/03/22  Time IMM was signed: 0900    Contact Info       Oracio Johnson MD   Specialty: Family Medicine   Relationship: PCP - General    [address] [address]   Phone: [phone]       Next Steps: Follow up    [facility], [place]   Specialty: Home Health Services, Home Therapy Services, Home Living Aide Services    [address] [address]   Phone: [phone]       Next Steps: Follow up    Instructions: Home Health agency will call to schedule initial visit.

## 2022-03-04 NOTE — PT/OT/SLP PROGRESS
Occupational Therapy      Patient Name:  Reese Bell   MRN:  [id number]    Patient not seen this am. D/c prep with RN and transportation readiness underway. Patiwnt to discharge to home with Family support and HH follow up.  Addendum: 2:58 03/04/22. Discharge reversed 2* low BP and shortness of breath upon transportation services arrival per Sons verbal reports P follow up this date. Max lethargy, deep sleep. Son requesting continued rest. OT will follow up as indicated.      3/4/2022

## 2022-03-04 NOTE — PROGRESS NOTES
[facility] Medicine  Progress Note    Patient Name: Reese Bell  MRN: [id number]  Patient Class: IP- Inpatient   Admission Date: 2/24/2022  Length of Stay: 8 days  Attending Physician: Stef Pichardo MD  Primary Care Provider: Oracio Johnson MD        Subjective:     Principal Problem:SBO (small bowel obstruction)        HPI:  Reese Bell 89 y.o. male with for hypertension, hyperlipidemia, DJD, CAD post cardiac stenting, FRIDA- CPAP, thrombocytopenia, history of ventricular fibrillation s/p ICD and recent defibrillator battery changed on Monday by Dr. De La Cruz in [place] presents to the hospital with a chief complaint of abdominal pain worst at the bottom of his abdomen without radiation.  He reports yesterday he developed sudden onset diffuse abdominal pain described as a pressure with associated nausea and vomiting.  Presented to an outside hospital where he was admitted.  He denies any worsening or alleviating factors for pain.  He now states his abdominal pain has resolved he has had no further nausea vomiting since arrival from outside hospital.  The symptoms never occurred before.  He had an appendectomy and a child but denies other abdominal surgeries.  He denies any current complaints.    In the emergency room and seen and, creatinine 1.8, CT abdomen with high-grade partial versus complete small bowel obstruction, troponin negative, BNP negative, COVID negative, NG tube placement attempted but unsuccessful chest x-ray showed placement to right lower lobe.  NG tube was removed.  Seen by surgery who recommended transfer for higher level care.      Overview/Hospital Course:  89-year-old white male transferred to [facility] from [facility] to be admitted on 02/24/2022 for small bowel obstruction and needed cardiac and surgery consultants. Patient with extensive past medical history (HTN, hyperlipidemia, DJD, CAD post cardiac stenting, FRIDA- CPAP, thrombocytopenia,  history of ventricular fibrillation s/p ICD and recent defibrillator battery changed on Monday by Dr. De La Cruz in [place] ). Of note,  patient is nonambulatory, W/C bound at baseline. Lives at home with his daughter M-F, and siblings alternate watching him on the weekend. Plenty of family support    CT abdomen with high-grade partial versus early complete small bowel obstruction.  The stomach is significantly distended. CXR w/ opacification at the left lung base probably a combination of atelectasis and pleural effusion.  Of note, patient failed multiple attempts of NGT placement at [facility]. CXR from [facility] showed enteric tube appearing to terminate in the region of the right lower lobe, which later was removed. Cardiology and surgery consulted. Holding off on NGT per surgery at this time, given patient without nausea and vomiting. Cardiology recommend no further ischemic workup needed at this time. Patient has had loose, very dark bowel movement since admission.  Suspect dark stools due to recent NGT trauma and patient aspirating on the blood. Hemoglobin down trended but then stabilized. GI consulted for further evaluation and recommends monitoring and empiric treatment with PPI. Patient continues on supplemental O2, will wean as tolerated. CT chest WO showed likely atelectasis. Patient tolerating home CPAP. Patient with flatus and bowel movements. Advanced diet as tolerated. Continue abx for possible aspiration pneumonia. PT/OT were consulted on 3/1/22 and recommended H/H with commode.  Patient was discharged to home on 3/4/22. Activity as tolerated. Diet- low Na. Follow up with PCP in one week       Interval History:  No new issues     Review of Systems   Constitutional:  Negative for chills and fever.   HENT:  Negative for nosebleeds (resolved) and tinnitus.    Eyes:  Negative for visual disturbance.   Respiratory:  Negative for chest tightness, shortness of breath (improving) and wheezing.    Cardiovascular:   Negative for chest pain, palpitations and leg swelling.   Gastrointestinal:  Negative for abdominal distention (improving per patient and family), abdominal pain, blood in stool (no obvious blood in stool but says its dark. improving), constipation, nausea and vomiting.   Genitourinary:  Negative for dysuria, flank pain and hematuria.   Musculoskeletal:  Negative for joint swelling.   Skin:  Negative for rash and wound.   Neurological:  Negative for dizziness, syncope, weakness and headaches.   Psychiatric/Behavioral:  Negative for confusion and hallucinations.     Review of Systems   Constitutional:  Negative for chills and fever.   HENT:  Negative for nosebleeds (resolved) and tinnitus.    Eyes:  Negative for visual disturbance.   Respiratory:  Negative for chest tightness, shortness of breath (improving) and wheezing.    Cardiovascular:  Negative for chest pain, palpitations and leg swelling.   Gastrointestinal:  Negative for abdominal distention (improving per patient and family), abdominal pain, blood in stool (no obvious blood in stool but says its dark. improving), constipation, nausea and vomiting.   Genitourinary:  Negative for dysuria, flank pain and hematuria.   Musculoskeletal:  Negative for joint swelling.   Skin:  Negative for rash and wound.   Neurological:  Negative for dizziness, syncope, weakness and headaches.   Psychiatric/Behavioral:  Negative for confusion and hallucinations.    Objective:     Vital Signs (Most Recent):  Temp: 97.7 °F (36.5 °C) (03/04/22 0804)  Pulse: 82 (03/04/22 0804)  Resp: 18 (03/04/22 0804)  BP: 130/60 (03/04/22 0804)  SpO2: (!) 92 % (03/04/22 0804)   Vital Signs (24h Range):  Temp:  [97.4 °F (36.3 °C)-98.3 °F (36.8 °C)] 97.7 °F (36.5 °C)  Pulse:  [70-88] 82  Resp:  [17-20] 18  SpO2:  [92 %-98 %] 92 %  BP: ()/(50-66) 130/60     Weight: 104.3 kg (229 lb 15 oz)  Body mass index is 39.47 kg/m².    Intake/Output Summary (Last 24 hours) at 3/4/2022 9961  Last data filed  at 3/3/2022 1230  Gross per 24 hour   Intake 120 ml   Output --   Net 120 ml      Physical Exam  Vitals and nursing note reviewed.   Constitutional:       General: He is not in acute distress.     Appearance: He is obese. He is not ill-appearing, toxic-appearing or diaphoretic.      Comments: Appears clinically better today. No distress. No conversational dyspnea    HENT:      Head: Normocephalic and atraumatic.      Ears:      Comments: Slightly hard of hearing  Neck:      Thyroid: No thyromegaly.   Cardiovascular:      Rate and Rhythm: Normal rate and regular rhythm.      Heart sounds: No murmur heard.     Comments: AICD chest wall, skin overlying it appears bruised.   Pulmonary:      Effort: Pulmonary effort is normal. No respiratory distress.      Breath sounds: Decreased breath sounds present. Wheezes: expiratory wheezesz in all lung fields.     Comments: Breathing improved today. Patient currently on 2L NC with sats at 92-94%. Lungs with diminished breath sounds throughout, but left more than right. No conversational dyspnea today  Abdominal:      Palpations: Abdomen is soft.      Comments: Bowel sounds present. Soft, non-tender no rebound   Musculoskeletal:         General: No deformity.      Right lower leg: No edema.      Left lower leg: No edema.   Skin:     General: Skin is warm and dry.   Neurological:      Mental Status: He is alert and oriented to person, place, and time.      Sensory: No sensory deficit.   Psychiatric:         Mood and Affect: Mood normal.         Behavior: Behavior normal.         Thought Content: Thought content normal.         Cognition and Memory: Cognition and memory normal.      Comments: Very sharp memory       Significant Labs: All pertinent labs within the past 24 hours have been reviewed.  BMP:   Recent Labs   Lab 03/02/22  1459 03/03/22  0424   * 116*    143   K 4.9 3.7    105   CO2 24 28   BUN 48* 45*   CREATININE 1.7* 1.7*   CALCIUM 8.0* 8.2*   MG 2.5   "--      CBC: No results for input(s): WBC, HGB, HCT, PLT in the last 48 hours.    Significant Imaging:       Assessment/Plan:      * SBO (small bowel obstruction)  CT on 2/24 at [facility] with "High-grade partial versus early complete small bowel obstruction.  The stomach is significantly distended.  Transition zone somewhere in the mid abdomen." evaluated by surgery there and recommended for transfer for higher level of care. NG tube unable to be placed at [place].    -Surgery consulted: hold on further NG tube placement. Advance diet as tolerated. No plans for sx  -GI consulted: empiric tx with IV PPI and consider NGT placement if any vomiting.  -Pt now with flatus and bowel movements  -XR abdomen (2/27) w/air distention, no air fluid level  -Pt tolerating regular/cardiac diet    Aspiration pneumonia  -He had a mild leukocytosis on admission  -Recent traumatic NGT at [place] (CXR showed NGT in the region of the RLL)  -Presented with epistaxis and coughing up blood  -Repeat CXR w/mild superimposed right perihilar and infrahilar infiltrate, with some mild atelectasis at the right base as well.  There is continued opacity at the left lung base  -Started on Zosyn for possible aspiration pna from the blood  -Day 4 of Zosyn    Melena  -Patient with melena   -Suspect 2/2 to recent traumatic NGT placement given pt had epistaxis and coughing up blood prior to arrival  -Monitor H/H: stable at this time  -GI consulted: empiric treatment with IV PPI while inpatient. protonix 40 BID on discharge for 6-8 weeks.    Advanced care planning/counseling discussion  Discussed code status with son at bedside and patient.  Son Jian confirmed patient is DNR. He has brought his living will to the hospital.   Greater than 16 minutes spent discussing code status.   DNR order placed in chart    FRIDA (obstructive sleep apnea)  Continue home CPAP if able    Coronary artery disease involving native coronary artery of native heart without angina " pectoris  Denies any chest pain.   Will hold aspirin in setting of melena  Continue home coreg/zetia   Monitor on telemetry  Cardiology consulted for Pre-Op evaluation should surgery be required for SBO    Permanent atrial fibrillation  Continue home coreg, now tolerating PO intake  Currently rate controlled.   Not on anticoagulation outpatient    CHF (congestive heart failure), NYHA class II, chronic, systolic  Echo in 2019 with EF of 60% and normal ventricular diastolic function   On laisx 20 BID at home.   Will hold lasix at this time  CT chest with atelectasis.  Continue supplemental O2, wean as tolerated.    Echo: EF 65%, normal systolic and diastolic function    Continue home entresto  Daily weights  strict intake and output  telemetry    Essential hypertension  Fair controlled   Continue home coreg and entresto    Hyperlipidemia  Continue home zetia   Pt takes Crestor 10mg MWF    Stage 3 chronic kidney disease  Baseline Cr of 1.5-1.9.  Cr at baseline  Renal dose medications   avoid nephrotoxic drugs   Caution with diuresis  monitor intake and output    Dementia in Alzheimer's disease  Stable, at baseline mental status per family  Delirium precautions  Holding home namenda at this time    Benign prostatic hyperplasia  Continue home flomax        VTE Risk Mitigation (From admission, onward)         Ordered     Place CHRISTOPHER hose  Until discontinued         02/24/22 1712     IP VTE HIGH RISK PATIENT  Once         02/24/22 1712     Place sequential compression device  Until discontinued         02/24/22 1712                Discharge Planning   LOUISE:      Code Status: DNR   Is the patient medically ready for discharge?:     Reason for patient still in hospital (select all that apply): Patient unstable and Patient new problem  Discharge Plan A: Home Health   Discharge Delays: (!) Post-Acute Set-up              Stef Machado MD  Department of Hospital Medicine   [facility]

## 2022-03-04 NOTE — PLAN OF CARE
Patient approved for BSC by Meliza at [facility]. SW informed Meliza that equipment will have to be delivered to patient's home. Patient traveling by ambulance home.     Daughter informed DEYA that they purchased BSC for patient already and declined the one from [facility].      03/04/22 0930   Post-Acute Status   Post-Acute Authorization [facility] Status Set-up Complete/Auth obtained   Coverage N   Hospital Resources/Appts/Education Provided Appointments scheduled and added to AVS;Appointments scheduled by Navigator/Coordinator   Discharge Delays None known at this time   Discharge Plan   Discharge Plan A Home Health   Discharge Plan B Home with family

## 2022-03-04 NOTE — NURSING
RAPID RESPONSE NURSE PROACTIVE ROUNDING NOTE       Time of Visit:     Admit Date: 2022  LOS: 7  Code Status: DNR   Date of Visit: 2022  : 1931  Age: 89 y.o.  Sex: male  Race: White  Bed: W440/W440 A:   MRN: [id number]  Was the patient discharged from an ICU this admission? No   Was the patient discharged from a PACU within last 24 hours? No   Did the patient receive conscious sedation/general anesthesia in last 24 hours? No   Was the patient in the ED within the past 24 hours? No   Was the patient on NIPPV within the past 24 hours? No   Attending Physician: Stef Pichardo MD  Primary Service: Hospitalist   Time spent at the bedside: < 15 min    SITUATION    Notified by bedside RN via phone call  Reason for alert: BP 82/54 (63)    Diagnosis: SBO (small bowel obstruction)   has a past medical history of ASCVD (arteriosclerotic cardiovascular disease), DJD (degenerative joint disease), Hyperlipidemia, Hypertension, MGUS (monoclonal gammopathy of unknown significance), FRIDA (obstructive sleep apnea), Thrombocytopenia, and Ventricular fibrillation.    Last Vitals:  Temp: 98.3 °F (36.8 °C) (1633)  Pulse: 82 (2019)  Resp: 19 (2019)  BP: 113/66 (2004)  SpO2: 94 % (2019)    24 Hour Vitals Range:  Temp:  [97.5 °F (36.4 °C)-98.3 °F (36.8 °C)]   Pulse:  [72-88]   Resp:  [18-20]   BP: ()/(50-71)   SpO2:  [94 %-99 %]     Clinical Issues: Circulatory, SBO    ASSESSMENT/INTERVENTIONS    Pt resting in bed in NAD. Lung sounds wheezy but not coarse. Reassessed vitals: SpO2 96%, HR 82, RR 19. BP cuff repositioned and replaced with longer/better fitting cuff; /66. MD Townsend notified, orders to hold 2100 carvedilol and lasix. Pt complaining of some SOB, 2L NC in place. Resp called for 2000 scheduled breathing treatment. Will follow up.       RECOMMENDATIONS  See above.    Discussed plan of care with bedside RN, Tari    PROVIDER ESCALATION    Physician escalation:  Yes    Orders received and case discussed with Dr. Townsend.    Disposition:Remain in room 440    FOLLOW UP    Call back the Rapid Response NurseYary at [phone] for additional questions or concerns.

## 2022-03-04 NOTE — PLAN OF CARE
Pt free from falls, injury or any further trauma throughout shift. Pt AAO x 4. Currently on 1L NC, O2 sat 93%. Family at bedside. Pt was able to wear CPAP for 1hour last night. Pt is bladder and bowel incontinence. Continued medications as ordered. Pt in no distress. Bed at lowest position, bed alarm on, call light in reach, instruct pt to call for assistance. Will cont to monitor.       Problem: Adult Inpatient Plan of Care  Goal: Plan of Care Review  Outcome: Ongoing, Progressing     Problem: Skin Injury Risk Increased  Goal: Skin Health and Integrity  Outcome: Ongoing, Progressing     Problem: Fall Injury Risk  Goal: Absence of Fall and Fall-Related Injury  Outcome: Ongoing, Progressing     Problem: Bariatric Environmental Safety  Goal: Safety Maintained with Care  Outcome: Ongoing, Progressing     Problem: Gas Exchange Impaired  Goal: Optimal Gas Exchange  Outcome: Ongoing, Progressing

## 2022-03-05 LAB
POCT GLUCOSE: 115 MG/DL (ref 70–110)
POCT GLUCOSE: 136 MG/DL (ref 70–110)
POCT GLUCOSE: 143 MG/DL (ref 70–110)
POCT GLUCOSE: 143 MG/DL (ref 70–110)

## 2022-03-05 PROCEDURE — 63600175 PHARM REV CODE 636 W HCPCS: Performed by: PHYSICIAN ASSISTANT

## 2022-03-05 PROCEDURE — C9113 INJ PANTOPRAZOLE SODIUM, VIA: HCPCS | Performed by: STUDENT IN AN ORGANIZED HEALTH CARE EDUCATION/TRAINING PROGRAM

## 2022-03-05 PROCEDURE — 25000003 PHARM REV CODE 250: Performed by: STUDENT IN AN ORGANIZED HEALTH CARE EDUCATION/TRAINING PROGRAM

## 2022-03-05 PROCEDURE — 94760 N-INVAS EAR/PLS OXIMETRY 1: CPT

## 2022-03-05 PROCEDURE — 11000001 HC ACUTE MED/SURG PRIVATE ROOM

## 2022-03-05 PROCEDURE — 99900035 HC TECH TIME PER 15 MIN (STAT)

## 2022-03-05 PROCEDURE — 63600175 PHARM REV CODE 636 W HCPCS: Performed by: STUDENT IN AN ORGANIZED HEALTH CARE EDUCATION/TRAINING PROGRAM

## 2022-03-05 PROCEDURE — 94640 AIRWAY INHALATION TREATMENT: CPT

## 2022-03-05 PROCEDURE — 63600175 PHARM REV CODE 636 W HCPCS: Performed by: INTERNAL MEDICINE

## 2022-03-05 PROCEDURE — 25000003 PHARM REV CODE 250: Performed by: PHYSICIAN ASSISTANT

## 2022-03-05 PROCEDURE — 25000242 PHARM REV CODE 250 ALT 637 W/ HCPCS: Performed by: STUDENT IN AN ORGANIZED HEALTH CARE EDUCATION/TRAINING PROGRAM

## 2022-03-05 RX ADMIN — FUROSEMIDE 20 MG: 10 INJECTION, SOLUTION INTRAVENOUS at 10:03

## 2022-03-05 RX ADMIN — EZETIMIBE 10 MG: 10 TABLET ORAL at 10:03

## 2022-03-05 RX ADMIN — IPRATROPIUM BROMIDE AND ALBUTEROL SULFATE 3 ML: 2.5; .5 SOLUTION RESPIRATORY (INHALATION) at 04:03

## 2022-03-05 RX ADMIN — IPRATROPIUM BROMIDE AND ALBUTEROL SULFATE 3 ML: 2.5; .5 SOLUTION RESPIRATORY (INHALATION) at 10:03

## 2022-03-05 RX ADMIN — PANTOPRAZOLE SODIUM 40 MG: 40 INJECTION, POWDER, FOR SOLUTION INTRAVENOUS at 10:03

## 2022-03-05 RX ADMIN — PIPERACILLIN AND TAZOBACTAM 4.5 G: 4; .5 INJECTION, POWDER, LYOPHILIZED, FOR SOLUTION INTRAVENOUS; PARENTERAL at 03:03

## 2022-03-05 RX ADMIN — IPRATROPIUM BROMIDE AND ALBUTEROL SULFATE 3 ML: 2.5; .5 SOLUTION RESPIRATORY (INHALATION) at 07:03

## 2022-03-05 RX ADMIN — TAMSULOSIN HYDROCHLORIDE 0.4 MG: 0.4 CAPSULE ORAL at 10:03

## 2022-03-05 RX ADMIN — IPRATROPIUM BROMIDE AND ALBUTEROL SULFATE 3 ML: 2.5; .5 SOLUTION RESPIRATORY (INHALATION) at 12:03

## 2022-03-05 RX ADMIN — PIPERACILLIN AND TAZOBACTAM 4.5 G: 4; .5 INJECTION, POWDER, LYOPHILIZED, FOR SOLUTION INTRAVENOUS; PARENTERAL at 05:03

## 2022-03-05 NOTE — NURSING
Family called RN into room, pt stated he could not breathe with his BiPAP mask on. RRT and RN at bedside. Removed mask, repositioned pt and placed 2L NC, O2 sat 96%. Notified MD Townsend. MD at bedside.

## 2022-03-05 NOTE — PROGRESS NOTES
[facility] Medicine  Progress Note    Patient Name: Reese Bell  MRN: [id number]  Patient Class: IP- Inpatient   Admission Date: 2/24/2022  Length of Stay: 9 days  Attending Physician: Stef Pichardo MD  Primary Care Provider: Oracio Johnson MD        Subjective:     Principal Problem:SBO (small bowel obstruction)        HPI:  Reese Bell 89 y.o. male with for hypertension, hyperlipidemia, DJD, CAD post cardiac stenting, FRIDA- CPAP, thrombocytopenia, history of ventricular fibrillation s/p ICD and recent defibrillator battery changed on Monday by Dr. De La Cruz in [place] presents to the hospital with a chief complaint of abdominal pain worst at the bottom of his abdomen without radiation.  He reports yesterday he developed sudden onset diffuse abdominal pain described as a pressure with associated nausea and vomiting.  Presented to an outside hospital where he was admitted.  He denies any worsening or alleviating factors for pain.  He now states his abdominal pain has resolved he has had no further nausea vomiting since arrival from outside hospital.  The symptoms never occurred before.  He had an appendectomy and a child but denies other abdominal surgeries.  He denies any current complaints.    In the emergency room and seen and, creatinine 1.8, CT abdomen with high-grade partial versus complete small bowel obstruction, troponin negative, BNP negative, COVID negative, NG tube placement attempted but unsuccessful chest x-ray showed placement to right lower lobe.  NG tube was removed.  Seen by surgery who recommended transfer for higher level care.      Overview/Hospital Course:  89-year-old white male transferred to [facility] from [facility] to be admitted on 02/24/2022 for small bowel obstruction and needed cardiac and surgery consultants. Patient with extensive past medical history (HTN, hyperlipidemia, DJD, CAD post cardiac stenting, FRIDA- CPAP, thrombocytopenia,  history of ventricular fibrillation s/p ICD and recent defibrillator battery changed on Monday by Dr. De La Cruz in [place] ). Of note,  patient is nonambulatory, W/C bound at baseline. Lives at home with his daughter M-F, and siblings alternate watching him on the weekend. Plenty of family support    CT abdomen with high-grade partial versus early complete small bowel obstruction.  The stomach is significantly distended. CXR w/ opacification at the left lung base probably a combination of atelectasis and pleural effusion.  Of note, patient failed multiple attempts of NGT placement at [place]. CXR from [place] showed enteric tube appearing to terminate in the region of the right lower lobe, which later was removed. Cardiology and surgery consulted. Holding off on NGT per surgery at this time, given patient without nausea and vomiting. Cardiology recommend no further ischemic workup needed at this time. Patient has had loose, very dark bowel movement since admission.  Suspect dark stools due to recent NGT trauma and patient aspirating on the blood. Hemoglobin down trended but then stabilized. GI consulted for further evaluation and recommends monitoring and empiric treatment with PPI. Patient continues on supplemental O2, will wean as tolerated. CT chest WO showed likely atelectasis. Patient tolerating home CPAP. Patient with flatus and bowel movements. Advanced diet as tolerated. Continue abx for possible aspiration pneumonia. PT/OT were consulted on 3/1/22 and recommended H/H with commode.  Patient was discharged to home on 3/4/22. Activity as tolerated. Diet- low Na. Follow up with PCP in one week       Interval History: No new issues     Review of Systems   Constitutional:  Negative for chills and fever.   HENT:  Negative for nosebleeds (resolved) and tinnitus.    Eyes:  Negative for visual disturbance.   Respiratory:  Negative for chest tightness, shortness of breath (improving) and wheezing.    Cardiovascular:   Negative for chest pain, palpitations and leg swelling.   Gastrointestinal:  Negative for abdominal distention (improving per patient and family), abdominal pain, blood in stool (no obvious blood in stool but says its dark. improving), constipation, nausea and vomiting.   Genitourinary:  Negative for dysuria, flank pain and hematuria.   Musculoskeletal:  Negative for joint swelling.   Skin:  Negative for rash and wound.   Neurological:  Negative for dizziness, syncope, weakness and headaches.   Psychiatric/Behavioral:  Negative for confusion and hallucinations.    Objective:     Vital Signs (Most Recent):  Temp: 98.2 °F (36.8 °C) (03/05/22 0744)  Pulse: 86 (03/05/22 0744)  Resp: 20 (03/05/22 0744)  BP: (!) 93/50 (03/05/22 0744)  SpO2: 99 % (03/05/22 0744)   Vital Signs (24h Range):  Temp:  [97.5 °F (36.4 °C)-98.5 °F (36.9 °C)] 98.2 °F (36.8 °C)  Pulse:  [79-97] 86  Resp:  [18-24] 20  SpO2:  [81 %-99 %] 99 %  BP: ()/(50-67) 93/50     Weight: 104.3 kg (229 lb 15 oz)  Body mass index is 39.47 kg/m².  No intake or output data in the 24 hours ending 03/05/22 0954   Physical Exam  Vitals and nursing note reviewed.   Constitutional:       General: He is not in acute distress.     Appearance: He is obese. He is not ill-appearing, toxic-appearing or diaphoretic.      Comments: Appears clinically better today. No distress. No conversational dyspnea    HENT:      Head: Normocephalic and atraumatic.      Ears:      Comments: Slightly hard of hearing  Neck:      Thyroid: No thyromegaly.   Cardiovascular:      Rate and Rhythm: Normal rate and regular rhythm.      Heart sounds: No murmur heard.     Comments: AICD chest wall, skin overlying it appears bruised.   Pulmonary:      Effort: Pulmonary effort is normal. No respiratory distress.      Breath sounds: Decreased breath sounds present. Wheezes: expiratory wheezesz in all lung fields.     Comments: Breathing improved today. Patient currently on 2L NC with sats at 92-94%.  Lungs with diminished breath sounds throughout, but left more than right. No conversational dyspnea today  Abdominal:      Palpations: Abdomen is soft.      Comments: Bowel sounds present. Soft, non-tender no rebound   Musculoskeletal:         General: No deformity.      Right lower leg: No edema.      Left lower leg: No edema.   Skin:     General: Skin is warm and dry.   Neurological:      Mental Status: He is alert and oriented to person, place, and time.      Sensory: No sensory deficit.   Psychiatric:         Mood and Affect: Mood normal.         Behavior: Behavior normal.         Thought Content: Thought content normal.         Cognition and Memory: Cognition and memory normal.      Comments: Very sharp memory       Significant Labs: All pertinent labs within the past 24 hours have been reviewed.  BMP: No results for input(s): GLU, NA, K, CL, CO2, BUN, CREATININE, CALCIUM, MG in the last 48 hours.  CBC: No results for input(s): WBC, HGB, HCT, PLT in the last 48 hours.    Significant Imagi      Assessment/Plan:      Aspiration pneumonia  -He had a mild leukocytosis on admission  -Recent traumatic NGT at [place] (CXR showed NGT in the region of the RLL)  -Presented with epistaxis and coughing up blood  -Repeat CXR w/mild superimposed right perihilar and infrahilar infiltrate, with some mild atelectasis at the right base as well.  There is continued opacity at the left lung base  -Started on Zosyn for possible aspiration pna from the blood  -Day 4 of Zosyn    Melena  -Patient with melena   -Suspect 2/2 to recent traumatic NGT placement given pt had epistaxis and coughing up blood prior to arrival  -Monitor H/H: stable at this time  -GI consulted: empiric treatment with IV PPI while inpatient. protonix 40 BID on discharge for 6-8 weeks.    Advanced care planning/counseling discussion  Discussed code status with son at bedside and patient.  Son Jian confirmed patient is DNR. He has brought his living will to the  hospital.   Greater than 16 minutes spent discussing code status.   DNR order placed in chart    FRIDA (obstructive sleep apnea)  Continue home CPAP if able    Coronary artery disease involving native coronary artery of native heart without angina pectoris  Denies any chest pain.   Will hold aspirin in setting of melena  Continue home coreg/zetia   Monitor on telemetry  Cardiology consulted for Pre-Op evaluation should surgery be required for SBO    Permanent atrial fibrillation  Continue home coreg, now tolerating PO intake  Currently rate controlled.   Not on anticoagulation outpatient    CHF (congestive heart failure), NYHA class II, chronic, systolic  Echo in 2019 with EF of 60% and normal ventricular diastolic function   On laisx 20 BID at home.   Will hold lasix at this time  CT chest with atelectasis.  Continue supplemental O2, wean as tolerated.    Echo: EF 65%, normal systolic and diastolic function    Continue home entresto  Daily weights  strict intake and output  telemetry    Essential hypertension  Fair controlled   Continue home coreg and entresto    Hyperlipidemia  Continue home zetia   Pt takes Crestor 10mg MWF    Stage 3 chronic kidney disease  Baseline Cr of 1.5-1.9.  Cr at baseline  Renal dose medications   avoid nephrotoxic drugs   Caution with diuresis  monitor intake and output    Dementia in Alzheimer's disease  Stable, at baseline mental status per family  Delirium precautions  Holding home namenda at this time    Benign prostatic hyperplasia  Continue home flomax      Hypoxia- barrier to discharge. CXR normal. BNP elevated but echo normal.  May have obesity /hypoventilation? Will check 02 sats today     VTE Risk Mitigation (From admission, onward)         Ordered     Place CHRISTOPHER hose  Until discontinued         02/24/22 1712     IP VTE HIGH RISK PATIENT  Once         02/24/22 1712     Place sequential compression device  Until discontinued         02/24/22 1712                Discharge Planning    LOUISE: 3/4/2022     Code Status: DNR   Is the patient medically ready for discharge?:     Reason for patient still in hospital (select all that apply): Patient unstable  Discharge Plan A: Home Health   Discharge Delays: None known at this time              Stef Machado MD  Department of Hospital Medicine   [facility]

## 2022-03-05 NOTE — SUBJECTIVE & OBJECTIVE
Interval History: No new issues     Review of Systems   Constitutional:  Negative for chills and fever.   HENT:  Negative for nosebleeds (resolved) and tinnitus.    Eyes:  Negative for visual disturbance.   Respiratory:  Negative for chest tightness, shortness of breath (improving) and wheezing.    Cardiovascular:  Negative for chest pain, palpitations and leg swelling.   Gastrointestinal:  Negative for abdominal distention (improving per patient and family), abdominal pain, blood in stool (no obvious blood in stool but says its dark. improving), constipation, nausea and vomiting.   Genitourinary:  Negative for dysuria, flank pain and hematuria.   Musculoskeletal:  Negative for joint swelling.   Skin:  Negative for rash and wound.   Neurological:  Negative for dizziness, syncope, weakness and headaches.   Psychiatric/Behavioral:  Negative for confusion and hallucinations.    Objective:     Vital Signs (Most Recent):  Temp: 98.2 °F (36.8 °C) (03/05/22 0744)  Pulse: 86 (03/05/22 0744)  Resp: 20 (03/05/22 0744)  BP: (!) 93/50 (03/05/22 0744)  SpO2: 99 % (03/05/22 0744)   Vital Signs (24h Range):  Temp:  [97.5 °F (36.4 °C)-98.5 °F (36.9 °C)] 98.2 °F (36.8 °C)  Pulse:  [79-97] 86  Resp:  [18-24] 20  SpO2:  [81 %-99 %] 99 %  BP: ()/(50-67) 93/50     Weight: 104.3 kg (229 lb 15 oz)  Body mass index is 39.47 kg/m².  No intake or output data in the 24 hours ending 03/05/22 0954   Physical Exam  Vitals and nursing note reviewed.   Constitutional:       General: He is not in acute distress.     Appearance: He is obese. He is not ill-appearing, toxic-appearing or diaphoretic.      Comments: Appears clinically better today. No distress. No conversational dyspnea    HENT:      Head: Normocephalic and atraumatic.      Ears:      Comments: Slightly hard of hearing  Neck:      Thyroid: No thyromegaly.   Cardiovascular:      Rate and Rhythm: Normal rate and regular rhythm.      Heart sounds: No murmur heard.     Comments: AICD  chest wall, skin overlying it appears bruised.   Pulmonary:      Effort: Pulmonary effort is normal. No respiratory distress.      Breath sounds: Decreased breath sounds present. Wheezes: expiratory wheezesz in all lung fields.     Comments: Breathing improved today. Patient currently on 2L NC with sats at 92-94%. Lungs with diminished breath sounds throughout, but left more than right. No conversational dyspnea today  Abdominal:      Palpations: Abdomen is soft.      Comments: Bowel sounds present. Soft, non-tender no rebound   Musculoskeletal:         General: No deformity.      Right lower leg: No edema.      Left lower leg: No edema.   Skin:     General: Skin is warm and dry.   Neurological:      Mental Status: He is alert and oriented to person, place, and time.      Sensory: No sensory deficit.   Psychiatric:         Mood and Affect: Mood normal.         Behavior: Behavior normal.         Thought Content: Thought content normal.         Cognition and Memory: Cognition and memory normal.      Comments: Very sharp memory       Significant Labs: All pertinent labs within the past 24 hours have been reviewed.  BMP: No results for input(s): GLU, NA, K, CL, CO2, BUN, CREATININE, CALCIUM, MG in the last 48 hours.  CBC: No results for input(s): WBC, HGB, HCT, PLT in the last 48 hours.    Significant Imagi

## 2022-03-05 NOTE — NURSING
Pt requested to sit up on edge of bed. 2L NC, O2sat 96%. Family at bedside with patient. Door opened to nurses station.

## 2022-03-06 VITALS
HEART RATE: 80 BPM | DIASTOLIC BLOOD PRESSURE: 60 MMHG | HEIGHT: 64 IN | SYSTOLIC BLOOD PRESSURE: 124 MMHG | TEMPERATURE: 99 F | BODY MASS INDEX: 39.26 KG/M2 | OXYGEN SATURATION: 96 % | RESPIRATION RATE: 18 BRPM | WEIGHT: 229.94 LBS

## 2022-03-06 LAB
POCT GLUCOSE: 128 MG/DL (ref 70–110)
POCT GLUCOSE: 136 MG/DL (ref 70–110)

## 2022-03-06 PROCEDURE — 25000242 PHARM REV CODE 250 ALT 637 W/ HCPCS: Performed by: STUDENT IN AN ORGANIZED HEALTH CARE EDUCATION/TRAINING PROGRAM

## 2022-03-06 PROCEDURE — 94760 N-INVAS EAR/PLS OXIMETRY 1: CPT

## 2022-03-06 PROCEDURE — 63600175 PHARM REV CODE 636 W HCPCS: Performed by: STUDENT IN AN ORGANIZED HEALTH CARE EDUCATION/TRAINING PROGRAM

## 2022-03-06 PROCEDURE — 63600175 PHARM REV CODE 636 W HCPCS: Performed by: PHYSICIAN ASSISTANT

## 2022-03-06 PROCEDURE — 99900035 HC TECH TIME PER 15 MIN (STAT)

## 2022-03-06 PROCEDURE — 94640 AIRWAY INHALATION TREATMENT: CPT

## 2022-03-06 PROCEDURE — 25000003 PHARM REV CODE 250: Performed by: PHYSICIAN ASSISTANT

## 2022-03-06 PROCEDURE — 63600175 PHARM REV CODE 636 W HCPCS: Performed by: INTERNAL MEDICINE

## 2022-03-06 PROCEDURE — C9113 INJ PANTOPRAZOLE SODIUM, VIA: HCPCS | Performed by: STUDENT IN AN ORGANIZED HEALTH CARE EDUCATION/TRAINING PROGRAM

## 2022-03-06 PROCEDURE — 25000003 PHARM REV CODE 250: Performed by: STUDENT IN AN ORGANIZED HEALTH CARE EDUCATION/TRAINING PROGRAM

## 2022-03-06 PROCEDURE — 94660 CPAP INITIATION&MGMT: CPT

## 2022-03-06 RX ADMIN — CARVEDILOL 12.5 MG: 12.5 TABLET, FILM COATED ORAL at 09:03

## 2022-03-06 RX ADMIN — SACUBITRIL AND VALSARTAN 1 TABLET: 24; 26 TABLET, FILM COATED ORAL at 09:03

## 2022-03-06 RX ADMIN — IPRATROPIUM BROMIDE AND ALBUTEROL SULFATE 3 ML: 2.5; .5 SOLUTION RESPIRATORY (INHALATION) at 04:03

## 2022-03-06 RX ADMIN — IPRATROPIUM BROMIDE AND ALBUTEROL SULFATE 3 ML: 2.5; .5 SOLUTION RESPIRATORY (INHALATION) at 12:03

## 2022-03-06 RX ADMIN — PIPERACILLIN AND TAZOBACTAM 4.5 G: 4; .5 INJECTION, POWDER, LYOPHILIZED, FOR SOLUTION INTRAVENOUS; PARENTERAL at 12:03

## 2022-03-06 RX ADMIN — PIPERACILLIN AND TAZOBACTAM 4.5 G: 4; .5 INJECTION, POWDER, LYOPHILIZED, FOR SOLUTION INTRAVENOUS; PARENTERAL at 09:03

## 2022-03-06 RX ADMIN — FUROSEMIDE 20 MG: 10 INJECTION, SOLUTION INTRAVENOUS at 10:03

## 2022-03-06 RX ADMIN — IPRATROPIUM BROMIDE AND ALBUTEROL SULFATE 3 ML: 2.5; .5 SOLUTION RESPIRATORY (INHALATION) at 08:03

## 2022-03-06 RX ADMIN — PANTOPRAZOLE SODIUM 40 MG: 40 INJECTION, POWDER, FOR SOLUTION INTRAVENOUS at 09:03

## 2022-03-06 NOTE — PLAN OF CARE
[facility] - Med Surg  Discharge Final Note    Primary Care Provider: Oracio Johnson MD    Expected Discharge Date: 3/6/2022    Final Discharge Note (most recent)       Final Note - 03/06/22 1216          Final Note    Assessment Type Final Discharge Note     Anticipated Discharge Disposition Home[place]Health Care [facility]   [facility]    What phone number can be called within the next 1-3 days to see how you are doing after discharge? [phone]     Hospital Resources/Appts/Education Provided Provided patient/caregiver with written discharge plan information;Appointments scheduled by Navigator/Coordinator;Appointments scheduled and added to AVS        Post-Acute Status    Post-Acute Authorization Home Health     HME Status --   Declined BSC    Home Health Status Set-up Complete/Auth obtained     Coverage [facility]     Discharge Delays Ambulance Transport/Facility Transport                     Important Message from Medicare  Important Message from Medicare regarding Discharge Appeal Rights: Given to patient/caregiver, Explained to patient/caregiver, Signed/date by patient/caregiver     Date IMM was signed: 03/06/22  Time IMM was signed: 1210    Contact Info       Oracio Johnson MD   Specialty: Family Medicine   Relationship: PCP - General    [facility] [address]   Phone: [phone]       Next Steps: Follow up in 1 week(s)    [facility]   Specialty: Home Health Services, Home Therapy Services, Home Living Aide Services    [address] [address]   Phone: [phone]       Next Steps: Follow up    Instructions: Home Health agency will call to schedule initial visit.          Patient discharging to home with Home Health.  Ambulance authorization obtained from [facility] on 3/4/2022. OK to use same auth per Salvador with N (3/6/2022).  Patient's nurse advised that transport has been scheduled and [facility] advised that patient is ready now. Awaiting  transport.  Daughter advised of two hour turn around required for transport.

## 2022-03-06 NOTE — PLAN OF CARE
03/06/22 1214   Medicare Message   Important Message from Medicare regarding Discharge Appeal Rights Given to patient/caregiver;Explained to patient/caregiver;Signed/date by patient/caregiver   Date IMM was signed 03/06/22   Time IMM was signed 1210   Medicare Notice reviewed with patient's daughter, Mikala Reddy, at the bedside.  Daughter/POA in agreement with today's discharge.  Copy of IMM provided to daughter and copy placed in chart.

## 2022-03-06 NOTE — PROGRESS NOTES
Call back received from Salvador with [employer] advising that it is ok to use same auth # that was given on Friday.    Per chart review, auth#[id number].  ADT 30 completed.  Pickup time requested: Now.

## 2022-03-06 NOTE — DISCHARGE SUMMARY
[facility] Medicine  Discharge Summary      Patient Name: Reese Bell  MRN: [id number]  Patient Class: IP- Inpatient  Admission Date: 2/24/2022  Hospital Length of Stay: 10 days  Discharge Date and Time:  03/06/2022 9:28 AM  Attending Physician: Stef Pichardo MD   Discharging Provider: Stef Pichardo MD  Primary Care Provider: Oracio Johnson MD      HPI:   Reese Bell 89 y.o. male with for hypertension, hyperlipidemia, DJD, CAD post cardiac stenting, FRIDA- CPAP, thrombocytopenia, history of ventricular fibrillation s/p ICD and recent defibrillator battery changed on Monday by Dr. De La Cruz in [place] presents to the hospital with a chief complaint of abdominal pain worst at the bottom of his abdomen without radiation.  He reports yesterday he developed sudden onset diffuse abdominal pain described as a pressure with associated nausea and vomiting.  Presented to an outside hospital where he was admitted.  He denies any worsening or alleviating factors for pain.  He now states his abdominal pain has resolved he has had no further nausea vomiting since arrival from outside hospital.  The symptoms never occurred before.  He had an appendectomy and a child but denies other abdominal surgeries.  He denies any current complaints.    In the emergency room and seen and, creatinine 1.8, CT abdomen with high-grade partial versus complete small bowel obstruction, troponin negative, BNP negative, COVID negative, NG tube placement attempted but unsuccessful chest x-ray showed placement to right lower lobe.  NG tube was removed.  Seen by surgery who recommended transfer for higher level care.      * No surgery found *      Hospital Course:   89-year-old white male transferred to [facility] from [facility] to be admitted on 02/24/2022 for small bowel obstruction and needed cardiac and surgery consultants. Patient with extensive past medical history (HTN, hyperlipidemia, DJD, CAD  post cardiac stenting, FRIDA- CPAP, thrombocytopenia, history of ventricular fibrillation s/p ICD and recent defibrillator battery changed on Monday by Dr. De La Cruz in [place] ). Of note,  patient is nonambulatory, W/C bound at baseline. Lives at home with his daughter M-F, and siblings alternate watching him on the weekend. Plenty of family support    CT abdomen with high-grade partial versus early complete small bowel obstruction.  The stomach is significantly distended. CXR w/ opacification at the left lung base probably a combination of atelectasis and pleural effusion.  Of note, patient failed multiple attempts of NGT placement at [place]. CXR from [place] showed enteric tube appearing to terminate in the region of the right lower lobe, which later was removed. Cardiology and surgery consulted. Holding off on NGT per surgery at this time, given patient without nausea and vomiting. Cardiology recommend no further ischemic workup needed at this time. Patient has had loose, very dark bowel movement since admission.  Suspect dark stools due to recent NGT trauma and patient aspirating on the blood. Hemoglobin down trended but then stabilized. GI consulted for further evaluation and recommends monitoring and empiric treatment with PPI. Patient continues on supplemental O2, will wean as tolerated. CT chest WO showed likely atelectasis. Patient tolerating home CPAP. Patient with flatus and bowel movements. Advanced diet as tolerated. Continue abx for possible aspiration pneumonia. PT/OT were consulted on 3/1/22 and recommended H/H with commode.  Patient was discharged to home on 3/6/22. Activity as tolerated. Diet- low Na. Follow up with PCP in one week        Goals of Care Treatment Preferences:  Code Status: DNR    Living Will: Yes              Consults:   Consults (From admission, onward)        Status Ordering Provider     Inpatient consult to Social Work  Once        Provider:  (Not yet assigned)    Acknowledged  GERMAN JURADO     Inpatient consult to Gastroenterology  Once        Provider:  Gail Velasco MD    Completed HAYDEN DYER     Case Management/  Once        Provider:  (Not yet assigned)    Completed GAMA MCLAIN     Inpatient consult to Registered Dietitian/Nutritionist  Once        Provider:  (Not yet assigned)    Completed HAYDEN DYER     Inpatient consult to Cardiology  Once        Provider:  Home Mcneil MD    Completed GAMA MCLAIN     Inpatient consult to General Surgery  Once        Provider:  Compa Wiseman Jr., MD    Completed GAMA MCLAIN          No new Assessment & Plan notes have been filed under this hospital service since the last note was generated.  Service: Hospital Medicine    Final Active Diagnoses:    Diagnosis Date Noted POA    Aspiration pneumonia [J69.0] 02/27/2022 Yes    Melena [K92.1] 02/25/2022 No    Advanced care planning/counseling discussion [Z71.89] 02/24/2022 Not Applicable    FRIDA (obstructive sleep apnea) [G47.33] 07/13/2021 Yes    Coronary artery disease involving native coronary artery of native heart without angina pectoris [I25.10] 08/15/2019 Yes    CHF (congestive heart failure), NYHA class II, chronic, systolic [I50.22] 03/28/2019 Yes    Essential hypertension [I10] 03/28/2019 Yes    Hyperlipidemia [E78.5] 03/28/2019 Yes    Permanent atrial fibrillation [I48.21] 03/28/2019 Yes    Stage 3 chronic kidney disease [N18.30] 08/09/2018 Yes    Dementia in Alzheimer's disease [G30.9, F02.80] 08/17/2017 Yes    Benign prostatic hyperplasia [N40.0] 05/09/2013 Yes      Problems Resolved During this Admission:    Diagnosis Date Noted Date Resolved POA    PRINCIPAL PROBLEM:  SBO (small bowel obstruction) [K56.609]  03/04/2022 Yes       Discharged Condition: good    [place] [facility]:   Follow-up Information     Oracio Johnson MD Follow up in 1 week(s).    Specialty: Family Medicine  Contact  "information:  [address] [address]  [phone]             [facility], [facility] Follow up.    Specialties: Home Health Services, Home Therapy Services, Home Living Aide Services  Why: Home Health agency will call to schedule initial visit.  Contact information:  [address] [place] [address] [id number] [phone]                     Patient Instructions:      COMMODE FOR HOME USE     Order Specific Question Answer Comments   Type: Standard    Height: 5' 4" (1.626 m)    Weight: 104.3 kg (229 lb 15 oz)    Does patient have medical equipment at home? walker, rolling    Does patient have medical equipment at home? wheelchair    Does patient have medical equipment at home? rollator    Length of need (1-99 months): 99        Significant Diagnostic Studie    Pending Diagnostic Studies:     None         Medications:  Reconciled Home Medications:      Medication List      CONTINUE taking these medications    aspirin 81 MG Chew  Take 81 mg by mouth once daily.     carvediloL 12.5 MG tablet  Commonly known as: COREG  Take 12.5 mg by mouth 2 (two) times daily.     cholecalciferol (vitamin D3) 50 mcg (2,000 unit) Cap  Commonly known as: VITAMIN D3  Take 1 capsule by mouth once daily.     clotrimazole-betamethasone 1-0.05% cream  Commonly known as: LOTRISONE  APPLY TO AFFECTED AREA TWICE A DAY     CRESTOR 5 MG tablet  Generic drug: rosuvastatin  Take 5 mg by mouth every Mon, Wed, Fri.     ezetimibe 10 mg tablet  Commonly known as: ZETIA  Take 10 mg by mouth every evening.     furosemide 20 MG tablet  Commonly known as: LASIX  Take 20 mg by mouth 2 (two) times daily. 1 tablet in the morning and 1 tablet at 3pm     ipratropium 42 mcg (0.06 %) nasal spray  Commonly known as: ATROVENT  SMARTSI-2 Spray(s) Both Nares Twice Daily PRN     memantine 5 MG Tab  Commonly known as: NAMENDA  TAKE 1 TABLET BY MOUTH TWICE A DAY     mirtazapine 15 MG tablet  Commonly known as: REMERON  TAKE 1 TABLET " (15 MG TOTAL) BY MOUTH EVERY EVENING.     nitroGLYCERIN 0.4 MG SL tablet  Commonly known as: NITROSTAT  Place 0.4 mg under the tongue every 5 (five) minutes as needed for Chest pain.     RANEXA 1,000 mg Tb12  Generic drug: ranolazine  Take 1,000 mg by mouth 2 (two) times daily.     sacubitriL-valsartan 24-26 mg per tablet  Commonly known as: ENTRESTO  Take 1 tablet by mouth 2 (two) times daily.     spironolactone 25 MG tablet  Commonly known as: ALDACTONE  One po q 3-4pm for abdominal fluid Mon, Wed, Fri     tamsulosin 0.4 mg Cap  Commonly known as: FLOMAX  CAN TAKE ONE AT BEDTIME FOR BLADDER & PROSTATE TO EASE URINATION     tobramycin-dexamethasone 0.3-0.05 % Drps  Commonly known as: TOBRADEX ST  1-2 qtts to each eye tid for 7-10 days     triamcinolone acetonide 0.1% 0.1 % cream  Commonly known as: KENALOG  Apply topically 2 (two) times daily. for 10 days            Indwelling Lines/Drains at time of discharge:   Lines/Drains/Airways     None                 Time spent on the discharge of patient: > 35 minutes         Stef Machado MD  Department of Hospital Medicine  [facility] Surg

## 2022-03-06 NOTE — PLAN OF CARE
Pt free from falls, injury or any further trauma throughout shift. Pt Alert and oriented to x2 . Continued medications as ordered. IV Abx administered as ordered. Pt turned throughout shift. Blood glucose monitored. Incontinent care performed. Pt in no distress. BiPAP in use. Call light in reach, bed locked in lowest position. Son at bedside. Will cont to monitor.      Problem: Adult Inpatient Plan of Care  Goal: Plan of Care Review  Outcome: Ongoing, Progressing     Problem: Impaired Wound Healing  Goal: Optimal Wound Healing  Outcome: Ongoing, Progressing     Problem: Adjustment to Illness (Gastrointestinal Bleeding)  Goal: Optimal Coping with Acute Illness  Outcome: Ongoing, Progressing     Problem: Bleeding (Gastrointestinal Bleeding)  Goal: Hemostasis  Outcome: Ongoing, Progressing     Problem: Skin Injury Risk Increased  Goal: Skin Health and Integrity  Outcome: Ongoing, Progressing     Problem: Fall Injury Risk  Goal: Absence of Fall and Fall-Related Injury  Outcome: Ongoing, Progressing     Problem: Bariatric Environmental Safety  Goal: Safety Maintained with Care  Outcome: Ongoing, Progressing

## 2022-03-06 NOTE — PLAN OF CARE
Daughter, Mikala, called requesting update for ETA for transport.  Per [place] in [facility], will be about another hour due to some emergencies.  Daughter updated.

## 2022-03-06 NOTE — PROGRESS NOTES
DEYA spoke with [place] @ [facility] to inquire if new order is needed to obtain an ambulance authorization.  Order was written on Friday to request ambulance transport but patient did not discharge. Gene will check with the on-call nurse and followup with .

## 2022-03-06 NOTE — PROGRESS NOTES
[facility] Medicine  Progress Note    Patient Name: Reese Bell  MRN: [id number]  Patient Class: IP- Inpatient   Admission Date: 2/24/2022  Length of Stay: 10 days  Attending Physician: Stef Pichardo MD  Primary Care Provider: Oracio Johnson MD        Subjective:     Principal Problem:SBO (small bowel obstruction)        HPI:  Reese Bell 89 y.o. male with for hypertension, hyperlipidemia, DJD, CAD post cardiac stenting, FRIDA- CPAP, thrombocytopenia, history of ventricular fibrillation s/p ICD and recent defibrillator battery changed on Monday by Dr. De La Cruz in [place] presents to the hospital with a chief complaint of abdominal pain worst at the bottom of his abdomen without radiation.  He reports yesterday he developed sudden onset diffuse abdominal pain described as a pressure with associated nausea and vomiting.  Presented to an outside hospital where he was admitted.  He denies any worsening or alleviating factors for pain.  He now states his abdominal pain has resolved he has had no further nausea vomiting since arrival from outside hospital.  The symptoms never occurred before.  He had an appendectomy and a child but denies other abdominal surgeries.  He denies any current complaints.    In the emergency room and seen and, creatinine 1.8, CT abdomen with high-grade partial versus complete small bowel obstruction, troponin negative, BNP negative, COVID negative, NG tube placement attempted but unsuccessful chest x-ray showed placement to right lower lobe.  NG tube was removed.  Seen by surgery who recommended transfer for higher level care.      Overview/Hospital Course:  89-year-old white male transferred to [facility] from [facility] to be admitted on 02/24/2022 for small bowel obstruction and needed cardiac and surgery consultants. Patient with extensive past medical history (HTN, hyperlipidemia, DJD, CAD post cardiac stenting, FRIDA- CPAP, thrombocytopenia,  history of ventricular fibrillation s/p ICD and recent defibrillator battery changed on Monday by Dr. De La Cruz in [place] ). Of note,  patient is nonambulatory, W/C bound at baseline. Lives at home with his daughter M-F, and siblings alternate watching him on the weekend. Plenty of family support    CT abdomen with high-grade partial versus early complete small bowel obstruction.  The stomach is significantly distended. CXR w/ opacification at the left lung base probably a combination of atelectasis and pleural effusion.  Of note, patient failed multiple attempts of NGT placement at [place]. CXR from [place] showed enteric tube appearing to terminate in the region of the right lower lobe, which later was removed. Cardiology and surgery consulted. Holding off on NGT per surgery at this time, given patient without nausea and vomiting. Cardiology recommend no further ischemic workup needed at this time. Patient has had loose, very dark bowel movement since admission.  Suspect dark stools due to recent NGT trauma and patient aspirating on the blood. Hemoglobin down trended but then stabilized. GI consulted for further evaluation and recommends monitoring and empiric treatment with PPI. Patient continues on supplemental O2, will wean as tolerated. CT chest WO showed likely atelectasis. Patient tolerating home CPAP. Patient with flatus and bowel movements. Advanced diet as tolerated. Continue abx for possible aspiration pneumonia. PT/OT were consulted on 3/1/22 and recommended H/H with commode.  Patient was discharged to home on 3/6/22. Activity as tolerated. Diet- low Na. Follow up with PCP in one week       Interval History: No new issues. Wants to go home     Review of Systems   Constitutional:  Negative for chills and fever.   HENT:  Negative for nosebleeds (resolved) and tinnitus.    Eyes:  Negative for visual disturbance.   Respiratory:  Negative for chest tightness, shortness of breath (improving) and wheezing.     Cardiovascular:  Negative for chest pain, palpitations and leg swelling.   Gastrointestinal:  Negative for abdominal distention (improving per patient and family), abdominal pain, blood in stool (no obvious blood in stool but says its dark. improving), constipation, nausea and vomiting.   Genitourinary:  Negative for dysuria, flank pain and hematuria.   Musculoskeletal:  Negative for joint swelling.   Skin:  Negative for rash and wound.   Neurological:  Negative for dizziness, syncope, weakness and headaches.   Psychiatric/Behavioral:  Negative for confusion and hallucinations.    Objective:     Vital Signs (Most Recent):  Temp: 98.5 °F (36.9 °C) (03/06/22 0843)  Pulse: 98 (03/06/22 0843)  Resp: 18 (03/06/22 0843)  BP: 124/60 (03/06/22 0843)  SpO2: 97 % (03/06/22 0843) Vital Signs (24h Range):  Temp:  [97.6 °F (36.4 °C)-98.5 °F (36.9 °C)] 98.5 °F (36.9 °C)  Pulse:  [79-98] 98  Resp:  [15-21] 18  SpO2:  [92 %-98 %] 97 %  BP: ()/(51-70) 124/60     Weight: 104.3 kg (229 lb 15 oz)  Body mass index is 39.47 kg/m².    Intake/Output Summary (Last 24 hours) at 3/6/2022 0925  Last data filed at 3/6/2022 0829  Gross per 24 hour   Intake 180 ml   Output --   Net 180 ml      Physical Exam  Vitals and nursing note reviewed.   Constitutional:       General: He is not in acute distress.     Appearance: He is obese. He is not ill-appearing, toxic-appearing or diaphoretic.      Comments: Appears clinically better today. No distress. No conversational dyspnea    HENT:      Head: Normocephalic and atraumatic.      Ears:      Comments: Slightly hard of hearing  Neck:      Thyroid: No thyromegaly.   Cardiovascular:      Rate and Rhythm: Normal rate and regular rhythm.      Heart sounds: No murmur heard.     Comments: AICD chest wall, skin overlying it appears bruised.   Pulmonary:      Effort: Pulmonary effort is normal. No respiratory distress.      Breath sounds: Decreased breath sounds present. Wheezes: expiratory wheezesz in  all lung fields.     Comments: Breathing improved today. Patient currently on 2L NC with sats at 92-94%. Lungs with diminished breath sounds throughout, but left more than right. No conversational dyspnea today  Abdominal:      Palpations: Abdomen is soft.      Comments: Bowel sounds present. Soft, non-tender no rebound   Musculoskeletal:         General: No deformity.      Right lower leg: No edema.      Left lower leg: No edema.   Skin:     General: Skin is warm and dry.   Neurological:      Mental Status: He is alert and oriented to person, place, and time.      Sensory: No sensory deficit.   Psychiatric:         Mood and Affect: Mood normal.         Behavior: Behavior normal.         Thought Content: Thought content normal.         Cognition and Memory: Cognition and memory normal.      Comments: Very sharp memory       Significant Labs: All pertinent labs within the past 24 hours have been reviewed.  BMP: No results for input(s): GLU, NA, K, CL, CO2, BUN, CREATININE, CALCIUM, MG in the last 48 hours.  CBC: No results for input(s): WBC, HGB, HCT, PLT in the last 48 hours.    Significant Imaging:       Assessment/Plan:      Aspiration pneumonia  -He had a mild leukocytosis on admission  -Recent traumatic NGT at [place] (CXR showed NGT in the region of the RLL)  -Presented with epistaxis and coughing up blood  -Repeat CXR w/mild superimposed right perihilar and infrahilar infiltrate, with some mild atelectasis at the right base as well.  There is continued opacity at the left lung base  -Started on Zosyn for possible aspiration pna from the blood  -Day 4 of Zosyn    Melena  -Patient with melena   -Suspect 2/2 to recent traumatic NGT placement given pt had epistaxis and coughing up blood prior to arrival  -Monitor H/H: stable at this time  -GI consulted: empiric treatment with IV PPI while inpatient. protonix 40 BID on discharge for 6-8 weeks.    Advanced care planning/counseling discussion  Discussed code status  with son at bedside and patient.  Son Jian confirmed patient is DNR. He has brought his living will to the hospital.   Greater than 16 minutes spent discussing code status.   DNR order placed in chart    FRIDA (obstructive sleep apnea)  Continue home CPAP if able    Coronary artery disease involving native coronary artery of native heart without angina pectoris  Denies any chest pain.   Will hold aspirin in setting of melena  Continue home coreg/zetia   Monitor on telemetry  Cardiology consulted for Pre-Op evaluation should surgery be required for SBO    Permanent atrial fibrillation  Continue home coreg, now tolerating PO intake  Currently rate controlled.   Not on anticoagulation outpatient    CHF (congestive heart failure), NYHA class II, chronic, systolic  Echo in 2019 with EF of 60% and normal ventricular diastolic function   On laisx 20 BID at home.   Will hold lasix at this time  CT chest with atelectasis.  Continue supplemental O2, wean as tolerated.    Echo: EF 65%, normal systolic and diastolic function    Continue home entresto  Daily weights  strict intake and output  telemetry    Essential hypertension  Fair controlled   Continue home coreg and entresto    Hyperlipidemia  Continue home zetia   Pt takes Crestor 10mg MWF    Stage 3 chronic kidney disease  Baseline Cr of 1.5-1.9.  Cr at baseline  Renal dose medications   avoid nephrotoxic drugs   Caution with diuresis  monitor intake and output    Dementia in Alzheimer's disease  Stable, at baseline mental status per family  Delirium precautions  Holding home namenda at this time    Benign prostatic hyperplasia  Continue home flomax        VTE Risk Mitigation (From admission, onward)         Ordered     Place CHRISTOPHER hose  Until discontinued         02/24/22 1712     IP VTE HIGH RISK PATIENT  Once         02/24/22 1712     Place sequential compression device  Until discontinued         02/24/22 1712                Discharge Planning   LOUISE: 3/6/2022     Code  Status: DNR   Is the patient medically ready for discharge?:     Reason for patient still in hospital (select all that apply): Patient unstable  Discharge Plan A: Home Health   Discharge Delays: None known at this time              Stef Machado MD  Department of Hospital Medicine   [facility]

## 2022-03-06 NOTE — SUBJECTIVE & OBJECTIVE
Interval History: No new issues. Wants to go home     Review of Systems   Constitutional:  Negative for chills and fever.   HENT:  Negative for nosebleeds (resolved) and tinnitus.    Eyes:  Negative for visual disturbance.   Respiratory:  Negative for chest tightness, shortness of breath (improving) and wheezing.    Cardiovascular:  Negative for chest pain, palpitations and leg swelling.   Gastrointestinal:  Negative for abdominal distention (improving per patient and family), abdominal pain, blood in stool (no obvious blood in stool but says its dark. improving), constipation, nausea and vomiting.   Genitourinary:  Negative for dysuria, flank pain and hematuria.   Musculoskeletal:  Negative for joint swelling.   Skin:  Negative for rash and wound.   Neurological:  Negative for dizziness, syncope, weakness and headaches.   Psychiatric/Behavioral:  Negative for confusion and hallucinations.    Objective:     Vital Signs (Most Recent):  Temp: 98.5 °F (36.9 °C) (03/06/22 0843)  Pulse: 98 (03/06/22 0843)  Resp: 18 (03/06/22 0843)  BP: 124/60 (03/06/22 0843)  SpO2: 97 % (03/06/22 0843) Vital Signs (24h Range):  Temp:  [97.6 °F (36.4 °C)-98.5 °F (36.9 °C)] 98.5 °F (36.9 °C)  Pulse:  [79-98] 98  Resp:  [15-21] 18  SpO2:  [92 %-98 %] 97 %  BP: ()/(51-70) 124/60     Weight: 104.3 kg (229 lb 15 oz)  Body mass index is 39.47 kg/m².    Intake/Output Summary (Last 24 hours) at 3/6/2022 0998  Last data filed at 3/6/2022 0829  Gross per 24 hour   Intake 180 ml   Output --   Net 180 ml      Physical Exam  Vitals and nursing note reviewed.   Constitutional:       General: He is not in acute distress.     Appearance: He is obese. He is not ill-appearing, toxic-appearing or diaphoretic.      Comments: Appears clinically better today. No distress. No conversational dyspnea    HENT:      Head: Normocephalic and atraumatic.      Ears:      Comments: Slightly hard of hearing  Neck:      Thyroid: No thyromegaly.   Cardiovascular:       Rate and Rhythm: Normal rate and regular rhythm.      Heart sounds: No murmur heard.     Comments: AICD chest wall, skin overlying it appears bruised.   Pulmonary:      Effort: Pulmonary effort is normal. No respiratory distress.      Breath sounds: Decreased breath sounds present. Wheezes: expiratory wheezesz in all lung fields.     Comments: Breathing improved today. Patient currently on 2L NC with sats at 92-94%. Lungs with diminished breath sounds throughout, but left more than right. No conversational dyspnea today  Abdominal:      Palpations: Abdomen is soft.      Comments: Bowel sounds present. Soft, non-tender no rebound   Musculoskeletal:         General: No deformity.      Right lower leg: No edema.      Left lower leg: No edema.   Skin:     General: Skin is warm and dry.   Neurological:      Mental Status: He is alert and oriented to person, place, and time.      Sensory: No sensory deficit.   Psychiatric:         Mood and Affect: Mood normal.         Behavior: Behavior normal.         Thought Content: Thought content normal.         Cognition and Memory: Cognition and memory normal.      Comments: Very sharp memory       Significant Labs: All pertinent labs within the past 24 hours have been reviewed.  BMP: No results for input(s): GLU, NA, K, CL, CO2, BUN, CREATININE, CALCIUM, MG in the last 48 hours.  CBC: No results for input(s): WBC, HGB, HCT, PLT in the last 48 hours.    Significant Imaging:

## 2022-03-07 PROCEDURE — G0180 PR HOME HEALTH MD CERTIFICATION: ICD-10-PCS | Mod: ,,, | Performed by: FAMILY MEDICINE

## 2022-03-07 PROCEDURE — G0180 MD CERTIFICATION HHA PATIENT: HCPCS | Mod: ,,, | Performed by: FAMILY MEDICINE

## 2022-03-07 NOTE — NURSING
Patient discharged per MD order. IV removed; catheter tip intact. No distress noted. Discharge instructions explained; patient verbalized understanding. VSS. Afebrile. No complaint of pain, n/v, diarrhea, or SOB. RX provided. Patient left with belongings to Main Entrance via stretcher per transport.

## 2022-03-07 NOTE — PHYSICIAN QUERY
"PT Name: Reese Bell  MR #: [id number]     DOCUMENTATION CLARIFICATION     CDS/: Terrie Neal RN, CDI            Contact information:[email or web address]  This form is a permanent document in the medical record.     Query Date: March 7, 2022    By submitting this query, we are merely seeking further clarification of documentation to reflect the severity of illness of your patient. Please utilize your independent clinical judgment when addressing the question(s) below.    The medical record reflects the following:     Indicators   Supporting Clinical Findings Location in Medical Record   x Bowel obstruction, intestinal obstruction, LBO or SBO documented SBO (small bowel obstruction)  CT on 2/24 at [facility] ([place] with "High-grade partial versus early complete small bowel obstruction.  The stomach is significantly distended.  Transition zone somewhere in the mid abdomen." evaluated by surgery there and recommended for transfer for higher level of care. NG tube unable to be placed at [facility].    89 y.o. male with extensive pmh admitted with concern for an sbo.  Since presentation to this facility, patient has been passing flatus and having bowel movements.  Pain resolved and no nv.  Low concern for bowel obstruction    H&P 2/24                        GS consult 2/25   x Radiology findings -XR abdomen (2/27) w/air distention, no air fluid level XR 2/27   x Treatment/Medication Advanced diet as tolerated DC summary 3/6    Procedure/Surgery      Other       Provider, please further specify the bowel obstruction diagnosis:  [   ] Partial or incomplete intestinal obstruction, due to other (please specify): ____________   [  X ] Partial or incomplete intestinal obstruction, unknown or unspecified etiology   [   ] Complete intestinal obstruction, due to other (please specify): ____________   [   ] Complete intestinal obstruction, unknown or unspecified etiology   [   ] Other intestinal condition (please specify): " _____________________   [   ] Intestinal Obstruction Ruled Out   [ x  ]  Clinically Undetermined       Please document in your progress notes daily for the duration of treatment until resolved, and include in your discharge summary.

## 2022-03-08 NOTE — PT/OT/SLP DISCHARGE
Physical Therapy Discharge Summary    Name: Reese Bell  MRN: [id number]   Principal Problem: SBO (small bowel obstruction)     Patient Discharged from acute Physical Therapy on 3/6/22.  Please refer to prior PT notes for functional status.     Assessment:     Patient appropriate for care in another setting.    Objective:     GOALS:   Multidisciplinary Problems     Physical Therapy Goals        Problem: Physical Therapy Goal    Goal Priority Disciplines Outcome Goal Variances Interventions   Physical Therapy Goal     PT, PT/OT Ongoing, Progressing     Description: Goals to be met by: 3/16/22     Patient will increase functional independence with mobility by performin. Supine to sit with Stand-by Assistance  2. Rolling to Left and Right with Stand-by Assistance  3. Sit to stand transfer with Stand-by Assistance using RW  4. Bed to chair transfer with Stand-by Assistance using Rolling Walker  5. Gait x5-10 feet with Stand-by Assistance using Rolling Walker  6. Wheelchair propulsion >50 feet with Stand-by Assistance using bilateral upper/lower extremities  7. Lower extremity exercise program 2 sets x10 reps per handout, with supervision                     Reasons for Discontinuation of Therapy Services  Transfer to alternate level of care.      Plan:     Patient Discharged to: Home with Home Health Service.      3/8/2022

## 2022-03-10 ENCOUNTER — TELEPHONE (OUTPATIENT)
Dept: FAMILY MEDICINE | Facility: CLINIC | Age: 87
End: 2022-03-10
Payer: MEDICARE

## 2022-03-10 ENCOUNTER — LAB VISIT (OUTPATIENT)
Dept: LAB | Facility: HOSPITAL | Age: 87
End: 2022-03-10
Attending: FAMILY MEDICINE
Payer: MEDICARE

## 2022-03-10 ENCOUNTER — PATIENT MESSAGE (OUTPATIENT)
Dept: FAMILY MEDICINE | Facility: CLINIC | Age: 87
End: 2022-03-10
Payer: MEDICARE

## 2022-03-10 DIAGNOSIS — I10 HTN (HYPERTENSION): ICD-10-CM

## 2022-03-10 DIAGNOSIS — D64.9 ANEMIA: Primary | ICD-10-CM

## 2022-03-10 DIAGNOSIS — N39.0 UTI (URINARY TRACT INFECTION): ICD-10-CM

## 2022-03-10 LAB
ALBUMIN SERPL BCP-MCNC: 2.8 G/DL (ref 3.5–5.2)
ALP SERPL-CCNC: 54 U/L (ref 55–135)
ALT SERPL W/O P-5'-P-CCNC: 20 U/L (ref 10–44)
ANION GAP SERPL CALC-SCNC: 13 MMOL/L (ref 8–16)
AST SERPL-CCNC: 19 U/L (ref 10–40)
BACTERIA #/AREA URNS HPF: ABNORMAL /HPF
BASOPHILS # BLD AUTO: 0.02 K/UL (ref 0–0.2)
BASOPHILS NFR BLD: 0.3 % (ref 0–1.9)
BILIRUB SERPL-MCNC: 0.4 MG/DL (ref 0.1–1)
BILIRUB UR QL STRIP: ABNORMAL
BUN SERPL-MCNC: 27 MG/DL (ref 8–23)
CALCIUM SERPL-MCNC: 8.1 MG/DL (ref 8.7–10.5)
CHLORIDE SERPL-SCNC: 103 MMOL/L (ref 95–110)
CLARITY UR: CLEAR
CO2 SERPL-SCNC: 25 MMOL/L (ref 23–29)
COLOR UR: YELLOW
CREAT SERPL-MCNC: 2.3 MG/DL (ref 0.5–1.4)
DIFFERENTIAL METHOD: ABNORMAL
EOSINOPHIL # BLD AUTO: 0.1 K/UL (ref 0–0.5)
EOSINOPHIL NFR BLD: 1.9 % (ref 0–8)
ERYTHROCYTE [DISTWIDTH] IN BLOOD BY AUTOMATED COUNT: 15.9 % (ref 11.5–14.5)
EST. GFR  (AFRICAN AMERICAN): 28 ML/MIN/1.73 M^2
EST. GFR  (NON AFRICAN AMERICAN): 24 ML/MIN/1.73 M^2
GLUCOSE SERPL-MCNC: 137 MG/DL (ref 70–110)
GLUCOSE UR QL STRIP: NEGATIVE
HCT VFR BLD AUTO: 28.9 % (ref 40–54)
HGB BLD-MCNC: 8.9 G/DL (ref 14–18)
HGB UR QL STRIP: NEGATIVE
HYALINE CASTS #/AREA URNS LPF: 0 /LPF
IMM GRANULOCYTES # BLD AUTO: 0.08 K/UL (ref 0–0.04)
IMM GRANULOCYTES NFR BLD AUTO: 1.1 % (ref 0–0.5)
KETONES UR QL STRIP: ABNORMAL
LEUKOCYTE ESTERASE UR QL STRIP: NEGATIVE
LYMPHOCYTES # BLD AUTO: 0.7 K/UL (ref 1–4.8)
LYMPHOCYTES NFR BLD: 9.6 % (ref 18–48)
MCH RBC QN AUTO: 34 PG (ref 27–31)
MCHC RBC AUTO-ENTMCNC: 30.8 G/DL (ref 32–36)
MCV RBC AUTO: 110 FL (ref 82–98)
MICROSCOPIC COMMENT: ABNORMAL
MONOCYTES # BLD AUTO: 1 K/UL (ref 0.3–1)
MONOCYTES NFR BLD: 12.6 % (ref 4–15)
NEUTROPHILS # BLD AUTO: 5.6 K/UL (ref 1.8–7.7)
NEUTROPHILS NFR BLD: 74.5 % (ref 38–73)
NITRITE UR QL STRIP: NEGATIVE
NRBC BLD-RTO: 0 /100 WBC
PH UR STRIP: 5 [PH] (ref 5–8)
PLATELET # BLD AUTO: 101 K/UL (ref 150–450)
PMV BLD AUTO: 9.5 FL (ref 9.2–12.9)
POTASSIUM SERPL-SCNC: 3 MMOL/L (ref 3.5–5.1)
PROT SERPL-MCNC: 5.3 G/DL (ref 6–8.4)
PROT UR QL STRIP: ABNORMAL
RBC # BLD AUTO: 2.62 M/UL (ref 4.6–6.2)
RBC #/AREA URNS HPF: 0 /HPF (ref 0–4)
SODIUM SERPL-SCNC: 141 MMOL/L (ref 136–145)
SP GR UR STRIP: >=1.03 (ref 1–1.03)
SQUAMOUS #/AREA URNS HPF: 0 /HPF
URATE CRY URNS QL MICRO: ABNORMAL
URN SPEC COLLECT METH UR: ABNORMAL
UROBILINOGEN UR STRIP-ACNC: NEGATIVE EU/DL
WBC # BLD AUTO: 7.51 K/UL (ref 3.9–12.7)
WBC #/AREA URNS HPF: 3 /HPF (ref 0–5)

## 2022-03-10 PROCEDURE — 85025 COMPLETE CBC W/AUTO DIFF WBC: CPT

## 2022-03-10 PROCEDURE — 80053 COMPREHEN METABOLIC PANEL: CPT

## 2022-03-10 PROCEDURE — 81000 URINALYSIS NONAUTO W/SCOPE: CPT | Performed by: FAMILY MEDICINE

## 2022-03-10 PROCEDURE — 36415 COLL VENOUS BLD VENIPUNCTURE: CPT

## 2022-03-10 NOTE — TELEPHONE ENCOUNTER
----- Message from Sen Coronado sent at 3/10/2022  9:15 AM CST -----  Contact: Marisela @ [employer]  Reese Bell  MRN: [id number]  : 1931  PCP: Oracio Johnson  Home Phone      [phone]  Work Phone      Not on file.  Mobile          [phone]      MESSAGE: [employer] -- low BP -- requesting order for labs to check for dehydration    Call Marisela @ [phone]    PCP: Alex

## 2022-03-10 NOTE — TELEPHONE ENCOUNTER
Spoke w/ Dr Johnson . Informed Marisela w/ homehealth   Cbc cmp, urine   Voiced understanding will drop urine off

## 2022-03-11 NOTE — TELEPHONE ENCOUNTER
Doc the labs are in your box in your office I also am getting with pt's daughter to see if they have a bp log for pt beings he has been having low blood pressures per daughter.

## 2022-03-15 ENCOUNTER — PATIENT MESSAGE (OUTPATIENT)
Dept: FAMILY MEDICINE | Facility: CLINIC | Age: 87
End: 2022-03-15
Payer: MEDICARE

## 2022-03-15 ENCOUNTER — TELEPHONE (OUTPATIENT)
Dept: FAMILY MEDICINE | Facility: CLINIC | Age: 87
End: 2022-03-15
Payer: MEDICARE

## 2022-03-15 ENCOUNTER — EXTERNAL HOME HEALTH (OUTPATIENT)
Dept: HOME HEALTH SERVICES | Facility: HOSPITAL | Age: 87
End: 2022-03-15
Payer: MEDICARE

## 2022-03-15 DIAGNOSIS — Z78.9 IMPAIRED MOBILITY AND ACTIVITIES OF DAILY LIVING: Primary | ICD-10-CM

## 2022-03-15 DIAGNOSIS — Z74.09 IMPAIRED MOBILITY AND ACTIVITIES OF DAILY LIVING: Primary | ICD-10-CM

## 2022-03-15 NOTE — TELEPHONE ENCOUNTER
----- Message from Sen Coronado sent at 3/15/2022  1:37 PM CDT -----  Contact: Marisela @ [employer]  Reese Bell  MRN: [id number]  : 1931  PCP: Oracio Johnson  Home Phone      [phone]  Work Phone      Not on file.  [employer]          [phone]      MESSAGE: [facility] -- requesting order for a Hoya lift - asking if a Dr in this afternoon can send order    Call Marisela @ [phone]    PCP: Alex

## 2022-03-16 ENCOUNTER — LAB VISIT (OUTPATIENT)
Dept: LAB | Facility: HOSPITAL | Age: 87
End: 2022-03-16
Attending: STUDENT IN AN ORGANIZED HEALTH CARE EDUCATION/TRAINING PROGRAM
Payer: MEDICARE

## 2022-03-16 ENCOUNTER — TELEPHONE (OUTPATIENT)
Dept: FAMILY MEDICINE | Facility: CLINIC | Age: 87
End: 2022-03-16
Payer: MEDICARE

## 2022-03-16 DIAGNOSIS — I50.9 CHRONIC CONGESTIVE HEART FAILURE, UNSPECIFIED HEART FAILURE TYPE: Primary | ICD-10-CM

## 2022-03-16 DIAGNOSIS — I50.9 HEART FAILURE, UNSPECIFIED: Primary | ICD-10-CM

## 2022-03-16 LAB
ALBUMIN SERPL BCP-MCNC: 2.9 G/DL (ref 3.5–5.2)
ALP SERPL-CCNC: 75 U/L (ref 55–135)
ALT SERPL W/O P-5'-P-CCNC: 14 U/L (ref 10–44)
ANION GAP SERPL CALC-SCNC: 12 MMOL/L (ref 8–16)
AST SERPL-CCNC: 16 U/L (ref 10–40)
BASOPHILS # BLD AUTO: 0.02 K/UL (ref 0–0.2)
BASOPHILS NFR BLD: 0.5 % (ref 0–1.9)
BILIRUB SERPL-MCNC: 0.5 MG/DL (ref 0.1–1)
BUN SERPL-MCNC: 30 MG/DL (ref 8–23)
CALCIUM SERPL-MCNC: 7.9 MG/DL (ref 8.7–10.5)
CHLORIDE SERPL-SCNC: 102 MMOL/L (ref 95–110)
CO2 SERPL-SCNC: 25 MMOL/L (ref 23–29)
CREAT SERPL-MCNC: 2.6 MG/DL (ref 0.5–1.4)
DIFFERENTIAL METHOD: ABNORMAL
EOSINOPHIL # BLD AUTO: 0.2 K/UL (ref 0–0.5)
EOSINOPHIL NFR BLD: 4.4 % (ref 0–8)
ERYTHROCYTE [DISTWIDTH] IN BLOOD BY AUTOMATED COUNT: 14.9 % (ref 11.5–14.5)
EST. GFR  (AFRICAN AMERICAN): 24 ML/MIN/1.73 M^2
EST. GFR  (NON AFRICAN AMERICAN): 21 ML/MIN/1.73 M^2
GLUCOSE SERPL-MCNC: 120 MG/DL (ref 70–110)
HCT VFR BLD AUTO: 30.2 % (ref 40–54)
HGB BLD-MCNC: 9.4 G/DL (ref 14–18)
IMM GRANULOCYTES # BLD AUTO: 0.04 K/UL (ref 0–0.04)
IMM GRANULOCYTES NFR BLD AUTO: 0.9 % (ref 0–0.5)
LYMPHOCYTES # BLD AUTO: 0.5 K/UL (ref 1–4.8)
LYMPHOCYTES NFR BLD: 12.3 % (ref 18–48)
MCH RBC QN AUTO: 33.9 PG (ref 27–31)
MCHC RBC AUTO-ENTMCNC: 31.1 G/DL (ref 32–36)
MCV RBC AUTO: 109 FL (ref 82–98)
MONOCYTES # BLD AUTO: 0.5 K/UL (ref 0.3–1)
MONOCYTES NFR BLD: 12 % (ref 4–15)
NEUTROPHILS # BLD AUTO: 3 K/UL (ref 1.8–7.7)
NEUTROPHILS NFR BLD: 69.9 % (ref 38–73)
NRBC BLD-RTO: 0 /100 WBC
PLATELET # BLD AUTO: 121 K/UL (ref 150–450)
PMV BLD AUTO: 9.8 FL (ref 9.2–12.9)
POTASSIUM SERPL-SCNC: 4.5 MMOL/L (ref 3.5–5.1)
PROT SERPL-MCNC: 5.2 G/DL (ref 6–8.4)
RBC # BLD AUTO: 2.77 M/UL (ref 4.6–6.2)
SODIUM SERPL-SCNC: 139 MMOL/L (ref 136–145)
WBC # BLD AUTO: 4.32 K/UL (ref 3.9–12.7)

## 2022-03-16 PROCEDURE — 80053 COMPREHEN METABOLIC PANEL: CPT

## 2022-03-16 PROCEDURE — 85025 COMPLETE CBC W/AUTO DIFF WBC: CPT

## 2022-03-16 NOTE — TELEPHONE ENCOUNTER
Marisela with [facility] home health called regarding pt has been having issues recently, main concern for them today with patient is that he may be getting dehydrated. Pt is having Home health nurse go out today would like for his H&H and potassium drawn.  out of office please advise, thanks

## 2022-03-17 ENCOUNTER — DOCUMENT SCAN (OUTPATIENT)
Dept: HOME HEALTH SERVICES | Facility: HOSPITAL | Age: 87
End: 2022-03-17
Payer: MEDICARE

## 2022-03-18 NOTE — PROGRESS NOTES
The following lab results were abnormal. The following recommendations were made:Mr Kathleen's renal failure is worse..

## 2022-03-20 ENCOUNTER — DOCUMENT SCAN (OUTPATIENT)
Dept: HOME HEALTH SERVICES | Facility: HOSPITAL | Age: 87
End: 2022-03-20
Payer: MEDICARE

## 2022-03-23 ENCOUNTER — DOCUMENT SCAN (OUTPATIENT)
Dept: HOME HEALTH SERVICES | Facility: HOSPITAL | Age: 87
End: 2022-03-23
Payer: MEDICARE

## 2022-03-28 ENCOUNTER — PATIENT MESSAGE (OUTPATIENT)
Dept: FAMILY MEDICINE | Facility: CLINIC | Age: 87
End: 2022-03-28
Payer: MEDICARE

## 2022-03-28 ENCOUNTER — TELEPHONE (OUTPATIENT)
Dept: FAMILY MEDICINE | Facility: CLINIC | Age: 87
End: 2022-03-28
Payer: MEDICARE

## 2022-03-28 NOTE — TELEPHONE ENCOUNTER
----- Message from Sen Coronado sent at 3/28/2022 11:57 AM CDT -----  Contact: Marisela @ [employer]  Reese Bell  MRN: [id number]  : 1931  PCP: Oracio Johnson  Home Phone      [phone]  Work Phone      Not on file.  Mobile          [phone]      MESSAGE: [employer] -- previously requested order for Hoya lift -- no response -- please check status & advise    Call Marisela @ [phone]    PCP: Tao

## 2022-03-29 ENCOUNTER — DOCUMENT SCAN (OUTPATIENT)
Dept: HOME HEALTH SERVICES | Facility: HOSPITAL | Age: 87
End: 2022-03-29
Payer: MEDICARE

## 2022-04-04 ENCOUNTER — TELEPHONE (OUTPATIENT)
Dept: FAMILY MEDICINE | Facility: CLINIC | Age: 87
End: 2022-04-04
Payer: MEDICARE

## 2022-04-04 NOTE — TELEPHONE ENCOUNTER
Called patient's daughter (Mikala) to advise that Dr. Johnson did place order for HOYA lift.  I informed her that a current office visit is needed since patient hasn't been seen since 7/2021.  In order for approval supporting clinic notes with HOYA lift order is needed.  She verbalized understanding and stated that patient is homebound at this point and seen Dr. De La Cruz last will request order from Dr. De La Cruz.  All questions answered.

## 2022-04-04 NOTE — TELEPHONE ENCOUNTER
----- Message from Etienne Abraham sent at 2022  2:12 PM CDT -----  Contact: Mariana/[employer]  Reese Bell  MRN: [id number]  : 1931  PCP: Oracio Johnson  Home Phone      [phone]  Work Phone      Not on file.  Mobile          [phone]      MESSAGE:   Mariana with [employer] called and stated they are needing orders and clinic notes for the pt to receive a Hoya lift.      Phone: [phone]  Fax: [phone]

## 2022-04-13 NOTE — TELEPHONE ENCOUNTER
Spoke to Mikala and she has received the july  through Dr De La Cruz's office. I encouraged her to give us a call if there is anything else that we can do to help.

## 2022-04-26 ENCOUNTER — DOCUMENT SCAN (OUTPATIENT)
Dept: HOME HEALTH SERVICES | Facility: HOSPITAL | Age: 87
End: 2022-04-26
Payer: MEDICARE

## 2022-05-18 ENCOUNTER — DOCUMENT SCAN (OUTPATIENT)
Dept: HOME HEALTH SERVICES | Facility: HOSPITAL | Age: 87
End: 2022-05-18
Payer: MEDICARE

## 2022-06-03 ENCOUNTER — DOCUMENT SCAN (OUTPATIENT)
Dept: HOME HEALTH SERVICES | Facility: HOSPITAL | Age: 87
End: 2022-06-03
Payer: MEDICARE

## 2022-06-21 ENCOUNTER — PATIENT MESSAGE (OUTPATIENT)
Dept: FAMILY MEDICINE | Facility: CLINIC | Age: 87
End: 2022-06-21
Payer: MEDICARE

## 2022-09-06 NOTE — PLAN OF CARE
[facility] - Med Surg (3rd Fl)  Initial Discharge Assessment       Primary Care Provider: Oracio Johnson MD    Admission Diagnosis: SBO (small bowel obstruction) [K56.609]    Admission Date: 2/23/2022  Expected Discharge Date:     Discharge Barriers Identified: None    Payor: [employer]ICARE / Plan: [employer] 65 / Product Type: Medicare Advantage /     Extended Emergency Contact Information  Primary Emergency Contact: Mikala Reddy   [place]  Mobile Phone: [phone]  Relation: Daughter  Secondary Emergency Contact: Treva Gonzales  Mobile Phone: [phone]  Relation: Daughter    Discharge Plan A: Other (Transfer to a higher level of care)  Discharge Plan B: Home with family, Home Health      [facility] - [facility] [address] [place] [address]  Phone: [phone] Fax: [phone]    [facility]/pharmacy #[id number] - [place], [place] - [address]  [address]  [facility] [address]  Phone: [phone] Fax: [phone]    [facility] - [facility] [address] [facility]  [address] [facility] [place] [address]  Phone: [phone] Fax: [phone]      Initial Assessment (most recent)     Adult Discharge Assessment - 02/24/22 1232        Discharge Assessment    Assessment Type Discharge Planning Assessment     Communicated LOUISE with patient/caregiver Date not available/Unable to determine     Reason For Admission SBO     Lives With child(keesha), adult     Facility Arrived From: Home     Do you expect to return to your current living situation? Yes     Do you have help at home or someone to help you manage your care at home? Yes     Prior to hospitilization cognitive status: Alert/Oriented     Current cognitive status: Alert/Oriented     Walking or Climbing Stairs Difficulty ambulation difficulty, requires equipment     Equipment Currently Used at Home CPAP;wheelchair     Readmission within 30 days? No     Do you have  Lab Results   Component Value Date    EGFR 45 09/05/2022    EGFR 35 09/03/2022    EGFR 33 09/02/2022    CREATININE 1 66 (H) 09/05/2022    CREATININE 2 06 (H) 09/03/2022    CREATININE 2 17 (H) 09/02/2022     · Baseline creatinine seems between 1 6-1 9  · Currently at baseline  · Continue home dose Bumex    · Trend BMP  · I&O and daily weight prescription coverage? Yes     Coverage [employer]     Are you on dialysis? No     Do you take coumadin? No     Discharge Plan A Other   Transfer to a higher level of care    Discharge Plan B Home with family;Home Health     DME Needed Upon Discharge  other (see comments)   TBD    Discharge Barriers Identified None                    Discharge assessment completed with patient's children at the bedside. Transfer initiated for a higher level of care given patient's cardiac history. SW to remain available.

## 2022-10-03 ENCOUNTER — PATIENT MESSAGE (OUTPATIENT)
Dept: ADMINISTRATIVE | Facility: HOSPITAL | Age: 87
End: 2022-10-03
Payer: MEDICARE

## 2022-10-11 NOTE — PT/OT/SLP PROGRESS
Occupational Therapy   Treatment    Name: Reese Bell  MRN: [id number]  Admitting Diagnosis:  Subdural hematoma       Recommendations:     Discharge Recommendations: nursing facility, skilled  Discharge Equipment Recommendations:  walker, rolling  Barriers to discharge:  None    Assessment:     Reese Bell is a 88 y.o. male with a medical diagnosis of Subdural hematoma.  Performance deficits affecting function are weakness, impaired endurance, impaired self care skills, impaired functional mobilty, gait instability, impaired balance, decreased lower extremity function, impaired cardiopulmonary response to activity. Patient initially declined OOB activity 3x on this date, but agreed to UE exercises in bed.     Rehab Prognosis:  Fair; patient would benefit from acute skilled OT services to address these deficits and reach maximum level of function.       Plan:     Patient to be seen 4 x/week to address the above listed problems via self-care/home management, therapeutic activities, therapeutic exercises  · Plan of Care Expires: 09/13/19  · Plan of Care Reviewed with: patient, family    Subjective     Pain/Comfort:  · Pain Rating 1: 0/10  · Pain Rating Post-Intervention 1: 0/10    Objective:     Communicated with: patient prior to session.  Patient found semi-supine with telemetry upon OT entry to room.    General Precautions: Standard, fall, seizure, hearing impaired   Orthopedic Precautions:N/A   Braces: N/A     Occupational Performance:     [facility] 6 Click ADL: 17    Treatment & Education:  Patient performed B UE ROM exercises using 1# dowel monse 1 x 30 in all planes and joints focusing to improve strength and endurance to increase independence with ADLs.     Patient left semi-supine with call button in reach and all needs met. Education:      GOALS:   Multidisciplinary Problems     Occupational Therapy Goals        Problem: Occupational Therapy Goal    Goal Priority Disciplines Outcome Interventions    Occupational Therapy Goal     OT, PT/OT Ongoing (interventions implemented as appropriate)    Description:  Goals to be met by: 7 days (8/20/19)     Patient will increase functional independence with ADLs by performing:    UE Dressing with Supervision.  LE Dressing with Contact Guard Assistance.  Grooming while standing at sink with Contact Guard Assistance.  Toileting from toilet with Contact Guard Assistance for hygiene and clothing management.   Supine to sit with Supervision.  Toilet transfer to toilet with Stand-by Assistance.  Complete functional mobility household distance with Stand-by Assistance using AD as needed.                      Time Tracking:     OT Date of Treatment: 08/20/19  OT Start Time: 1547  OT Stop Time: 1558  OT Total Time (min): 11 min    Billable Minutes:Therapeutic Exercise 11    FAITH Joseph  8/20/2019     MOLECULAR PCR

## 2022-11-11 ENCOUNTER — PATIENT MESSAGE (OUTPATIENT)
Dept: FAMILY MEDICINE | Facility: CLINIC | Age: 87
End: 2022-11-11
Payer: MEDICARE

## 2022-11-14 ENCOUNTER — CLINICAL SUPPORT (OUTPATIENT)
Dept: FAMILY MEDICINE | Facility: CLINIC | Age: 87
End: 2022-11-14
Payer: MEDICARE

## 2022-11-14 ENCOUNTER — TELEPHONE (OUTPATIENT)
Dept: FAMILY MEDICINE | Facility: CLINIC | Age: 87
End: 2022-11-14
Payer: MEDICARE

## 2022-11-14 DIAGNOSIS — N30.01 ACUTE CYSTITIS WITH HEMATURIA: Primary | ICD-10-CM

## 2022-11-14 DIAGNOSIS — N30.00 ACUTE CYSTITIS WITHOUT HEMATURIA: Primary | ICD-10-CM

## 2022-11-14 LAB
BILIRUB SERPL-MCNC: ABNORMAL MG/DL
BLOOD URINE, POC: ABNORMAL
COLOR, POC UA: YELLOW
GLUCOSE UR QL STRIP: NORMAL
KETONES UR QL STRIP: ABNORMAL
LEUKOCYTE ESTERASE URINE, POC: ABNORMAL
NITRITE, POC UA: ABNORMAL
PH, POC UA: 5
PROTEIN, POC: ABNORMAL
SPECIFIC GRAVITY, POC UA: 1.01
UROBILINOGEN, POC UA: NORMAL

## 2022-11-14 PROCEDURE — 81000 POCT URINE SEDIMENT EXAM: ICD-10-PCS | Mod: S$GLB,,, | Performed by: FAMILY MEDICINE

## 2022-11-14 PROCEDURE — 81000 URINALYSIS NONAUTO W/SCOPE: CPT | Mod: S$GLB,,, | Performed by: FAMILY MEDICINE

## 2022-11-14 RX ORDER — SULFAMETHOXAZOLE AND TRIMETHOPRIM 800; 160 MG/1; MG/1
TABLET ORAL
Qty: 20 TABLET | Refills: 0 | Status: SHIPPED | OUTPATIENT
Start: 2022-11-14

## 2022-11-14 NOTE — TELEPHONE ENCOUNTER
PT DECIDED NOT TO COME IN DUE TO COLD WEATHER BUT INSTEAD DAUGHTER WILL BRING IN  A CUP FOR A UA .

## 2022-11-15 LAB
BACTERIA SPEC CULT: NORMAL /HPF
BILIRUB SERPL-MCNC: NORMAL MG/DL
BLOOD URINE, POC: NORMAL
CASTS: NORMAL
COLOR, POC UA: YELLOW
CRYSTALS: NORMAL
GLUCOSE UR QL STRIP: NORMAL
KETONES UR QL STRIP: NORMAL
LEUKOCYTE ESTERASE URINE, POC: NORMAL
NITRITE, POC UA: NORMAL
PH, POC UA: 5
PROTEIN, POC: NORMAL
RBC CELLS COUNTED: NORMAL
SPECIFIC GRAVITY, POC UA: 1.01
UROBILINOGEN, POC UA: NORMAL
WHITE BLOOD CELLS: NORMAL

## 2022-11-22 ENCOUNTER — CLINICAL SUPPORT (OUTPATIENT)
Dept: FAMILY MEDICINE | Facility: CLINIC | Age: 87
End: 2022-11-22
Payer: MEDICARE

## 2022-11-22 DIAGNOSIS — N30.00 ACUTE CYSTITIS WITHOUT HEMATURIA: Primary | ICD-10-CM

## 2022-11-22 PROCEDURE — 81000 URINALYSIS NONAUTO W/SCOPE: CPT | Mod: S$GLB,,, | Performed by: FAMILY MEDICINE

## 2022-11-22 PROCEDURE — 81000 POCT URINE SEDIMENT EXAM: ICD-10-PCS | Mod: S$GLB,,, | Performed by: FAMILY MEDICINE

## 2022-12-30 ENCOUNTER — PATIENT MESSAGE (OUTPATIENT)
Dept: FAMILY MEDICINE | Facility: CLINIC | Age: 87
End: 2022-12-30
Payer: MEDICARE

## 2022-12-30 NOTE — TELEPHONE ENCOUNTER
Pt's daughter states started with cough and sinus drip last night and was wondering if mucinex dm is good to take for pt. Pt has no other symptoms as of yet. Daughter was sick with same thing

## 2023-01-29 ENCOUNTER — PATIENT MESSAGE (OUTPATIENT)
Dept: FAMILY MEDICINE | Facility: CLINIC | Age: 88
End: 2023-01-29
Payer: MEDICARE

## 2023-01-30 NOTE — TELEPHONE ENCOUNTER
Pt was called and stated area was afebrile and seemed to get alittle better from yesterday. Wants dr to look at it.

## 2023-02-01 ENCOUNTER — PATIENT MESSAGE (OUTPATIENT)
Dept: FAMILY MEDICINE | Facility: CLINIC | Age: 88
End: 2023-02-01
Payer: MEDICARE

## 2023-04-14 ENCOUNTER — TELEPHONE (OUTPATIENT)
Dept: FAMILY MEDICINE | Facility: CLINIC | Age: 88
End: 2023-04-14
Payer: MEDICARE

## 2023-04-14 NOTE — TELEPHONE ENCOUNTER
----- Message from Gretta Frank sent at 2023  4:49 PM CDT -----  Contact: FAX  Reese Bell  MRN: [id number]  : 1931  PCP: Oracio Johnson  Home Phone      [phone]  Work Phone      Not on file.  [employer]          [phone]      MESSAGE:   RX refill     mirtazapine (REMERON) 15 MG tablet    90 day     LOV:2022    [facility]    [phone]

## 2023-05-22 ENCOUNTER — PATIENT MESSAGE (OUTPATIENT)
Dept: FAMILY MEDICINE | Facility: CLINIC | Age: 88
End: 2023-05-22
Payer: MEDICARE

## 2023-05-23 DIAGNOSIS — R33.9 URINARY RETENTION: Primary | ICD-10-CM

## 2023-05-24 ENCOUNTER — CLINICAL SUPPORT (OUTPATIENT)
Dept: FAMILY MEDICINE | Facility: CLINIC | Age: 88
End: 2023-05-24
Payer: MEDICARE

## 2023-05-24 DIAGNOSIS — N30.00 ACUTE CYSTITIS WITHOUT HEMATURIA: Primary | ICD-10-CM

## 2023-05-24 LAB
BILIRUB UR QL STRIP: NEGATIVE
CLARITY UR: CLEAR
COLOR UR: YELLOW
GLUCOSE UR QL STRIP: NEGATIVE
HGB UR QL STRIP: NEGATIVE
KETONES UR QL STRIP: NEGATIVE
LEUKOCYTE ESTERASE UR QL STRIP: ABNORMAL
MICROSCOPIC COMMENT: ABNORMAL
NITRITE UR QL STRIP: NEGATIVE
PH UR STRIP: 7 [PH] (ref 5–8)
PROT UR QL STRIP: NEGATIVE
SP GR UR STRIP: 1.01 (ref 1–1.03)
URN SPEC COLLECT METH UR: ABNORMAL
UROBILINOGEN UR STRIP-ACNC: NEGATIVE EU/DL
WBC #/AREA URNS HPF: 10 /HPF (ref 0–5)

## 2023-05-24 PROCEDURE — 81000 URINALYSIS NONAUTO W/SCOPE: CPT | Performed by: FAMILY MEDICINE

## 2023-05-25 ENCOUNTER — TELEPHONE (OUTPATIENT)
Dept: INTERNAL MEDICINE | Facility: CLINIC | Age: 88
End: 2023-05-25
Payer: MEDICARE

## 2023-06-29 ENCOUNTER — PATIENT MESSAGE (OUTPATIENT)
Dept: FAMILY MEDICINE | Facility: CLINIC | Age: 88
End: 2023-06-29
Payer: MEDICARE

## 2023-06-29 DIAGNOSIS — Z74.09 IMPAIRED MOBILITY AND ACTIVITIES OF DAILY LIVING: ICD-10-CM

## 2023-06-29 DIAGNOSIS — I50.42 CHRONIC COMBINED SYSTOLIC AND DIASTOLIC CONGESTIVE HEART FAILURE: Primary | ICD-10-CM

## 2023-06-29 DIAGNOSIS — Z78.9 IMPAIRED MOBILITY AND ACTIVITIES OF DAILY LIVING: ICD-10-CM

## 2023-07-03 RX ORDER — MIRTAZAPINE 15 MG/1
TABLET, FILM COATED ORAL
Qty: 90 TABLET | Refills: 3 | Status: SHIPPED | OUTPATIENT
Start: 2023-07-03

## 2023-07-03 NOTE — TELEPHONE ENCOUNTER
LOV 07/13/2021  (Dr. Johnson out)    Patient requesting refill for mirtazapine (REMERON) 15 MG tablet    Requested Prescriptions     Pending Prescriptions Disp Refills    mirtazapine (REMERON) 15 MG tablet [Pharmacy Med Name: Mirtazapine 15 MG Oral Tablet] 90 tablet 3     Sig: TAKE 1 TABLET BY MOUTH IN THE  EVENING       Medication pended/pharmacy confirmed, please advise.

## 2023-07-03 NOTE — TELEPHONE ENCOUNTER
Care Due:                  Date            Visit Type   Department     Provider  --------------------------------------------------------------------------------                                SAME DAY -                              ESTABLISHED   [facility] FAMILY Oracio Leigh  Last Visit: 07-      PATIENT      MEDICINE       Alex  Next Visit: None Scheduled  None         None Found                                                            Last  Test          Frequency    Reason                     Performed    Due Date  --------------------------------------------------------------------------------    Office Visit  12 months..  mirtazapine, tamsulosin..  07- 07-    CBC.........  12 months..  mirtazapine..............  03- 03-    CMP.........  12 months..  mirtazapine..............  03- 03-    Lipid Panel.  12 months..  mirtazapine..............  Not Found    Overdue    [employer] Embedded Care Due Messages. Reference number: [id number].   7/03/2023 9:02:12 AM CDT

## 2023-08-08 DIAGNOSIS — F02.80 DEMENTIA IN ALZHEIMER'S DISEASE: ICD-10-CM

## 2023-08-08 DIAGNOSIS — G30.9 DEMENTIA IN ALZHEIMER'S DISEASE: ICD-10-CM

## 2023-08-08 RX ORDER — MEMANTINE HYDROCHLORIDE 5 MG/1
5 TABLET ORAL 2 TIMES DAILY
Qty: 180 TABLET | Refills: 3 | Status: SHIPPED | OUTPATIENT
Start: 2023-08-08

## 2023-08-29 ENCOUNTER — PATIENT MESSAGE (OUTPATIENT)
Dept: FAMILY MEDICINE | Facility: CLINIC | Age: 88
End: 2023-08-29
Payer: MEDICARE

## 2023-08-29 DIAGNOSIS — I25.10 ASCVD (ARTERIOSCLEROTIC CARDIOVASCULAR DISEASE): ICD-10-CM

## 2023-08-29 DIAGNOSIS — Z74.09 IMPAIRED MOBILITY: Primary | ICD-10-CM

## 2023-09-11 ENCOUNTER — TELEPHONE (OUTPATIENT)
Dept: FAMILY MEDICINE | Facility: CLINIC | Age: 88
End: 2023-09-11
Payer: MEDICARE

## 2023-09-11 NOTE — TELEPHONE ENCOUNTER
Contacted pt's daughter. States that [employer] says pt qualifies for power wheelchair. Let daughter know pt would need to be seen and evaluated for order to be submitted to [employer]. Pt scheduled for 9/28. Daughter verified date/time and location.

## 2023-09-11 NOTE — TELEPHONE ENCOUNTER
----- Message from Avery Meza sent at 2023  9:58 AM CDT -----  Contact: Mikala/Daughter  Reese Bell  MRN: [id number]  : 1931  PCP: Oracio Johnson  Home Phone      [phone]  Work Phone      Not on file.  Mobile          [phone]      MESSAGE:   Patient daughter, is looking to get patient a power wheelchair.    [phone]

## 2023-09-22 ENCOUNTER — PATIENT MESSAGE (OUTPATIENT)
Dept: INTERNAL MEDICINE | Facility: CLINIC | Age: 88
End: 2023-09-22
Payer: MEDICARE

## 2023-09-25 DIAGNOSIS — R53.1 WEAKNESS: Primary | ICD-10-CM

## 2023-09-25 DIAGNOSIS — E55.9 VITAMIN D INSUFFICIENCY: ICD-10-CM

## 2023-09-25 NOTE — TELEPHONE ENCOUNTER
Pt has appointment Thursday. Daughter is requesting orders for labs before appointment so we can have results during his visit. Specifically requesting vit D lab. Daughter states he has been weak and has had a loss in appetite.    Please advise.

## 2023-09-26 ENCOUNTER — LAB VISIT (OUTPATIENT)
Dept: LAB | Facility: HOSPITAL | Age: 88
End: 2023-09-26
Attending: FAMILY MEDICINE
Payer: MEDICARE

## 2023-09-26 DIAGNOSIS — R53.1 WEAKNESS: ICD-10-CM

## 2023-09-26 DIAGNOSIS — E55.9 VITAMIN D INSUFFICIENCY: ICD-10-CM

## 2023-09-26 LAB
ALBUMIN SERPL BCP-MCNC: 3.4 G/DL (ref 3.5–5.2)
ALP SERPL-CCNC: 131 U/L (ref 55–135)
ALT SERPL W/O P-5'-P-CCNC: 12 U/L (ref 10–44)
ANION GAP SERPL CALC-SCNC: 12 MMOL/L (ref 8–16)
AST SERPL-CCNC: 17 U/L (ref 10–40)
BASOPHILS # BLD AUTO: 0.02 K/UL (ref 0–0.2)
BASOPHILS NFR BLD: 0.3 % (ref 0–1.9)
BILIRUB SERPL-MCNC: 0.6 MG/DL (ref 0.1–1)
BUN SERPL-MCNC: 29 MG/DL (ref 10–30)
CALCIUM SERPL-MCNC: 9.7 MG/DL (ref 8.7–10.5)
CHLORIDE SERPL-SCNC: 103 MMOL/L (ref 95–110)
CO2 SERPL-SCNC: 24 MMOL/L (ref 23–29)
CREAT SERPL-MCNC: 1.5 MG/DL (ref 0.5–1.4)
DIFFERENTIAL METHOD: ABNORMAL
EOSINOPHIL # BLD AUTO: 0.4 K/UL (ref 0–0.5)
EOSINOPHIL NFR BLD: 7 % (ref 0–8)
ERYTHROCYTE [DISTWIDTH] IN BLOOD BY AUTOMATED COUNT: 13.9 % (ref 11.5–14.5)
ERYTHROCYTE [SEDIMENTATION RATE] IN BLOOD BY WESTERGREN METHOD: 38 MM/HR (ref 0–10)
EST. GFR  (NO RACE VARIABLE): 43 ML/MIN/1.73 M^2
GLUCOSE SERPL-MCNC: 110 MG/DL (ref 70–110)
HCT VFR BLD AUTO: 41.6 % (ref 40–54)
HGB BLD-MCNC: 13.2 G/DL (ref 14–18)
IMM GRANULOCYTES # BLD AUTO: 0.13 K/UL (ref 0–0.04)
IMM GRANULOCYTES NFR BLD AUTO: 2.2 % (ref 0–0.5)
LYMPHOCYTES # BLD AUTO: 0.7 K/UL (ref 1–4.8)
LYMPHOCYTES NFR BLD: 11.3 % (ref 18–48)
MCH RBC QN AUTO: 33 PG (ref 27–31)
MCHC RBC AUTO-ENTMCNC: 31.7 G/DL (ref 32–36)
MCV RBC AUTO: 104 FL (ref 82–98)
MONOCYTES # BLD AUTO: 1.1 K/UL (ref 0.3–1)
MONOCYTES NFR BLD: 17.4 % (ref 4–15)
NEUTROPHILS # BLD AUTO: 3.7 K/UL (ref 1.8–7.7)
NEUTROPHILS NFR BLD: 61.8 % (ref 38–73)
NRBC BLD-RTO: 0 /100 WBC
PLATELET # BLD AUTO: 143 K/UL (ref 150–450)
PMV BLD AUTO: 9 FL (ref 9.2–12.9)
POTASSIUM SERPL-SCNC: 4.5 MMOL/L (ref 3.5–5.1)
PROT SERPL-MCNC: 7.1 G/DL (ref 6–8.4)
RBC # BLD AUTO: 4 M/UL (ref 4.6–6.2)
SODIUM SERPL-SCNC: 139 MMOL/L (ref 136–145)
TSH SERPL DL<=0.005 MIU/L-ACNC: 1.41 UIU/ML (ref 0.4–4)
WBC # BLD AUTO: 6.04 K/UL (ref 3.9–12.7)

## 2023-09-26 PROCEDURE — 82306 VITAMIN D 25 HYDROXY: CPT | Performed by: FAMILY MEDICINE

## 2023-09-26 PROCEDURE — 84443 ASSAY THYROID STIM HORMONE: CPT | Performed by: FAMILY MEDICINE

## 2023-09-26 PROCEDURE — 85025 COMPLETE CBC W/AUTO DIFF WBC: CPT | Performed by: FAMILY MEDICINE

## 2023-09-26 PROCEDURE — 80053 COMPREHEN METABOLIC PANEL: CPT | Performed by: FAMILY MEDICINE

## 2023-09-26 PROCEDURE — 36415 COLL VENOUS BLD VENIPUNCTURE: CPT | Performed by: FAMILY MEDICINE

## 2023-09-26 PROCEDURE — 85651 RBC SED RATE NONAUTOMATED: CPT | Performed by: FAMILY MEDICINE

## 2023-09-27 LAB — 25(OH)D3+25(OH)D2 SERPL-MCNC: 49 NG/ML (ref 30–96)

## 2023-09-28 ENCOUNTER — OFFICE VISIT (OUTPATIENT)
Dept: INTERNAL MEDICINE | Facility: CLINIC | Age: 88
End: 2023-09-28
Payer: MEDICARE

## 2023-09-28 VITALS
DIASTOLIC BLOOD PRESSURE: 60 MMHG | SYSTOLIC BLOOD PRESSURE: 98 MMHG | BODY MASS INDEX: 39.47 KG/M2 | RESPIRATION RATE: 20 BRPM | HEART RATE: 108 BPM | HEIGHT: 64 IN

## 2023-09-28 DIAGNOSIS — G47.33 OSA (OBSTRUCTIVE SLEEP APNEA): Primary | ICD-10-CM

## 2023-09-28 DIAGNOSIS — Z74.09 IMPAIRED MOBILITY: ICD-10-CM

## 2023-09-28 DIAGNOSIS — M19.011 PRIMARY OSTEOARTHRITIS OF RIGHT SHOULDER: ICD-10-CM

## 2023-09-28 DIAGNOSIS — I25.10 ASCVD (ARTERIOSCLEROTIC CARDIOVASCULAR DISEASE): ICD-10-CM

## 2023-09-28 DIAGNOSIS — N18.32 STAGE 3B CHRONIC KIDNEY DISEASE: ICD-10-CM

## 2023-09-28 PROCEDURE — 1126F AMNT PAIN NOTED NONE PRSNT: CPT | Mod: CPTII,S$GLB,, | Performed by: FAMILY MEDICINE

## 2023-09-28 PROCEDURE — 1159F PR MEDICATION LIST DOCUMENTED IN MEDICAL RECORD: ICD-10-PCS | Mod: CPTII,S$GLB,, | Performed by: FAMILY MEDICINE

## 2023-09-28 PROCEDURE — 1101F PT FALLS ASSESS-DOCD LE1/YR: CPT | Mod: CPTII,S$GLB,, | Performed by: FAMILY MEDICINE

## 2023-09-28 PROCEDURE — 99999 PR PBB SHADOW E&M-EST. PATIENT-LVL IV: ICD-10-PCS | Mod: PBBFAC,,, | Performed by: FAMILY MEDICINE

## 2023-09-28 PROCEDURE — 1101F PR PT FALLS ASSESS DOC 0-1 FALLS W/OUT INJ PAST YR: ICD-10-PCS | Mod: CPTII,S$GLB,, | Performed by: FAMILY MEDICINE

## 2023-09-28 PROCEDURE — 3288F PR FALLS RISK ASSESSMENT DOCUMENTED: ICD-10-PCS | Mod: CPTII,S$GLB,, | Performed by: FAMILY MEDICINE

## 2023-09-28 PROCEDURE — 3288F FALL RISK ASSESSMENT DOCD: CPT | Mod: CPTII,S$GLB,, | Performed by: FAMILY MEDICINE

## 2023-09-28 PROCEDURE — 1159F MED LIST DOCD IN RCRD: CPT | Mod: CPTII,S$GLB,, | Performed by: FAMILY MEDICINE

## 2023-09-28 PROCEDURE — 99999 PR PBB SHADOW E&M-EST. PATIENT-LVL IV: CPT | Mod: PBBFAC,,, | Performed by: FAMILY MEDICINE

## 2023-09-28 PROCEDURE — 99214 PR OFFICE/OUTPT VISIT, EST, LEVL IV, 30-39 MIN: ICD-10-PCS | Mod: S$GLB,,, | Performed by: FAMILY MEDICINE

## 2023-09-28 PROCEDURE — 1126F PR PAIN SEVERITY QUANTIFIED, NO PAIN PRESENT: ICD-10-PCS | Mod: CPTII,S$GLB,, | Performed by: FAMILY MEDICINE

## 2023-09-28 PROCEDURE — 99214 OFFICE O/P EST MOD 30 MIN: CPT | Mod: S$GLB,,, | Performed by: FAMILY MEDICINE

## 2023-09-28 NOTE — PROGRESS NOTES
Subjective:       Patient ID: Reese Bell is a 89 y.o. male.    Chief Complaint: wheelchair evaluation (Pt is in need for a wheelchair evaluation )    Pt is a 89 y.o. male who presents for a mobility exam; SOB on exertion;unable to stand nor ambulate; lives in a W/C.      Review of Systems   Constitutional:  Positive for fatigue.   Respiratory:          Uses CPAP nightly   Cardiovascular:         End stage CHF   Gastrointestinal:  Positive for constipation.        Reg BMs       Objective:      Physical Exam  Constitutional:       Appearance: He is well-developed. He is obese. He is ill-appearing.   HENT:      Head: Normocephalic.      Comments: Poor hearing  Eyes:      Pupils: Pupils are equal, round, and reactive to light.   Neck:      Thyroid: No thyromegaly.   Cardiovascular:      Rate and Rhythm: Normal rate and regular rhythm.      Heart sounds: No murmur heard.     No friction rub.   Pulmonary:      Effort: Pulmonary effort is normal. No respiratory distress.      Breath sounds: No wheezing.   Abdominal:      Tenderness: There is no abdominal tenderness. There is no guarding or rebound.   Musculoskeletal:         General: Tenderness and deformity present. Normal range of motion.      Cervical back: Normal range of motion and neck supple.      Comments: Unable to stand; poor ROM of lower legs; 1/5 ROM of both legs; poor ROM of UEE   Lymphadenopathy:      Cervical: No cervical adenopathy.   Skin:     General: Skin is warm and dry.   Neurological:      Mental Status: He is alert and oriented to person, place, and time.      Cranial Nerves: No cranial nerve deficit.      Deep Tendon Reflexes: Reflexes are normal and symmetric.   Psychiatric:         Thought Content: Thought content normal.         Judgment: Judgment normal.         Assessment:       Encounter Diagnoses   Name Primary?    FRIDA (obstructive sleep apnea) Yes    Primary osteoarthritis of right shoulder     Impaired mobility     ASCVD  (arteriosclerotic cardiovascular disease)     Stage 3b chronic kidney disease          Plan:   1. FRIDA (obstructive sleep apnea)    2. Primary osteoarthritis of right shoulder    3. Impaired mobility    4. ASCVD (arteriosclerotic cardiovascular disease)    5. Stage 3b chronic kidney disease     Needs a W/C motorized

## 2023-10-08 DIAGNOSIS — N40.0 BENIGN NODULAR PROSTATIC HYPERPLASIA WITHOUT LOWER URINARY TRACT SYMPTOMS: ICD-10-CM

## 2023-10-08 RX ORDER — TAMSULOSIN HYDROCHLORIDE 0.4 MG/1
CAPSULE ORAL
Qty: 90 CAPSULE | Refills: 3 | Status: SHIPPED | OUTPATIENT
Start: 2023-10-08

## 2023-10-08 NOTE — TELEPHONE ENCOUNTER
Refill Decision Note   Reese Bell  is requesting a refill authorization.  Brief Assessment and Rationale for Refill:  Approve     Medication Therapy Plan:         Comments:     Note composed:4:03 PM 10/08/2023

## 2023-10-08 NOTE — TELEPHONE ENCOUNTER
No care due was identified.  [facility] Embedded Care Due Messages. Reference number: [id number].   10/08/2023 4:01:22 PM CDT

## 2023-10-12 ENCOUNTER — TELEPHONE (OUTPATIENT)
Dept: INTERNAL MEDICINE | Facility: CLINIC | Age: 88
End: 2023-10-12
Payer: MEDICARE

## 2023-10-12 NOTE — TELEPHONE ENCOUNTER
----- Message from Tressa Waldrop sent at 10/12/2023 12:46 PM CDT -----  Contact: pt  Reese Bell  MRN: [id number]  : 1931  PCP: Oracio Jhonson  Home Phone      [phone]  Work Phone      Not on file.  Mobile          [phone]      MESSAGE:     Pt would like to discuss a motorized wheelchair that was ordered.      Please advise   [phone]

## 2023-10-12 NOTE — TELEPHONE ENCOUNTER
Spoke to pt's daughter(Mikala). Let her know all paperwork was sent to [facility]. Clinic did receive more paperwork yesterday(10/11) needing to be filled out and returned back to [facility]. Mikala verbalized understanding.

## 2023-10-20 ENCOUNTER — TELEPHONE (OUTPATIENT)
Dept: FAMILY MEDICINE | Facility: CLINIC | Age: 88
End: 2023-10-20
Payer: MEDICARE

## 2023-10-20 NOTE — TELEPHONE ENCOUNTER
Necessity form, order and office note faxed to [facility] for motorized wheelchair.    [employer] faxed new paperwork to be filled out by PCP.    New paperwork faxed back to [email or web address].

## 2023-10-23 ENCOUNTER — TELEPHONE (OUTPATIENT)
Dept: ADMINISTRATIVE | Facility: CLINIC | Age: 88
End: 2023-10-23
Payer: MEDICARE

## 2023-10-24 ENCOUNTER — OFFICE VISIT (OUTPATIENT)
Dept: HOME HEALTH SERVICES | Facility: CLINIC | Age: 88
End: 2023-10-24
Payer: MEDICARE

## 2023-10-24 DIAGNOSIS — Z99.3 DEPENDENCE ON WHEELCHAIR: ICD-10-CM

## 2023-10-24 DIAGNOSIS — R26.9 ABNORMALITY OF GAIT AND MOBILITY: ICD-10-CM

## 2023-10-24 DIAGNOSIS — Z00.00 ENCOUNTER FOR PREVENTIVE HEALTH EXAMINATION: Primary | ICD-10-CM

## 2023-10-24 DIAGNOSIS — N18.32 STAGE 3B CHRONIC KIDNEY DISEASE: ICD-10-CM

## 2023-10-24 DIAGNOSIS — I50.32 CHRONIC DIASTOLIC CONGESTIVE HEART FAILURE: ICD-10-CM

## 2023-10-24 DIAGNOSIS — I48.21 PERMANENT ATRIAL FIBRILLATION: ICD-10-CM

## 2023-10-24 PROCEDURE — 1170F FXNL STATUS ASSESSED: CPT | Mod: CPTII,95,, | Performed by: NURSE PRACTITIONER

## 2023-10-24 PROCEDURE — 3288F FALL RISK ASSESSMENT DOCD: CPT | Mod: CPTII,95,, | Performed by: NURSE PRACTITIONER

## 2023-10-24 PROCEDURE — 1160F RVW MEDS BY RX/DR IN RCRD: CPT | Mod: CPTII,95,, | Performed by: NURSE PRACTITIONER

## 2023-10-24 PROCEDURE — G0439 PPPS, SUBSEQ VISIT: HCPCS | Mod: 95,,, | Performed by: NURSE PRACTITIONER

## 2023-10-24 PROCEDURE — 1170F PR FUNCTIONAL STATUS ASSESSED: ICD-10-PCS | Mod: CPTII,95,, | Performed by: NURSE PRACTITIONER

## 2023-10-24 PROCEDURE — 3288F PR FALLS RISK ASSESSMENT DOCUMENTED: ICD-10-PCS | Mod: CPTII,95,, | Performed by: NURSE PRACTITIONER

## 2023-10-24 PROCEDURE — 1101F PR PT FALLS ASSESS DOC 0-1 FALLS W/OUT INJ PAST YR: ICD-10-PCS | Mod: CPTII,95,, | Performed by: NURSE PRACTITIONER

## 2023-10-24 PROCEDURE — 1159F PR MEDICATION LIST DOCUMENTED IN MEDICAL RECORD: ICD-10-PCS | Mod: CPTII,95,, | Performed by: NURSE PRACTITIONER

## 2023-10-24 PROCEDURE — 1160F PR REVIEW ALL MEDS BY PRESCRIBER/CLIN PHARMACIST DOCUMENTED: ICD-10-PCS | Mod: CPTII,95,, | Performed by: NURSE PRACTITIONER

## 2023-10-24 PROCEDURE — 1101F PT FALLS ASSESS-DOCD LE1/YR: CPT | Mod: CPTII,95,, | Performed by: NURSE PRACTITIONER

## 2023-10-24 PROCEDURE — 1159F MED LIST DOCD IN RCRD: CPT | Mod: CPTII,95,, | Performed by: NURSE PRACTITIONER

## 2023-10-24 PROCEDURE — G0439 PR MEDICARE ANNUAL WELLNESS SUBSEQUENT VISIT: ICD-10-PCS | Mod: 95,,, | Performed by: NURSE PRACTITIONER

## 2023-10-24 NOTE — PATIENT INSTRUCTIONS
Counseling and Referral of Other Preventative  (Italic type indicates deductible and co-insurance are waived)    Patient Name: Reese Bell  Today's Date: 10/24/2023    Health Maintenance       Date Due Completion Date    Shingles Vaccine (1 of 2) Never done ---    Pneumococcal Vaccines (Age 65+) (2 - PPSV23 or PCV20) 03/09/2017 1/12/2017    Lipid Panel 01/07/2023 1/7/2022    Influenza Vaccine (1) 09/01/2023 12/29/2020 (Declined)    Override on 12/29/2020: Declined    COVID-19 Vaccine (4 - 2023-24 season) 09/01/2023 1/7/2022    Aspirin/Antiplatelet Therapy 09/28/2024 9/28/2023    TETANUS VACCINE 09/25/2027 9/25/2017 (Declined)    Override on 9/25/2017: Declined        No orders of the defined types were placed in this encounter.      The following information is provided to all patients.  This information is to help you find resources for any of the problems found today that may be affecting your health:                Living healthy guide: www.[facility].[place].gov      Understanding Diabetes: www.diabetes.org      Eating healthy: www.cdc.gov/healthyweight      CDC home safety checklist: www.cdc.gov/steadi/patient.html      Agency on Aging: www.goea.[place].gov      Alcoholics anonymous (AA): www.aa.org      Physical Activity: www.deidre.nih.gov/tu8theg      Tobacco use: www.quitwithusla.org

## 2023-10-24 NOTE — PROGRESS NOTES
The patient location is: [place]  The chief complaint leading to consultation is: awv    Visit type: audiovisual    Face to Face time with patient: 60  60 minutes of total time spent on the encounter, which includes face to face time and non-face to face time preparing to see the patient (eg, review of tests), Obtaining and/or reviewing separately obtained history, Documenting clinical information in the electronic or other health record, Independently interpreting results (not separately reported) and communicating results to the patient/family/caregiver, or Care coordination (not separately reported).         Each patient to whom he or she provides medical services by telemedicine is:  (1) informed of the relationship between the physician and patient and the respective role of any other health care provider with respect to management of the patient; and (2) notified that he or she may decline to receive medical services by telemedicine and may withdraw from such care at any time.    Notes:   Review for Opioid Screening: Pt does not have Rx for Opioids    Review for Substance Use Disorders: Patient does not use substance        Reese Bell presented for a  Medicare AWV and comprehensive Health Risk Assessment today. The following components were reviewed and updated:    Medical history  Family History  Social history  Allergies and Current Medications  Health Risk Assessment  Health Maintenance  Care Team         ** See Completed Assessments for Annual Wellness Visit within the encounter summary.**         The following assessments were completed:  Living Situation  CAGE  Depression Screening  Fall Risk Assessment (MACH 10)  Hearing Assessment(HHI)  Cognitive Function Screening  Nutrition Screening  ADL Screening  PAQ Screening      There were no vitals filed for this visit.  There is no height or weight on file to calculate BMI.  Physical Exam  Constitutional:       Appearance: Normal appearance.    Neurological:      Mental Status: He is alert.   Psychiatric:         Attention and Perception: Attention and perception normal.         Mood and Affect: Mood and affect normal.         Speech: Speech normal.         Behavior: Behavior normal. Behavior is cooperative.         Thought Content: Thought content normal.         Cognition and Memory: Cognition and memory normal.         Judgment: Judgment normal.               Diagnoses and health risks identified today and associated recommendations/orders:    1. Encounter for preventive health examination  Stable, followed by provider    2. Dependence on wheelchair  Stable, followed by provider    3. Abnormality of gait and mobility  Stable, followed by provider, uses WC for mobility    4. Chronic diastolic congestive heart failure  Stable, followed by provider, has pacemaker/defib, takes aspirin, carvedilol, lasix, entresto     5. Permanent atrial fibrillation  Stable, followed by provider, has pacemaker/defib, takes aspirin, carvedilol, lasix, entresto     6. Stage 3b chronic kidney disease  Stable, followed by provider, takes carvedilol for BP       Provided Reese RAUSCH with a 5-10 year written screening schedule and personal prevention plan. Recommendations were developed using the USPSTF age appropriate recommendations. Education, counseling, and referrals were provided as needed. After Visit Summary printed and given to patient which includes a list of additional screenings\tests needed.    Follow up in about 1 year (around 10/24/2024) for your next annual wellness visit.    Perez St NP  I offered to discuss advanced care planning, including how to pick a person who would make decisions for you if you were unable to make them for yourself, called a health care power of , and what kind of decisions you might make such as use of life sustaining treatments such as ventilators and tube feeding when faced with a life limiting illness recorded on a living  will that they will need to know. (How you want to be cared for as you near the end of your natural life)     X  Patient has advanced directives on file, which we reviewed, and they do not wish to make changes.

## 2023-12-06 NOTE — PROGRESS NOTES
0350 - Pt becoming more disoriented, agitated, and combative. Jm NP with [facility] notified. Orders placed to admin 0.5 mg versed.    0405 - Pt more calm after administering versed.    0435 - Jm, NP notified that neuro status has changed from baseline. No longer oriented to self. STAT CTH ordered.    0500 - Pt transported by bed to CT x 2 RNs. Pt on monitor and 4 L NC. Ambu bag transported with pt. No acute events during travel.    0525 - Pt transported back to room. LOC assessed again. Pt oriented to self and place.    From: Sarika Quinonez  To: Tisha Vizcaino  Sent: 12/5/2023 2:28 PM CST  Subject: Referral    Hi ! Sorry to bother you again. I was wondering if you could please send a referral to [facility] in [place]. My insurance counselor told me they need it for their services to get covered. Thank you so much!

## 2024-02-28 ENCOUNTER — PATIENT MESSAGE (OUTPATIENT)
Dept: INTERNAL MEDICINE | Facility: CLINIC | Age: 89
End: 2024-02-28
Payer: MEDICARE

## 2024-03-01 DIAGNOSIS — B30.9 VIRAL CONJUNCTIVITIS: Primary | ICD-10-CM

## 2024-03-01 NOTE — TELEPHONE ENCOUNTER
Please refer to images Mrs. Bach sent in message regarding pts swollen eyes     Requesting rx for drops or further advise for OTC meds    Please advise

## 2024-04-25 ENCOUNTER — PATIENT MESSAGE (OUTPATIENT)
Dept: INTERNAL MEDICINE | Facility: CLINIC | Age: 89
End: 2024-04-25
Payer: MEDICARE

## 2024-04-25 ENCOUNTER — OFFICE VISIT (OUTPATIENT)
Dept: FAMILY MEDICINE | Facility: CLINIC | Age: 89
End: 2024-04-25
Payer: MEDICARE

## 2024-04-25 ENCOUNTER — CLINICAL SUPPORT (OUTPATIENT)
Dept: FAMILY MEDICINE | Facility: CLINIC | Age: 89
End: 2024-04-25
Payer: MEDICARE

## 2024-04-25 VITALS
SYSTOLIC BLOOD PRESSURE: 126 MMHG | HEIGHT: 64 IN | OXYGEN SATURATION: 98 % | BODY MASS INDEX: 39.47 KG/M2 | HEART RATE: 91 BPM | DIASTOLIC BLOOD PRESSURE: 72 MMHG | RESPIRATION RATE: 18 BRPM

## 2024-04-25 DIAGNOSIS — R79.89 LOW VITAMIN D LEVEL: ICD-10-CM

## 2024-04-25 DIAGNOSIS — R22.2 MASS OF TORSO: ICD-10-CM

## 2024-04-25 DIAGNOSIS — I25.10 ASCVD (ARTERIOSCLEROTIC CARDIOVASCULAR DISEASE): Primary | ICD-10-CM

## 2024-04-25 DIAGNOSIS — R82.90 ABNORMAL URINE: ICD-10-CM

## 2024-04-25 DIAGNOSIS — I25.10 ASCVD (ARTERIOSCLEROTIC CARDIOVASCULAR DISEASE): ICD-10-CM

## 2024-04-25 PROCEDURE — 80061 LIPID PANEL: CPT | Performed by: NURSE PRACTITIONER

## 2024-04-25 PROCEDURE — 3288F FALL RISK ASSESSMENT DOCD: CPT | Mod: CPTII,S$GLB,, | Performed by: NURSE PRACTITIONER

## 2024-04-25 PROCEDURE — 81000 URINALYSIS NONAUTO W/SCOPE: CPT | Mod: S$GLB,,, | Performed by: NURSE PRACTITIONER

## 2024-04-25 PROCEDURE — 99999 PR PBB SHADOW E&M-EST. PATIENT-LVL V: CPT | Mod: PBBFAC,,, | Performed by: NURSE PRACTITIONER

## 2024-04-25 PROCEDURE — 99213 OFFICE O/P EST LOW 20 MIN: CPT | Mod: S$GLB,,, | Performed by: NURSE PRACTITIONER

## 2024-04-25 PROCEDURE — 1159F MED LIST DOCD IN RCRD: CPT | Mod: CPTII,S$GLB,, | Performed by: NURSE PRACTITIONER

## 2024-04-25 PROCEDURE — 36415 COLL VENOUS BLD VENIPUNCTURE: CPT | Mod: S$GLB,,, | Performed by: FAMILY MEDICINE

## 2024-04-25 PROCEDURE — 80053 COMPREHEN METABOLIC PANEL: CPT | Performed by: NURSE PRACTITIONER

## 2024-04-25 PROCEDURE — 82306 VITAMIN D 25 HYDROXY: CPT | Performed by: NURSE PRACTITIONER

## 2024-04-25 PROCEDURE — 1101F PT FALLS ASSESS-DOCD LE1/YR: CPT | Mod: CPTII,S$GLB,, | Performed by: NURSE PRACTITIONER

## 2024-04-25 PROCEDURE — 85025 COMPLETE CBC W/AUTO DIFF WBC: CPT | Performed by: NURSE PRACTITIONER

## 2024-04-25 RX ORDER — IPRATROPIUM BROMIDE 42 UG/1
2 SPRAY, METERED NASAL 2 TIMES DAILY
Qty: 15 ML | Refills: 11 | Status: SHIPPED | OUTPATIENT
Start: 2024-04-25 | End: 2024-04-29 | Stop reason: SDUPTHER

## 2024-04-25 RX ORDER — TRIAMCINOLONE ACETONIDE 1 MG/G
CREAM TOPICAL 2 TIMES DAILY
Qty: 80 G | Refills: 5 | Status: SHIPPED | OUTPATIENT
Start: 2024-04-25 | End: 2024-04-29 | Stop reason: SDUPTHER

## 2024-04-25 NOTE — TELEPHONE ENCOUNTER
LOV 04/25/2024    Patient requesting refill for ipratropium (ATROVENT) 42 mcg (0.06 %) nasal spray     triamcinolone acetonide 0.1% (KENALOG) 0.1 % cream       Medication pended/pharmacy confirmed, please advise.

## 2024-04-25 NOTE — PROGRESS NOTES
Subjective:       Patient ID: Reese Bell is a 89 y.o. male.    Chief Complaint: Back Pain (Pt is here for bump on his back. Pt's daughter mentioned that the bump on pt's back is itchy and bothers pt when he sleeps. Pt's daughter also stated pt is taking over the counter Tylenol. )    Patient presents with a lump on the back that has become painful.      Review of Systems   Constitutional: Negative.  Negative for appetite change, fatigue and fever.   HENT: Negative.  Negative for congestion, ear pain and sore throat.    Eyes: Negative.  Negative for visual disturbance.   Respiratory: Negative.  Negative for cough, shortness of breath and wheezing.    Cardiovascular: Negative.    Gastrointestinal: Negative.  Negative for abdominal pain, diarrhea, nausea and vomiting.   Genitourinary: Negative.  Negative for difficulty urinating.   Musculoskeletal: Negative.    Skin: Negative.  Negative for rash.        Lump on the back   Neurological: Negative.  Negative for headaches.   Psychiatric/Behavioral: Negative.  Negative for sleep disturbance. The patient is not nervous/anxious.    All other systems reviewed and are negative.      Objective:      Physical Exam  Vitals and nursing note reviewed. Exam conducted with a chaperone present (Daughter).   Constitutional:       General: He is not in acute distress.     Appearance: Normal appearance. He is well-developed.   HENT:      Head: Normocephalic and atraumatic.   Eyes:      Pupils: Pupils are equal, round, and reactive to light.   Cardiovascular:      Rate and Rhythm: Normal rate and regular rhythm.      Pulses: Normal pulses.      Heart sounds: Normal heart sounds. No murmur heard.  Pulmonary:      Effort: Pulmonary effort is normal. No respiratory distress.      Breath sounds: Normal breath sounds.   Abdominal:      Tenderness: There is no right CVA tenderness or left CVA tenderness.   Musculoskeletal:      Cervical back: Normal range of motion and neck supple.    Skin:     General: Skin is warm and dry.      Comments: Area at the right mid back is palpable, firm without any discoloration. About 4x5 cm   Neurological:      Mental Status: He is alert and oriented to person, place, and time.   Psychiatric:         Mood and Affect: Mood normal.         Assessment:       1. ASCVD (arteriosclerotic cardiovascular disease)    2. Abnormal urine    3. Mass of torso        Plan:     1. ASCVD (arteriosclerotic cardiovascular disease)  -     Lipid Panel; Future; Expected date: 04/25/2024    2. Abnormal urine  -     POCT urinalysis, dipstick or tablet reag; Future; Expected date: 04/25/2024  -     POCT URINE SEDIMENT EXAM; Future; Expected date: 04/25/2024    3. Mass of torso  -     CBC Auto Differential; Future; Expected date: 04/25/2024  -     Comprehensive Metabolic Panel; Future; Expected date: 04/25/2024  -     US Soft Tissue Chest_Upper Back; Future; Expected date: 04/25/2024     Follow up with Dr. Johnson

## 2024-04-26 ENCOUNTER — CLINICAL SUPPORT (OUTPATIENT)
Dept: FAMILY MEDICINE | Facility: CLINIC | Age: 89
End: 2024-04-26
Payer: MEDICARE

## 2024-04-26 ENCOUNTER — TELEPHONE (OUTPATIENT)
Dept: FAMILY MEDICINE | Facility: CLINIC | Age: 89
End: 2024-04-26

## 2024-04-26 DIAGNOSIS — R82.90 ABNORMAL URINE: Primary | ICD-10-CM

## 2024-04-26 DIAGNOSIS — N30.00 ACUTE CYSTITIS WITHOUT HEMATURIA: Primary | ICD-10-CM

## 2024-04-26 LAB
25(OH)D3+25(OH)D2 SERPL-MCNC: 55 NG/ML (ref 30–96)
ALBUMIN SERPL BCP-MCNC: 2.7 G/DL (ref 3.5–5.2)
ALP SERPL-CCNC: 133 U/L (ref 55–135)
ALT SERPL W/O P-5'-P-CCNC: 14 U/L (ref 10–44)
ANION GAP SERPL CALC-SCNC: 7 MMOL/L (ref 8–16)
AST SERPL-CCNC: 23 U/L (ref 10–40)
BACTERIA SPEC CULT: NORMAL /HPF
BASOPHILS # BLD AUTO: 0.02 K/UL (ref 0–0.2)
BASOPHILS NFR BLD: 0.3 % (ref 0–1.9)
BILIRUB SERPL-MCNC: 0.3 MG/DL (ref 0.1–1)
BILIRUB SERPL-MCNC: NORMAL MG/DL
BLOOD URINE, POC: NORMAL
BUN SERPL-MCNC: 25 MG/DL (ref 10–30)
CALCIUM SERPL-MCNC: 8.4 MG/DL (ref 8.7–10.5)
CASTS: NORMAL
CHLORIDE SERPL-SCNC: 107 MMOL/L (ref 95–110)
CHOLEST SERPL-MCNC: 131 MG/DL (ref 120–199)
CHOLEST/HDLC SERPL: 3.2 {RATIO} (ref 2–5)
CO2 SERPL-SCNC: 23 MMOL/L (ref 23–29)
COLOR, POC UA: YELLOW
CREAT SERPL-MCNC: 1.2 MG/DL (ref 0.5–1.4)
CRYSTALS: NORMAL
DIFFERENTIAL METHOD BLD: ABNORMAL
EOSINOPHIL # BLD AUTO: 0.1 K/UL (ref 0–0.5)
EOSINOPHIL NFR BLD: 1.8 % (ref 0–8)
ERYTHROCYTE [DISTWIDTH] IN BLOOD BY AUTOMATED COUNT: 14.9 % (ref 11.5–14.5)
EST. GFR  (NO RACE VARIABLE): 57 ML/MIN/1.73 M^2
GLUCOSE SERPL-MCNC: 107 MG/DL (ref 70–110)
GLUCOSE UR QL STRIP: NORMAL
HCT VFR BLD AUTO: 31.6 % (ref 40–54)
HDLC SERPL-MCNC: 41 MG/DL (ref 40–75)
HDLC SERPL: 31.3 % (ref 20–50)
HGB BLD-MCNC: 9.6 G/DL (ref 14–18)
IMM GRANULOCYTES # BLD AUTO: 0.16 K/UL (ref 0–0.04)
IMM GRANULOCYTES NFR BLD AUTO: 2.6 % (ref 0–0.5)
KETONES UR QL STRIP: NORMAL
LDLC SERPL CALC-MCNC: 65.2 MG/DL (ref 63–159)
LEUKOCYTE ESTERASE URINE, POC: NORMAL
LYMPHOCYTES # BLD AUTO: 0.4 K/UL (ref 1–4.8)
LYMPHOCYTES NFR BLD: 6.6 % (ref 18–48)
MCH RBC QN AUTO: 32.8 PG (ref 27–31)
MCHC RBC AUTO-ENTMCNC: 30.4 G/DL (ref 32–36)
MCV RBC AUTO: 108 FL (ref 82–98)
MONOCYTES # BLD AUTO: 0.6 K/UL (ref 0.3–1)
MONOCYTES NFR BLD: 9.6 % (ref 4–15)
NEUTROPHILS # BLD AUTO: 4.8 K/UL (ref 1.8–7.7)
NEUTROPHILS NFR BLD: 79.1 % (ref 38–73)
NITRITE, POC UA: NORMAL
NONHDLC SERPL-MCNC: 90 MG/DL
NRBC BLD-RTO: 0 /100 WBC
PH, POC UA: 5.5
PLATELET # BLD AUTO: 208 K/UL (ref 150–450)
PMV BLD AUTO: 9.7 FL (ref 9.2–12.9)
POTASSIUM SERPL-SCNC: 5.5 MMOL/L (ref 3.5–5.1)
PROT SERPL-MCNC: 6.6 G/DL (ref 6–8.4)
PROTEIN, POC: NORMAL
RBC # BLD AUTO: 2.93 M/UL (ref 4.6–6.2)
RBC CELLS COUNTED: 0.4
SODIUM SERPL-SCNC: 137 MMOL/L (ref 136–145)
SPECIFIC GRAVITY, POC UA: 1.02
TRIGL SERPL-MCNC: 124 MG/DL (ref 30–150)
UROBILINOGEN, POC UA: 0.2
WBC # BLD AUTO: 6.04 K/UL (ref 3.9–12.7)
WHITE BLOOD CELLS: NORMAL

## 2024-04-26 PROCEDURE — 87086 URINE CULTURE/COLONY COUNT: CPT | Performed by: FAMILY MEDICINE

## 2024-04-26 RX ORDER — CIPROFLOXACIN 500 MG/1
500 TABLET ORAL 2 TIMES DAILY
Qty: 10 TABLET | Refills: 0 | Status: SHIPPED | OUTPATIENT
Start: 2024-04-26 | End: 2024-05-01

## 2024-04-26 NOTE — TELEPHONE ENCOUNTER
Pt came in for a urinalysis and Dr. Bach reviewed. Per provider, pt does have a UTI and Ciprofloxacin 500 mg has been sent to [facility] in [place]. Contacted/spoke to pt's daughter (Mikala), notified of providers recommendations, and gave understanding.

## 2024-04-27 LAB
BACTERIA UR CULT: NORMAL
BACTERIA UR CULT: NORMAL

## 2024-04-29 RX ORDER — TRIAMCINOLONE ACETONIDE 1 MG/G
CREAM TOPICAL 2 TIMES DAILY
Qty: 80 G | Refills: 5 | Status: SHIPPED | OUTPATIENT
Start: 2024-04-29 | End: 2024-05-09

## 2024-04-29 RX ORDER — IPRATROPIUM BROMIDE 42 UG/1
2 SPRAY, METERED NASAL 2 TIMES DAILY
Qty: 15 ML | Refills: 11 | Status: SHIPPED | OUTPATIENT
Start: 2024-04-29

## 2024-04-29 NOTE — PROGRESS NOTES
Belkys Doc. I just wanted you to look at this gentleman's lab work. Not sure how aggressive you'd want to be

## 2024-05-06 RX ORDER — MIRTAZAPINE 15 MG/1
TABLET, FILM COATED ORAL
Qty: 90 TABLET | Refills: 1 | Status: SHIPPED | OUTPATIENT
Start: 2024-05-06 | End: 2024-05-24 | Stop reason: SDUPTHER

## 2024-05-06 NOTE — TELEPHONE ENCOUNTER
Refill Routing Note   Medication(s) are not appropriate for processing by [facility] for the following reason(s):        Responsible provider unclear    [facility] action(s):  Defer             Appointments  past 12m or future 3m with PCP    Date Provider   Last Visit   7/21/2017 Otis Huang MD   Next Visit   Visit date not found Otis Huang MD   ED visits in past 90 days: 0        Note composed:7:27 AM 05/06/2024

## 2024-05-06 NOTE — TELEPHONE ENCOUNTER
No care due was identified.  [facility] Embedded Care Due Messages. Reference number: [id number].   5/05/2024 9:17:48 PM CDT

## 2024-05-07 ENCOUNTER — TELEPHONE (OUTPATIENT)
Dept: FAMILY MEDICINE | Facility: CLINIC | Age: 89
End: 2024-05-07
Payer: MEDICARE

## 2024-05-07 NOTE — TELEPHONE ENCOUNTER
----- Message from Sen Coronado sent at 2024  3:16 PM CDT -----  Contact: Daughter - Mikala Bell  MRN: [id number]  : 1931  PCP: Oracio Johnson  Home Phone      [phone]  Work Phone      Not on file.  Mobile          [phone]      MESSAGE: was scheduled for ultrasound last week with f/u to see Dr Johnson -- daughter was sick & uable to bring him for those appts -- requesting that they be rescheduled     Call Mikala @ [phone]    PCP: Alex

## 2024-05-20 ENCOUNTER — HOSPITAL ENCOUNTER (OUTPATIENT)
Dept: RADIOLOGY | Facility: HOSPITAL | Age: 89
Discharge: HOME | End: 2024-05-20
Attending: NURSE PRACTITIONER
Payer: MEDICARE

## 2024-05-20 DIAGNOSIS — R22.2 MASS OF TORSO: ICD-10-CM

## 2024-05-20 PROCEDURE — 76604 US EXAM CHEST: CPT | Mod: TC

## 2024-05-21 DIAGNOSIS — G30.9 DEMENTIA IN ALZHEIMER'S DISEASE: ICD-10-CM

## 2024-05-21 DIAGNOSIS — F02.80 DEMENTIA IN ALZHEIMER'S DISEASE: ICD-10-CM

## 2024-05-21 RX ORDER — MEMANTINE HYDROCHLORIDE 5 MG/1
5 TABLET ORAL 2 TIMES DAILY
Qty: 180 TABLET | Refills: 3 | Status: SHIPPED | OUTPATIENT
Start: 2024-05-21 | End: 2024-05-24 | Stop reason: SDUPTHER

## 2024-05-21 NOTE — TELEPHONE ENCOUNTER
LOV:4/25/24    Pt requesting refill of: memantine (NAMENDA) 5 MG Tab     Medication pended    Please advise

## 2024-05-24 ENCOUNTER — OFFICE VISIT (OUTPATIENT)
Dept: FAMILY MEDICINE | Facility: CLINIC | Age: 89
End: 2024-05-24
Payer: MEDICARE

## 2024-05-24 VITALS
DIASTOLIC BLOOD PRESSURE: 64 MMHG | HEART RATE: 72 BPM | WEIGHT: 199 LBS | SYSTOLIC BLOOD PRESSURE: 132 MMHG | BODY MASS INDEX: 34.16 KG/M2 | RESPIRATION RATE: 20 BRPM

## 2024-05-24 DIAGNOSIS — I50.22 CHF (CONGESTIVE HEART FAILURE), NYHA CLASS II, CHRONIC, SYSTOLIC: Primary | ICD-10-CM

## 2024-05-24 DIAGNOSIS — Z95.810 CARDIAC RESYNCHRONIZATION THERAPY DEFIBRILLATOR (CRT-D) IN PLACE: ICD-10-CM

## 2024-05-24 DIAGNOSIS — F02.80 DEMENTIA IN ALZHEIMER'S DISEASE: ICD-10-CM

## 2024-05-24 DIAGNOSIS — N40.0 BENIGN NODULAR PROSTATIC HYPERPLASIA WITHOUT LOWER URINARY TRACT SYMPTOMS: ICD-10-CM

## 2024-05-24 DIAGNOSIS — G30.9 DEMENTIA IN ALZHEIMER'S DISEASE: ICD-10-CM

## 2024-05-24 PROCEDURE — 1159F MED LIST DOCD IN RCRD: CPT | Mod: CPTII,S$GLB,, | Performed by: FAMILY MEDICINE

## 2024-05-24 PROCEDURE — 1126F AMNT PAIN NOTED NONE PRSNT: CPT | Mod: CPTII,S$GLB,, | Performed by: FAMILY MEDICINE

## 2024-05-24 PROCEDURE — 99213 OFFICE O/P EST LOW 20 MIN: CPT | Mod: S$GLB,,, | Performed by: FAMILY MEDICINE

## 2024-05-24 PROCEDURE — 99999 PR PBB SHADOW E&M-EST. PATIENT-LVL III: CPT | Mod: PBBFAC,,, | Performed by: FAMILY MEDICINE

## 2024-05-24 PROCEDURE — 3288F FALL RISK ASSESSMENT DOCD: CPT | Mod: CPTII,S$GLB,, | Performed by: FAMILY MEDICINE

## 2024-05-24 PROCEDURE — 1101F PT FALLS ASSESS-DOCD LE1/YR: CPT | Mod: CPTII,S$GLB,, | Performed by: FAMILY MEDICINE

## 2024-05-24 PROCEDURE — 1160F RVW MEDS BY RX/DR IN RCRD: CPT | Mod: CPTII,S$GLB,, | Performed by: FAMILY MEDICINE

## 2024-05-24 RX ORDER — MIRTAZAPINE 15 MG/1
15 TABLET, FILM COATED ORAL NIGHTLY
Qty: 90 TABLET | Refills: 1 | Status: SHIPPED | OUTPATIENT
Start: 2024-05-24

## 2024-05-24 RX ORDER — MEMANTINE HYDROCHLORIDE 5 MG/1
5 TABLET ORAL 2 TIMES DAILY
Qty: 180 TABLET | Refills: 3 | Status: SHIPPED | OUTPATIENT
Start: 2024-05-24

## 2024-05-24 RX ORDER — FUROSEMIDE 20 MG/1
20 TABLET ORAL 2 TIMES DAILY
Qty: 180 TABLET | Refills: 3 | Status: SHIPPED | OUTPATIENT
Start: 2024-05-24

## 2024-05-24 RX ORDER — TAMSULOSIN HYDROCHLORIDE 0.4 MG/1
0.4 CAPSULE ORAL DAILY
Qty: 90 CAPSULE | Refills: 3 | Status: SHIPPED | OUTPATIENT
Start: 2024-05-24

## 2024-06-05 ENCOUNTER — PATIENT MESSAGE (OUTPATIENT)
Dept: FAMILY MEDICINE | Facility: CLINIC | Age: 89
End: 2024-06-05
Payer: MEDICARE

## 2024-06-05 NOTE — TELEPHONE ENCOUNTER
Daughter inquiring if pt is okay to take supplements, picture attached to pt message.    Please advise.

## 2024-07-02 ENCOUNTER — PATIENT MESSAGE (OUTPATIENT)
Dept: FAMILY MEDICINE | Facility: CLINIC | Age: 89
End: 2024-07-02
Payer: MEDICARE

## 2024-07-08 DIAGNOSIS — Z74.09 IMPAIRED MOBILITY AND ACTIVITIES OF DAILY LIVING: Primary | ICD-10-CM

## 2024-07-08 DIAGNOSIS — Z78.9 IMPAIRED MOBILITY AND ACTIVITIES OF DAILY LIVING: Primary | ICD-10-CM

## 2024-07-10 ENCOUNTER — PATIENT MESSAGE (OUTPATIENT)
Dept: FAMILY MEDICINE | Facility: CLINIC | Age: 89
End: 2024-07-10
Payer: MEDICARE

## 2024-07-11 ENCOUNTER — TELEPHONE (OUTPATIENT)
Dept: FAMILY MEDICINE | Facility: CLINIC | Age: 89
End: 2024-07-11
Payer: MEDICARE

## 2024-07-11 NOTE — TELEPHONE ENCOUNTER
Pt in need of orders for a reynaga chair, single care giver patient transfer system.     Please advise

## 2024-07-11 NOTE — TELEPHONE ENCOUNTER
----- Message from Eric Lorenzo sent at 7/10/2024  3:37 PM CDT -----  Contact: Alex  Reese Bell  MRN: [id number]  : 1931  PCP: Oracio Johnson  Home Phone      [phone]  Work Phone      Not on file.  Mobile          [phone]      MESSAGE:     Alex with [facility] called wanting to speak with nurse about pt needing a reynaga chair, single care giver patient transfer system.        Please advise  Alex  [phone]

## 2024-07-12 DIAGNOSIS — Z78.9 IMPAIRED MOBILITY AND ACTIVITIES OF DAILY LIVING: Primary | ICD-10-CM

## 2024-07-12 DIAGNOSIS — Z74.09 IMPAIRED MOBILITY AND ACTIVITIES OF DAILY LIVING: Primary | ICD-10-CM

## 2024-07-12 NOTE — TELEPHONE ENCOUNTER
Alex with [employer] needs orders/office notes to state that patient would benefit from Lateral Supine Transfer Chair.      Please advise

## 2024-07-17 ENCOUNTER — TELEPHONE (OUTPATIENT)
Dept: FAMILY MEDICINE | Facility: CLINIC | Age: 89
End: 2024-07-17
Payer: MEDICARE

## 2024-07-17 NOTE — TELEPHONE ENCOUNTER
[employer] [facility] [employer] requesting PCP note of ambulatory status from LOV (does pt need an updated F2F visit)     Also requesting a statement stating pt will benefit from lateral supine position     Please advise

## 2024-07-17 NOTE — TELEPHONE ENCOUNTER
----- Message from Avery Meza sent at 2024  4:45 PM CDT -----  Contact: Joshua Sovicell diamante Bell  MRN: [id number]  : 1931  PCP: Oracio Johnson  Home Phone      [phone]  Work Phone      Not on file.  Mobile          [phone]      MESSAGE:   [employer] needs a face sheet, patient p form/ w doctors note and ambulatory status. They also needs a statement stating patient will benefit from lateral supine      Phone [phone]  Fax [phone]

## 2024-07-18 NOTE — TELEPHONE ENCOUNTER
Faxed order and office notes from 5/24/2024 and 4/25/2024 as instructed by Dr Johnson. Spoke to Alex, advised him that those papers were faxed. He said they also need the H&P with added ambulatory status and stated from PCP stating patient requires supine transfer. I notified Lary of these last 2 needed forms because the previous message shows she is working on that part,

## 2024-07-19 ENCOUNTER — TELEPHONE (OUTPATIENT)
Dept: FAMILY MEDICINE | Facility: CLINIC | Age: 89
End: 2024-07-19
Payer: MEDICARE

## 2024-08-21 ENCOUNTER — PATIENT MESSAGE (OUTPATIENT)
Dept: FAMILY MEDICINE | Facility: CLINIC | Age: 89
End: 2024-08-21
Payer: MEDICARE

## 2024-08-21 NOTE — TELEPHONE ENCOUNTER
Pt requesting refill of albuterol-ipratropium 2.5 mg-0.5 mg/3 mL nebulizer solution 3 mL     Med discontinued     Please advise

## 2024-08-22 DIAGNOSIS — R06.02 SOB (SHORTNESS OF BREATH): Primary | ICD-10-CM

## 2024-08-22 RX ORDER — IPRATROPIUM BROMIDE AND ALBUTEROL SULFATE 2.5; .5 MG/3ML; MG/3ML
3 SOLUTION RESPIRATORY (INHALATION) EVERY 6 HOURS PRN
Qty: 60 EACH | Refills: 5 | Status: SHIPPED | OUTPATIENT
Start: 2024-08-22 | End: 2025-08-22

## 2024-08-26 NOTE — PT/OT/SLP PROGRESS
**Required when the History and Physical has been performed prior to Registration**  (within a 30 day period, if it's over 30 days then a new and complete History and Physical needs to be completed)  **An update to the history and physical must be completed prior to the start of the surgery or procedure requiring anesthesia services.**    The History and Physical has been reviewed and I have examined the patient.  Based upon my physical assessment and interview of the patient:    Check and/or complete:    [X] No significant changes have occurred in the patient's condition since the History and Physical was completed.      OR    [_] Changes to History and Physical (see below):          Rachael Aviles PA-C   8/26/2024    Physical Therapy Treatment    Patient Name:  Reese Bell   MRN:  [id number]    Recommendations:     Discharge Recommendations:  other (see comments)(possible home health, 24 hour supervision)   Discharge Equipment Recommendations: walker, rolling   Barriers to discharge:  None and 24 supervison from family, good family support     Assessment:     Reese Bell is a 88 y.o. male admitted with a medical diagnosis of <principal problem not specified>.  He presents with the following impairments/functional limitations:  weakness, impaired endurance, impaired self care skills, impaired functional mobilty, gait instability, impaired balance, decreased lower extremity function, decreased safety awareness, impaired cardiopulmonary response to activity Patient tolerated treatment well focusing on therapeutic exercise. Patient medically unstable on this date deferring end of session, nurse aware. Patient will continue to improve with skilled physical therapy services in order to return to functional baseline.    Rehab Prognosis: Good; patient would benefit from acute skilled PT services to address these deficits and reach maximum level of function.    Recent Surgery: * No surgery found *      Plan:     During this hospitalization, patient to be seen 4 x/week to address the identified rehab impairments via gait training, therapeutic activities, therapeutic exercises, neuromuscular re-education and progress toward the following goals:    · Plan of Care Expires:  09/12/19    Subjective     Chief Complaint: no c/o  Pain/Comfort:  · Pain Rating 1: 0/10  · Pain Rating Post-Intervention 1: 0/10      Objective:     Communicated with nursing prior to session.  Patient found HOB elevated with telemetry, pulse ox (continuous), blood pressure cuff, oxygen, peripheral IV upon PT entry to room.     General Precautions: Standard, aspiration, fall, seizure   Orthopedic Precautions:N/A   Braces: N/A       AM-PAC 6 CLICK  MOBILITY  Turning over in bed (including adjusting bedclothes, sheets and blankets)?: 3  Sitting down on and standing up from a chair with arms (e.g., wheelchair, bedside commode, etc.): 3  Moving from lying on back to sitting on the side of the bed?: 3  Moving to and from a bed to a chair (including a wheelchair)?: 3  Need to walk in hospital room?: 1  Climbing 3-5 steps with a railing?: 1  Basic Mobility Total Score: 14       Therapeutic Activities and Exercises:   supine therex 2 x 10 LAQ, AP, HF    Patient left HOB elevated with all lines intact and call button in reach..    GOALS:   Multidisciplinary Problems     Physical Therapy Goals        Problem: Physical Therapy Goal    Goal Priority Disciplines Outcome Goal Variances Interventions   Physical Therapy Goal     PT, PT/OT Ongoing (interventions implemented as appropriate)     Description:  Goals to be met by: 19    Patient will increase functional independence with mobility by performin. Supine to sit with Modified [place]  2. Sit to supine with Modified [place]  3. Sit to stand transfer with Supervision  4. Gait  x 100 feet with Supervision using Rolling Walker.   5. Lower extremity exercise program x10 reps per handout, with supervision                      Time Tracking:     PT Received On: 19  PT Start Time: 1447     PT Stop Time: 1456  PT Total Time (min): 9 min     Billable Minutes: Therapeutic Exercise 9    Treatment Type: Treatment  PT/PTA: PTA     PTA Visit Number: 1     Manda Qureshi PTA  2019

## 2024-09-03 ENCOUNTER — PATIENT MESSAGE (OUTPATIENT)
Dept: FAMILY MEDICINE | Facility: CLINIC | Age: 89
End: 2024-09-03
Payer: MEDICARE

## 2024-09-03 NOTE — TELEPHONE ENCOUNTER
Patient was woken up with severe swelling located in his left breast.    Patient has a defibrillator and his daughter is concerned.     Please advise.    Dr. Johnson is out of the office until Friday.

## 2024-09-05 ENCOUNTER — TELEPHONE (OUTPATIENT)
Dept: INTERNAL MEDICINE | Facility: CLINIC | Age: 89
End: 2024-09-05
Payer: MEDICARE

## 2024-09-05 NOTE — TELEPHONE ENCOUNTER
Alex with [employer] states medicare denied the  ronnell chair and needs updates to notes to get coverage on this.    please advise   Thank you

## 2024-09-05 NOTE — TELEPHONE ENCOUNTER
----- Message from Tressa Waldrop sent at 9/3/2024  3:51 PM CDT -----  Contact: Joshua Bell  MRN: [id number]  : 1931  PCP: Oracio Johnson  Home Phone      [phone]  Work Phone      Not on file.  Mobile          [phone]      MESSAGE:     Alex with [employer] states medicare denied the  ronnell chair and needs updates to notes to get coverage on this.         Please advise   [phone]

## 2024-09-06 ENCOUNTER — TELEPHONE (OUTPATIENT)
Dept: FAMILY MEDICINE | Facility: CLINIC | Age: 89
End: 2024-09-06
Payer: MEDICARE

## 2024-09-06 NOTE — TELEPHONE ENCOUNTER
----- Message from Sen Coronado sent at 2024  8:26 AM CDT -----  Contact: Joshua Newman @ [facility]  Reese Bell  MRN: [id number]  : 1931  PCP: Oracio Johnson  Home Phone      [phone]  Work Phone      Not on file.  Mobile          [phone]      MESSAGE: [place] -- spoke with Mandy yesterday Re: updated note needed -- did not receive fax -- asks that it be faxed again to [phone]    PCP: Alex

## 2024-09-06 NOTE — TELEPHONE ENCOUNTER
Mandy out of office today. Updated notes from visit 05/24/24 faxed to [facility] at given fax [phone]. Fax confirmation received.

## 2024-09-10 ENCOUNTER — TELEPHONE (OUTPATIENT)
Dept: FAMILY MEDICINE | Facility: CLINIC | Age: 89
End: 2024-09-10

## 2024-10-18 ENCOUNTER — PATIENT MESSAGE (OUTPATIENT)
Dept: FAMILY MEDICINE | Facility: CLINIC | Age: 89
End: 2024-10-18
Payer: MEDICARE

## 2024-10-18 DIAGNOSIS — J44.9 CHRONIC OBSTRUCTIVE PULMONARY DISEASE, UNSPECIFIED COPD TYPE: Primary | ICD-10-CM

## 2024-10-18 RX ORDER — NEBULIZER AND COMPRESSOR
1 EACH MISCELLANEOUS 4 TIMES DAILY PRN
Qty: 1 EACH | Refills: 0 | Status: SHIPPED | OUTPATIENT
Start: 2024-10-18

## 2024-10-18 NOTE — TELEPHONE ENCOUNTER
Patient is needing a new order or prescription for a new home nebulizer.    Prescription to be sent to [facility].    Please advise.

## 2024-10-21 DIAGNOSIS — J44.9 CHRONIC OBSTRUCTIVE PULMONARY DISEASE, UNSPECIFIED COPD TYPE: Primary | ICD-10-CM

## 2024-10-30 ENCOUNTER — TELEPHONE (OUTPATIENT)
Dept: FAMILY MEDICINE | Facility: CLINIC | Age: 89
End: 2024-10-30
Payer: MEDICARE

## 2024-11-06 PROBLEM — I50.23 ACUTE ON CHRONIC SYSTOLIC CONGESTIVE HEART FAILURE: Status: ACTIVE | Noted: 2019-03-28

## 2024-11-06 PROBLEM — J96.01 ACUTE HYPOXEMIC RESPIRATORY FAILURE: Status: ACTIVE | Noted: 2024-11-06

## 2024-11-07 ENCOUNTER — TELEPHONE (OUTPATIENT)
Dept: FAMILY MEDICINE | Facility: CLINIC | Age: 89
End: 2024-11-07
Payer: MEDICARE

## 2024-11-07 PROBLEM — I50.9 HEART FAILURE: Status: ACTIVE | Noted: 2024-11-07

## 2024-11-07 NOTE — TELEPHONE ENCOUNTER
----- Message from Zakia sent at 11/5/2024 10:58 AM CST -----  Type:  Needs Medical Advice    Who Called: [facility]     Would the patient rather a call back or a response via [facility]? Call     Best Call Back Number: [phone] ext:     Additional Information: requesting to have progress notes on the statement sent over to e-channel.     FAX:[phone]

## 2024-11-07 NOTE — TELEPHONE ENCOUNTER
"Attempted to contact [employer]; unable to contact company. Zeetl is requesting a "Progress Note" in other words a pt's Last Office Visit. Pt's last Progress Note was in May of 2024. May or may not be acceptable for [employer].  "

## 2024-11-08 PROBLEM — I10 ESSENTIAL HYPERTENSION: Chronic | Status: ACTIVE | Noted: 2019-03-28

## 2024-11-11 PROBLEM — I50.9 HEART FAILURE: Status: RESOLVED | Noted: 2024-11-07 | Resolved: 2024-11-11

## 2024-11-12 ENCOUNTER — TELEPHONE (OUTPATIENT)
Dept: FAMILY MEDICINE | Facility: CLINIC | Age: 89
End: 2024-11-12
Payer: MEDICARE

## 2024-11-12 PROBLEM — N17.9 AKI (ACUTE KIDNEY INJURY): Status: ACTIVE | Noted: 2024-11-12

## 2024-11-12 NOTE — TELEPHONE ENCOUNTER
----- Message from Israel sent at 2024  3:04 PM CST -----  Contact: gisel-daughter  Reese Bell  MRN: [id number]  : 1931  PCP: Oracio Johnson  Home Phone      [phone]  Work Phone      Not on file.  Mobile          [phone]      MESSAGE:   Pt daughter states she need to speak with nurse because her father is in the hospital.    Please advise  [phone]

## 2024-11-12 NOTE — TELEPHONE ENCOUNTER
Pt has been hospitalized for 1 week due to CHF, pts CPAP/BiPAP. Pts daughter states [employer] is going to give them a loner machine until [facility] approves his new one.

## 2024-11-20 ENCOUNTER — TELEPHONE (OUTPATIENT)
Dept: FAMILY MEDICINE | Facility: CLINIC | Age: 89
End: 2024-11-20
Payer: MEDICARE

## 2024-11-20 ENCOUNTER — PATIENT MESSAGE (OUTPATIENT)
Dept: FAMILY MEDICINE | Facility: CLINIC | Age: 89
End: 2024-11-20
Payer: MEDICARE

## 2024-11-20 ENCOUNTER — LAB VISIT (OUTPATIENT)
Dept: LAB | Facility: HOSPITAL | Age: 89
End: 2024-11-20
Attending: INTERNAL MEDICINE
Payer: MEDICARE

## 2024-11-20 DIAGNOSIS — I50.22 CHRONIC SYSTOLIC HEART FAILURE: Primary | ICD-10-CM

## 2024-11-20 LAB
ALBUMIN SERPL BCP-MCNC: 3.1 G/DL (ref 3.5–5.2)
ALP SERPL-CCNC: 94 U/L (ref 40–150)
ALT SERPL W/O P-5'-P-CCNC: 23 U/L (ref 10–44)
ANION GAP SERPL CALC-SCNC: 13 MMOL/L (ref 8–16)
AST SERPL-CCNC: 14 U/L (ref 10–40)
BILIRUB SERPL-MCNC: 0.6 MG/DL (ref 0.1–1)
BNP SERPL-MCNC: 65 PG/ML (ref 0–99)
BUN SERPL-MCNC: 53 MG/DL (ref 10–30)
CALCIUM SERPL-MCNC: 8.5 MG/DL (ref 8.7–10.5)
CHLORIDE SERPL-SCNC: 99 MMOL/L (ref 95–110)
CO2 SERPL-SCNC: 27 MMOL/L (ref 23–29)
CREAT SERPL-MCNC: 1.6 MG/DL (ref 0.5–1.4)
ERYTHROCYTE [DISTWIDTH] IN BLOOD BY AUTOMATED COUNT: 14.6 % (ref 11.5–14.5)
EST. GFR  (NO RACE VARIABLE): 40 ML/MIN/1.73 M^2
GLUCOSE SERPL-MCNC: 92 MG/DL (ref 70–110)
HCT VFR BLD AUTO: 36.6 % (ref 40–54)
HGB BLD-MCNC: 11.7 G/DL (ref 14–18)
MCH RBC QN AUTO: 32 PG (ref 27–31)
MCHC RBC AUTO-ENTMCNC: 32 G/DL (ref 32–36)
MCV RBC AUTO: 100 FL (ref 82–98)
PLATELET # BLD AUTO: 136 K/UL (ref 150–450)
PMV BLD AUTO: 9.4 FL (ref 9.2–12.9)
POTASSIUM SERPL-SCNC: 4.5 MMOL/L (ref 3.5–5.1)
PROT SERPL-MCNC: 6.1 G/DL (ref 6–8.4)
RBC # BLD AUTO: 3.66 M/UL (ref 4.6–6.2)
SODIUM SERPL-SCNC: 139 MMOL/L (ref 136–145)
WBC # BLD AUTO: 10.75 K/UL (ref 3.9–12.7)

## 2024-11-20 PROCEDURE — 83880 ASSAY OF NATRIURETIC PEPTIDE: CPT | Performed by: INTERNAL MEDICINE

## 2024-11-20 PROCEDURE — 85027 COMPLETE CBC AUTOMATED: CPT | Performed by: INTERNAL MEDICINE

## 2024-11-20 PROCEDURE — 36415 COLL VENOUS BLD VENIPUNCTURE: CPT | Performed by: INTERNAL MEDICINE

## 2024-11-20 PROCEDURE — 80053 COMPREHEN METABOLIC PANEL: CPT | Performed by: INTERNAL MEDICINE

## 2024-11-20 NOTE — TELEPHONE ENCOUNTER
----- Message from Eric sent at 11/15/2024  8:43 AM CST -----  Contact: Dr. Middleton  Reese Bell  MRN: [id number]  : 1931  PCP: Oracio Johnson  Home Phone      [phone]  Work Phone      Not on file.  Mobile          [phone]      MESSAGE:     Dr. Middleton with [employer] called wanting to speak with nurse about putting orders in for equipment to help get him out of his bed.          Please advise  Dr. Middleton  [phone]

## 2024-11-22 ENCOUNTER — TELEPHONE (OUTPATIENT)
Dept: FAMILY MEDICINE | Facility: CLINIC | Age: 89
End: 2024-11-22

## 2024-11-22 ENCOUNTER — OFFICE VISIT (OUTPATIENT)
Dept: FAMILY MEDICINE | Facility: CLINIC | Age: 89
End: 2024-11-22
Payer: MEDICARE

## 2024-11-22 DIAGNOSIS — Z78.9 IMPAIRED MOBILITY AND ACTIVITIES OF DAILY LIVING: ICD-10-CM

## 2024-11-22 DIAGNOSIS — F02.80 DEMENTIA IN ALZHEIMER'S DISEASE: ICD-10-CM

## 2024-11-22 DIAGNOSIS — Z74.09 IMPAIRED MOBILITY AND ACTIVITIES OF DAILY LIVING: ICD-10-CM

## 2024-11-22 DIAGNOSIS — I25.10 ASCVD (ARTERIOSCLEROTIC CARDIOVASCULAR DISEASE): Primary | ICD-10-CM

## 2024-11-22 DIAGNOSIS — I50.9 CHRONIC CONGESTIVE HEART FAILURE, UNSPECIFIED HEART FAILURE TYPE: ICD-10-CM

## 2024-11-22 DIAGNOSIS — G30.9 DEMENTIA IN ALZHEIMER'S DISEASE: ICD-10-CM

## 2024-11-22 NOTE — TELEPHONE ENCOUNTER
----- Message from Israel sent at 2024 12:27 PM CST -----  Reese Bell  MRN: [id number]  : 1931  PCP: Oracio Johnson  Home Phone      [phone]  Work Phone      Not on file.  [employer]          [phone]      MESSAGE:   Pt daughter gisel states she needs to speak with nurse because she needs stuff faxed      [phone]

## 2024-11-22 NOTE — TELEPHONE ENCOUNTER
Called and spoke with patients daughter. Got the fax number and what needs to be faxed. I confirmed with her I will send this over

## 2024-11-22 NOTE — PROGRESS NOTES
Subjective:       Patient ID: Reese Bell is a 89 y.o. male.    Chief Complaint: No chief complaint on file.    HPI  Review of Systems   Constitutional:  Positive for unexpected weight change. Negative for activity change.   HENT:  Positive for trouble swallowing. Negative for hearing loss and rhinorrhea.    Eyes:  Negative for discharge and visual disturbance.   Respiratory:  Positive for wheezing. Negative for chest tightness.    Cardiovascular:  Negative for chest pain and palpitations.   Gastrointestinal:  Negative for blood in stool, constipation, diarrhea and vomiting.   Endocrine: Negative for polydipsia and polyuria.   Genitourinary:  Negative for difficulty urinating, hematuria and urgency.   Musculoskeletal:  Negative for arthralgias, joint swelling and neck pain.        Patient is max assist due to Alzheimer's disease and CHF (congestive heart failure). The patient requires transfers between a bed to a chair or commode. Without the use of a transfer system the patient would be bed confined. Due to the diagnoses of Alzheimer's disease and CHF, the patient requires supine positioning for transfers. A Abimael lift/sling was considered and ruled out due to safety concern from Alzheimer's disease and CHF. A supine transfer system is required. The Atwood chair is recommended   Neurological:  Negative for weakness and headaches.   Psychiatric/Behavioral:  Negative for confusion and dysphoric mood.        Objective:      Physical Exam  Constitutional:       Comments: Virtual visit was done         Assessment:       Encounter Diagnoses   Name Primary?    ASCVD (arteriosclerotic cardiovascular disease) Yes    Dementia in Alzheimer's disease     Chronic congestive heart failure, unspecified heart failure type     Impaired mobility and activities of daily living          Plan:   1. ASCVD (arteriosclerotic cardiovascular disease)    2. Dementia in Alzheimer's disease    3. Chronic congestive heart failure,  unspecified heart failure type    4. Impaired mobility and activities of daily living     Atwood's chair Rx being requested

## 2024-11-22 NOTE — TELEPHONE ENCOUNTER
"Please add following statement in today's office notes. Notes needed to obtain transfer chair. Please see pt's message below.      I also need if you can do this is to list the verbiage that I Will send you in my fathers Clinical notes it is needed to obtain the chair. Also, this is it. Greatly appreciate your help thank you      "Patient is max assist due to Alzheimer's disease and CHF (congestive heart failure). The patient requires transfers between a bed to a chair or commode. Without the use of a transfer system the patient would be bed confined. Due to the diagnoses of Alzheimer's disease and CHF, the patient requires supine positioning for transfers. A Abimael lift/sling was considered and ruled out due to safety concern from Alzheimer's disease and CHF. A supine transfer system is required. The Atwood chair is recommended  "  "

## 2024-11-27 ENCOUNTER — TELEPHONE (OUTPATIENT)
Dept: FAMILY MEDICINE | Facility: CLINIC | Age: 89
End: 2024-11-27
Payer: MEDICARE

## 2024-11-27 NOTE — TELEPHONE ENCOUNTER
----- Message from [place] sent at 2024  8:54 AM CST -----  Contact: self  Reese Bell  MRN: [id number]  : 1931  PCP: Oracio Johnson  Home Phone      [phone]  Work Phone      Not on file.  in[employer]nite          [phone]      MESSAGE:   Samanta from [facility] needs to talk to nurse about the transfer system      Please advise  [phone]

## 2024-11-27 NOTE — TELEPHONE ENCOUNTER
I have called [employer] and [employer].   [facility] stated hey have previously gotten this exact chair approved through [employer]. When it was time to order, the man reported he could not order it due to the amount insurace was willing to cover was leaving them coming out short of coverage and the patients family could not come out of pocket for the remainder. Samanta asked that I call [employer] to check if they have the chair, will accept what the insurance will cover and if the patients family can afford what they would be responsible for. [employer] is an out of network company so they just want to make sure of all of this before getting all the approvals.  I then called [employer]. [employer] confirmed they do have the chair in stock, insurance can cover 80% of the chair, the patients family would be responsible for $175 monthly for 13 months. I asked [employer] if there was anything I can turn in showing the patient needs this chair instead of the lift that is covered 100% to get that 20% the patient would pay covered and they voiced they will check into that for me. [employer] confirmed if [employer] can approve them although they are out of network, they can have the chair to the patient within a week after approval.   I then called Mikala, the patients sister, and explained all of the above to her. She confirmed they are prepared to have to pay the $175 monthly if needed because its emergent the patient gets this chair now.   Lastly, I called Samanta at [employer] and recapped everything to her confirming everything [facility] and Mikala have said and confirmed. She verbalized she will send the paperwork to the review team to try and get this approved. She stated it may be into next week before we know due to the holiday.

## 2024-12-03 ENCOUNTER — PATIENT MESSAGE (OUTPATIENT)
Dept: FAMILY MEDICINE | Facility: CLINIC | Age: 89
End: 2024-12-03
Payer: MEDICARE

## 2024-12-09 ENCOUNTER — LAB VISIT (OUTPATIENT)
Dept: LAB | Facility: HOSPITAL | Age: 89
End: 2024-12-09
Attending: INTERNAL MEDICINE
Payer: MEDICARE

## 2024-12-09 DIAGNOSIS — I10 HTN (HYPERTENSION): ICD-10-CM

## 2024-12-09 DIAGNOSIS — I50.9 HEART FAILURE, UNSPECIFIED: Primary | ICD-10-CM

## 2024-12-09 LAB
ALBUMIN SERPL BCP-MCNC: 2.8 G/DL (ref 3.5–5.2)
ALP SERPL-CCNC: 114 U/L (ref 40–150)
ALT SERPL W/O P-5'-P-CCNC: 9 U/L (ref 10–44)
ANION GAP SERPL CALC-SCNC: 12 MMOL/L (ref 8–16)
AST SERPL-CCNC: 11 U/L (ref 10–40)
BASOPHILS # BLD AUTO: 0.02 K/UL (ref 0–0.2)
BASOPHILS NFR BLD: 0.4 % (ref 0–1.9)
BILIRUB SERPL-MCNC: 0.5 MG/DL (ref 0.1–1)
BNP SERPL-MCNC: 127 PG/ML (ref 0–99)
BUN SERPL-MCNC: 23 MG/DL (ref 10–30)
CALCIUM SERPL-MCNC: 8.3 MG/DL (ref 8.7–10.5)
CHLORIDE SERPL-SCNC: 99 MMOL/L (ref 95–110)
CO2 SERPL-SCNC: 25 MMOL/L (ref 23–29)
CREAT SERPL-MCNC: 1.2 MG/DL (ref 0.5–1.4)
DIFFERENTIAL METHOD BLD: ABNORMAL
EOSINOPHIL # BLD AUTO: 0.1 K/UL (ref 0–0.5)
EOSINOPHIL NFR BLD: 1.7 % (ref 0–8)
ERYTHROCYTE [DISTWIDTH] IN BLOOD BY AUTOMATED COUNT: 16.1 % (ref 11.5–14.5)
EST. GFR  (NO RACE VARIABLE): 56 ML/MIN/1.73 M^2
GLUCOSE SERPL-MCNC: 87 MG/DL (ref 70–110)
HCT VFR BLD AUTO: 32.5 % (ref 40–54)
HGB BLD-MCNC: 10.4 G/DL (ref 14–18)
IMM GRANULOCYTES # BLD AUTO: 0.12 K/UL (ref 0–0.04)
IMM GRANULOCYTES NFR BLD AUTO: 2.5 % (ref 0–0.5)
LYMPHOCYTES # BLD AUTO: 0.4 K/UL (ref 1–4.8)
LYMPHOCYTES NFR BLD: 7.5 % (ref 18–48)
MCH RBC QN AUTO: 33.2 PG (ref 27–31)
MCHC RBC AUTO-ENTMCNC: 32 G/DL (ref 32–36)
MCV RBC AUTO: 104 FL (ref 82–98)
MONOCYTES # BLD AUTO: 0.8 K/UL (ref 0.3–1)
MONOCYTES NFR BLD: 17.5 % (ref 4–15)
NEUTROPHILS # BLD AUTO: 3.4 K/UL (ref 1.8–7.7)
NEUTROPHILS NFR BLD: 70.4 % (ref 38–73)
NRBC BLD-RTO: 1 /100 WBC
PLATELET # BLD AUTO: 141 K/UL (ref 150–450)
PMV BLD AUTO: 9.1 FL (ref 9.2–12.9)
POTASSIUM SERPL-SCNC: 4.1 MMOL/L (ref 3.5–5.1)
PROT SERPL-MCNC: 5.5 G/DL (ref 6–8.4)
RBC # BLD AUTO: 3.13 M/UL (ref 4.6–6.2)
SODIUM SERPL-SCNC: 136 MMOL/L (ref 136–145)
WBC # BLD AUTO: 4.8 K/UL (ref 3.9–12.7)

## 2024-12-09 PROCEDURE — 80053 COMPREHEN METABOLIC PANEL: CPT

## 2024-12-09 PROCEDURE — 83880 ASSAY OF NATRIURETIC PEPTIDE: CPT

## 2024-12-09 PROCEDURE — 85025 COMPLETE CBC W/AUTO DIFF WBC: CPT

## 2024-12-17 ENCOUNTER — TELEPHONE (OUTPATIENT)
Dept: FAMILY MEDICINE | Facility: CLINIC | Age: 89
End: 2024-12-17
Payer: MEDICARE

## 2024-12-31 ENCOUNTER — TELEPHONE (OUTPATIENT)
Dept: FAMILY MEDICINE | Facility: CLINIC | Age: 89
End: 2024-12-31
Payer: MEDICARE

## 2024-12-31 ENCOUNTER — LAB VISIT (OUTPATIENT)
Dept: LAB | Facility: HOSPITAL | Age: 89
End: 2024-12-31
Attending: FAMILY MEDICINE
Payer: MEDICARE

## 2024-12-31 DIAGNOSIS — N39.0 URINARY TRACT INFECTION, SITE NOT SPECIFIED: ICD-10-CM

## 2024-12-31 DIAGNOSIS — N39.0 URINARY TRACT INFECTION, SITE NOT SPECIFIED: Primary | ICD-10-CM

## 2024-12-31 LAB
BACTERIA #/AREA URNS HPF: ABNORMAL /HPF
BILIRUB UR QL STRIP: NEGATIVE
CLARITY UR: CLEAR
COLOR UR: YELLOW
GLUCOSE UR QL STRIP: NEGATIVE
HGB UR QL STRIP: NEGATIVE
KETONES UR QL STRIP: NEGATIVE
LEUKOCYTE ESTERASE UR QL STRIP: ABNORMAL
MICROSCOPIC COMMENT: ABNORMAL
NITRITE UR QL STRIP: NEGATIVE
PH UR STRIP: 6 [PH] (ref 5–8)
PROT UR QL STRIP: NEGATIVE
SP GR UR STRIP: 1.01 (ref 1–1.03)
URN SPEC COLLECT METH UR: ABNORMAL
UROBILINOGEN UR STRIP-ACNC: NEGATIVE EU/DL
WBC #/AREA URNS HPF: 16 /HPF (ref 0–5)
WBC CLUMPS URNS QL MICRO: ABNORMAL

## 2024-12-31 PROCEDURE — 81000 URINALYSIS NONAUTO W/SCOPE: CPT | Performed by: FAMILY MEDICINE

## 2024-12-31 PROCEDURE — 87086 URINE CULTURE/COLONY COUNT: CPT | Performed by: FAMILY MEDICINE

## 2024-12-31 NOTE — TELEPHONE ENCOUNTER
Spoke with [facility] nurse and let her know that the pt should be seen in the ER with those sx and his age.  Nurse voiced understanding,.

## 2024-12-31 NOTE — TELEPHONE ENCOUNTER
"Laura with [facility] called and said that the daughter contacted them and said that pt urine smelled odorous and that he was talking "out of his head' which wasn't the norm.  I let her know that we were closing early today and that the pt needed to be seen in the ER for those symptoms.  Laura voiced understanding.  "

## 2024-12-31 NOTE — TELEPHONE ENCOUNTER
----- Message from [place] sent at 2024  9:40 AM CST -----  Contact: roland Bell  MRN: [id number]  : 1931  PCP: Oracio Johnson  Home Phone      [phone]  Work Phone      Not on file.  [employer]          [phone]      MESSAGE:   [facility] states Daughter states pt urine has a strong odor wants to know can it be checked for a UTI      [phone]

## 2025-01-01 ENCOUNTER — PATIENT MESSAGE (OUTPATIENT)
Dept: FAMILY MEDICINE | Facility: CLINIC | Age: OVER 89
End: 2025-01-01

## 2025-01-01 DIAGNOSIS — N39.0 URINARY TRACT INFECTION WITHOUT HEMATURIA, SITE UNSPECIFIED: Primary | ICD-10-CM

## 2025-01-01 RX ORDER — SULFAMETHOXAZOLE AND TRIMETHOPRIM 800; 160 MG/1; MG/1
TABLET ORAL
Qty: 20 TABLET | Refills: 0 | Status: SHIPPED | OUTPATIENT
Start: 2025-01-01

## 2025-01-02 LAB — BACTERIA UR CULT: NORMAL

## 2025-01-03 ENCOUNTER — TELEPHONE (OUTPATIENT)
Dept: FAMILY MEDICINE | Facility: CLINIC | Age: OVER 89
End: 2025-01-03
Payer: MEDICARE

## 2025-01-03 ENCOUNTER — PATIENT MESSAGE (OUTPATIENT)
Dept: FAMILY MEDICINE | Facility: CLINIC | Age: OVER 89
End: 2025-01-03

## 2025-01-03 RX ORDER — MIRTAZAPINE 15 MG/1
15 TABLET, FILM COATED ORAL NIGHTLY
Qty: 90 TABLET | Refills: 3 | Status: SHIPPED | OUTPATIENT
Start: 2025-01-03

## 2025-01-03 NOTE — TELEPHONE ENCOUNTER
Spoke with patients sister about home health, she has confirmed she does not feel he still needs it, they have gotten into a routine at home. If she sees it becomes too much and they need the help again, she will call us back

## 2025-01-03 NOTE — TELEPHONE ENCOUNTER
No care due was identified.  [facility] Embedded Care Due Messages. Reference number: [id number].   1/03/2025 3:39:49 AM CST

## 2025-01-03 NOTE — TELEPHONE ENCOUNTER
----- Message from [facility] sent at 1/3/2025  2:40 PM CST -----  Contact: Yary- [facility]  Reese Bell  MRN: [id number]  : 1931  PCP: Oracio Johnson  Home Phone      [phone]  Work Phone      Not on file.  Mobile          [phone]      MESSAGE: [facility] called and stated the patients insurance is out of network with their company and they have to discharge him.    Yary from [facility]: [phone]

## 2025-01-03 NOTE — TELEPHONE ENCOUNTER
Refill Decision Note   Reese Bell  is requesting a refill authorization.  Brief Assessment and Rationale for Refill:  Approve     Medication Therapy Plan:         Comments:     Note composed:4:00 PM 01/03/2025

## 2025-01-06 ENCOUNTER — HOSPITAL ENCOUNTER (INPATIENT)
Facility: HOSPITAL | Age: OVER 89
LOS: 1 days | Discharge: HOME HEALTH | DRG: 690 | End: 2025-01-07
Attending: STUDENT IN AN ORGANIZED HEALTH CARE EDUCATION/TRAINING PROGRAM | Admitting: INTERNAL MEDICINE
Payer: MEDICARE

## 2025-01-06 DIAGNOSIS — I50.22 CHRONIC SYSTOLIC CONGESTIVE HEART FAILURE: Primary | ICD-10-CM

## 2025-01-06 DIAGNOSIS — Z71.89 ADVANCED CARE PLANNING/COUNSELING DISCUSSION: ICD-10-CM

## 2025-01-06 DIAGNOSIS — I49.9 ABNORMAL HEART RHYTHM: ICD-10-CM

## 2025-01-06 DIAGNOSIS — F02.80 DEMENTIA IN ALZHEIMER'S DISEASE: ICD-10-CM

## 2025-01-06 DIAGNOSIS — G30.9 DEMENTIA IN ALZHEIMER'S DISEASE: ICD-10-CM

## 2025-01-06 DIAGNOSIS — R41.82 AMS (ALTERED MENTAL STATUS): ICD-10-CM

## 2025-01-06 LAB
ALBUMIN SERPL BCP-MCNC: 3.2 G/DL (ref 3.5–5.2)
ALP SERPL-CCNC: 139 U/L (ref 40–150)
ALT SERPL W/O P-5'-P-CCNC: 16 U/L (ref 10–44)
AMMONIA PLAS-SCNC: 46 UMOL/L (ref 10–50)
AMPHET+METHAMPHET UR QL: NEGATIVE
ANION GAP SERPL CALC-SCNC: 11 MMOL/L (ref 8–16)
APAP SERPL-MCNC: <3 UG/ML (ref 10–20)
AST SERPL-CCNC: 20 U/L (ref 10–40)
BACTERIA #/AREA URNS HPF: ABNORMAL /HPF
BARBITURATES UR QL SCN>200 NG/ML: NEGATIVE
BASOPHILS # BLD AUTO: 0.02 K/UL (ref 0–0.2)
BASOPHILS NFR BLD: 0.3 % (ref 0–1.9)
BENZODIAZ UR QL SCN>200 NG/ML: NEGATIVE
BILIRUB SERPL-MCNC: 0.5 MG/DL (ref 0.1–1)
BILIRUB UR QL STRIP: NEGATIVE
BUN SERPL-MCNC: 19 MG/DL (ref 10–30)
BZE UR QL SCN: NEGATIVE
CALCIUM SERPL-MCNC: 9.1 MG/DL (ref 8.7–10.5)
CANNABINOIDS UR QL SCN: NEGATIVE
CHLORIDE SERPL-SCNC: 102 MMOL/L (ref 95–110)
CLARITY UR: ABNORMAL
CO2 SERPL-SCNC: 25 MMOL/L (ref 23–29)
COLOR UR: ABNORMAL
CREAT SERPL-MCNC: 1.3 MG/DL (ref 0.5–1.4)
CREAT UR-MCNC: 47.3 MG/DL (ref 23–375)
DIFFERENTIAL METHOD BLD: ABNORMAL
EOSINOPHIL # BLD AUTO: 0.1 K/UL (ref 0–0.5)
EOSINOPHIL NFR BLD: 1.4 % (ref 0–8)
ERYTHROCYTE [DISTWIDTH] IN BLOOD BY AUTOMATED COUNT: 15.8 % (ref 11.5–14.5)
EST. GFR  (NO RACE VARIABLE): 51 ML/MIN/1.73 M^2
ETHANOL SERPL-MCNC: <10 MG/DL
GLUCOSE SERPL-MCNC: 119 MG/DL (ref 70–110)
GLUCOSE UR QL STRIP: NEGATIVE
HCT VFR BLD AUTO: 32.4 % (ref 40–54)
HGB BLD-MCNC: 10.4 G/DL (ref 14–18)
HGB UR QL STRIP: NEGATIVE
HYALINE CASTS #/AREA URNS LPF: 0 /LPF
IMM GRANULOCYTES # BLD AUTO: 0.11 K/UL (ref 0–0.04)
IMM GRANULOCYTES NFR BLD AUTO: 1.6 % (ref 0–0.5)
KETONES UR QL STRIP: NEGATIVE
LACTATE SERPL-SCNC: 1.4 MMOL/L (ref 0.5–2.2)
LEUKOCYTE ESTERASE UR QL STRIP: ABNORMAL
LYMPHOCYTES # BLD AUTO: 0.3 K/UL (ref 1–4.8)
LYMPHOCYTES NFR BLD: 4.5 % (ref 18–48)
MCH RBC QN AUTO: 33.4 PG (ref 27–31)
MCHC RBC AUTO-ENTMCNC: 32.1 G/DL (ref 32–36)
MCV RBC AUTO: 104 FL (ref 82–98)
METHADONE UR QL SCN>300 NG/ML: NEGATIVE
MICROSCOPIC COMMENT: ABNORMAL
MONOCYTES # BLD AUTO: 0.7 K/UL (ref 0.3–1)
MONOCYTES NFR BLD: 9.8 % (ref 4–15)
NEUTROPHILS # BLD AUTO: 5.8 K/UL (ref 1.8–7.7)
NEUTROPHILS NFR BLD: 82.4 % (ref 38–73)
NITRITE UR QL STRIP: NEGATIVE
NRBC BLD-RTO: 0 /100 WBC
OPIATES UR QL SCN: NEGATIVE
PCP UR QL SCN>25 NG/ML: NEGATIVE
PH UR STRIP: 6 [PH] (ref 5–8)
PLATELET # BLD AUTO: 141 K/UL (ref 150–450)
PMV BLD AUTO: 8.6 FL (ref 9.2–12.9)
POTASSIUM SERPL-SCNC: 4.5 MMOL/L (ref 3.5–5.1)
PROT SERPL-MCNC: 7.2 G/DL (ref 6–8.4)
PROT UR QL STRIP: ABNORMAL
RBC # BLD AUTO: 3.11 M/UL (ref 4.6–6.2)
RBC #/AREA URNS HPF: 0 /HPF (ref 0–4)
SALICYLATES SERPL-MCNC: <5 MG/DL (ref 15–30)
SODIUM SERPL-SCNC: 138 MMOL/L (ref 136–145)
SP GR UR STRIP: 1.02 (ref 1–1.03)
TOXICOLOGY INFORMATION: NORMAL
TSH SERPL DL<=0.005 MIU/L-ACNC: 1.76 UIU/ML (ref 0.4–4)
URN SPEC COLLECT METH UR: ABNORMAL
UROBILINOGEN UR STRIP-ACNC: NEGATIVE EU/DL
WBC # BLD AUTO: 7.07 K/UL (ref 3.9–12.7)
WBC #/AREA URNS HPF: 85 /HPF (ref 0–5)

## 2025-01-06 PROCEDURE — 82077 ASSAY SPEC XCP UR&BREATH IA: CPT | Performed by: STUDENT IN AN ORGANIZED HEALTH CARE EDUCATION/TRAINING PROGRAM

## 2025-01-06 PROCEDURE — 80179 DRUG ASSAY SALICYLATE: CPT | Performed by: STUDENT IN AN ORGANIZED HEALTH CARE EDUCATION/TRAINING PROGRAM

## 2025-01-06 PROCEDURE — 11000001 HC ACUTE MED/SURG PRIVATE ROOM

## 2025-01-06 PROCEDURE — 82140 ASSAY OF AMMONIA: CPT | Performed by: STUDENT IN AN ORGANIZED HEALTH CARE EDUCATION/TRAINING PROGRAM

## 2025-01-06 PROCEDURE — 87040 BLOOD CULTURE FOR BACTERIA: CPT | Performed by: STUDENT IN AN ORGANIZED HEALTH CARE EDUCATION/TRAINING PROGRAM

## 2025-01-06 PROCEDURE — 63600175 PHARM REV CODE 636 W HCPCS: Performed by: STUDENT IN AN ORGANIZED HEALTH CARE EDUCATION/TRAINING PROGRAM

## 2025-01-06 PROCEDURE — 25000003 PHARM REV CODE 250: Performed by: INTERNAL MEDICINE

## 2025-01-06 PROCEDURE — 85025 COMPLETE CBC W/AUTO DIFF WBC: CPT | Performed by: STUDENT IN AN ORGANIZED HEALTH CARE EDUCATION/TRAINING PROGRAM

## 2025-01-06 PROCEDURE — 80053 COMPREHEN METABOLIC PANEL: CPT | Performed by: STUDENT IN AN ORGANIZED HEALTH CARE EDUCATION/TRAINING PROGRAM

## 2025-01-06 PROCEDURE — 94760 N-INVAS EAR/PLS OXIMETRY 1: CPT

## 2025-01-06 PROCEDURE — 25000003 PHARM REV CODE 250: Performed by: STUDENT IN AN ORGANIZED HEALTH CARE EDUCATION/TRAINING PROGRAM

## 2025-01-06 PROCEDURE — 80143 DRUG ASSAY ACETAMINOPHEN: CPT | Performed by: STUDENT IN AN ORGANIZED HEALTH CARE EDUCATION/TRAINING PROGRAM

## 2025-01-06 PROCEDURE — 93010 ELECTROCARDIOGRAM REPORT: CPT | Mod: ,,, | Performed by: INTERNAL MEDICINE

## 2025-01-06 PROCEDURE — 99285 EMERGENCY DEPT VISIT HI MDM: CPT | Mod: 25

## 2025-01-06 PROCEDURE — 96374 THER/PROPH/DIAG INJ IV PUSH: CPT

## 2025-01-06 PROCEDURE — 83605 ASSAY OF LACTIC ACID: CPT | Performed by: STUDENT IN AN ORGANIZED HEALTH CARE EDUCATION/TRAINING PROGRAM

## 2025-01-06 PROCEDURE — 81000 URINALYSIS NONAUTO W/SCOPE: CPT | Mod: 59 | Performed by: STUDENT IN AN ORGANIZED HEALTH CARE EDUCATION/TRAINING PROGRAM

## 2025-01-06 PROCEDURE — 80307 DRUG TEST PRSMV CHEM ANLYZR: CPT | Performed by: STUDENT IN AN ORGANIZED HEALTH CARE EDUCATION/TRAINING PROGRAM

## 2025-01-06 PROCEDURE — 93005 ELECTROCARDIOGRAM TRACING: CPT

## 2025-01-06 PROCEDURE — 84443 ASSAY THYROID STIM HORMONE: CPT | Performed by: STUDENT IN AN ORGANIZED HEALTH CARE EDUCATION/TRAINING PROGRAM

## 2025-01-06 PROCEDURE — 87086 URINE CULTURE/COLONY COUNT: CPT | Performed by: STUDENT IN AN ORGANIZED HEALTH CARE EDUCATION/TRAINING PROGRAM

## 2025-01-06 RX ORDER — MIRTAZAPINE 15 MG/1
15 TABLET, FILM COATED ORAL NIGHTLY
Status: DISCONTINUED | OUTPATIENT
Start: 2025-01-06 | End: 2025-01-07 | Stop reason: HOSPADM

## 2025-01-06 RX ORDER — EZETIMIBE 10 MG/1
10 TABLET ORAL NIGHTLY
Status: DISCONTINUED | OUTPATIENT
Start: 2025-01-06 | End: 2025-01-07 | Stop reason: HOSPADM

## 2025-01-06 RX ORDER — CARVEDILOL 12.5 MG/1
12.5 TABLET ORAL 2 TIMES DAILY
Status: DISCONTINUED | OUTPATIENT
Start: 2025-01-06 | End: 2025-01-07

## 2025-01-06 RX ORDER — TALC
6 POWDER (GRAM) TOPICAL NIGHTLY PRN
Status: DISCONTINUED | OUTPATIENT
Start: 2025-01-06 | End: 2025-01-07 | Stop reason: HOSPADM

## 2025-01-06 RX ORDER — CHOLECALCIFEROL (VITAMIN D3) 25 MCG
2000 TABLET ORAL DAILY
Status: DISCONTINUED | OUTPATIENT
Start: 2025-01-07 | End: 2025-01-07 | Stop reason: HOSPADM

## 2025-01-06 RX ORDER — CEFTRIAXONE 1 G/1
1 INJECTION, POWDER, FOR SOLUTION INTRAMUSCULAR; INTRAVENOUS
Status: COMPLETED | OUTPATIENT
Start: 2025-01-06 | End: 2025-01-06

## 2025-01-06 RX ORDER — NAPROXEN SODIUM 220 MG/1
81 TABLET, FILM COATED ORAL DAILY
Status: DISCONTINUED | OUTPATIENT
Start: 2025-01-07 | End: 2025-01-07 | Stop reason: HOSPADM

## 2025-01-06 RX ORDER — CEFTRIAXONE 1 G/1
1 INJECTION, POWDER, FOR SOLUTION INTRAMUSCULAR; INTRAVENOUS
Status: DISCONTINUED | OUTPATIENT
Start: 2025-01-07 | End: 2025-01-07 | Stop reason: HOSPADM

## 2025-01-06 RX ORDER — SODIUM CHLORIDE 9 MG/ML
1000 INJECTION, SOLUTION INTRAVENOUS
Status: COMPLETED | OUTPATIENT
Start: 2025-01-06 | End: 2025-01-06

## 2025-01-06 RX ORDER — FUROSEMIDE 40 MG/1
40 TABLET ORAL 2 TIMES DAILY
Status: DISCONTINUED | OUTPATIENT
Start: 2025-01-06 | End: 2025-01-07

## 2025-01-06 RX ORDER — SODIUM CHLORIDE 0.9 % (FLUSH) 0.9 %
10 SYRINGE (ML) INJECTION
Status: DISCONTINUED | OUTPATIENT
Start: 2025-01-06 | End: 2025-01-07 | Stop reason: HOSPADM

## 2025-01-06 RX ORDER — MEMANTINE HYDROCHLORIDE 5 MG/1
5 TABLET ORAL 2 TIMES DAILY
Status: DISCONTINUED | OUTPATIENT
Start: 2025-01-06 | End: 2025-01-07 | Stop reason: HOSPADM

## 2025-01-06 RX ORDER — TAMSULOSIN HYDROCHLORIDE 0.4 MG/1
0.4 CAPSULE ORAL NIGHTLY
Status: DISCONTINUED | OUTPATIENT
Start: 2025-01-06 | End: 2025-01-07 | Stop reason: HOSPADM

## 2025-01-06 RX ORDER — IPRATROPIUM BROMIDE AND ALBUTEROL SULFATE 2.5; .5 MG/3ML; MG/3ML
3 SOLUTION RESPIRATORY (INHALATION) EVERY 6 HOURS PRN
Status: DISCONTINUED | OUTPATIENT
Start: 2025-01-06 | End: 2025-01-07 | Stop reason: HOSPADM

## 2025-01-06 RX ORDER — ATORVASTATIN CALCIUM 40 MG/1
40 TABLET, FILM COATED ORAL NIGHTLY
Status: DISCONTINUED | OUTPATIENT
Start: 2025-01-06 | End: 2025-01-07 | Stop reason: HOSPADM

## 2025-01-06 RX ADMIN — FUROSEMIDE 40 MG: 40 TABLET ORAL at 09:01

## 2025-01-06 RX ADMIN — EZETIMIBE 10 MG: 10 TABLET ORAL at 09:01

## 2025-01-06 RX ADMIN — CEFTRIAXONE SODIUM 1 G: 1 INJECTION, POWDER, FOR SOLUTION INTRAMUSCULAR; INTRAVENOUS at 11:01

## 2025-01-06 RX ADMIN — CARVEDILOL 12.5 MG: 12.5 TABLET, FILM COATED ORAL at 09:01

## 2025-01-06 RX ADMIN — MEMANTINE HYDROCHLORIDE 5 MG: 5 TABLET ORAL at 09:01

## 2025-01-06 RX ADMIN — TAMSULOSIN HYDROCHLORIDE 0.4 MG: 0.4 CAPSULE ORAL at 09:01

## 2025-01-06 RX ADMIN — MIRTAZAPINE 15 MG: 15 TABLET, FILM COATED ORAL at 09:01

## 2025-01-06 RX ADMIN — SODIUM CHLORIDE 1000 ML: 9 INJECTION, SOLUTION INTRAVENOUS at 04:01

## 2025-01-06 RX ADMIN — ATORVASTATIN CALCIUM 40 MG: 40 TABLET, FILM COATED ORAL at 09:01

## 2025-01-06 NOTE — NURSING
Report received over the phone from RUFINA Preston. Pt will be brought up after pt finishes eating per RN.

## 2025-01-06 NOTE — LETTER
Patient: Hever Bell  YOB: 1931  Date: 1/6/2025 Time: 9:42 AM  Location: [facility]    Leaving the Hospital Against Medical Advice    Chart #:[id number]    This will certify that I, the undersigned,    ______________________________________________________________________    A patient in the above named medical center, having requested discharge and removal from the medical [place] against the advice of my attending physician(s), hereby release [facility], its physicians, officers and employees, severally and individually, from any and all liability of any nature whatsoever for any injury or harm or complication of any kind that may result directly or indirectly, by reason of my terminating my stay as a patient at [facility] and my departure from [facility], and hereby waive any and all rights of action I may now have or later acquire as a result of my voluntary departure from [facility] and the termination of my stay as a patient therein.    This release is made with the full knowledge of the danger that may result from the action which I am taking.      Date:_______________________                         ___________________________                                                                                    Patient/Legal Representative    Witness:        ____________________________                          ___________________________  Nurse                                                                        Physician

## 2025-01-06 NOTE — TELEPHONE ENCOUNTER
We spoke about this pt this morning.  I advised the daughter to bring him to the ER and she said she was bringing him to [facility] ER.  There is no hospital encounter in his chart.   Please advise.

## 2025-01-06 NOTE — ED PROVIDER NOTES
Encounter Date: 1/6/2025       History     Chief Complaint   Patient presents with    Altered Mental Status     Patient presented to ED via EMS with complaints of AMS (onset Saturday). He is currently being treated for a UTI.   Family stopped offering prescribed medication for UTI due to mental status.      89-year-old male with history of dementia, MGUS, hypertension, presenting with altered mental status.  Patient was diagnosed with a urinary tract infection at his primary care physician's office and started on Bactrim last week.  Family stated that patient's mental status declined, and they stopped giving him the antibiotics because they thought that it was a drug reaction.  Patient has been shouting incoherent thoughts while at home.  Patient denies any complaints at this time.      Review of patient's allergies indicates:   Allergen Reactions    Flexeril [cyclobenzaprine] Other (See Comments)    Vicodin [hydrocodone-acetaminophen] Hallucinations     Past Medical History:   Diagnosis Date    ASCVD (arteriosclerotic cardiovascular disease)     DJD (degenerative joint disease)     Hyperlipidemia     Hypertension     MGUS (monoclonal gammopathy of unknown significance) 5/11/2017    FRIDA (obstructive sleep apnea)     Thrombocytopenia 5/11/2017    Ventricular fibrillation 3/29/2019     Past Surgical History:   Procedure Laterality Date    APPENDECTOMY      CARDIAC PACEMAKER PLACEMENT  10/2016    DENTAL SURGERY  06/02/2016    HERNIA REPAIR      right inguinal    LEFT HEART CATHETERIZATION Left 3/28/2019    Procedure: CATHETERIZATION, HEART, LEFT;  Surgeon: Memo Lynn MD;  Location: [facility] CATH;  Service: Cardiology;  Laterality: Left;    Right side catherization Right 2019    TONSILLECTOMY       Family History   Problem Relation Name Age of Onset    Stroke Mother      Coronary artery disease Father       Social History     Tobacco Use    Smoking status: Former    Smokeless tobacco: Never   Substance Use Topics     Alcohol use: No    Drug use: No     Review of Systems   Constitutional:  Negative for fever.   HENT:  Negative for sore throat.    Respiratory:  Negative for shortness of breath.    Cardiovascular:  Negative for chest pain.   Gastrointestinal:  Negative for nausea.   Genitourinary:  Negative for dysuria.   Musculoskeletal:  Negative for back pain.   Skin:  Negative for rash.   Neurological:  Negative for weakness.   Hematological:  Does not bruise/bleed easily.       Physical Exam     Initial Vitals [01/06/25 0914]   BP Pulse Resp Temp SpO2   131/75 88 16 98.4 °F (36.9 °C) 95 %      MAP       --         Physical Exam    Nursing note and vitals reviewed.  Constitutional: He appears well-developed. He is not diaphoretic. No distress.   Eyes: EOM are normal.   Neck:   Normal range of motion.  Cardiovascular:            No murmur heard.  Pulmonary/Chest: No respiratory distress.   Decreased lung sounds to the left side.  Clear lung sounds on the right.   Abdominal: He exhibits no distension.   No TTP diffusely. No guarding, rebound, or masses. No CVAT bilaterally.     Musculoskeletal:         General: Normal range of motion.      Cervical back: Normal range of motion.     Neurological: He is alert.   Moving all extremities.   Skin: Skin is warm.   Psychiatric: He has a normal mood and affect.         ED Course   Procedures  Labs Reviewed   CBC W/ AUTO DIFFERENTIAL - Abnormal       Result Value    WBC 7.07      RBC 3.11 (*)     Hemoglobin 10.4 (*)     Hematocrit 32.4 (*)      (*)     MCH 33.4 (*)     MCHC 32.1      RDW 15.8 (*)     Platelets 141 (*)     MPV 8.6 (*)     Immature Granulocytes 1.6 (*)     Gran # (ANC) 5.8      Immature Grans (Abs) 0.11 (*)     Lymph # 0.3 (*)     Mono # 0.7      Eos # 0.1      Baso # 0.02      nRBC 0      Gran % 82.4 (*)     Lymph % 4.5 (*)     Mono % 9.8      Eosinophil % 1.4      Basophil % 0.3      Differential Method Automated     CULTURE, BLOOD   CULTURE, BLOOD   AMMONIA     Ammonia 46     COMPREHENSIVE METABOLIC PANEL   DRUG SCREEN PANEL, URINE EMERGENCY   ACETAMINOPHEN LEVEL   ALCOHOL,MEDICAL (ETHANOL)   SALICYLATE LEVEL   TSH   URINALYSIS, REFLEX TO URINE CULTURE   LACTIC ACID, PLASMA     EKG Readings: (Independently Interpreted)   Initial Reading: No STEMI. Rhythm: Paced Rhythm. Heart Rate: 88. Ectopy: No Ectopy. Conduction: Normal.       Imaging Results              X-Ray Chest AP Portable (In process)  Result time 01/06/25 09:50:52                     Medications - No data to display  Medical Decision Making  DDX: Altered mental status - r/o infection (likely infectious), hypoglycemia, electrolyte abnormality, metabolic abnormality, ICH/mass, intoxication.  Due to patient's decreased lung sounds on the left side, will assess for possible effusion versus pneumonia.  Initial chest x-ray and consistent with pneumothorax.  DX: BMP, CBC, TSH, ASA level, tylenol level, EtOH level, Utox, UA. CTH. Bcx.   TX: Analgesia PRN. Treatment/consult as indicated by studies.   DISPO:  Admit        Amount and/or Complexity of Data Reviewed  Labs: ordered.  Radiology: ordered.                                      Clinical Impression:  Final diagnoses:  [R41.82] AMS (altered mental status)                 Johan Delgado MD  01/06/25 5353       Johan Delgado MD  01/06/25 1015

## 2025-01-07 VITALS
RESPIRATION RATE: 16 BRPM | WEIGHT: 226.44 LBS | DIASTOLIC BLOOD PRESSURE: 62 MMHG | TEMPERATURE: 98 F | HEART RATE: 80 BPM | BODY MASS INDEX: 38.66 KG/M2 | SYSTOLIC BLOOD PRESSURE: 148 MMHG | HEIGHT: 64 IN | OXYGEN SATURATION: 97 %

## 2025-01-07 PROBLEM — G47.33 OBSTRUCTIVE SLEEP APNEA: Status: ACTIVE | Noted: 2025-01-07

## 2025-01-07 PROBLEM — N30.00 ACUTE CYSTITIS: Status: ACTIVE | Noted: 2025-01-07

## 2025-01-07 PROBLEM — B99.9 INFECTIOUS ENCEPHALOPATHY: Status: ACTIVE | Noted: 2025-01-07

## 2025-01-07 PROBLEM — J90 CHRONIC BILATERAL PLEURAL EFFUSIONS: Status: ACTIVE | Noted: 2025-01-07

## 2025-01-07 PROBLEM — G93.49 INFECTIOUS ENCEPHALOPATHY: Status: ACTIVE | Noted: 2025-01-07

## 2025-01-07 LAB
ANION GAP SERPL CALC-SCNC: 10 MMOL/L (ref 8–16)
BACTERIA UR CULT: NO GROWTH
BASOPHILS # BLD AUTO: 0.02 K/UL (ref 0–0.2)
BASOPHILS NFR BLD: 0.4 % (ref 0–1.9)
BUN SERPL-MCNC: 19 MG/DL (ref 10–30)
CALCIUM SERPL-MCNC: 9 MG/DL (ref 8.7–10.5)
CHLORIDE SERPL-SCNC: 103 MMOL/L (ref 95–110)
CO2 SERPL-SCNC: 25 MMOL/L (ref 23–29)
CREAT SERPL-MCNC: 1.2 MG/DL (ref 0.5–1.4)
DIFFERENTIAL METHOD BLD: ABNORMAL
EOSINOPHIL # BLD AUTO: 0.1 K/UL (ref 0–0.5)
EOSINOPHIL NFR BLD: 1.9 % (ref 0–8)
ERYTHROCYTE [DISTWIDTH] IN BLOOD BY AUTOMATED COUNT: 15.7 % (ref 11.5–14.5)
EST. GFR  (NO RACE VARIABLE): 56 ML/MIN/1.73 M^2
GLUCOSE SERPL-MCNC: 96 MG/DL (ref 70–110)
HCT VFR BLD AUTO: 32.1 % (ref 40–54)
HGB BLD-MCNC: 10.3 G/DL (ref 14–18)
IMM GRANULOCYTES # BLD AUTO: 0.11 K/UL (ref 0–0.04)
IMM GRANULOCYTES NFR BLD AUTO: 1.9 % (ref 0–0.5)
LYMPHOCYTES # BLD AUTO: 0.3 K/UL (ref 1–4.8)
LYMPHOCYTES NFR BLD: 4.9 % (ref 18–48)
MCH RBC QN AUTO: 33.4 PG (ref 27–31)
MCHC RBC AUTO-ENTMCNC: 32.1 G/DL (ref 32–36)
MCV RBC AUTO: 104 FL (ref 82–98)
MONOCYTES # BLD AUTO: 0.6 K/UL (ref 0.3–1)
MONOCYTES NFR BLD: 10.4 % (ref 4–15)
NEUTROPHILS # BLD AUTO: 4.6 K/UL (ref 1.8–7.7)
NEUTROPHILS NFR BLD: 80.5 % (ref 38–73)
NRBC BLD-RTO: 0 /100 WBC
OHS QRS DURATION: 102 MS
OHS QTC CALCULATION: 387 MS
PLATELET # BLD AUTO: 135 K/UL (ref 150–450)
PMV BLD AUTO: 8.4 FL (ref 9.2–12.9)
POTASSIUM SERPL-SCNC: 4.3 MMOL/L (ref 3.5–5.1)
RBC # BLD AUTO: 3.08 M/UL (ref 4.6–6.2)
SODIUM SERPL-SCNC: 138 MMOL/L (ref 136–145)
WBC # BLD AUTO: 5.68 K/UL (ref 3.9–12.7)

## 2025-01-07 PROCEDURE — 99222 1ST HOSP IP/OBS MODERATE 55: CPT | Mod: ,,, | Performed by: PHYSICIAN ASSISTANT

## 2025-01-07 PROCEDURE — 25000003 PHARM REV CODE 250: Performed by: PHYSICIAN ASSISTANT

## 2025-01-07 PROCEDURE — 92610 EVALUATE SWALLOWING FUNCTION: CPT

## 2025-01-07 PROCEDURE — 36415 COLL VENOUS BLD VENIPUNCTURE: CPT | Performed by: INTERNAL MEDICINE

## 2025-01-07 PROCEDURE — 63600175 PHARM REV CODE 636 W HCPCS: Performed by: STUDENT IN AN ORGANIZED HEALTH CARE EDUCATION/TRAINING PROGRAM

## 2025-01-07 PROCEDURE — 99900035 HC TECH TIME PER 15 MIN (STAT)

## 2025-01-07 PROCEDURE — 93010 ELECTROCARDIOGRAM REPORT: CPT | Mod: ,,, | Performed by: INTERNAL MEDICINE

## 2025-01-07 PROCEDURE — 85025 COMPLETE CBC W/AUTO DIFF WBC: CPT | Performed by: INTERNAL MEDICINE

## 2025-01-07 PROCEDURE — 25000003 PHARM REV CODE 250: Performed by: INTERNAL MEDICINE

## 2025-01-07 PROCEDURE — 94799 UNLISTED PULMONARY SVC/PX: CPT

## 2025-01-07 PROCEDURE — 93005 ELECTROCARDIOGRAM TRACING: CPT

## 2025-01-07 PROCEDURE — 80048 BASIC METABOLIC PNL TOTAL CA: CPT | Performed by: INTERNAL MEDICINE

## 2025-01-07 PROCEDURE — 94761 N-INVAS EAR/PLS OXIMETRY MLT: CPT

## 2025-01-07 RX ORDER — IPRATROPIUM BROMIDE 42 UG/1
2 SPRAY, METERED NASAL DAILY
Start: 2025-01-07

## 2025-01-07 RX ORDER — CARVEDILOL 12.5 MG/1
6.25 TABLET ORAL 2 TIMES DAILY
Qty: 90 TABLET | Refills: 3 | Status: SHIPPED | OUTPATIENT
Start: 2025-01-07 | End: 2026-01-07

## 2025-01-07 RX ORDER — CARVEDILOL 3.12 MG/1
6.25 TABLET ORAL 2 TIMES DAILY
Status: DISCONTINUED | OUTPATIENT
Start: 2025-01-07 | End: 2025-01-07 | Stop reason: HOSPADM

## 2025-01-07 RX ORDER — CARVEDILOL 3.12 MG/1
6.25 TABLET ORAL 2 TIMES DAILY
Status: DISCONTINUED | OUTPATIENT
Start: 2025-01-07 | End: 2025-01-07

## 2025-01-07 RX ORDER — FUROSEMIDE 20 MG/1
20 TABLET ORAL DAILY
Status: DISCONTINUED | OUTPATIENT
Start: 2025-01-07 | End: 2025-01-07 | Stop reason: HOSPADM

## 2025-01-07 RX ORDER — CEPHALEXIN 500 MG/1
500 CAPSULE ORAL EVERY 8 HOURS
Qty: 21 CAPSULE | Refills: 0 | Status: SHIPPED | OUTPATIENT
Start: 2025-01-07 | End: 2025-01-14

## 2025-01-07 RX ORDER — FUROSEMIDE 40 MG/1
20 TABLET ORAL DAILY
Start: 2025-01-07 | End: 2026-01-07

## 2025-01-07 RX ORDER — FUROSEMIDE 20 MG/1
20 TABLET ORAL DAILY
Status: DISCONTINUED | OUTPATIENT
Start: 2025-01-08 | End: 2025-01-07

## 2025-01-07 RX ORDER — TRAZODONE HYDROCHLORIDE 50 MG/1
50 TABLET ORAL NIGHTLY
Qty: 30 TABLET | Refills: 0 | Status: SHIPPED | OUTPATIENT
Start: 2025-01-07 | End: 2026-01-07

## 2025-01-07 RX ADMIN — FUROSEMIDE 20 MG: 20 TABLET ORAL at 11:01

## 2025-01-07 RX ADMIN — MEMANTINE HYDROCHLORIDE 5 MG: 5 TABLET ORAL at 11:01

## 2025-01-07 RX ADMIN — CEFTRIAXONE SODIUM 1 G: 1 INJECTION, POWDER, FOR SOLUTION INTRAMUSCULAR; INTRAVENOUS at 11:01

## 2025-01-07 RX ADMIN — Medication 2000 UNITS: at 11:01

## 2025-01-07 RX ADMIN — ASPIRIN 81 MG 81 MG: 81 TABLET ORAL at 11:01

## 2025-01-07 RX ADMIN — CARVEDILOL 6.25 MG: 3.12 TABLET, FILM COATED ORAL at 11:01

## 2025-01-07 NOTE — PT/OT/SLP EVAL
Speech Language Pathology Evaluation  Bedside Swallow    Patient Name:  Reese Bell   MRN:  [id number]  Admitting Diagnosis: <principal problem not specified>    Recommendations:                 General Recommendations:   SLP f/u to ensure diet tolerance  Diet recommendations:  Soft & Bite Sized Diet - IDDSI Level 6, Thin liquids - IDDSI Level 0. PO meds w/ liquids.  Aspiration Precautions:  Supervision/Assistance w/ meals, Reduced rate of intake, consider eliminating straws, 1 bite/sip at a time, HOB upright w/ PO, and standard aspiration precautions.  Control risk for aspiration PNA via a) oral hygiene q4h & b) HOB upright as tolerated    General Precautions: Standard, aspiration  Communication strategies:   n/a    Assessment:     Reese Bell is a 89 y/o M who presents w/ no clinical signs of oropharyngeal dysphagia today at bedside exam; however, per verbal report from daughter, pt 's baseline presentation at home is coughing w/ liquids when rate is uncontrolled (most notably w/ straw use). Etiology is likely chronic r/t dementia in Alzheimer's disease, although additional chronic dysphagia risk factors appreciated (i.e., GERD, CHF, & aberrant R subclavian artery ). At this current time, pt's dysphagia appears to be well controlled at home w/ implementation of diet modification + aspiration precautions. Pt appears safe for continuation of oral diet at this time. SLP to f/u to ensure diet tolerance.    History:     Past Medical History:   Diagnosis Date    ASCVD (arteriosclerotic cardiovascular disease)     DJD (degenerative joint disease)     Hyperlipidemia     Hypertension     MGUS (monoclonal gammopathy of unknown significance) 5/11/2017    FRIDA (obstructive sleep apnea)     Thrombocytopenia 5/11/2017    Ventricular fibrillation 3/29/2019       Past Surgical History:   Procedure Laterality Date    APPENDECTOMY      CARDIAC PACEMAKER PLACEMENT  10/2016    DENTAL SURGERY  06/02/2016    HERNIA REPAIR       right inguinal    LEFT HEART CATHETERIZATION Left 3/28/2019    Procedure: CATHETERIZATION, HEART, LEFT;  Surgeon: Memo Lynn MD;  [facility]: [facility] CATH;  Service: Cardiology;  Laterality: Left;    Right side catherization Right 2019    TONSILLECTOMY         Social History: Patient lives at home w/ adult children as caretakers.    Prior Intubation HX:  n/a    Modified Barium Swallow: n/a    Radiography:   Imaging Results              CT Chest Without Contrast (Final result)  Result time 01/06/25 10:53:10      Final result by Erin Carbajal MD (01/06/25 10:53:10)                   Impression:      Large left pleural effusion with associated pleural thickening, with complete left lower lobe atelectasis and partial atelectasis of the left upper lobe.  Findings are similar to the prior study of 11/06/2024.    Large right pleural effusion with adjacent passive atelectasis.    Bilateral renal cysts.      Electronically signed by: Erin Carbajal MD  Date:    01/06/2025  Time:    10:53               Narrative:    EXAMINATION:  CT CHEST WITHOUT CONTRAST    CLINICAL HISTORY:  Pleural effusion, malignancy suspected;    TECHNIQUE:  Low dose axial images, sagittal and coronal reformations were obtained from the thoracic inlet to the lung bases. Contrast was not administered.    COMPARISON:  11/06/2024    FINDINGS:  The thyroid appears normal.  The central airways are patent.  The esophagus appears normal.  The heart is enlarged.  Calcified coronary artery disease.  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  This is similar to prior studies.  AICD is in place.  No pericardial effusion no mediastinal, hilar or axillary adenopathy.    Again noted is a large left pleural effusion with associated pleural thickening, similar to previous study of 11/06/2024.  There is complete atelectasis of the left lower lobe with partial aeration of the left upper lobe.  There is a large  right pleural effusion with adjacent passive atelectasis.    Limited evaluation of the upper abdominal structures show bilateral renal cysts.    The bones are osteopenic and show age-appropriate degenerative change.                                       CT Head Without Contrast (Final result)  Result time 01/06/25 10:29:08      Final result by Otilio Zafar MD (01/06/25 10:29:08)                   Impression:      No evidence of acute hemorrhage or major vascular distribution infarct.    Chronic small vessel ischemic change with cerebral volume loss.  This appears similar to but may be somewhat progressed from prior study of 2019.      Electronically signed by: Otilio Zafar MD  Date:    01/06/2025  Time:    10:29               Narrative:    EXAMINATION:  CT HEAD WITHOUT CONTRAST    CLINICAL HISTORY:  Mental status change, unknown cause;    TECHNIQUE:  Low dose axial CT images obtained throughout the head without the use of intravenous contrast.  Axial, sagittal and coronal reconstructions were performed.    COMPARISON:  08/15/2019    FINDINGS:  Intracranial compartment:    Ventricles are similar in size to prior.  There is a pattern of cerebral volume loss, without evidence of hydrocephalus.    Patchy and confluent hypoattenuation in the supratentorial white matter, nonspecific but most likely reflecting chronic small vessel ischemic changes. No recent or remote major vascular distribution infarct. No acute hemorrhage.  No mass effect or midline shift.    No extra-axial blood or fluid collections.    Skull/extracranial contents (limited evaluation):    No displaced calvarial fracture.    The mastoid air cells and visualized paranasal sinuses are essentially clear.    Bilateral pseudophakia.                                       X-Ray Chest AP Portable (Final result)  Result time 01/06/25 09:58:27      Final result by Erin Carbajal MD (01/06/25 09:58:27)                   Impression:      As  "above.      Electronically signed by: Erin Carbajal MD  Date:    01/06/2025  Time:    09:58               Narrative:    EXAMINATION:  XR CHEST AP PORTABLE    CLINICAL HISTORY:  ams;    TECHNIQUE:  Single frontal view of the chest was performed.    COMPARISON:  11/11/2024    FINDINGS:  The heart is enlarged.  Calcified atheromatous disease affects the aorta.  Left-sided AICD.  Large left pleural effusion which is partially loculated appears similar to prior radiographs from 2024.  Suspected small right pleural effusion.  There is pulmonary vascular congestion with interstitial edema.  Skeletal structures are intact.                                    Prior diet: IDDSI 6/0 per pt's daughter including no straws and controlling bolus size presentation/rate of intake      Subjective     Pt seen for clinical swallow evaluation. Pt w/ baseline dementia and is extremely [place]. Participation poor-fair. Positioning limitations given pt becoming upset/uncomfortable w/ HOB in more upright position, stating he could not breathe when positioned that way. Daughter at bedside who provided verbal history. Pt's daughter stated that pt is edentulous and consumes soft and bite-sized solids at home w/ no swallowing difficulties. No reported swallowing difficulties w/ multiple whole pills at one time. Pt's daughter reported coughing/choking w/ thin liquids when pt left to own accord in the context of "drinking too fast." She stated, "that's why we don't use straws anymore." She reported these dysphagia concerns are eliminated w/ controlling pt's rate of intake and bolus size presentation. She reported that she feels pt's dysphagia symptoms are well controlled at home.  Patient goals: none stated     Pain/Comfort:  None verbalized    Respiratory Status: Room air    Objective:     Oral Musculature Evaluation  Oral Musculature: unable to assess due to poor participation/comprehension  Dentition: edentulous  Secretion Management: " adequate  Mucosal Quality: adequate  Voice Prior to PO Intake: perceptually WFL  Oral Musculature Comments: no facial asymmetry    Bedside Swallow Eval:   Consistencies Assessed:  Thin liquids - ~2 oz via pt regulated sequential straw drinking and single straw sips x3  Solids via gautam cracker piece x1    No further trials/presentations administered 2/2 pt refusal    Oral Phase:   WFL    Pharyngeal Phase:   no overt clinical signs/symptoms of aspiration  no overt clinical signs/symptoms of pharyngeal dysphagia  No coughing/choking  No change in vocal quality    Compensatory Strategies  Bolus size modification  Bolus texture modification  Controlled, reduced rate of intake  Consider no straws    Goals:   Multidisciplinary Problems       SLP Goals          Problem: SLP    Goal Priority Disciplines Outcome   SLP Goal     SLP    Description: GOALS:    1) Pt will tolerate least-restrictive oral diet w/o acute dysphagia-related pulmonary complication                       Plan:     Patient to be seen:  3 x/week   Plan of Care expires:  01/21/25  Plan of Care reviewed with:  patient, daughter   SLP Follow-Up:  Yes       Discharge recommendations:  No Therapy Indicated   Barriers to Discharge:   none per SLP    Time Tracking:     SLP Treatment Date:   01/07/25  Speech Start Time:  1116  Speech Stop Time:  1125     Speech Total Time (min):  9 min    Billable Minutes: Eval Swallow and Oral Function 9 minutes    01/07/2025

## 2025-01-07 NOTE — PLAN OF CARE
Problem: Adult Inpatient Plan of Care  Goal: Plan of Care Review  Outcome: Met     Problem: Acute Kidney Injury/Impairment  Goal: Effective Renal Function  Outcome: Progressing     Problem: Wound  Goal: Skin Health and Integrity  Outcome: Progressing     Problem: Fall Injury Risk  Goal: Absence of Fall and Fall-Related Injury  Outcome: Progressing     Problem: Skin Injury Risk Increased  Goal: Skin Health and Integrity  Outcome: Progressing

## 2025-01-07 NOTE — NURSING
Patient discharge note was updated by physician. Discussed discharge with patient. Printed discharge paper work and provided patient with copy. Reading over important points to patient . All of patient questions answered at this time. Patient transported home via wheel chair accompanied by [facility] staff and family. Patient VSS stable.

## 2025-01-07 NOTE — PLAN OF CARE
Problem: Adult Inpatient Plan of Care  Goal: Plan of Care Review  Outcome: Progressing  Goal: Patient-Specific Goal (Individualized)  Outcome: Progressing  Goal: Absence of Hospital-Acquired Illness or Injury  Outcome: Progressing  Goal: Optimal Comfort and Wellbeing  Outcome: Progressing  Goal: Readiness for Transition of Care  Outcome: Progressing     Problem: Acute Kidney Injury/Impairment  Goal: Fluid and Electrolyte Balance  Outcome: Progressing  Goal: Improved Oral Intake  Outcome: Progressing      Clear

## 2025-01-07 NOTE — HPI
Patient is a 89 year old male with medical history of HTN, HLD, Chronic pleural effusions, MGUS, FRIDA and dementia who presented to the ED with altered mental status and hallucinations.  Daughter states that patient became confused last week and was started on Bactrim for suspected UTI.  However, he wasn't eating or drinking so they stopped his bactrim over the weekend.  He seemed more confused and was having hallucinations.  He received IV fluids and IV abx in the ED and daughter states he is back to his cognitive baseline.  He does get intermittently confused and irritated at times.    Admitted for infectious encephalopathy.

## 2025-01-07 NOTE — PLAN OF CARE
[facility] - Med Surg (3rd Fl)  Initial Discharge Assessment       Primary Care Provider: Oracio Johnson MD    Admission Diagnosis: AMS (altered mental status) [R41.82]    Admission Date: 1/6/2025  Expected Discharge Date: 1/7/2025    Transition of Care Barriers: (P) None    Payor: [employer] MEDICARE ADVANTAGE / Plan: [employer] D[employer] HMO DSNP / Product Type: Medicare Advantage /     Extended Emergency Contact Information  Primary Emergency Contact: Mikala Reddy   [place]  Mobile Phone: [phone]  Relation: Daughter  Secondary Emergency Contact: Treva Gonzales  Mobile Phone: [phone]  Relation: Daughter    Discharge Plan A: (P) Home Health  Discharge Plan B: (P) Home Health      [id number] [place] - [address]  [address]  [facility] [address]  Phone: [phone] Fax: [phone]    [facility] [address] [place] [address]  Phone: [phone] Fax: [phone]    [facility] - [place], [place] - [address]  [address]  [facility] [address]  Phone: [phone] Fax: [phone]    [facility] [address]  [address]  [facility] [address]  Phone: [phone] Fax: [phone]      Initial Assessment (most recent)       Adult Discharge Assessment - 01/07/25 1639          Discharge Assessment    Assessment Type Discharge Planning Assessment     Confirmed/corrected address, phone number and insurance Yes     Confirmed Demographics Correct on Facesheet     Source of Information patient     Communicated LOUISE with patient/caregiver Date not available/Unable to determine     Reason For Admission Infectious Encephalopathy     People in Home child(keesha), adult     Facility Arrived From: Home     Do you expect to return to your current living situation? Yes     Do you have help at home or someone to help you manage your care at home?  Yes     Who are your caregiver(s) and their phone number(s)? Family     Prior to hospitilization cognitive status: Unable to Assess     Current cognitive status: Unable to Assess     Walking or Climbing Stairs Difficulty yes     Walking or Climbing Stairs ambulation difficulty, dependent;stair climbing difficulty, dependent;transferring difficulty, dependent     Mobility Management Wheelchair and july     Dressing/Bathing Difficulty yes     Dressing/Bathing bathing difficulty, dependent;dressing difficulty, dependent     Dressing/Bathing Management Family     Home Accessibility wheelchair accessible     Home Layout Able to live on 1st floor     Equipment Currently Used at Home hospital bed;lift device;wheelchair;CPAP;shower chair     Readmission within 30 days? No     Patient currently being followed by outpatient case management? No     Do you currently have service(s) that help you manage your care at home? No     Do you take prescription medications? No     Do you have prescription coverage? Yes     Coverage [employer] medicare     Do you have any problems affording any of your prescribed medications? No (P)      Is the patient taking medications as prescribed? yes (P)      Who is going to help you get home at discharge? Family (P)      How do you get to doctors appointments? family or friend will provide (P)      Are you on dialysis? No (P)      Do you take coumadin? No (P)      Discharge Plan A Home Health (P)      Discharge Plan B Home Health (P)      DME Needed Upon Discharge  none (P)      Discharge Plan discussed with: Patient (P)      Transition of Care Barriers None (P)                           Discharge assessment completed at bedside with patient's daughter. Patient lives at home and has 24 hr care by his children. Patient will have [facility] upon discharge. CM to remain available to assist with discharge set up.

## 2025-01-07 NOTE — PLAN OF CARE
01/07/25 1057   Rounds   Attendance Provider;Nurse ;   Discharge Plan A Home   Why the patient remains in the hospital Requires continued medical care  (patient currently being treated for UTI)   Transition of Care Barriers Other (see comments)  (case management will schedule follow up care, family requires education on disease management and medication compliance)     Care team at bedside, discussed plan of care with patient / family.  Discharge planning initiated. Patient provided with Case Management contact information and encouraged to call with concerns or questions. Discharge Planning Begins on Admission Pamphlet provided.  Will continue to follow for duration of stay.

## 2025-01-08 LAB
OHS QRS DURATION: 84 MS
OHS QTC CALCULATION: 440 MS

## 2025-01-08 NOTE — ASSESSMENT & PLAN NOTE
Continue home medications  Neurology referral  Daughter asking for medication for irritation/agitation.  Low dose trazodone started.  Discussed risk/benefits

## 2025-01-08 NOTE — DISCHARGE SUMMARY
[facility] Surg ([facility])  [facility]  Discharge Summary      Patient Name: Reese Bell  MRN: [id number]  JACQUES: [id number]  Patient Class: IP- Inpatient  Admission Date: 1/6/2025  Hospital Length of Stay: 1 days  Discharge Date and Time:  01/07/2025 6:50 PM  Attending Physician: No att. providers found   Discharging Provider: Heather Disla PA-C  Primary Care Provider: Oracio Johnson MD    Primary Care Team: Networked reference to record PCT     HPI:   Patient is a 89 year old male with medical history of HTN, HLD, Chronic pleural effusions, MGUS, FRIDA and dementia who presented to the ED with altered mental status and hallucinations.  Daughter states that patient became confused last week and was started on Bactrim for suspected UTI.  However, he wasn't eating or drinking so they stopped his bactrim over the weekend.  He seemed more confused and was having hallucinations.  He received IV fluids and IV abx in the ED and daughter states he is back to his cognitive baseline.  He does get intermittently confused and irritated at times.    Admitted for infectious encephalopathy.      * No surgery found *      Hospital Course:   PT admitted for infectious encephalopathy.  CT head negative.  Suspect UTI but urine culture pending.  He is back to neurological baseline and will discharge with course of keflex.  Discussed with daughter who states she is POA.       Goals of Care Treatment Preferences:  Code Status: Full Code    Living Will: Yes              SDOH Screening:  The patient was screened for utility difficulties, food insecurity, transport difficulties, housing insecurity, and interpersonal safety and there were no concerns identified this admission.     Consults:     * Infectious encephalopathy  + elevated WBC on u/a  Received IV rocephin  CT head negative for acute stroke  Etiology likely due to UTI in the setting of dementia  Pt can follow up with Dr. Johnson   Back to baseline per  daughter      Chronic bilateral pleural effusions  Stable on CT Chest  Continue home lasix   Pt on room air     Obstructive sleep apnea  Continue CPAP machine       Acute cystitis  WBC elevated on microscopic  Urine culture pending  Prior urine culture negative but WBC more elevated on microscopic  Will d/c with keflex and can f/u outpatient urine culture with PCP      Paroxysmal atrial fibrillation  Patient has paroxysmal (<7 days) atrial fibrillation. Patient is currently in sinus rhythm. [id number] Score: 3. The patients heart rate in the last 24 hours is as follows:  Pulse  Min: 75  Max: 94     Antiarrhythmics       Anticoagulants       Plan  - Replete lytes with a goal of K>4, Mg >2  - Patient is not anticoagulated due to SDH  - Patient's afib is currently controlled         Dementia in Alzheimer's disease  Continue home medications  Neurology referral  Daughter asking for medication for irritation/agitation.  Low dose trazodone started.  Discussed risk/benefits      Final Active Diagnoses:    Diagnosis Date Noted POA    PRINCIPAL PROBLEM:  Infectious encephalopathy [G93.49, B99.9] 01/07/2025 Yes    Acute cystitis [N30.00] 01/07/2025 Yes    Obstructive sleep apnea [G47.33] 01/07/2025 Yes    Chronic bilateral pleural effusions [J90] 01/07/2025 Yes    Paroxysmal atrial fibrillation [I48.0] 08/15/2019 Yes    Dementia in Alzheimer's disease [G30.9, F02.80] 08/17/2017 Yes      Problems Resolved During this Admission:       Discharged Condition: stable    Disposition: Home-Health Care [facility]    Follow Up:   Follow-up Information       Oracio Johnson MD. Go on 1/24/2025.    Specialty: Family Medicine  Why: 9:30am  Contact information:  [place] [address]  [phone]                           Patient Instructions:      Ambulatory referral/consult to Home Health   Standing Status: Future   Referral Priority: Routine Referral Type: Home Health   Referral Reason: Specialty Services Required    Requested Specialty: Home Health Services   Number of Visits Requested: 1     Ambulatory referral/consult to Neurology   Standing Status: Future   Referral Priority: Routine Referral Type: Consultation   Referral Reason: Specialty Services Required   Requested Specialty: Neurology   Number of Visits Requested: 1       Significant Diagnostic Studies: see A&P    Pending Diagnostic Studies:       None           Medications:  Reconciled Home Medications:      Medication List        START taking these medications      cephALEXin 500 MG capsule  Commonly known as: KEFLEX  Take 1 capsule (500 mg total) by mouth every 8 (eight) hours. for 7 days     traZODone 50 MG tablet  Commonly known as: DESYREL  Take 1 tablet (50 mg total) by mouth every evening.            CHANGE how you take these medications      tamsulosin 0.4 mg Cap  Commonly known as: FLOMAX  Take 1 capsule (0.4 mg total) by mouth once daily.  What changed: when to take this            CONTINUE taking these medications      albuterol-ipratropium 2.5 mg-0.5 mg/3 mL nebulizer solution  Commonly known as: DUO-NEB  Take 3 mLs by nebulization every 6 (six) hours as needed for Wheezing. Rescue     aspirin 81 MG Chew  Take 81 mg by mouth once daily.     carvediloL 12.5 MG tablet  Commonly known as: COREG  Take 0.5 tablets (6.25 mg total) by mouth 2 (two) times daily.     cholecalciferol (vitamin D3) 50 mcg (2,000 unit) Cap capsule  Commonly known as: VITAMIN D3  Take 1 capsule by mouth once daily.     CRESTOR 5 mg tablet  Generic drug: rosuvastatin  Take 5 mg by mouth every Mon, Wed, Fri.     ezetimibe 10 mg tablet  Commonly known as: ZETIA  Take 10 mg by mouth every evening.     furosemide 40 MG tablet  Commonly known as: LASIX  Take 0.5 tablets (20 mg total) by mouth once daily. Takes 20 mg in the morning and 20 prn at night     ipratropium 42 mcg (0.06 %) nasal spray  Commonly known as: ATROVENT  2 sprays by Each Nostril route once daily.     memantine 5 MG  Tab  Commonly known as: NAMENDA  Take 1 tablet (5 mg total) by mouth 2 (two) times daily.     mirtazapine 15 MG tablet  Commonly known as: REMERON  TAKE 1 TABLET BY MOUTH IN THE  EVENING     nebulizer and compressor Nenita  1 Device by Misc.(Non-Drug; Combo Route) route 4 (four) times daily as needed.     nitroGLYCERIN 0.4 MG SL tablet  Commonly known as: NITROSTAT  Place 0.4 mg under the tongue every 5 (five) minutes as needed for Chest pain.     RANEXA 1,000 mg Tb12  Generic drug: ranolazine  Take 1,000 mg by mouth 2 (two) times daily.     triamcinolone acetonide 0.1% 0.1 % cream  Commonly known as: KENALOG  Apply topically 2 (two) times daily. for 10 days            STOP taking these medications      sulfamethoxazole-trimethoprim 800-160mg 800-160 mg Tab  Commonly known as: BACTRIM DS              Indwelling Lines/Drains at time of discharge:   Lines/Drains/Airways       None                   Time spent on the discharge of patient: 25 minutes         Heather Disla PA-C  Department of Hospital Medicine  [place] [facility] Surg (3rd Fl)

## 2025-01-08 NOTE — ASSESSMENT & PLAN NOTE
WBC elevated on microscopic  Urine culture pending  Prior urine culture negative but WBC more elevated on microscopic  Will d/c with keflex and can f/u outpatient urine culture with PCP

## 2025-01-08 NOTE — SUBJECTIVE & OBJECTIVE
Past Medical History:   Diagnosis Date    ASCVD (arteriosclerotic cardiovascular disease)     DJD (degenerative joint disease)     Hyperlipidemia     Hypertension     MGUS (monoclonal gammopathy of unknown significance) 5/11/2017    FRIDA (obstructive sleep apnea)     Thrombocytopenia 5/11/2017    Ventricular fibrillation 3/29/2019       Past Surgical History:   Procedure Laterality Date    APPENDECTOMY      CARDIAC PACEMAKER PLACEMENT  10/2016    DENTAL SURGERY  06/02/2016    HERNIA REPAIR      right inguinal    LEFT HEART CATHETERIZATION Left 3/28/2019    Procedure: CATHETERIZATION, HEART, LEFT;  Surgeon: Memo Lynn MD;  Location: [facility] CATH;  Service: Cardiology;  Laterality: Left;    Right side catherization Right 2019    TONSILLECTOMY         Review of patient's allergies indicates:   Allergen Reactions    Flexeril [cyclobenzaprine] Other (See Comments)    Vicodin [hydrocodone-acetaminophen] Hallucinations    Bactrim [sulfamethoxazole-trimethoprim] Other (See Comments) and Hallucinations     Agitation       Current Facility-Administered Medications on File Prior to Encounter   Medication    albuterol inhaler 2 puff     Current Outpatient Medications on File Prior to Encounter   Medication Sig    aspirin 81 MG Chew Take 81 mg by mouth once daily.    cholecalciferol, vitamin D3, (VITAMIN D3) 50 mcg (2,000 unit) Cap Take 1 capsule by mouth once daily.    CRESTOR 5 mg tablet Take 5 mg by mouth every Mon, Wed, Fri.     ezetimibe (ZETIA) 10 mg tablet Take 10 mg by mouth every evening.     memantine (NAMENDA) 5 MG Tab Take 1 tablet (5 mg total) by mouth 2 (two) times daily.    mirtazapine (REMERON) 15 MG tablet TAKE 1 TABLET BY MOUTH IN THE  EVENING    RANEXA 1,000 mg Tb12 Take 1,000 mg by mouth 2 (two) times daily.     tamsulosin (FLOMAX) 0.4 mg Cap Take 1 capsule (0.4 mg total) by mouth once daily. (Patient taking differently: Take 0.4 mg by mouth every evening.)    [DISCONTINUED] carvediloL (COREG) 12.5 MG tablet  Take 1 tablet (12.5 mg total) by mouth 2 (two) times daily. (Patient taking differently: Take 6.25 mg by mouth 2 (two) times daily.)    [DISCONTINUED] furosemide (LASIX) 40 MG tablet Take 1 tablet (40 mg total) by mouth 2 (two) times daily. (Patient taking differently: Take 20 mg by mouth once daily. Takes 20 mg in the morning and 20 prn at night)    [DISCONTINUED] ipratropium (ATROVENT) 42 mcg (0.06 %) nasal spray 2 sprays by Each Nostril route 2 (two) times daily. (Patient taking differently: 2 sprays by Each Nostril route once daily.)    albuterol-ipratropium (DUO-NEB) 2.5 mg-0.5 mg/3 mL nebulizer solution Take 3 mLs by nebulization every 6 (six) hours as needed for Wheezing. Rescue    nebulizer and compressor Nenita 1 Device by Misc.(Non-Drug; Combo Route) route 4 (four) times daily as needed.    nitroGLYCERIN (NITROSTAT) 0.4 MG SL tablet Place 0.4 mg under the tongue every 5 (five) minutes as needed for Chest pain.    triamcinolone acetonide 0.1% (KENALOG) 0.1 % cream Apply topically 2 (two) times daily. for 10 days    [DISCONTINUED] sulfamethoxazole-trimethoprim 800-160mg (BACTRIM DS) 800-160 mg Tab One po bid for 5 days then one po qd for 10 days     Family History       Problem Relation (Age of Onset)    Coronary artery disease Father    Stroke Mother          Tobacco Use    Smoking status: Former    Smokeless tobacco: Never   Substance and Sexual Activity    Alcohol use: No    Drug use: No    Sexual activity: Not on file     Review of Systems   Reason unable to perform ROS: no acute complaints but has dementia.     Objective:     Vital Signs (Most Recent):  Temp: 97.6 °F (36.4 °C) (01/07/25 1557)  Pulse: 80 (01/07/25 1610)  Resp: 16 (01/07/25 1557)  BP: (!) 148/62 (01/07/25 1620)  SpO2: 97 % (01/07/25 1557) Vital Signs (24h Range):  Temp:  [97.4 °F (36.3 °C)-98.5 °F (36.9 °C)] 97.6 °F (36.4 °C)  Pulse:  [75-94] 80  Resp:  [16-20] 16  SpO2:  [95 %-99 %] 97 %  BP: (134-163)/(62-97) 148/62     Weight: 102.7 kg  "(226 lb 6.6 oz)  Body mass index is 38.86 kg/m².     Physical Exam  Constitutional:       General: He is not in acute distress.  HENT:      Head: Normocephalic and atraumatic.   Eyes:      General:         Right eye: No discharge.         Left eye: No discharge.   Cardiovascular:      Rate and Rhythm: Normal rate and regular rhythm.   Pulmonary:      Effort: Pulmonary effort is normal.      Comments: Bilateral decreased breath sounds  Abdominal:      General: There is no distension.      Tenderness: There is no abdominal tenderness.   Musculoskeletal:         General: No swelling or tenderness.      Cervical back: Neck supple. No tenderness.      Right lower leg: No edema.      Left lower leg: Edema present.   Skin:     General: Skin is warm and dry.   Neurological:      General: No focal deficit present.      Mental Status: He is alert.      Comments: Oriented to place but not month or age   Psychiatric:         Mood and Affect: Mood normal.         Behavior: Behavior normal.                Significant Labs: A1C: No results for input(s): "HGBA1C" in the last 4320 hours.  ABGs: No results for input(s): "PH", "PCO2", "HCO3", "POCSATURATED", "BE", "TOTALHB", "COHB", "METHB", "O2HB", "POCFIO2", "PO2" in the last 48 hours.  Bilirubin:   Recent Labs   Lab 12/09/24  1250 01/06/25  0933   BILITOT 0.5 0.5     Blood Culture:   Recent Labs   Lab 01/06/25  0931 01/06/25  0934   LABBLOO No Growth to date No Growth to date     BMP:   Recent Labs   Lab 01/07/25  0455   GLU 96      K 4.3      CO2 25   BUN 19   CREATININE 1.2   CALCIUM 9.0     CBC:   Recent Labs   Lab 01/06/25  0933 01/07/25  0455   WBC 7.07 5.68   HGB 10.4* 10.3*   HCT 32.4* 32.1*   * 135*     CMP:   Recent Labs   Lab 01/06/25  0933 01/07/25  0455    138   K 4.5 4.3    103   CO2 25 25   * 96   BUN 19 19   CREATININE 1.3 1.2   CALCIUM 9.1 9.0   PROT 7.2  --    ALBUMIN 3.2*  --    BILITOT 0.5  --    ALKPHOS 139  --    AST 20  -- " "   ALT 16  --    ANIONGAP 11 10     Cardiac Markers: No results for input(s): "CKMB", "MYOGLOBIN", "BNP", "TROPISTAT" in the last 48 hours.  Coagulation: No results for input(s): "PT", "INR", "APTT" in the last 48 hours.  Lactic Acid:   Recent Labs   Lab 01/06/25  0931   LACTATE 1.4     Lipase: No results for input(s): "LIPASE" in the last 48 hours.  Lipid Panel: No results for input(s): "CHOL", "HDL", "LDLCALC", "TRIG", "CHOLHDL" in the last 48 hours.  Magnesium: No results for input(s): "MG" in the last 48 hours.  POCT Glucose: No results for input(s): "POCTGLUCOSE" in the last 48 hours.  Prealbumin: No results for input(s): "PREALBUMIN" in the last 48 hours.  Respiratory Culture: No results for input(s): "GSRESP", "RESPIRATORYC" in the last 48 hours.  Troponin: No results for input(s): "TROPONINI", "TROPONINIHS" in the last 48 hours.  TSH:   Recent Labs   Lab 01/06/25  0933   TSH 1.756     Urine Culture: No results for input(s): "LABURIN" in the last 48 hours.  Urine Studies:   Recent Labs   Lab 01/06/25  1041   COLORU Lela   APPEARANCEUA Hazy*   PHUR 6.0   SPECGRAV 1.020   PROTEINUA 1+*   GLUCUA Negative   KETONESU Negative   BILIRUBINUA Negative   OCCULTUA Negative   NITRITE Negative   UROBILINOGEN Negative   LEUKOCYTESUR 2+*   RBCUA 0   WBCUA 85*   BACTERIA Moderate*   HYALINECASTS 0       Significant Imaging: I have reviewed all pertinent imaging results/findings within the past 24 hours.  "

## 2025-01-08 NOTE — HOSPITAL COURSE
PT admitted for infectious encephalopathy.  CT head negative.  Suspect UTI but urine culture pending.  He is back to neurological baseline and will discharge with course of keflex.  Discussed with daughter who states she is POA.

## 2025-01-08 NOTE — ASSESSMENT & PLAN NOTE
+ elevated WBC on u/a  Received IV rocephin  CT head negative for acute stroke  Etiology likely due to UTI in the setting of dementia  Pt can follow up with Dr. Johnson   Back to baseline per daughter

## 2025-01-08 NOTE — ASSESSMENT & PLAN NOTE
Patient has paroxysmal (<7 days) atrial fibrillation. Patient is currently in sinus rhythm. [id number] Score: 3. The patients heart rate in the last 24 hours is as follows:  Pulse  Min: 75  Max: 94     Antiarrhythmics       Anticoagulants       Plan  - Replete lytes with a goal of K>4, Mg >2  - Patient is not anticoagulated due to SDH  - Patient's afib is currently controlled

## 2025-01-08 NOTE — H&P
[facility] ([address] [place] [address] Medicine  History & Physical    Patient Name: Reese Bell  MRN: [id number]  Patient Class: IP- Inpatient  Admission Date: 1/6/2025  Attending Physician: No att. providers found   Primary Care Provider: Oracio Johnson MD         Patient information was obtained from patient, relative(s), and ER records.     Subjective:     Principal Problem:Infectious encephalopathy    Chief Complaint:   Chief Complaint   Patient presents with    Altered Mental Status     Patient presented to ED via EMS with complaints of AMS (onset Saturday). He is currently being treated for a UTI.   Family stopped offering prescribed medication for UTI due to mental status.         HPI: Patient is a 89 year old male with medical history of HTN, HLD, Chronic pleural effusions, MGUS, FRIDA and dementia who presented to the ED with altered mental status and hallucinations.  Daughter states that patient became confused last week and was started on Bactrim for suspected UTI.  However, he wasn't eating or drinking so they stopped his bactrim over the weekend.  He seemed more confused and was having hallucinations.  He received IV fluids and IV abx in the ED and daughter states he is back to his cognitive baseline.  He does get intermittently confused and irritated at times.    Admitted for infectious encephalopathy.      Past Medical History:   Diagnosis Date    ASCVD (arteriosclerotic cardiovascular disease)     DJD (degenerative joint disease)     Hyperlipidemia     Hypertension     MGUS (monoclonal gammopathy of unknown significance) 5/11/2017    FRIDA (obstructive sleep apnea)     Thrombocytopenia 5/11/2017    Ventricular fibrillation 3/29/2019       Past Surgical History:   Procedure Laterality Date    APPENDECTOMY      CARDIAC PACEMAKER PLACEMENT  10/2016    DENTAL SURGERY  06/02/2016    HERNIA REPAIR      right inguinal    LEFT HEART CATHETERIZATION Left 3/28/2019    Procedure: CATHETERIZATION,  HEART, LEFT;  Surgeon: Memo Lynn MD;  Location: [facility];  Service: Cardiology;  Laterality: Left;    Right side catherization Right 2019    TONSILLECTOMY         Review of patient's allergies indicates:   Allergen Reactions    Flexeril [cyclobenzaprine] Other (See Comments)    Vicodin [hydrocodone-acetaminophen] Hallucinations    Bactrim [sulfamethoxazole-trimethoprim] Other (See Comments) and Hallucinations     Agitation       Current Facility-Administered Medications on File Prior to Encounter   Medication    albuterol inhaler 2 puff     Current Outpatient Medications on File Prior to Encounter   Medication Sig    aspirin 81 MG Chew Take 81 mg by mouth once daily.    cholecalciferol, vitamin D3, (VITAMIN D3) 50 mcg (2,000 unit) Cap Take 1 capsule by mouth once daily.    CRESTOR 5 mg tablet Take 5 mg by mouth every Mon, Wed, Fri.     ezetimibe (ZETIA) 10 mg tablet Take 10 mg by mouth every evening.     memantine (NAMENDA) 5 MG Tab Take 1 tablet (5 mg total) by mouth 2 (two) times daily.    mirtazapine (REMERON) 15 MG tablet TAKE 1 TABLET BY MOUTH IN THE  EVENING    RANEXA 1,000 mg Tb12 Take 1,000 mg by mouth 2 (two) times daily.     tamsulosin (FLOMAX) 0.4 mg Cap Take 1 capsule (0.4 mg total) by mouth once daily. (Patient taking differently: Take 0.4 mg by mouth every evening.)    [DISCONTINUED] carvediloL (COREG) 12.5 MG tablet Take 1 tablet (12.5 mg total) by mouth 2 (two) times daily. (Patient taking differently: Take 6.25 mg by mouth 2 (two) times daily.)    [DISCONTINUED] furosemide (LASIX) 40 MG tablet Take 1 tablet (40 mg total) by mouth 2 (two) times daily. (Patient taking differently: Take 20 mg by mouth once daily. Takes 20 mg in the morning and 20 prn at night)    [DISCONTINUED] ipratropium (ATROVENT) 42 mcg (0.06 %) nasal spray 2 sprays by Each Nostril route 2 (two) times daily. (Patient taking differently: 2 sprays by Each Nostril route once daily.)    albuterol-ipratropium (DUO-NEB) 2.5 mg-0.5  mg/3 mL nebulizer solution Take 3 mLs by nebulization every 6 (six) hours as needed for Wheezing. Rescue    nebulizer and compressor Nenita 1 Device by Misc.(Non-Drug; Combo Route) route 4 (four) times daily as needed.    nitroGLYCERIN (NITROSTAT) 0.4 MG SL tablet Place 0.4 mg under the tongue every 5 (five) minutes as needed for Chest pain.    triamcinolone acetonide 0.1% (KENALOG) 0.1 % cream Apply topically 2 (two) times daily. for 10 days    [DISCONTINUED] sulfamethoxazole-trimethoprim 800-160mg (BACTRIM DS) 800-160 mg Tab One po bid for 5 days then one po qd for 10 days     Family History       Problem Relation (Age of Onset)    Coronary artery disease Father    Stroke Mother          Tobacco Use    Smoking status: Former    Smokeless tobacco: Never   Substance and Sexual Activity    Alcohol use: No    Drug use: No    Sexual activity: Not on file     Review of Systems   Reason unable to perform ROS: no acute complaints but has dementia.     Objective:     Vital Signs (Most Recent):  Temp: 97.6 °F (36.4 °C) (01/07/25 1557)  Pulse: 80 (01/07/25 1610)  Resp: 16 (01/07/25 1557)  BP: (!) 148/62 (01/07/25 1620)  SpO2: 97 % (01/07/25 1557) Vital Signs (24h Range):  Temp:  [97.4 °F (36.3 °C)-98.5 °F (36.9 °C)] 97.6 °F (36.4 °C)  Pulse:  [75-94] 80  Resp:  [16-20] 16  SpO2:  [95 %-99 %] 97 %  BP: (134-163)/(62-97) 148/62     Weight: 102.7 kg (226 lb 6.6 oz)  Body mass index is 38.86 kg/m².     Physical Exam  Constitutional:       General: He is not in acute distress.  HENT:      Head: Normocephalic and atraumatic.   Eyes:      General:         Right eye: No discharge.         Left eye: No discharge.   Cardiovascular:      Rate and Rhythm: Normal rate and regular rhythm.   Pulmonary:      Effort: Pulmonary effort is normal.      Comments: Bilateral decreased breath sounds  Abdominal:      General: There is no distension.      Tenderness: There is no abdominal tenderness.   Musculoskeletal:         General: No swelling or  "tenderness.      Cervical back: Neck supple. No tenderness.      Right lower leg: No edema.      Left lower leg: Edema present.   Skin:     General: Skin is warm and dry.   Neurological:      General: No focal deficit present.      Mental Status: He is alert.      Comments: Oriented to place but not month or age   Psychiatric:         Mood and Affect: Mood normal.         Behavior: Behavior normal.                Significant Labs: A1C: No results for input(s): "HGBA1C" in the last 4320 hours.  ABGs: No results for input(s): "PH", "PCO2", "HCO3", "POCSATURATED", "BE", "TOTALHB", "COHB", "METHB", "O2HB", "POCFIO2", "PO2" in the last 48 hours.  Bilirubin:   Recent Labs   Lab 12/09/24  1250 01/06/25  0933   BILITOT 0.5 0.5     Blood Culture:   Recent Labs   Lab 01/06/25  0931 01/06/25  0934   LABBLOO No Growth to date No Growth to date     BMP:   Recent Labs   Lab 01/07/25  0455   GLU 96      K 4.3      CO2 25   BUN 19   CREATININE 1.2   CALCIUM 9.0     CBC:   Recent Labs   Lab 01/06/25  0933 01/07/25  0455   WBC 7.07 5.68   HGB 10.4* 10.3*   HCT 32.4* 32.1*   * 135*     CMP:   Recent Labs   Lab 01/06/25  0933 01/07/25  0455    138   K 4.5 4.3    103   CO2 25 25   * 96   BUN 19 19   CREATININE 1.3 1.2   CALCIUM 9.1 9.0   PROT 7.2  --    ALBUMIN 3.2*  --    BILITOT 0.5  --    ALKPHOS 139  --    AST 20  --    ALT 16  --    ANIONGAP 11 10     Cardiac Markers: No results for input(s): "CKMB", "MYOGLOBIN", "BNP", "TROPISTAT" in the last 48 hours.  Coagulation: No results for input(s): "PT", "INR", "APTT" in the last 48 hours.  Lactic Acid:   Recent Labs   Lab 01/06/25  0931   LACTATE 1.4     Lipase: No results for input(s): "LIPASE" in the last 48 hours.  Lipid Panel: No results for input(s): "CHOL", "HDL", "LDLCALC", "TRIG", "CHOLHDL" in the last 48 hours.  Magnesium: No results for input(s): "MG" in the last 48 hours.  POCT Glucose: No results for input(s): "POCTGLUCOSE" in the last 48 " "hours.  Prealbumin: No results for input(s): "PREALBUMIN" in the last 48 hours.  Respiratory Culture: No results for input(s): "GSRESP", "RESPIRATORYC" in the last 48 hours.  Troponin: No results for input(s): "TROPONINI", "TROPONINIHS" in the last 48 hours.  TSH:   Recent Labs   Lab 01/06/25  0933   TSH 1.756     Urine Culture: No results for input(s): "LABURIN" in the last 48 hours.  Urine Studies:   Recent Labs   Lab 01/06/25  1041   COLORU Lela   APPEARANCEUA Hazy*   PHUR 6.0   SPECGRAV 1.020   PROTEINUA 1+*   GLUCUA Negative   KETONESU Negative   BILIRUBINUA Negative   OCCULTUA Negative   NITRITE Negative   UROBILINOGEN Negative   LEUKOCYTESUR 2+*   RBCUA 0   WBCUA 85*   BACTERIA Moderate*   HYALINECASTS 0       Significant Imaging: I have reviewed all pertinent imaging results/findings within the past 24 hours.  Assessment/Plan:     * Infectious encephalopathy  + elevated WBC on u/a  Received IV rocephin  CT head negative for acute stroke  Etiology likely due to UTI in the setting of dementia  Pt can follow up with Dr. Johnson   Back to baseline per daughter      Chronic bilateral pleural effusions  Stable on CT Chest  Continue home lasix   Pt on room air     Obstructive sleep apnea  Continue CPAP machine       Acute cystitis  WBC elevated on microscopic  Urine culture pending  Prior urine culture negative but WBC more elevated on microscopic  Will d/c with keflex and can f/u outpatient urine culture with PCP      Paroxysmal atrial fibrillation  Patient has paroxysmal (<7 days) atrial fibrillation. Patient is currently in sinus rhythm. [id number] Score: 3. The patients heart rate in the last 24 hours is as follows:  Pulse  Min: 75  Max: 94     Antiarrhythmics       Anticoagulants       Plan  - Replete lytes with a goal of K>4, Mg >2  - Patient is not anticoagulated due to SDH  - Patient's afib is currently controlled         Dementia in Alzheimer's disease  Continue home medications  Neurology " referral  Daughter asking for medication for irritation/agitation.  Low dose trazodone started.  Discussed risk/benefits      VTE Risk Mitigation (From admission, onward)           Ordered     IP VTE HIGH RISK PATIENT  Once         01/06/25 1743     Place sequential compression device  Until discontinued         01/06/25 1743                                    Heather Disla PA-C  Department of Hospital Medicine  [place] [facility] Surg (3rd Fl)

## 2025-01-08 NOTE — ASSESSMENT & PLAN NOTE
Patient has paroxysmal (<7 days) atrial fibrillation. Patient is currently in sinus rhythm. [id number] Score: 3. The patients heart rate in the last 24 hours is as follows:  Pulse  Min: 75  Max: 94     Antiarrhythmics       Anticoagulants       Plan  - Replete lytes with a goal of K>4, Mg >2  - Patient is not anticoagulated due to SDH  - Patient's afib is currently controlled

## 2025-01-09 NOTE — PLAN OF CARE
[place] - Med Surg (3rd Fl)  Discharge Final Note    Primary Care Provider: Oracio Johnson MD    Expected Discharge Date: 1/7/2025    Final Discharge Note (most recent)       Final Note - 01/09/25 0902          Final Note    Assessment Type Final Discharge Note (P)      Anticipated Discharge Disposition Home or Self Care (P)      Hospital Resources/Appts/Education Provided Appointments scheduled and added to AVS;Provided education on problems/symptoms using teachback;Provided patient/caregiver with written discharge plan information (P)         Post-Acute Status    Post-Acute Authorization Home Health (P)      Home Health Status Set-up Complete/Auth obtained (P)      Coverage [employer] medicare (P)      Discharge Delays None known at this time (P)                      Important Message from Medicare  Important Message from Medicare regarding Discharge Appeal Rights: Signed/date by patient/caregiver     Date IMM was signed: 01/06/25  Time IMM was signed: 1208    Contact Info       Oracio Johnson MD   Specialty: Family Medicine   Relationship: PCP - General    [facility] [place] [id number]   Phone: [phone]       Next Steps: Go on 1/24/2025    Instructions: 9:30am          Patient will discharge home with adult children with [facility]. Follow up appointment is listed above.

## 2025-01-10 ENCOUNTER — PATIENT OUTREACH (OUTPATIENT)
Dept: ADMINISTRATIVE | Facility: CLINIC | Age: OVER 89
End: 2025-01-10
Payer: MEDICARE

## 2025-01-10 NOTE — PROGRESS NOTES
C3 nurse spoke with Reese Bell's daughter, Mikala for a TCC post hospital discharge follow up call. The patient has a scheduled HOSFU appointment with Oracio Johnson MD on 01/16/25 @ 7434, virtual.

## 2025-01-10 NOTE — TELEPHONE ENCOUNTER
Mikala (daughter)    Vit C, Emergen, 2 tab in AM  Probiotic gummies while on Abx  Vegg/Fruit supp in AM

## 2025-01-11 LAB
BACTERIA BLD CULT: NORMAL
BACTERIA BLD CULT: NORMAL

## 2025-01-16 ENCOUNTER — OFFICE VISIT (OUTPATIENT)
Dept: INTERNAL MEDICINE | Facility: CLINIC | Age: OVER 89
End: 2025-01-16
Payer: MEDICARE

## 2025-01-16 DIAGNOSIS — N17.9 AKI (ACUTE KIDNEY INJURY): ICD-10-CM

## 2025-01-16 DIAGNOSIS — N39.0 URINARY TRACT INFECTION WITHOUT HEMATURIA, SITE UNSPECIFIED: ICD-10-CM

## 2025-01-16 DIAGNOSIS — Z74.09 IMPAIRED MOBILITY: ICD-10-CM

## 2025-01-16 DIAGNOSIS — S31.000A WOUND OF SACRAL REGION, INITIAL ENCOUNTER: ICD-10-CM

## 2025-01-16 DIAGNOSIS — G47.33 OSA (OBSTRUCTIVE SLEEP APNEA): ICD-10-CM

## 2025-01-16 DIAGNOSIS — I25.10 ASCVD (ARTERIOSCLEROTIC CARDIOVASCULAR DISEASE): Primary | ICD-10-CM

## 2025-01-16 RX ORDER — FOAM BANDAGE 3" X 3"
1 BANDAGE TOPICAL
Start: 2025-01-16

## 2025-01-16 NOTE — PROGRESS NOTES
"Subjective:       Patient ID: Reese Bell is a 89 y.o. male.    Chief Complaint: No chief complaint on file.    89y/o W M  for post F U for   Review of Systems   Constitutional:  Positive for appetite change.        Dehydrated    Genitourinary:         Post UTI  hospital stay for and has finished his Keflex   Skin:  Positive for wound.        Has a sacral skin breakdown   Neurological:         Sleeping week now       Objective:      Physical Exam  Constitutional:       Comments: 89y/o W M- virtual visit with his sitter       Assessment:       Encounter Diagnoses   Name Primary?    ASCVD (arteriosclerotic cardiovascular disease) Yes    FRIDA (obstructive sleep apnea)     Impaired mobility     HERMELINDA (acute kidney injury)     Urinary tract infection without hematuria, site unspecified     Wound of sacral region, initial encounter          Plan:   1. ASCVD (arteriosclerotic cardiovascular disease)    2. FRIDA (obstructive sleep apnea)    3. Impaired mobility    4. HERMELINDA (acute kidney injury)    5. Urinary tract infection without hematuria, site unspecified    6. Wound of sacral region, initial encounter  -     foam bandage (MEPILEX BORDER) 3 X 3 " Bndg; Apply 1 application  topically every 72 hours.         "

## 2025-01-30 DIAGNOSIS — Z00.00 ENCOUNTER FOR MEDICARE ANNUAL WELLNESS EXAM: ICD-10-CM

## 2025-02-03 ENCOUNTER — TELEPHONE (OUTPATIENT)
Dept: FAMILY MEDICINE | Facility: CLINIC | Age: OVER 89
End: 2025-02-03
Payer: MEDICARE

## 2025-02-03 DIAGNOSIS — K62.89 ANAL PAIN: Primary | ICD-10-CM

## 2025-02-03 RX ORDER — HYDROCORTISONE 25 MG/G
CREAM TOPICAL
Qty: 28 G | Refills: 10 | Status: SHIPPED | OUTPATIENT
Start: 2025-02-03

## 2025-02-03 NOTE — TELEPHONE ENCOUNTER
----- Message from Yamila sent at 2/3/2025  2:25 PM CST -----  Contact: patient  Reese Bell  MRN: [id number]  : 1931  PCP: Oracio Johnson  Home Phone      [phone]  Work Phone      Not on file.  Mobile          [phone]      MESSAGE: home health nurse is currently with patient daughter has told her that the patient has been having issues with having a bowel movement due to Hemorids being inflamed he is feeling weak because it hurts and he is backed up. Nurse would like to see if maybe a cream can be sent in because the over the counter cream is not helping.     Pharmacy: [facility]/pharmacy #[id number] - [address]     Please advise: [phone]

## 2025-02-05 ENCOUNTER — TELEPHONE (OUTPATIENT)
Dept: FAMILY MEDICINE | Facility: CLINIC | Age: OVER 89
End: 2025-02-05
Payer: MEDICARE

## 2025-02-05 NOTE — TELEPHONE ENCOUNTER
----- Message from Eric sent at 2025  2:01 PM CST -----  Contact: Ramandeep  Reese Bell  MRN: [id number]  : 1931  PCP: Oracio Johnson  Home Phone      [phone]  Work Phone      Not on file.  Mobile          [phone]      MESSAGE:     Ramandeep with [facility] called wanting to speak with nurse about pts CPAP machine. Ramandeep stated [facility] is coming  pts machine tomorrow due to insurance change. Daughter is stating pt is talking in sleep, and delirium.          Please advise  Ramandeep  [phone]

## 2025-02-05 NOTE — TELEPHONE ENCOUNTER
Spoke with Harleen from [facility] about pt.  She states that pt is confused and talking out of his head.  I gave her a verbal for CBC, CMP and UA reflex to culture if needed.  Harleen will draw labs tomorrow at visit.  Pt also needs a new order for CPAP because his  insurance has changed.

## 2025-02-06 ENCOUNTER — LAB VISIT (OUTPATIENT)
Dept: LAB | Facility: HOSPITAL | Age: OVER 89
End: 2025-02-06
Attending: FAMILY MEDICINE
Payer: MEDICARE

## 2025-02-06 DIAGNOSIS — N39.0 URINARY TRACT INFECTION, SITE NOT SPECIFIED: Primary | ICD-10-CM

## 2025-02-06 DIAGNOSIS — N30.00 ACUTE CYSTITIS WITHOUT HEMATURIA: Primary | ICD-10-CM

## 2025-02-06 LAB
ALBUMIN SERPL BCP-MCNC: 3 G/DL (ref 3.5–5.2)
ALP SERPL-CCNC: 129 U/L (ref 40–150)
ALT SERPL W/O P-5'-P-CCNC: 10 U/L (ref 10–44)
ANION GAP SERPL CALC-SCNC: 11 MMOL/L (ref 8–16)
AST SERPL-CCNC: 17 U/L (ref 10–40)
BACTERIA #/AREA URNS HPF: ABNORMAL /HPF
BASOPHILS # BLD AUTO: 0.02 K/UL (ref 0–0.2)
BASOPHILS NFR BLD: 0.3 % (ref 0–1.9)
BILIRUB SERPL-MCNC: 0.5 MG/DL (ref 0.1–1)
BILIRUB UR QL STRIP: NEGATIVE
BUN SERPL-MCNC: 18 MG/DL (ref 10–30)
CALCIUM SERPL-MCNC: 8.9 MG/DL (ref 8.7–10.5)
CHLORIDE SERPL-SCNC: 100 MMOL/L (ref 95–110)
CLARITY UR: ABNORMAL
CO2 SERPL-SCNC: 27 MMOL/L (ref 23–29)
COLOR UR: ABNORMAL
CREAT SERPL-MCNC: 1.3 MG/DL (ref 0.5–1.4)
DIFFERENTIAL METHOD BLD: ABNORMAL
EOSINOPHIL # BLD AUTO: 0.1 K/UL (ref 0–0.5)
EOSINOPHIL NFR BLD: 1.5 % (ref 0–8)
ERYTHROCYTE [DISTWIDTH] IN BLOOD BY AUTOMATED COUNT: 14.7 % (ref 11.5–14.5)
EST. GFR  (NO RACE VARIABLE): 51 ML/MIN/1.73 M^2
GLUCOSE SERPL-MCNC: 155 MG/DL (ref 70–110)
GLUCOSE UR QL STRIP: NEGATIVE
HCT VFR BLD AUTO: 38.2 % (ref 40–54)
HGB BLD-MCNC: 11.9 G/DL (ref 14–18)
HGB UR QL STRIP: NEGATIVE
HYALINE CASTS #/AREA URNS LPF: 0 /LPF
IMM GRANULOCYTES # BLD AUTO: 0.16 K/UL (ref 0–0.04)
IMM GRANULOCYTES NFR BLD AUTO: 2.6 % (ref 0–0.5)
KETONES UR QL STRIP: NEGATIVE
LEUKOCYTE ESTERASE UR QL STRIP: ABNORMAL
LYMPHOCYTES # BLD AUTO: 0.4 K/UL (ref 1–4.8)
LYMPHOCYTES NFR BLD: 6.1 % (ref 18–48)
MCH RBC QN AUTO: 33.2 PG (ref 27–31)
MCHC RBC AUTO-ENTMCNC: 31.2 G/DL (ref 32–36)
MCV RBC AUTO: 107 FL (ref 82–98)
MICROSCOPIC COMMENT: ABNORMAL
MONOCYTES # BLD AUTO: 0.6 K/UL (ref 0.3–1)
MONOCYTES NFR BLD: 10 % (ref 4–15)
NEUTROPHILS # BLD AUTO: 4.9 K/UL (ref 1.8–7.7)
NEUTROPHILS NFR BLD: 79.5 % (ref 38–73)
NITRITE UR QL STRIP: NEGATIVE
NON-SQ EPI CELLS #/AREA URNS HPF: 2 /HPF
NRBC BLD-RTO: 0 /100 WBC
PH UR STRIP: 6 [PH] (ref 5–8)
PLATELET # BLD AUTO: 120 K/UL (ref 150–450)
PMV BLD AUTO: 8.6 FL (ref 9.2–12.9)
POTASSIUM SERPL-SCNC: 4.1 MMOL/L (ref 3.5–5.1)
PROT SERPL-MCNC: 6.7 G/DL (ref 6–8.4)
PROT UR QL STRIP: ABNORMAL
RBC # BLD AUTO: 3.58 M/UL (ref 4.6–6.2)
RBC #/AREA URNS HPF: 0 /HPF (ref 0–4)
SODIUM SERPL-SCNC: 138 MMOL/L (ref 136–145)
SP GR UR STRIP: 1.01 (ref 1–1.03)
SQUAMOUS #/AREA URNS HPF: 1 /HPF
URN SPEC COLLECT METH UR: ABNORMAL
UROBILINOGEN UR STRIP-ACNC: NEGATIVE EU/DL
WBC # BLD AUTO: 6.19 K/UL (ref 3.9–12.7)
WBC #/AREA URNS HPF: >100 /HPF (ref 0–5)

## 2025-02-06 PROCEDURE — 81000 URINALYSIS NONAUTO W/SCOPE: CPT

## 2025-02-06 PROCEDURE — 80053 COMPREHEN METABOLIC PANEL: CPT

## 2025-02-06 PROCEDURE — 85025 COMPLETE CBC W/AUTO DIFF WBC: CPT

## 2025-02-06 RX ORDER — DOXYCYCLINE HYCLATE 100 MG
100 TABLET ORAL 2 TIMES DAILY
Qty: 20 TABLET | Refills: 0 | Status: SHIPPED | OUTPATIENT
Start: 2025-02-06 | End: 2025-02-16

## 2025-02-07 DIAGNOSIS — G47.33 OSA (OBSTRUCTIVE SLEEP APNEA): Primary | ICD-10-CM

## 2025-03-21 ENCOUNTER — DOCUMENT SCAN (OUTPATIENT)
Dept: HOME HEALTH SERVICES | Facility: HOSPITAL | Age: OVER 89
End: 2025-03-21
Payer: MEDICARE